# Patient Record
Sex: MALE | Race: WHITE | NOT HISPANIC OR LATINO | URBAN - METROPOLITAN AREA
[De-identification: names, ages, dates, MRNs, and addresses within clinical notes are randomized per-mention and may not be internally consistent; named-entity substitution may affect disease eponyms.]

---

## 2017-01-01 ENCOUNTER — EMERGENCY (EMERGENCY)
Facility: HOSPITAL | Age: 72
LOS: 1 days | Discharge: HOME | End: 2017-01-01
Attending: EMERGENCY MEDICINE
Payer: MEDICARE

## 2017-01-01 ENCOUNTER — OUTPATIENT (OUTPATIENT)
Dept: OUTPATIENT SERVICES | Facility: HOSPITAL | Age: 72
LOS: 1 days | End: 2017-01-01

## 2017-01-01 VITALS
WEIGHT: 199.96 LBS | HEART RATE: 58 BPM | DIASTOLIC BLOOD PRESSURE: 70 MMHG | RESPIRATION RATE: 16 BRPM | HEIGHT: 68 IN | TEMPERATURE: 99 F | SYSTOLIC BLOOD PRESSURE: 123 MMHG | OXYGEN SATURATION: 98 %

## 2017-01-01 DIAGNOSIS — I25.10 ATHEROSCLEROTIC HEART DISEASE OF NATIVE CORONARY ARTERY WITHOUT ANGINA PECTORIS: ICD-10-CM

## 2017-01-01 DIAGNOSIS — M25.542 PAIN IN JOINTS OF LEFT HAND: ICD-10-CM
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
## 2017-01-01 DIAGNOSIS — E78.5 HYPERLIPIDEMIA, UNSPECIFIED: ICD-10-CM
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
## 2017-01-01 DIAGNOSIS — Z98.89 OTHER SPECIFIED POSTPROCEDURAL STATES: Chronic | ICD-10-CM

## 2017-01-01 DIAGNOSIS — I10 ESSENTIAL (PRIMARY) HYPERTENSION: ICD-10-CM

## 2017-01-01 DIAGNOSIS — E11.9 TYPE 2 DIABETES MELLITUS WITHOUT COMPLICATIONS: ICD-10-CM
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
## 2017-01-01 PROCEDURE — 73110 X-RAY EXAM OF WRIST: CPT | Mod: 26,LT

## 2017-01-01 PROCEDURE — 73130 X-RAY EXAM OF HAND: CPT | Mod: 26,LT

## 2017-01-01 PROCEDURE — 99284 EMERGENCY DEPT VISIT MOD MDM: CPT

## 2017-01-01 PROCEDURE — 73130 X-RAY EXAM OF HAND: CPT

## 2017-01-01 PROCEDURE — 73110 X-RAY EXAM OF WRIST: CPT

## 2017-01-01 PROCEDURE — 73070 X-RAY EXAM OF ELBOW: CPT

## 2017-01-01 PROCEDURE — 73070 X-RAY EXAM OF ELBOW: CPT | Mod: 26,LT

## 2017-03-19 NOTE — ED ADULT TRIAGE NOTE - CHIEF COMPLAINT QUOTE
s/p slip and fell c/o l hand pain from [facility] denies hitting head or loc

## 2017-03-19 NOTE — ED PROVIDER NOTE - OBJECTIVE STATEMENT
72 y/o male RHD with PMHx of HTN, HLD, and DM presents to the ED for L hand pain s/p slip and fall today. Pt reports that he was walking when he slipped and fell forward and hit his L hand. Pt denies numbness, tingling, weakness, LOC, head injury, or any other complaints. Allergies: Multiple.

## 2017-03-19 NOTE — ED PROVIDER NOTE - NS ED MD SCRIBE ATTENDING SCRIBE SECTIONS
HISTORY OF PRESENT ILLNESS/PAST MEDICAL/SURGICAL/SOCIAL HISTORY/HIV/REVIEW OF SYSTEMS/VITAL SIGNS( Pullset)/PHYSICAL EXAM/DISPOSITION

## 2017-03-19 NOTE — ED ADULT NURSE NOTE - OBJECTIVE STATEMENT
Pt presented to er from [facility] with c/o left hand pain s/p trip and fall. Pt denies loc, denies head trauma.

## 2017-03-19 NOTE — ED PROVIDER NOTE - PMH
CAD (coronary artery disease)    DM (diabetes mellitus)    HTN (hypertension)    Hyperlipidemia    Sleep apnea  hx of spleep

## 2017-03-19 NOTE — ED PROVIDER NOTE - PHYSICAL EXAMINATION
MSK: L hand tenderness and swelling of the dorsum aspect. Able to passively flex and extend wrist. Radial pulse 2+, forearm nontender. FROM at elbow.

## 2018-01-01 ENCOUNTER — INPATIENT (INPATIENT)
Facility: HOSPITAL | Age: 73
LOS: 2 days | DRG: 871 | End: 2018-02-10
Attending: INTERNAL MEDICINE | Admitting: INTERNAL MEDICINE
Payer: OTHER MISCELLANEOUS

## 2018-01-01 ENCOUNTER — INPATIENT (INPATIENT)
Facility: HOSPITAL | Age: 73
LOS: 17 days | Discharge: HOME | DRG: 870 | End: 2018-02-07
Attending: INTERNAL MEDICINE | Admitting: INTERNAL MEDICINE
Payer: MEDICARE

## 2018-01-01 VITALS
SYSTOLIC BLOOD PRESSURE: 113 MMHG | HEART RATE: 92 BPM | TEMPERATURE: 98 F | RESPIRATION RATE: 16 BRPM | DIASTOLIC BLOOD PRESSURE: 47 MMHG

## 2018-01-01 VITALS
SYSTOLIC BLOOD PRESSURE: 121 MMHG | DIASTOLIC BLOOD PRESSURE: 43 MMHG | RESPIRATION RATE: 22 BRPM | TEMPERATURE: 99 F | HEART RATE: 109 BPM | OXYGEN SATURATION: 94 %

## 2018-01-01 VITALS
SYSTOLIC BLOOD PRESSURE: 82 MMHG | HEART RATE: 91 BPM | OXYGEN SATURATION: 98 % | WEIGHT: 199.96 LBS | TEMPERATURE: 101 F | HEIGHT: 73 IN | DIASTOLIC BLOOD PRESSURE: 53 MMHG

## 2018-01-01 VITALS — HEART RATE: 91 BPM | RESPIRATION RATE: 20 BRPM | OXYGEN SATURATION: 100 %

## 2018-01-01 DIAGNOSIS — J80 ACUTE RESPIRATORY DISTRESS SYNDROME: ICD-10-CM

## 2018-01-01 DIAGNOSIS — L98.9 DISORDER OF THE SKIN AND SUBCUTANEOUS TISSUE, UNSPECIFIED: ICD-10-CM

## 2018-01-01 DIAGNOSIS — R53.81 OTHER MALAISE: ICD-10-CM

## 2018-01-01 DIAGNOSIS — Z98.89 OTHER SPECIFIED POSTPROCEDURAL STATES: Chronic | ICD-10-CM

## 2018-01-01 DIAGNOSIS — A41.9 SEPSIS, UNSPECIFIED ORGANISM: ICD-10-CM

## 2018-01-01 DIAGNOSIS — N18.6 END STAGE RENAL DISEASE: ICD-10-CM

## 2018-01-01 DIAGNOSIS — I12.0 HYPERTENSIVE CHRONIC KIDNEY DISEASE WITH STAGE 5 CHRONIC KIDNEY DISEASE OR END STAGE RENAL DISEASE: ICD-10-CM

## 2018-01-01 DIAGNOSIS — Z29.9 ENCOUNTER FOR PROPHYLACTIC MEASURES, UNSPECIFIED: ICD-10-CM

## 2018-01-01 DIAGNOSIS — R06.00 DYSPNEA, UNSPECIFIED: ICD-10-CM

## 2018-01-01 DIAGNOSIS — J96.00 ACUTE RESPIRATORY FAILURE, UNSPECIFIED WHETHER WITH HYPOXIA OR HYPERCAPNIA: ICD-10-CM

## 2018-01-01 DIAGNOSIS — I10 ESSENTIAL (PRIMARY) HYPERTENSION: ICD-10-CM

## 2018-01-01 DIAGNOSIS — K11.7 DISTURBANCES OF SALIVARY SECRETION: ICD-10-CM

## 2018-01-01 DIAGNOSIS — R41.0 DISORIENTATION, UNSPECIFIED: ICD-10-CM

## 2018-01-01 DIAGNOSIS — K92.2 GASTROINTESTINAL HEMORRHAGE, UNSPECIFIED: ICD-10-CM

## 2018-01-01 DIAGNOSIS — J96.90 RESPIRATORY FAILURE, UNSPECIFIED, UNSPECIFIED WHETHER WITH HYPOXIA OR HYPERCAPNIA: ICD-10-CM

## 2018-01-01 DIAGNOSIS — N17.9 ACUTE KIDNEY FAILURE, UNSPECIFIED: ICD-10-CM

## 2018-01-01 DIAGNOSIS — R52 PAIN, UNSPECIFIED: ICD-10-CM

## 2018-01-01 DIAGNOSIS — K59.03 DRUG INDUCED CONSTIPATION: ICD-10-CM

## 2018-01-01 DIAGNOSIS — Z51.5 ENCOUNTER FOR PALLIATIVE CARE: ICD-10-CM

## 2018-01-01 DIAGNOSIS — E11.9 TYPE 2 DIABETES MELLITUS WITHOUT COMPLICATIONS: ICD-10-CM

## 2018-01-01 DIAGNOSIS — R69 ILLNESS, UNSPECIFIED: ICD-10-CM

## 2018-01-01 DIAGNOSIS — N19 UNSPECIFIED KIDNEY FAILURE: ICD-10-CM

## 2018-01-01 DIAGNOSIS — I25.10 ATHEROSCLEROTIC HEART DISEASE OF NATIVE CORONARY ARTERY WITHOUT ANGINA PECTORIS: ICD-10-CM

## 2018-01-01 DIAGNOSIS — J96.01 ACUTE RESPIRATORY FAILURE WITH HYPOXIA: ICD-10-CM

## 2018-01-01 LAB
24R-OH-CALCIDIOL SERPL-MCNC: 31 NG/ML — SIGNIFICANT CHANGE UP (ref 30–80)
ALBUMIN SERPL ELPH-MCNC: 1.5 G/DL — LOW (ref 3.5–5)
ALBUMIN SERPL ELPH-MCNC: 1.5 G/DL — LOW (ref 3.5–5)
ALBUMIN SERPL ELPH-MCNC: 1.6 G/DL — LOW (ref 3.5–5)
ALBUMIN SERPL ELPH-MCNC: 1.8 G/DL — LOW (ref 3.5–5)
ALBUMIN SERPL ELPH-MCNC: 1.9 G/DL — LOW (ref 3.5–5)
ALBUMIN SERPL ELPH-MCNC: 2 G/DL — LOW (ref 3.5–5)
ALBUMIN SERPL ELPH-MCNC: 2 G/DL — LOW (ref 3.5–5)
ALBUMIN SERPL ELPH-MCNC: 2.1 G/DL — LOW (ref 3.5–5)
ALBUMIN SERPL ELPH-MCNC: 2.2 G/DL — LOW (ref 3.5–5)
ALBUMIN SERPL ELPH-MCNC: 2.5 G/DL — LOW (ref 3.5–5)
ALBUMIN SERPL ELPH-MCNC: 3.3 G/DL — LOW (ref 3.5–5)
ALP SERPL-CCNC: 118 U/L — SIGNIFICANT CHANGE UP (ref 40–120)
ALP SERPL-CCNC: 119 U/L — SIGNIFICANT CHANGE UP (ref 40–120)
ALP SERPL-CCNC: 131 U/L — HIGH (ref 40–120)
ALP SERPL-CCNC: 142 U/L — HIGH (ref 40–120)
ALP SERPL-CCNC: 145 U/L — HIGH (ref 40–120)
ALP SERPL-CCNC: 160 U/L — HIGH (ref 40–120)
ALP SERPL-CCNC: 165 U/L — HIGH (ref 40–120)
ALP SERPL-CCNC: 207 U/L — HIGH (ref 40–120)
ALP SERPL-CCNC: 374 U/L — HIGH (ref 40–120)
ALP SERPL-CCNC: 43 U/L — SIGNIFICANT CHANGE UP (ref 40–120)
ALP SERPL-CCNC: 52 U/L — SIGNIFICANT CHANGE UP (ref 40–120)
ALP SERPL-CCNC: 65 U/L — SIGNIFICANT CHANGE UP (ref 40–120)
ALP SERPL-CCNC: 78 U/L — SIGNIFICANT CHANGE UP (ref 40–120)
ALP SERPL-CCNC: 85 U/L — SIGNIFICANT CHANGE UP (ref 40–120)
ALP SERPL-CCNC: 96 U/L — SIGNIFICANT CHANGE UP (ref 40–120)
ALT FLD-CCNC: 100 U/L DA — HIGH (ref 10–60)
ALT FLD-CCNC: 111 U/L DA — HIGH (ref 10–60)
ALT FLD-CCNC: 122 U/L DA — HIGH (ref 10–60)
ALT FLD-CCNC: 147 U/L DA — HIGH (ref 10–60)
ALT FLD-CCNC: 175 U/L DA — HIGH (ref 10–60)
ALT FLD-CCNC: 22 U/L DA — SIGNIFICANT CHANGE UP (ref 10–60)
ALT FLD-CCNC: 32 U/L DA — SIGNIFICANT CHANGE UP (ref 10–60)
ALT FLD-CCNC: 35 U/L DA — SIGNIFICANT CHANGE UP (ref 10–60)
ALT FLD-CCNC: 36 U/L DA — SIGNIFICANT CHANGE UP (ref 10–60)
ALT FLD-CCNC: 36 U/L DA — SIGNIFICANT CHANGE UP (ref 10–60)
ALT FLD-CCNC: 48 U/L DA — SIGNIFICANT CHANGE UP (ref 10–60)
ALT FLD-CCNC: 72 U/L DA — HIGH (ref 10–60)
ALT FLD-CCNC: 86 U/L DA — HIGH (ref 10–60)
ALT FLD-CCNC: 92 U/L DA — HIGH (ref 10–60)
ALT FLD-CCNC: 97 U/L DA — HIGH (ref 10–60)
ANION GAP SERPL CALC-SCNC: 10 MMOL/L — SIGNIFICANT CHANGE UP (ref 5–17)
ANION GAP SERPL CALC-SCNC: 11 MMOL/L — SIGNIFICANT CHANGE UP (ref 5–17)
ANION GAP SERPL CALC-SCNC: 12 MMOL/L — SIGNIFICANT CHANGE UP (ref 5–17)
ANION GAP SERPL CALC-SCNC: 14 MMOL/L — SIGNIFICANT CHANGE UP (ref 5–17)
ANION GAP SERPL CALC-SCNC: 14 MMOL/L — SIGNIFICANT CHANGE UP (ref 5–17)
ANION GAP SERPL CALC-SCNC: 15 MMOL/L — SIGNIFICANT CHANGE UP (ref 5–17)
ANION GAP SERPL CALC-SCNC: 16 MMOL/L — SIGNIFICANT CHANGE UP (ref 5–17)
ANION GAP SERPL CALC-SCNC: 17 MMOL/L — SIGNIFICANT CHANGE UP (ref 5–17)
ANION GAP SERPL CALC-SCNC: 18 MMOL/L — HIGH (ref 5–17)
ANION GAP SERPL CALC-SCNC: 19 MMOL/L — HIGH (ref 5–17)
ANION GAP SERPL CALC-SCNC: 19 MMOL/L — HIGH (ref 5–17)
ANION GAP SERPL CALC-SCNC: 20 MMOL/L — HIGH (ref 5–17)
ANION GAP SERPL CALC-SCNC: 23 MMOL/L — HIGH (ref 5–17)
ANION GAP SERPL CALC-SCNC: 9 MMOL/L — SIGNIFICANT CHANGE UP (ref 5–17)
APPEARANCE UR: ABNORMAL
APPEARANCE UR: CLEAR — SIGNIFICANT CHANGE UP
APPEARANCE UR: CLEAR — SIGNIFICANT CHANGE UP
APTT BLD: 102.4 SEC — HIGH (ref 27.5–37.4)
APTT BLD: 27.6 SEC — SIGNIFICANT CHANGE UP (ref 27.5–37.4)
APTT BLD: 32.5 SEC — SIGNIFICANT CHANGE UP (ref 27.5–37.4)
APTT BLD: 40.1 SEC — HIGH (ref 27.5–37.4)
APTT BLD: 56 SEC — HIGH (ref 27.5–37.4)
APTT BLD: 60.2 SEC — HIGH (ref 27.5–37.4)
APTT BLD: 64 SEC — HIGH (ref 27.5–37.4)
APTT BLD: 82 SEC — HIGH (ref 27.5–37.4)
AST SERPL-CCNC: 105 U/L — HIGH (ref 10–40)
AST SERPL-CCNC: 113 U/L — HIGH (ref 10–40)
AST SERPL-CCNC: 114 U/L — HIGH (ref 10–40)
AST SERPL-CCNC: 119 U/L — HIGH (ref 10–40)
AST SERPL-CCNC: 129 U/L — HIGH (ref 10–40)
AST SERPL-CCNC: 130 U/L — HIGH (ref 10–40)
AST SERPL-CCNC: 185 U/L — HIGH (ref 10–40)
AST SERPL-CCNC: 29 U/L — SIGNIFICANT CHANGE UP (ref 10–40)
AST SERPL-CCNC: 29 U/L — SIGNIFICANT CHANGE UP (ref 10–40)
AST SERPL-CCNC: 33 U/L — SIGNIFICANT CHANGE UP (ref 10–40)
AST SERPL-CCNC: 34 U/L — SIGNIFICANT CHANGE UP (ref 10–40)
AST SERPL-CCNC: 50 U/L — HIGH (ref 10–40)
AST SERPL-CCNC: 66 U/L — HIGH (ref 10–40)
AST SERPL-CCNC: 77 U/L — HIGH (ref 10–40)
AST SERPL-CCNC: 98 U/L — HIGH (ref 10–40)
BASE EXCESS BLDA CALC-SCNC: -10 MMOL/L — LOW (ref -2–2)
BASE EXCESS BLDA CALC-SCNC: -10.3 MMOL/L — LOW (ref -2–2)
BASE EXCESS BLDA CALC-SCNC: -10.4 MMOL/L — LOW (ref -2–2)
BASE EXCESS BLDA CALC-SCNC: -11.3 MMOL/L — LOW (ref -2–2)
BASE EXCESS BLDA CALC-SCNC: -4.6 MMOL/L — LOW (ref -2–2)
BASE EXCESS BLDA CALC-SCNC: -4.7 MMOL/L — LOW (ref -2–2)
BASE EXCESS BLDA CALC-SCNC: -5.8 MMOL/L — LOW (ref -2–2)
BASE EXCESS BLDA CALC-SCNC: -6.4 MMOL/L — LOW (ref -2–2)
BASE EXCESS BLDA CALC-SCNC: -6.8 MMOL/L — LOW (ref -2–2)
BASE EXCESS BLDA CALC-SCNC: -7 MMOL/L — LOW (ref -2–2)
BASE EXCESS BLDA CALC-SCNC: -7.4 MMOL/L — LOW (ref -2–2)
BASE EXCESS BLDA CALC-SCNC: -7.7 MMOL/L — LOW (ref -2–2)
BASE EXCESS BLDA CALC-SCNC: -8.1 MMOL/L — LOW (ref -2–2)
BASE EXCESS BLDA CALC-SCNC: -8.5 MMOL/L — LOW (ref -2–2)
BASE EXCESS BLDA CALC-SCNC: -8.6 MMOL/L — LOW (ref -2–2)
BASE EXCESS BLDA CALC-SCNC: -9.3 MMOL/L — LOW (ref -2–2)
BASE EXCESS BLDA CALC-SCNC: -9.5 MMOL/L — LOW (ref -2–2)
BASE EXCESS BLDV CALC-SCNC: -11.7 MMOL/L — LOW (ref -2–2)
BASE EXCESS BLDV CALC-SCNC: -5.3 MMOL/L — LOW (ref -2–2)
BASE EXCESS BLDV CALC-SCNC: -7.3 MMOL/L — LOW (ref -2–2)
BASE EXCESS BLDV CALC-SCNC: -8.3 MMOL/L — LOW (ref -2–2)
BASE EXCESS BLDV CALC-SCNC: -9.9 MMOL/L — LOW (ref -2–2)
BASOPHILS # BLD AUTO: 0 K/UL — SIGNIFICANT CHANGE UP (ref 0–0.2)
BASOPHILS # BLD AUTO: 0.1 K/UL — SIGNIFICANT CHANGE UP (ref 0–0.2)
BASOPHILS # BLD AUTO: 0.2 K/UL — SIGNIFICANT CHANGE UP (ref 0–0.2)
BASOPHILS NFR BLD AUTO: 0.5 % — SIGNIFICANT CHANGE UP (ref 0–2)
BASOPHILS NFR BLD AUTO: 0.8 % — SIGNIFICANT CHANGE UP (ref 0–2)
BASOPHILS NFR BLD AUTO: 0.9 % — SIGNIFICANT CHANGE UP (ref 0–2)
BASOPHILS NFR BLD AUTO: 1 % — SIGNIFICANT CHANGE UP (ref 0–2)
BASOPHILS NFR BLD AUTO: 1 % — SIGNIFICANT CHANGE UP (ref 0–2)
BASOPHILS NFR BLD AUTO: 1.1 % — SIGNIFICANT CHANGE UP (ref 0–2)
BASOPHILS NFR BLD AUTO: 1.2 % — SIGNIFICANT CHANGE UP (ref 0–2)
BASOPHILS NFR BLD AUTO: 1.5 % — SIGNIFICANT CHANGE UP (ref 0–2)
BILIRUB DIRECT SERPL-MCNC: 0.5 MG/DL — HIGH (ref 0–0.2)
BILIRUB DIRECT SERPL-MCNC: 1.4 MG/DL — HIGH (ref 0–0.2)
BILIRUB DIRECT SERPL-MCNC: 1.6 MG/DL — HIGH (ref 0–0.2)
BILIRUB INDIRECT FLD-MCNC: 0.4 MG/DL — SIGNIFICANT CHANGE UP (ref 0.2–1)
BILIRUB INDIRECT FLD-MCNC: 0.6 MG/DL — SIGNIFICANT CHANGE UP (ref 0.2–1)
BILIRUB INDIRECT FLD-MCNC: 0.7 MG/DL — SIGNIFICANT CHANGE UP (ref 0.2–1)
BILIRUB SERPL-MCNC: 0.5 MG/DL — SIGNIFICANT CHANGE UP (ref 0.2–1.2)
BILIRUB SERPL-MCNC: 0.5 MG/DL — SIGNIFICANT CHANGE UP (ref 0.2–1.2)
BILIRUB SERPL-MCNC: 0.7 MG/DL — SIGNIFICANT CHANGE UP (ref 0.2–1.2)
BILIRUB SERPL-MCNC: 0.7 MG/DL — SIGNIFICANT CHANGE UP (ref 0.2–1.2)
BILIRUB SERPL-MCNC: 0.8 MG/DL — SIGNIFICANT CHANGE UP (ref 0.2–1.2)
BILIRUB SERPL-MCNC: 0.9 MG/DL — SIGNIFICANT CHANGE UP (ref 0.2–1.2)
BILIRUB SERPL-MCNC: 1 MG/DL — SIGNIFICANT CHANGE UP (ref 0.2–1.2)
BILIRUB SERPL-MCNC: 1.1 MG/DL — SIGNIFICANT CHANGE UP (ref 0.2–1.2)
BILIRUB SERPL-MCNC: 1.1 MG/DL — SIGNIFICANT CHANGE UP (ref 0.2–1.2)
BILIRUB SERPL-MCNC: 1.2 MG/DL — SIGNIFICANT CHANGE UP (ref 0.2–1.2)
BILIRUB SERPL-MCNC: 1.4 MG/DL — HIGH (ref 0.2–1.2)
BILIRUB SERPL-MCNC: 1.7 MG/DL — HIGH (ref 0.2–1.2)
BILIRUB SERPL-MCNC: 2 MG/DL — HIGH (ref 0.2–1.2)
BILIRUB SERPL-MCNC: 2.1 MG/DL — HIGH (ref 0.2–1.2)
BILIRUB SERPL-MCNC: 2.2 MG/DL — HIGH (ref 0.2–1.2)
BILIRUB UR-MCNC: ABNORMAL
BILIRUB UR-MCNC: ABNORMAL
BILIRUB UR-MCNC: NEGATIVE — SIGNIFICANT CHANGE UP
BLOOD GAS COMMENTS ARTERIAL: SIGNIFICANT CHANGE UP
BLOOD GAS COMMENTS, VENOUS: SIGNIFICANT CHANGE UP
BUN SERPL-MCNC: 104 MG/DL — HIGH (ref 7–18)
BUN SERPL-MCNC: 108 MG/DL — HIGH (ref 7–18)
BUN SERPL-MCNC: 114 MG/DL — HIGH (ref 7–18)
BUN SERPL-MCNC: 122 MG/DL — HIGH (ref 7–18)
BUN SERPL-MCNC: 125 MG/DL — HIGH (ref 7–18)
BUN SERPL-MCNC: 137 MG/DL — HIGH (ref 7–18)
BUN SERPL-MCNC: 143 MG/DL — HIGH (ref 7–18)
BUN SERPL-MCNC: 143 MG/DL — HIGH (ref 7–18)
BUN SERPL-MCNC: 144 MG/DL — HIGH (ref 7–18)
BUN SERPL-MCNC: 156 MG/DL — HIGH (ref 7–18)
BUN SERPL-MCNC: 161 MG/DL — HIGH (ref 7–18)
BUN SERPL-MCNC: 161 MG/DL — SIGNIFICANT CHANGE UP (ref 7–18)
BUN SERPL-MCNC: 161 MG/DL — SIGNIFICANT CHANGE UP (ref 7–18)
BUN SERPL-MCNC: 165 MG/DL — SIGNIFICANT CHANGE UP (ref 7–18)
BUN SERPL-MCNC: 167 MG/DL — HIGH (ref 7–18)
BUN SERPL-MCNC: 175 MG/DL — HIGH (ref 7–18)
BUN SERPL-MCNC: 194 MG/DL — HIGH (ref 7–18)
BUN SERPL-MCNC: 20 MG/DL — HIGH (ref 7–18)
BUN SERPL-MCNC: 204 MG/DL — SIGNIFICANT CHANGE UP (ref 7–18)
BUN SERPL-MCNC: 204 MG/DL — SIGNIFICANT CHANGE UP (ref 7–18)
BUN SERPL-MCNC: 219 MG/DL — SIGNIFICANT CHANGE UP (ref 7–18)
BUN SERPL-MCNC: 224 MG/DL — SIGNIFICANT CHANGE UP (ref 7–18)
BUN SERPL-MCNC: 42 MG/DL — HIGH (ref 7–18)
BUN SERPL-MCNC: 53 MG/DL — HIGH (ref 7–18)
BUN SERPL-MCNC: 67 MG/DL — HIGH (ref 7–18)
BUN SERPL-MCNC: 71 MG/DL — HIGH (ref 7–18)
BUN SERPL-MCNC: 74 MG/DL — HIGH (ref 7–18)
BUN SERPL-MCNC: 76 MG/DL — HIGH (ref 7–18)
BUN SERPL-MCNC: 82 MG/DL — HIGH (ref 7–18)
BUN SERPL-MCNC: 82 MG/DL — HIGH (ref 7–18)
BUN SERPL-MCNC: 87 MG/DL — HIGH (ref 7–18)
BUN SERPL-MCNC: 88 MG/DL — HIGH (ref 7–18)
CALCIUM SERPL-MCNC: 6.1 MG/DL — SIGNIFICANT CHANGE UP (ref 8.4–10.5)
CALCIUM SERPL-MCNC: 6.2 MG/DL — CRITICAL LOW (ref 8.4–10.5)
CALCIUM SERPL-MCNC: 6.3 MG/DL — CRITICAL LOW (ref 8.4–10.5)
CALCIUM SERPL-MCNC: 6.3 MG/DL — CRITICAL LOW (ref 8.4–10.5)
CALCIUM SERPL-MCNC: 6.4 MG/DL — CRITICAL LOW (ref 8.4–10.5)
CALCIUM SERPL-MCNC: 6.5 MG/DL — CRITICAL LOW (ref 8.4–10.5)
CALCIUM SERPL-MCNC: 6.5 MG/DL — CRITICAL LOW (ref 8.4–10.5)
CALCIUM SERPL-MCNC: 6.5 MG/DL — SIGNIFICANT CHANGE UP (ref 8.4–10.5)
CALCIUM SERPL-MCNC: 6.6 MG/DL — LOW (ref 8.4–10.5)
CALCIUM SERPL-MCNC: 6.7 MG/DL — LOW (ref 8.4–10.5)
CALCIUM SERPL-MCNC: 6.7 MG/DL — LOW (ref 8.4–10.5)
CALCIUM SERPL-MCNC: 6.8 MG/DL — LOW (ref 8.4–10.5)
CALCIUM SERPL-MCNC: 7 MG/DL — LOW (ref 8.4–10.5)
CALCIUM SERPL-MCNC: 7 MG/DL — LOW (ref 8.4–10.5)
CALCIUM SERPL-MCNC: 7.1 MG/DL — LOW (ref 8.4–10.5)
CALCIUM SERPL-MCNC: 7.2 MG/DL — LOW (ref 8.4–10.5)
CALCIUM SERPL-MCNC: 7.4 MG/DL — LOW (ref 8.4–10.5)
CALCIUM SERPL-MCNC: 7.4 MG/DL — LOW (ref 8.4–10.5)
CALCIUM SERPL-MCNC: 7.6 MG/DL — LOW (ref 8.4–10.5)
CALCIUM SERPL-MCNC: 7.6 MG/DL — LOW (ref 8.4–10.5)
CALCIUM SERPL-MCNC: 7.9 MG/DL — LOW (ref 8.4–10.5)
CALCIUM SERPL-MCNC: 8 MG/DL — LOW (ref 8.4–10.5)
CALCIUM SERPL-MCNC: 8 MG/DL — LOW (ref 8.4–10.5)
CALCIUM SERPL-MCNC: 8.5 MG/DL — SIGNIFICANT CHANGE UP (ref 8.4–10.5)
CALCIUM UR-MCNC: 1 MG/DL — SIGNIFICANT CHANGE UP
CHLORIDE SERPL-SCNC: 100 MMOL/L — SIGNIFICANT CHANGE UP (ref 96–108)
CHLORIDE SERPL-SCNC: 100 MMOL/L — SIGNIFICANT CHANGE UP (ref 96–108)
CHLORIDE SERPL-SCNC: 101 MMOL/L — SIGNIFICANT CHANGE UP (ref 96–108)
CHLORIDE SERPL-SCNC: 102 MMOL/L — SIGNIFICANT CHANGE UP (ref 96–108)
CHLORIDE SERPL-SCNC: 102 MMOL/L — SIGNIFICANT CHANGE UP (ref 96–108)
CHLORIDE SERPL-SCNC: 105 MMOL/L — SIGNIFICANT CHANGE UP (ref 96–108)
CHLORIDE SERPL-SCNC: 106 MMOL/L — SIGNIFICANT CHANGE UP (ref 96–108)
CHLORIDE SERPL-SCNC: 107 MMOL/L — SIGNIFICANT CHANGE UP (ref 96–108)
CHLORIDE SERPL-SCNC: 107 MMOL/L — SIGNIFICANT CHANGE UP (ref 96–108)
CHLORIDE SERPL-SCNC: 109 MMOL/L — HIGH (ref 96–108)
CHLORIDE SERPL-SCNC: 109 MMOL/L — HIGH (ref 96–108)
CHLORIDE SERPL-SCNC: 113 MMOL/L — HIGH (ref 96–108)
CHLORIDE SERPL-SCNC: 94 MMOL/L — LOW (ref 96–108)
CHLORIDE SERPL-SCNC: 95 MMOL/L — LOW (ref 96–108)
CHLORIDE SERPL-SCNC: 95 MMOL/L — LOW (ref 96–108)
CHLORIDE SERPL-SCNC: 96 MMOL/L — SIGNIFICANT CHANGE UP (ref 96–108)
CHLORIDE SERPL-SCNC: 97 MMOL/L — SIGNIFICANT CHANGE UP (ref 96–108)
CHLORIDE SERPL-SCNC: 98 MMOL/L — SIGNIFICANT CHANGE UP (ref 96–108)
CHLORIDE SERPL-SCNC: 98 MMOL/L — SIGNIFICANT CHANGE UP (ref 96–108)
CHLORIDE SERPL-SCNC: 99 MMOL/L — SIGNIFICANT CHANGE UP (ref 96–108)
CHOLEST SERPL-MCNC: <50 MG/DL — LOW (ref 10–199)
CK MB BLD-MCNC: 0.1 % — SIGNIFICANT CHANGE UP (ref 0–3.5)
CK MB BLD-MCNC: 0.2 % — SIGNIFICANT CHANGE UP (ref 0–3.5)
CK MB BLD-MCNC: 0.2 % — SIGNIFICANT CHANGE UP (ref 0–3.5)
CK MB BLD-MCNC: 0.3 % — SIGNIFICANT CHANGE UP (ref 0–3.5)
CK MB CFR SERPL CALC: 1.4 NG/ML — SIGNIFICANT CHANGE UP (ref 0–3.6)
CK MB CFR SERPL CALC: 2.4 NG/ML — SIGNIFICANT CHANGE UP (ref 0–3.6)
CK MB CFR SERPL CALC: 2.9 NG/ML — SIGNIFICANT CHANGE UP (ref 0–3.6)
CK MB CFR SERPL CALC: 3.3 NG/ML — SIGNIFICANT CHANGE UP (ref 0–3.6)
CK SERPL-CCNC: 1005 U/L — HIGH (ref 35–232)
CK SERPL-CCNC: 1224 U/L — HIGH (ref 35–232)
CK SERPL-CCNC: 1298 U/L — HIGH (ref 35–232)
CK SERPL-CCNC: 1395 U/L — HIGH (ref 35–232)
CK SERPL-CCNC: 700 U/L — HIGH (ref 35–232)
CO2 SERPL-SCNC: 17 MMOL/L — LOW (ref 22–31)
CO2 SERPL-SCNC: 17 MMOL/L — LOW (ref 22–31)
CO2 SERPL-SCNC: 18 MMOL/L — LOW (ref 22–31)
CO2 SERPL-SCNC: 19 MMOL/L — LOW (ref 22–31)
CO2 SERPL-SCNC: 19 MMOL/L — LOW (ref 22–31)
CO2 SERPL-SCNC: 20 MMOL/L — LOW (ref 22–31)
CO2 SERPL-SCNC: 21 MMOL/L — LOW (ref 22–31)
CO2 SERPL-SCNC: 22 MMOL/L — SIGNIFICANT CHANGE UP (ref 22–31)
CO2 SERPL-SCNC: 23 MMOL/L — SIGNIFICANT CHANGE UP (ref 22–31)
CO2 SERPL-SCNC: 23 MMOL/L — SIGNIFICANT CHANGE UP (ref 22–31)
COLOR SPEC: YELLOW — SIGNIFICANT CHANGE UP
CREAT ?TM UR-MCNC: 85 MG/DL — SIGNIFICANT CHANGE UP
CREAT ?TM UR-MCNC: 90 MG/DL — SIGNIFICANT CHANGE UP
CREAT SERPL-MCNC: 0.8 MG/DL — SIGNIFICANT CHANGE UP (ref 0.5–1.3)
CREAT SERPL-MCNC: 2.66 MG/DL — HIGH (ref 0.5–1.3)
CREAT SERPL-MCNC: 2.92 MG/DL — HIGH (ref 0.5–1.3)
CREAT SERPL-MCNC: 3.77 MG/DL — HIGH (ref 0.5–1.3)
CREAT SERPL-MCNC: 4.73 MG/DL — HIGH (ref 0.5–1.3)
CREAT SERPL-MCNC: 4.78 MG/DL — HIGH (ref 0.5–1.3)
CREAT SERPL-MCNC: 4.94 MG/DL — HIGH (ref 0.5–1.3)
CREAT SERPL-MCNC: 5.18 MG/DL — HIGH (ref 0.5–1.3)
CREAT SERPL-MCNC: 5.25 MG/DL — HIGH (ref 0.5–1.3)
CREAT SERPL-MCNC: 5.26 MG/DL — HIGH (ref 0.5–1.3)
CREAT SERPL-MCNC: 5.65 MG/DL — HIGH (ref 0.5–1.3)
CREAT SERPL-MCNC: 6.02 MG/DL — HIGH (ref 0.5–1.3)
CREAT SERPL-MCNC: 6.04 MG/DL — HIGH (ref 0.5–1.3)
CREAT SERPL-MCNC: 6.04 MG/DL — HIGH (ref 0.5–1.3)
CREAT SERPL-MCNC: 6.45 MG/DL — HIGH (ref 0.5–1.3)
CREAT SERPL-MCNC: 6.61 MG/DL — HIGH (ref 0.5–1.3)
CREAT SERPL-MCNC: 6.63 MG/DL — HIGH (ref 0.5–1.3)
CREAT SERPL-MCNC: 6.75 MG/DL — HIGH (ref 0.5–1.3)
CREAT SERPL-MCNC: 6.87 MG/DL — HIGH (ref 0.5–1.3)
CREAT SERPL-MCNC: 6.87 MG/DL — HIGH (ref 0.5–1.3)
CREAT SERPL-MCNC: 6.91 MG/DL — HIGH (ref 0.5–1.3)
CREAT SERPL-MCNC: 6.91 MG/DL — HIGH (ref 0.5–1.3)
CREAT SERPL-MCNC: 6.96 MG/DL — HIGH (ref 0.5–1.3)
CREAT SERPL-MCNC: 7.14 MG/DL — HIGH (ref 0.5–1.3)
CREAT SERPL-MCNC: 7.25 MG/DL — HIGH (ref 0.5–1.3)
CREAT SERPL-MCNC: 7.34 MG/DL — HIGH (ref 0.5–1.3)
CREAT SERPL-MCNC: 7.46 MG/DL — HIGH (ref 0.5–1.3)
CREAT SERPL-MCNC: 7.62 MG/DL — HIGH (ref 0.5–1.3)
CREAT SERPL-MCNC: 7.7 MG/DL — HIGH (ref 0.5–1.3)
CREAT SERPL-MCNC: 7.72 MG/DL — HIGH (ref 0.5–1.3)
CREAT SERPL-MCNC: 7.92 MG/DL — HIGH (ref 0.5–1.3)
CREAT SERPL-MCNC: 7.97 MG/DL — HIGH (ref 0.5–1.3)
CULTURE RESULTS: NO GROWTH — SIGNIFICANT CHANGE UP
CULTURE RESULTS: SIGNIFICANT CHANGE UP
D DIMER BLD IA.RAPID-MCNC: 1131 NG/ML DDU — HIGH
DIFF PNL FLD: ABNORMAL
EOSINOPHIL # BLD AUTO: 0 K/UL — SIGNIFICANT CHANGE UP (ref 0–0.5)
EOSINOPHIL # BLD AUTO: 0.1 K/UL — SIGNIFICANT CHANGE UP (ref 0–0.5)
EOSINOPHIL # BLD AUTO: 0.2 K/UL — SIGNIFICANT CHANGE UP (ref 0–0.5)
EOSINOPHIL # BLD AUTO: 0.2 K/UL — SIGNIFICANT CHANGE UP (ref 0–0.5)
EOSINOPHIL # BLD AUTO: 0.4 K/UL — SIGNIFICANT CHANGE UP (ref 0–0.5)
EOSINOPHIL NFR BLD AUTO: 0 % — SIGNIFICANT CHANGE UP (ref 0–6)
EOSINOPHIL NFR BLD AUTO: 0 % — SIGNIFICANT CHANGE UP (ref 0–6)
EOSINOPHIL NFR BLD AUTO: 0.1 % — SIGNIFICANT CHANGE UP (ref 0–6)
EOSINOPHIL NFR BLD AUTO: 0.2 % — SIGNIFICANT CHANGE UP (ref 0–6)
EOSINOPHIL NFR BLD AUTO: 0.7 % — SIGNIFICANT CHANGE UP (ref 0–6)
EOSINOPHIL NFR BLD AUTO: 0.8 % — SIGNIFICANT CHANGE UP (ref 0–6)
EOSINOPHIL NFR BLD AUTO: 0.9 % — SIGNIFICANT CHANGE UP (ref 0–6)
EOSINOPHIL NFR BLD AUTO: 1 % — SIGNIFICANT CHANGE UP (ref 0–6)
EOSINOPHIL NFR BLD AUTO: 1 % — SIGNIFICANT CHANGE UP (ref 0–6)
EOSINOPHIL NFR BLD AUTO: 1.7 % — SIGNIFICANT CHANGE UP (ref 0–6)
EOSINOPHIL NFR BLD AUTO: 2.7 % — SIGNIFICANT CHANGE UP (ref 0–6)
FIBRINOGEN PPP-MCNC: 1106 MG/DL — HIGH (ref 310–510)
FLUBV RNA SPEC QL NAA+PROBE: DETECTED
GLUCOSE SERPL-MCNC: 137 MG/DL — HIGH (ref 70–99)
GLUCOSE SERPL-MCNC: 140 MG/DL — HIGH (ref 70–99)
GLUCOSE SERPL-MCNC: 152 MG/DL — HIGH (ref 70–99)
GLUCOSE SERPL-MCNC: 157 MG/DL — HIGH (ref 70–99)
GLUCOSE SERPL-MCNC: 165 MG/DL — HIGH (ref 70–99)
GLUCOSE SERPL-MCNC: 169 MG/DL — HIGH (ref 70–99)
GLUCOSE SERPL-MCNC: 191 MG/DL — HIGH (ref 70–99)
GLUCOSE SERPL-MCNC: 227 MG/DL — HIGH (ref 70–99)
GLUCOSE SERPL-MCNC: 236 MG/DL — HIGH (ref 70–99)
GLUCOSE SERPL-MCNC: 244 MG/DL — HIGH (ref 70–99)
GLUCOSE SERPL-MCNC: 254 MG/DL — HIGH (ref 70–99)
GLUCOSE SERPL-MCNC: 255 MG/DL — HIGH (ref 70–99)
GLUCOSE SERPL-MCNC: 261 MG/DL — HIGH (ref 70–99)
GLUCOSE SERPL-MCNC: 263 MG/DL — HIGH (ref 70–99)
GLUCOSE SERPL-MCNC: 265 MG/DL — HIGH (ref 70–99)
GLUCOSE SERPL-MCNC: 274 MG/DL — HIGH (ref 70–99)
GLUCOSE SERPL-MCNC: 292 MG/DL — HIGH (ref 70–99)
GLUCOSE SERPL-MCNC: 297 MG/DL — HIGH (ref 70–99)
GLUCOSE SERPL-MCNC: 301 MG/DL — HIGH (ref 70–99)
GLUCOSE SERPL-MCNC: 303 MG/DL — HIGH (ref 70–99)
GLUCOSE SERPL-MCNC: 304 MG/DL — HIGH (ref 70–99)
GLUCOSE SERPL-MCNC: 309 MG/DL — HIGH (ref 70–99)
GLUCOSE SERPL-MCNC: 314 MG/DL — HIGH (ref 70–99)
GLUCOSE SERPL-MCNC: 315 MG/DL — HIGH (ref 70–99)
GLUCOSE SERPL-MCNC: 322 MG/DL — HIGH (ref 70–99)
GLUCOSE SERPL-MCNC: 342 MG/DL — HIGH (ref 70–99)
GLUCOSE SERPL-MCNC: 351 MG/DL — HIGH (ref 70–99)
GLUCOSE SERPL-MCNC: 379 MG/DL — HIGH (ref 70–99)
GLUCOSE SERPL-MCNC: 412 MG/DL — HIGH (ref 70–99)
GLUCOSE SERPL-MCNC: 419 MG/DL — HIGH (ref 70–99)
GLUCOSE SERPL-MCNC: 429 MG/DL — HIGH (ref 70–99)
GLUCOSE SERPL-MCNC: 447 MG/DL — HIGH (ref 70–99)
GLUCOSE UR QL: 100 MG/DL
GLUCOSE UR QL: NEGATIVE — SIGNIFICANT CHANGE UP
GLUCOSE UR QL: NEGATIVE — SIGNIFICANT CHANGE UP
GRAM STN FLD: SIGNIFICANT CHANGE UP
GRAM STN FLD: SIGNIFICANT CHANGE UP
HAPTOGLOB SERPL-MCNC: 92 MG/DL — SIGNIFICANT CHANGE UP (ref 34–200)
HAV IGM SER-ACNC: SIGNIFICANT CHANGE UP
HBA1C BLD-MCNC: 6 % — HIGH (ref 4–5.6)
HBV CORE IGM SER-ACNC: SIGNIFICANT CHANGE UP
HBV SURFACE AG SER-ACNC: SIGNIFICANT CHANGE UP
HCO3 BLDA-SCNC: 15 MMOL/L — LOW (ref 23–27)
HCO3 BLDA-SCNC: 15 MMOL/L — LOW (ref 23–27)
HCO3 BLDA-SCNC: 16 MMOL/L — LOW (ref 23–27)
HCO3 BLDA-SCNC: 17 MMOL/L — LOW (ref 23–27)
HCO3 BLDA-SCNC: 18 MMOL/L — LOW (ref 23–27)
HCO3 BLDA-SCNC: 19 MMOL/L — LOW (ref 23–27)
HCO3 BLDA-SCNC: 20 MMOL/L — LOW (ref 23–27)
HCO3 BLDA-SCNC: 20 MMOL/L — LOW (ref 23–27)
HCO3 BLDV-SCNC: 13 MMOL/L — LOW (ref 21–29)
HCO3 BLDV-SCNC: 16 MMOL/L — LOW (ref 21–29)
HCO3 BLDV-SCNC: 18 MMOL/L — LOW (ref 21–29)
HCO3 BLDV-SCNC: 19 MMOL/L — LOW (ref 21–29)
HCO3 BLDV-SCNC: 20 MMOL/L — LOW (ref 21–29)
HCT VFR BLD CALC: 21.9 % — LOW (ref 39–50)
HCT VFR BLD CALC: 22.3 % — LOW (ref 39–50)
HCT VFR BLD CALC: 23.2 % — LOW (ref 39–50)
HCT VFR BLD CALC: 23.2 % — LOW (ref 39–50)
HCT VFR BLD CALC: 23.4 % — LOW (ref 39–50)
HCT VFR BLD CALC: 23.9 % — LOW (ref 39–50)
HCT VFR BLD CALC: 24 % — LOW (ref 39–50)
HCT VFR BLD CALC: 24.8 % — LOW (ref 39–50)
HCT VFR BLD CALC: 25.1 % — LOW (ref 39–50)
HCT VFR BLD CALC: 25.2 % — LOW (ref 39–50)
HCT VFR BLD CALC: 26 % — LOW (ref 39–50)
HCT VFR BLD CALC: 27.3 % — LOW (ref 39–50)
HCT VFR BLD CALC: 27.6 % — LOW (ref 39–50)
HCT VFR BLD CALC: 29.2 % — LOW (ref 39–50)
HCT VFR BLD CALC: 29.3 % — LOW (ref 39–50)
HCT VFR BLD CALC: 31.1 % — LOW (ref 39–50)
HCT VFR BLD CALC: 32.1 % — LOW (ref 39–50)
HCT VFR BLD CALC: 32.8 % — LOW (ref 39–50)
HCT VFR BLD CALC: 33.6 % — LOW (ref 39–50)
HCT VFR BLD CALC: 33.6 % — LOW (ref 39–50)
HCT VFR BLD CALC: 34.5 % — LOW (ref 39–50)
HCT VFR BLD CALC: 34.6 % — LOW (ref 39–50)
HCT VFR BLD CALC: 34.8 % — LOW (ref 39–50)
HCT VFR BLD CALC: 35.4 % — LOW (ref 39–50)
HCT VFR BLD CALC: 35.8 % — LOW (ref 39–50)
HCT VFR BLD CALC: 36.2 % — LOW (ref 39–50)
HCT VFR BLD CALC: 37.4 % — LOW (ref 39–50)
HCT VFR BLD CALC: 38 % — LOW (ref 39–50)
HCT VFR BLD CALC: 38.7 % — LOW (ref 39–50)
HCT VFR BLD CALC: 39 % — SIGNIFICANT CHANGE UP (ref 39–50)
HCT VFR BLD CALC: 39.5 % — SIGNIFICANT CHANGE UP (ref 39–50)
HCT VFR BLD CALC: 44.5 % — SIGNIFICANT CHANGE UP (ref 39–50)
HCV AB S/CO SERPL IA: 0.07 S/CO — SIGNIFICANT CHANGE UP
HCV AB SERPL-IMP: SIGNIFICANT CHANGE UP
HDLC SERPL-MCNC: 9 MG/DL — LOW (ref 40–125)
HGB BLD-MCNC: 10.1 G/DL — LOW (ref 13–17)
HGB BLD-MCNC: 10.6 G/DL — LOW (ref 13–17)
HGB BLD-MCNC: 11 G/DL — LOW (ref 13–17)
HGB BLD-MCNC: 11.4 G/DL — LOW (ref 13–17)
HGB BLD-MCNC: 11.5 G/DL — LOW (ref 13–17)
HGB BLD-MCNC: 11.6 G/DL — LOW (ref 13–17)
HGB BLD-MCNC: 11.6 G/DL — LOW (ref 13–17)
HGB BLD-MCNC: 11.7 G/DL — LOW (ref 13–17)
HGB BLD-MCNC: 11.9 G/DL — LOW (ref 13–17)
HGB BLD-MCNC: 12.5 G/DL — LOW (ref 13–17)
HGB BLD-MCNC: 12.5 G/DL — LOW (ref 13–17)
HGB BLD-MCNC: 12.6 G/DL — LOW (ref 13–17)
HGB BLD-MCNC: 12.8 G/DL — LOW (ref 13–17)
HGB BLD-MCNC: 12.9 G/DL — LOW (ref 13–17)
HGB BLD-MCNC: 13.4 G/DL — SIGNIFICANT CHANGE UP (ref 13–17)
HGB BLD-MCNC: 13.4 G/DL — SIGNIFICANT CHANGE UP (ref 13–17)
HGB BLD-MCNC: 13.5 G/DL — SIGNIFICANT CHANGE UP (ref 13–17)
HGB BLD-MCNC: 14.7 G/DL — SIGNIFICANT CHANGE UP (ref 13–17)
HGB BLD-MCNC: 7.3 G/DL — LOW (ref 13–17)
HGB BLD-MCNC: 7.5 G/DL — LOW (ref 13–17)
HGB BLD-MCNC: 7.6 G/DL — LOW (ref 13–17)
HGB BLD-MCNC: 7.7 G/DL — LOW (ref 13–17)
HGB BLD-MCNC: 7.9 G/DL — LOW (ref 13–17)
HGB BLD-MCNC: 8 G/DL — LOW (ref 13–17)
HGB BLD-MCNC: 8 G/DL — LOW (ref 13–17)
HGB BLD-MCNC: 8.2 G/DL — LOW (ref 13–17)
HGB BLD-MCNC: 8.5 G/DL — LOW (ref 13–17)
HGB BLD-MCNC: 9 G/DL — LOW (ref 13–17)
HGB BLD-MCNC: 9.3 G/DL — LOW (ref 13–17)
HGB BLD-MCNC: 9.8 G/DL — LOW (ref 13–17)
HOROWITZ INDEX BLDA+IHG-RTO: 100 — SIGNIFICANT CHANGE UP
HOROWITZ INDEX BLDA+IHG-RTO: 21 — SIGNIFICANT CHANGE UP
HOROWITZ INDEX BLDA+IHG-RTO: 40 — SIGNIFICANT CHANGE UP
HOROWITZ INDEX BLDA+IHG-RTO: 60 — SIGNIFICANT CHANGE UP
HOROWITZ INDEX BLDA+IHG-RTO: 60 — SIGNIFICANT CHANGE UP
HOROWITZ INDEX BLDA+IHG-RTO: 70 — SIGNIFICANT CHANGE UP
HOROWITZ INDEX BLDA+IHG-RTO: 80 — SIGNIFICANT CHANGE UP
HOROWITZ INDEX BLDA+IHG-RTO: 80 — SIGNIFICANT CHANGE UP
HOROWITZ INDEX BLDA+IHG-RTO: SIGNIFICANT CHANGE UP
HOROWITZ INDEX BLDV+IHG-RTO: 21 — SIGNIFICANT CHANGE UP
HOROWITZ INDEX BLDV+IHG-RTO: 40 — SIGNIFICANT CHANGE UP
INR BLD: 0.92 RATIO — SIGNIFICANT CHANGE UP (ref 0.88–1.16)
INR BLD: 1.02 RATIO — SIGNIFICANT CHANGE UP (ref 0.88–1.16)
INR BLD: 1.04 RATIO — SIGNIFICANT CHANGE UP (ref 0.88–1.16)
INR BLD: 1.09 RATIO — SIGNIFICANT CHANGE UP (ref 0.88–1.16)
INR BLD: 1.17 RATIO — HIGH (ref 0.88–1.16)
INR BLD: 1.21 RATIO — HIGH (ref 0.88–1.16)
INR BLD: 1.3 RATIO — HIGH (ref 0.88–1.16)
KETONES UR-MCNC: ABNORMAL
KETONES UR-MCNC: ABNORMAL
KETONES UR-MCNC: NEGATIVE — SIGNIFICANT CHANGE UP
LACTATE SERPL-SCNC: 0.8 MMOL/L — SIGNIFICANT CHANGE UP (ref 0.7–2)
LACTATE SERPL-SCNC: 1.9 MMOL/L — SIGNIFICANT CHANGE UP (ref 0.7–2)
LACTATE SERPL-SCNC: 2.4 MMOL/L — HIGH (ref 0.7–2)
LACTATE SERPL-SCNC: 2.6 MMOL/L — HIGH (ref 0.7–2)
LACTATE SERPL-SCNC: 7.6 MMOL/L — CRITICAL HIGH (ref 0.7–2)
LEGIONELLA AG UR QL: NEGATIVE — SIGNIFICANT CHANGE UP
LEUKOCYTE ESTERASE UR-ACNC: ABNORMAL
LIPID PNL WITH DIRECT LDL SERPL: <-40 MG/DL — SIGNIFICANT CHANGE UP
LYMPHOCYTES # BLD AUTO: 0.2 K/UL — LOW (ref 1–3.3)
LYMPHOCYTES # BLD AUTO: 0.3 K/UL — LOW (ref 1–3.3)
LYMPHOCYTES # BLD AUTO: 0.4 K/UL — LOW (ref 1–3.3)
LYMPHOCYTES # BLD AUTO: 0.5 K/UL — LOW (ref 1–3.3)
LYMPHOCYTES # BLD AUTO: 0.6 K/UL — LOW (ref 1–3.3)
LYMPHOCYTES # BLD AUTO: 0.7 K/UL — LOW (ref 1–3.3)
LYMPHOCYTES # BLD AUTO: 1 K/UL — SIGNIFICANT CHANGE UP (ref 1–3.3)
LYMPHOCYTES # BLD AUTO: 1.4 K/UL — SIGNIFICANT CHANGE UP (ref 1–3.3)
LYMPHOCYTES # BLD AUTO: 2 % — LOW (ref 13–44)
LYMPHOCYTES # BLD AUTO: 3.1 % — LOW (ref 13–44)
LYMPHOCYTES # BLD AUTO: 3.6 % — LOW (ref 13–44)
LYMPHOCYTES # BLD AUTO: 3.8 % — LOW (ref 13–44)
LYMPHOCYTES # BLD AUTO: 4 % — LOW (ref 13–44)
LYMPHOCYTES # BLD AUTO: 5.1 % — LOW (ref 13–44)
LYMPHOCYTES # BLD AUTO: 6.4 % — LOW (ref 13–44)
LYMPHOCYTES # BLD AUTO: 6.7 % — LOW (ref 13–44)
LYMPHOCYTES # BLD AUTO: 6.9 % — LOW (ref 13–44)
LYMPHOCYTES # BLD AUTO: 7 % — LOW (ref 13–44)
LYMPHOCYTES # BLD AUTO: 7 % — LOW (ref 13–44)
LYMPHOCYTES # BLD AUTO: 7.1 % — LOW (ref 13–44)
LYMPHOCYTES # BLD AUTO: 7.3 % — LOW (ref 13–44)
MAGNESIUM SERPL-MCNC: 1.7 MG/DL — SIGNIFICANT CHANGE UP (ref 1.6–2.6)
MAGNESIUM SERPL-MCNC: 1.8 MG/DL — SIGNIFICANT CHANGE UP (ref 1.6–2.6)
MAGNESIUM SERPL-MCNC: 1.8 MG/DL — SIGNIFICANT CHANGE UP (ref 1.6–2.6)
MAGNESIUM SERPL-MCNC: 1.9 MG/DL — SIGNIFICANT CHANGE UP (ref 1.6–2.6)
MAGNESIUM SERPL-MCNC: 2 MG/DL — SIGNIFICANT CHANGE UP (ref 1.6–2.6)
MAGNESIUM SERPL-MCNC: 2.2 MG/DL — SIGNIFICANT CHANGE UP (ref 1.6–2.6)
MAGNESIUM SERPL-MCNC: 2.2 MG/DL — SIGNIFICANT CHANGE UP (ref 1.6–2.6)
MAGNESIUM SERPL-MCNC: 2.3 MG/DL — SIGNIFICANT CHANGE UP (ref 1.6–2.6)
MAGNESIUM SERPL-MCNC: 2.5 MG/DL — SIGNIFICANT CHANGE UP (ref 1.6–2.6)
MAGNESIUM SERPL-MCNC: 2.7 MG/DL — HIGH (ref 1.6–2.6)
MAGNESIUM SERPL-MCNC: 2.9 MG/DL — HIGH (ref 1.6–2.6)
MAGNESIUM SERPL-MCNC: 2.9 MG/DL — HIGH (ref 1.6–2.6)
MAGNESIUM SERPL-MCNC: 3 MG/DL — HIGH (ref 1.6–2.6)
MAGNESIUM SERPL-MCNC: 3.1 MG/DL — HIGH (ref 1.6–2.6)
MAGNESIUM SERPL-MCNC: 3.2 MG/DL — HIGH (ref 1.6–2.6)
MAGNESIUM SERPL-MCNC: 3.3 MG/DL — HIGH (ref 1.6–2.6)
MAGNESIUM SERPL-MCNC: 3.3 MG/DL — HIGH (ref 1.6–2.6)
MCHC RBC-ENTMCNC: 30.1 PG — SIGNIFICANT CHANGE UP (ref 27–34)
MCHC RBC-ENTMCNC: 31.6 GM/DL — LOW (ref 32–36)
MCHC RBC-ENTMCNC: 31.9 GM/DL — LOW (ref 32–36)
MCHC RBC-ENTMCNC: 32 GM/DL — SIGNIFICANT CHANGE UP (ref 32–36)
MCHC RBC-ENTMCNC: 32.1 GM/DL — SIGNIFICANT CHANGE UP (ref 32–36)
MCHC RBC-ENTMCNC: 32.2 GM/DL — SIGNIFICANT CHANGE UP (ref 32–36)
MCHC RBC-ENTMCNC: 32.2 GM/DL — SIGNIFICANT CHANGE UP (ref 32–36)
MCHC RBC-ENTMCNC: 32.5 GM/DL — SIGNIFICANT CHANGE UP (ref 32–36)
MCHC RBC-ENTMCNC: 32.7 GM/DL — SIGNIFICANT CHANGE UP (ref 32–36)
MCHC RBC-ENTMCNC: 32.8 PG — SIGNIFICANT CHANGE UP (ref 27–34)
MCHC RBC-ENTMCNC: 32.9 PG — SIGNIFICANT CHANGE UP (ref 27–34)
MCHC RBC-ENTMCNC: 33 GM/DL — SIGNIFICANT CHANGE UP (ref 32–36)
MCHC RBC-ENTMCNC: 33 GM/DL — SIGNIFICANT CHANGE UP (ref 32–36)
MCHC RBC-ENTMCNC: 33 PG — SIGNIFICANT CHANGE UP (ref 27–34)
MCHC RBC-ENTMCNC: 33.1 GM/DL — SIGNIFICANT CHANGE UP (ref 32–36)
MCHC RBC-ENTMCNC: 33.1 PG — SIGNIFICANT CHANGE UP (ref 27–34)
MCHC RBC-ENTMCNC: 33.2 PG — SIGNIFICANT CHANGE UP (ref 27–34)
MCHC RBC-ENTMCNC: 33.3 PG — SIGNIFICANT CHANGE UP (ref 27–34)
MCHC RBC-ENTMCNC: 33.3 PG — SIGNIFICANT CHANGE UP (ref 27–34)
MCHC RBC-ENTMCNC: 33.6 GM/DL — SIGNIFICANT CHANGE UP (ref 32–36)
MCHC RBC-ENTMCNC: 33.6 GM/DL — SIGNIFICANT CHANGE UP (ref 32–36)
MCHC RBC-ENTMCNC: 33.7 PG — SIGNIFICANT CHANGE UP (ref 27–34)
MCHC RBC-ENTMCNC: 34 GM/DL — SIGNIFICANT CHANGE UP (ref 32–36)
MCHC RBC-ENTMCNC: 34 PG — SIGNIFICANT CHANGE UP (ref 27–34)
MCHC RBC-ENTMCNC: 34.1 GM/DL — SIGNIFICANT CHANGE UP (ref 32–36)
MCHC RBC-ENTMCNC: 34.1 PG — HIGH (ref 27–34)
MCHC RBC-ENTMCNC: 34.2 GM/DL — SIGNIFICANT CHANGE UP (ref 32–36)
MCHC RBC-ENTMCNC: 34.2 GM/DL — SIGNIFICANT CHANGE UP (ref 32–36)
MCHC RBC-ENTMCNC: 34.2 PG — HIGH (ref 27–34)
MCHC RBC-ENTMCNC: 34.3 GM/DL — SIGNIFICANT CHANGE UP (ref 32–36)
MCHC RBC-ENTMCNC: 34.3 GM/DL — SIGNIFICANT CHANGE UP (ref 32–36)
MCHC RBC-ENTMCNC: 34.3 PG — HIGH (ref 27–34)
MCHC RBC-ENTMCNC: 34.4 GM/DL — SIGNIFICANT CHANGE UP (ref 32–36)
MCHC RBC-ENTMCNC: 34.4 GM/DL — SIGNIFICANT CHANGE UP (ref 32–36)
MCHC RBC-ENTMCNC: 34.4 PG — HIGH (ref 27–34)
MCHC RBC-ENTMCNC: 34.5 GM/DL — SIGNIFICANT CHANGE UP (ref 32–36)
MCHC RBC-ENTMCNC: 34.6 GM/DL — SIGNIFICANT CHANGE UP (ref 32–36)
MCHC RBC-ENTMCNC: 34.7 GM/DL — SIGNIFICANT CHANGE UP (ref 32–36)
MCHC RBC-ENTMCNC: 35 PG — HIGH (ref 27–34)
MCHC RBC-ENTMCNC: 35.1 PG — HIGH (ref 27–34)
MCHC RBC-ENTMCNC: 35.2 PG — HIGH (ref 27–34)
MCHC RBC-ENTMCNC: 35.3 GM/DL — SIGNIFICANT CHANGE UP (ref 32–36)
MCHC RBC-ENTMCNC: 35.3 PG — HIGH (ref 27–34)
MCHC RBC-ENTMCNC: 35.4 PG — HIGH (ref 27–34)
MCHC RBC-ENTMCNC: 35.5 PG — HIGH (ref 27–34)
MCHC RBC-ENTMCNC: 35.6 PG — HIGH (ref 27–34)
MCHC RBC-ENTMCNC: 35.7 GM/DL — SIGNIFICANT CHANGE UP (ref 32–36)
MCHC RBC-ENTMCNC: 35.8 PG — HIGH (ref 27–34)
MCHC RBC-ENTMCNC: 35.9 GM/DL — SIGNIFICANT CHANGE UP (ref 32–36)
MCHC RBC-ENTMCNC: 35.9 PG — HIGH (ref 27–34)
MCHC RBC-ENTMCNC: 36.2 PG — HIGH (ref 27–34)
MCHC RBC-ENTMCNC: 36.3 GM/DL — HIGH (ref 32–36)
MCHC RBC-ENTMCNC: 36.7 PG — HIGH (ref 27–34)
MCHC RBC-ENTMCNC: 37 PG — HIGH (ref 27–34)
MCV RBC AUTO: 100 FL — SIGNIFICANT CHANGE UP (ref 80–100)
MCV RBC AUTO: 100.9 FL — HIGH (ref 80–100)
MCV RBC AUTO: 101.1 FL — HIGH (ref 80–100)
MCV RBC AUTO: 101.3 FL — HIGH (ref 80–100)
MCV RBC AUTO: 101.4 FL — HIGH (ref 80–100)
MCV RBC AUTO: 101.6 FL — HIGH (ref 80–100)
MCV RBC AUTO: 101.9 FL — HIGH (ref 80–100)
MCV RBC AUTO: 101.9 FL — HIGH (ref 80–100)
MCV RBC AUTO: 102 FL — HIGH (ref 80–100)
MCV RBC AUTO: 102 FL — HIGH (ref 80–100)
MCV RBC AUTO: 102.2 FL — HIGH (ref 80–100)
MCV RBC AUTO: 102.5 FL — HIGH (ref 80–100)
MCV RBC AUTO: 102.5 FL — HIGH (ref 80–100)
MCV RBC AUTO: 102.6 FL — HIGH (ref 80–100)
MCV RBC AUTO: 102.7 FL — HIGH (ref 80–100)
MCV RBC AUTO: 102.7 FL — HIGH (ref 80–100)
MCV RBC AUTO: 102.9 FL — HIGH (ref 80–100)
MCV RBC AUTO: 103.1 FL — HIGH (ref 80–100)
MCV RBC AUTO: 103.5 FL — HIGH (ref 80–100)
MCV RBC AUTO: 103.6 FL — HIGH (ref 80–100)
MCV RBC AUTO: 103.6 FL — HIGH (ref 80–100)
MCV RBC AUTO: 103.9 FL — HIGH (ref 80–100)
MCV RBC AUTO: 103.9 FL — HIGH (ref 80–100)
MCV RBC AUTO: 104 FL — HIGH (ref 80–100)
MCV RBC AUTO: 104 FL — HIGH (ref 80–100)
MCV RBC AUTO: 104.3 FL — HIGH (ref 80–100)
MCV RBC AUTO: 104.3 FL — HIGH (ref 80–100)
MCV RBC AUTO: 104.6 FL — HIGH (ref 80–100)
MCV RBC AUTO: 104.7 FL — HIGH (ref 80–100)
MCV RBC AUTO: 89.7 FL — SIGNIFICANT CHANGE UP (ref 80–100)
MONOCYTES # BLD AUTO: 0.4 K/UL — SIGNIFICANT CHANGE UP (ref 0–0.9)
MONOCYTES # BLD AUTO: 0.4 K/UL — SIGNIFICANT CHANGE UP (ref 0–0.9)
MONOCYTES # BLD AUTO: 0.5 K/UL — SIGNIFICANT CHANGE UP (ref 0–0.9)
MONOCYTES # BLD AUTO: 0.7 K/UL — SIGNIFICANT CHANGE UP (ref 0–0.9)
MONOCYTES # BLD AUTO: 0.8 K/UL — SIGNIFICANT CHANGE UP (ref 0–0.9)
MONOCYTES # BLD AUTO: 1.1 K/UL — HIGH (ref 0–0.9)
MONOCYTES # BLD AUTO: 1.1 K/UL — HIGH (ref 0–0.9)
MONOCYTES # BLD AUTO: 1.3 K/UL — HIGH (ref 0–0.9)
MONOCYTES # BLD AUTO: 1.5 K/UL — HIGH (ref 0–0.9)
MONOCYTES # BLD AUTO: 1.7 K/UL — HIGH (ref 0–0.9)
MONOCYTES NFR BLD AUTO: 11.5 % — SIGNIFICANT CHANGE UP (ref 2–14)
MONOCYTES NFR BLD AUTO: 3.9 % — SIGNIFICANT CHANGE UP (ref 2–14)
MONOCYTES NFR BLD AUTO: 5.6 % — SIGNIFICANT CHANGE UP (ref 2–14)
MONOCYTES NFR BLD AUTO: 5.8 % — SIGNIFICANT CHANGE UP (ref 2–14)
MONOCYTES NFR BLD AUTO: 6.1 % — SIGNIFICANT CHANGE UP (ref 2–14)
MONOCYTES NFR BLD AUTO: 7 % — SIGNIFICANT CHANGE UP (ref 2–14)
MONOCYTES NFR BLD AUTO: 7 % — SIGNIFICANT CHANGE UP (ref 2–14)
MONOCYTES NFR BLD AUTO: 7.7 % — SIGNIFICANT CHANGE UP (ref 2–14)
MONOCYTES NFR BLD AUTO: 8 % — SIGNIFICANT CHANGE UP (ref 2–14)
MONOCYTES NFR BLD AUTO: 8 % — SIGNIFICANT CHANGE UP (ref 2–14)
MONOCYTES NFR BLD AUTO: 8.1 % — SIGNIFICANT CHANGE UP (ref 2–14)
MONOCYTES NFR BLD AUTO: 8.4 % — SIGNIFICANT CHANGE UP (ref 2–14)
MONOCYTES NFR BLD AUTO: 9.2 % — SIGNIFICANT CHANGE UP (ref 2–14)
NEUTROPHILS # BLD AUTO: 10.9 K/UL — HIGH (ref 1.8–7.4)
NEUTROPHILS # BLD AUTO: 11.2 K/UL — HIGH (ref 1.8–7.4)
NEUTROPHILS # BLD AUTO: 11.5 K/UL — HIGH (ref 1.8–7.4)
NEUTROPHILS # BLD AUTO: 15.4 K/UL — HIGH (ref 1.8–7.4)
NEUTROPHILS # BLD AUTO: 15.7 K/UL — HIGH (ref 1.8–7.4)
NEUTROPHILS # BLD AUTO: 16.1 K/UL — HIGH (ref 1.8–7.4)
NEUTROPHILS # BLD AUTO: 5.2 K/UL — SIGNIFICANT CHANGE UP (ref 1.8–7.4)
NEUTROPHILS # BLD AUTO: 6.4 K/UL — SIGNIFICANT CHANGE UP (ref 1.8–7.4)
NEUTROPHILS # BLD AUTO: 7.2 K/UL — SIGNIFICANT CHANGE UP (ref 1.8–7.4)
NEUTROPHILS # BLD AUTO: 8.6 K/UL — HIGH (ref 1.8–7.4)
NEUTROPHILS NFR BLD AUTO: 36 % — LOW (ref 43–77)
NEUTROPHILS NFR BLD AUTO: 79.4 % — HIGH (ref 43–77)
NEUTROPHILS NFR BLD AUTO: 79.7 % — HIGH (ref 43–77)
NEUTROPHILS NFR BLD AUTO: 83.2 % — HIGH (ref 43–77)
NEUTROPHILS NFR BLD AUTO: 84.3 % — HIGH (ref 43–77)
NEUTROPHILS NFR BLD AUTO: 84.8 % — HIGH (ref 43–77)
NEUTROPHILS NFR BLD AUTO: 84.9 % — HIGH (ref 43–77)
NEUTROPHILS NFR BLD AUTO: 85 % — HIGH (ref 43–77)
NEUTROPHILS NFR BLD AUTO: 85 % — HIGH (ref 43–77)
NEUTROPHILS NFR BLD AUTO: 85.8 % — HIGH (ref 43–77)
NEUTROPHILS NFR BLD AUTO: 88.9 % — HIGH (ref 43–77)
NEUTROPHILS NFR BLD AUTO: 90.9 % — HIGH (ref 43–77)
NEUTROPHILS NFR BLD AUTO: 91.7 % — HIGH (ref 43–77)
NITRITE UR-MCNC: NEGATIVE — SIGNIFICANT CHANGE UP
NITRITE UR-MCNC: NEGATIVE — SIGNIFICANT CHANGE UP
NITRITE UR-MCNC: POSITIVE
OB PNL STL: POSITIVE
OSMOLALITY UR: 271 MOS/KG — SIGNIFICANT CHANGE UP (ref 50–1200)
PCO2 BLDA: 26 MMHG — LOW (ref 32–46)
PCO2 BLDA: 30 MMHG — LOW (ref 32–46)
PCO2 BLDA: 30 MMHG — LOW (ref 32–46)
PCO2 BLDA: 32 MMHG — SIGNIFICANT CHANGE UP (ref 32–46)
PCO2 BLDA: 34 MMHG — SIGNIFICANT CHANGE UP (ref 32–46)
PCO2 BLDA: 34 MMHG — SIGNIFICANT CHANGE UP (ref 32–46)
PCO2 BLDA: 38 MMHG — SIGNIFICANT CHANGE UP (ref 32–46)
PCO2 BLDA: 40 MMHG — SIGNIFICANT CHANGE UP (ref 32–46)
PCO2 BLDA: 41 MMHG — SIGNIFICANT CHANGE UP (ref 32–46)
PCO2 BLDA: 42 MMHG — SIGNIFICANT CHANGE UP (ref 32–46)
PCO2 BLDA: 44 MMHG — SIGNIFICANT CHANGE UP (ref 32–46)
PCO2 BLDA: 44 MMHG — SIGNIFICANT CHANGE UP (ref 32–46)
PCO2 BLDA: 45 MMHG — SIGNIFICANT CHANGE UP (ref 32–46)
PCO2 BLDA: 47 MMHG — HIGH (ref 32–46)
PCO2 BLDA: 47 MMHG — HIGH (ref 32–46)
PCO2 BLDA: 49 MMHG — HIGH (ref 32–46)
PCO2 BLDA: 51 MMHG — HIGH (ref 32–46)
PCO2 BLDV: 26 MMHG — LOW (ref 35–50)
PCO2 BLDV: 28 MMHG — LOW (ref 35–50)
PCO2 BLDV: 35 MMHG — SIGNIFICANT CHANGE UP (ref 35–50)
PCO2 BLDV: 47 MMHG — SIGNIFICANT CHANGE UP (ref 35–50)
PCO2 BLDV: 50 MMHG — SIGNIFICANT CHANGE UP (ref 35–50)
PH BLDA: 7.16 — CRITICAL LOW (ref 7.35–7.45)
PH BLDA: 7.21 — LOW (ref 7.35–7.45)
PH BLDA: 7.22 — LOW (ref 7.35–7.45)
PH BLDA: 7.22 — LOW (ref 7.35–7.45)
PH BLDA: 7.23 — LOW (ref 7.35–7.45)
PH BLDA: 7.25 — LOW (ref 7.35–7.45)
PH BLDA: 7.28 — LOW (ref 7.35–7.45)
PH BLDA: 7.3 — LOW (ref 7.35–7.45)
PH BLDA: 7.31 — LOW (ref 7.35–7.45)
PH BLDA: 7.32 — LOW (ref 7.35–7.45)
PH BLDA: 7.39 — SIGNIFICANT CHANGE UP (ref 7.35–7.45)
PH BLDA: 7.4 — SIGNIFICANT CHANGE UP (ref 7.35–7.45)
PH BLDA: 7.42 — SIGNIFICANT CHANGE UP (ref 7.35–7.45)
PH BLDV: 7.2 — CRITICAL LOW (ref 7.35–7.45)
PH BLDV: 7.23 — LOW (ref 7.35–7.45)
PH BLDV: 7.32 — LOW (ref 7.35–7.45)
PH BLDV: 7.35 — SIGNIFICANT CHANGE UP (ref 7.35–7.45)
PH BLDV: 7.37 — SIGNIFICANT CHANGE UP (ref 7.35–7.45)
PH UR: 5 — SIGNIFICANT CHANGE UP (ref 5–8)
PHOSPHATE SERPL-MCNC: 1.3 MG/DL — LOW (ref 2.5–4.5)
PHOSPHATE SERPL-MCNC: 1.6 MG/DL — LOW (ref 2.5–4.5)
PHOSPHATE SERPL-MCNC: 1.8 MG/DL — LOW (ref 2.5–4.5)
PHOSPHATE SERPL-MCNC: 10 MG/DL — HIGH (ref 2.5–4.5)
PHOSPHATE SERPL-MCNC: 10 MG/DL — SIGNIFICANT CHANGE UP (ref 2.5–4.5)
PHOSPHATE SERPL-MCNC: 10.2 MG/DL — SIGNIFICANT CHANGE UP (ref 2.5–4.5)
PHOSPHATE SERPL-MCNC: 11.1 MG/DL — HIGH (ref 2.5–4.5)
PHOSPHATE SERPL-MCNC: 11.8 MG/DL — SIGNIFICANT CHANGE UP (ref 2.5–4.5)
PHOSPHATE SERPL-MCNC: 12 MG/DL — SIGNIFICANT CHANGE UP (ref 2.5–4.5)
PHOSPHATE SERPL-MCNC: 13.2 MG/DL — HIGH (ref 2.5–4.5)
PHOSPHATE SERPL-MCNC: 13.4 MG/DL — SIGNIFICANT CHANGE UP (ref 2.5–4.5)
PHOSPHATE SERPL-MCNC: 3.7 MG/DL — SIGNIFICANT CHANGE UP (ref 2.5–4.5)
PHOSPHATE SERPL-MCNC: 5.1 MG/DL — HIGH (ref 2.5–4.5)
PHOSPHATE SERPL-MCNC: 7 MG/DL — HIGH (ref 2.5–4.5)
PHOSPHATE SERPL-MCNC: 7.5 MG/DL — HIGH (ref 2.5–4.5)
PHOSPHATE SERPL-MCNC: 7.6 MG/DL — HIGH (ref 2.5–4.5)
PHOSPHATE SERPL-MCNC: 8 MG/DL — HIGH (ref 2.5–4.5)
PHOSPHATE SERPL-MCNC: 9.2 MG/DL — HIGH (ref 2.5–4.5)
PHOSPHATE SERPL-MCNC: 9.5 MG/DL — SIGNIFICANT CHANGE UP (ref 2.5–4.5)
PHOSPHATE SERPL-MCNC: 9.9 MG/DL — SIGNIFICANT CHANGE UP (ref 2.5–4.5)
PHOSPHATE SERPL-MCNC: SIGNIFICANT CHANGE UP MG/DL (ref 2.5–4.5)
PHOSPHATE SERPL-MCNC: SIGNIFICANT CHANGE UP MG/DL (ref 2.5–4.5)
PLATELET # BLD AUTO: 133 K/UL — LOW (ref 150–400)
PLATELET # BLD AUTO: 133 K/UL — LOW (ref 150–400)
PLATELET # BLD AUTO: 148 K/UL — LOW (ref 150–400)
PLATELET # BLD AUTO: 157 K/UL — SIGNIFICANT CHANGE UP (ref 150–400)
PLATELET # BLD AUTO: 166 K/UL — SIGNIFICANT CHANGE UP (ref 150–400)
PLATELET # BLD AUTO: 168 K/UL — SIGNIFICANT CHANGE UP (ref 150–400)
PLATELET # BLD AUTO: 183 K/UL — SIGNIFICANT CHANGE UP (ref 150–400)
PLATELET # BLD AUTO: 186 K/UL — SIGNIFICANT CHANGE UP (ref 150–400)
PLATELET # BLD AUTO: 214 K/UL — SIGNIFICANT CHANGE UP (ref 150–400)
PLATELET # BLD AUTO: 245 K/UL — SIGNIFICANT CHANGE UP (ref 150–400)
PLATELET # BLD AUTO: 255 K/UL — SIGNIFICANT CHANGE UP (ref 150–400)
PLATELET # BLD AUTO: 270 K/UL — SIGNIFICANT CHANGE UP (ref 150–400)
PLATELET # BLD AUTO: 271 K/UL — SIGNIFICANT CHANGE UP (ref 150–400)
PLATELET # BLD AUTO: 278 K/UL — SIGNIFICANT CHANGE UP (ref 150–400)
PLATELET # BLD AUTO: 286 K/UL — SIGNIFICANT CHANGE UP (ref 150–400)
PLATELET # BLD AUTO: 303 K/UL — SIGNIFICANT CHANGE UP (ref 150–400)
PLATELET # BLD AUTO: 315 K/UL — SIGNIFICANT CHANGE UP (ref 150–400)
PLATELET # BLD AUTO: 328 K/UL — SIGNIFICANT CHANGE UP (ref 150–400)
PLATELET # BLD AUTO: 335 K/UL — SIGNIFICANT CHANGE UP (ref 150–400)
PLATELET # BLD AUTO: 354 K/UL — SIGNIFICANT CHANGE UP (ref 150–400)
PLATELET # BLD AUTO: 68 K/UL — LOW (ref 150–400)
PLATELET # BLD AUTO: 70 K/UL — LOW (ref 150–400)
PLATELET # BLD AUTO: 70 K/UL — LOW (ref 150–400)
PLATELET # BLD AUTO: 72 K/UL — LOW (ref 150–400)
PLATELET # BLD AUTO: 75 K/UL — LOW (ref 150–400)
PLATELET # BLD AUTO: 75 K/UL — LOW (ref 150–400)
PLATELET # BLD AUTO: 76 K/UL — LOW (ref 150–400)
PLATELET # BLD AUTO: 79 K/UL — LOW (ref 150–400)
PLATELET # BLD AUTO: 82 K/UL — LOW (ref 150–400)
PLATELET # BLD AUTO: 85 K/UL — LOW (ref 150–400)
PLATELET # BLD AUTO: 87 K/UL — LOW (ref 150–400)
PLATELET # BLD AUTO: 92 K/UL — LOW (ref 150–400)
PO2 BLDA: 102 MMHG — SIGNIFICANT CHANGE UP (ref 74–108)
PO2 BLDA: 113 MMHG — HIGH (ref 74–108)
PO2 BLDA: 121 MMHG — HIGH (ref 74–108)
PO2 BLDA: 122 MMHG — HIGH (ref 74–108)
PO2 BLDA: 171 MMHG — HIGH (ref 74–108)
PO2 BLDA: 220 MMHG — HIGH (ref 74–108)
PO2 BLDA: 52 MMHG — LOW (ref 74–108)
PO2 BLDA: 59 MMHG — LOW (ref 74–108)
PO2 BLDA: 64 MMHG — LOW (ref 74–108)
PO2 BLDA: 69 MMHG — LOW (ref 74–108)
PO2 BLDA: 77 MMHG — SIGNIFICANT CHANGE UP (ref 74–108)
PO2 BLDA: 85 MMHG — SIGNIFICANT CHANGE UP (ref 74–108)
PO2 BLDA: 85 MMHG — SIGNIFICANT CHANGE UP (ref 74–108)
PO2 BLDA: 87 MMHG — SIGNIFICANT CHANGE UP (ref 74–108)
PO2 BLDA: 95 MMHG — SIGNIFICANT CHANGE UP (ref 74–108)
PO2 BLDA: 96 MMHG — SIGNIFICANT CHANGE UP (ref 74–108)
PO2 BLDA: 98 MMHG — SIGNIFICANT CHANGE UP (ref 74–108)
PO2 BLDV: 49 MMHG — HIGH (ref 25–45)
PO2 BLDV: 83 MMHG — HIGH (ref 25–45)
PO2 BLDV: <43 MMHG — SIGNIFICANT CHANGE UP (ref 25–45)
PO2 BLDV: SIGNIFICANT CHANGE UP MMHG (ref 25–45)
PO2 BLDV: SIGNIFICANT CHANGE UP MMHG (ref 25–45)
POTASSIUM SERPL-MCNC: 3.2 MMOL/L — LOW (ref 3.5–5.3)
POTASSIUM SERPL-MCNC: 3.8 MMOL/L — SIGNIFICANT CHANGE UP (ref 3.5–5.3)
POTASSIUM SERPL-MCNC: 4 MMOL/L — SIGNIFICANT CHANGE UP (ref 3.5–5.3)
POTASSIUM SERPL-MCNC: 4.1 MMOL/L — SIGNIFICANT CHANGE UP (ref 3.5–5.3)
POTASSIUM SERPL-MCNC: 4.2 MMOL/L — SIGNIFICANT CHANGE UP (ref 3.5–5.3)
POTASSIUM SERPL-MCNC: 4.3 MMOL/L — SIGNIFICANT CHANGE UP (ref 3.5–5.3)
POTASSIUM SERPL-MCNC: 4.3 MMOL/L — SIGNIFICANT CHANGE UP (ref 3.5–5.3)
POTASSIUM SERPL-MCNC: 4.4 MMOL/L — SIGNIFICANT CHANGE UP (ref 3.5–5.3)
POTASSIUM SERPL-MCNC: 4.5 MMOL/L — SIGNIFICANT CHANGE UP (ref 3.5–5.3)
POTASSIUM SERPL-MCNC: 4.6 MMOL/L — SIGNIFICANT CHANGE UP (ref 3.5–5.3)
POTASSIUM SERPL-MCNC: 4.9 MMOL/L — SIGNIFICANT CHANGE UP (ref 3.5–5.3)
POTASSIUM SERPL-MCNC: 5 MMOL/L — SIGNIFICANT CHANGE UP (ref 3.5–5.3)
POTASSIUM SERPL-MCNC: 5.1 MMOL/L — SIGNIFICANT CHANGE UP (ref 3.5–5.3)
POTASSIUM SERPL-MCNC: 5.1 MMOL/L — SIGNIFICANT CHANGE UP (ref 3.5–5.3)
POTASSIUM SERPL-MCNC: 5.2 MMOL/L — SIGNIFICANT CHANGE UP (ref 3.5–5.3)
POTASSIUM SERPL-MCNC: 5.3 MMOL/L — SIGNIFICANT CHANGE UP (ref 3.5–5.3)
POTASSIUM SERPL-MCNC: 5.3 MMOL/L — SIGNIFICANT CHANGE UP (ref 3.5–5.3)
POTASSIUM SERPL-MCNC: 5.4 MMOL/L — HIGH (ref 3.5–5.3)
POTASSIUM SERPL-MCNC: 5.7 MMOL/L — HIGH (ref 3.5–5.3)
POTASSIUM SERPL-MCNC: 5.9 MMOL/L — HIGH (ref 3.5–5.3)
POTASSIUM SERPL-MCNC: 6 MMOL/L — HIGH (ref 3.5–5.3)
POTASSIUM SERPL-MCNC: 6 MMOL/L — HIGH (ref 3.5–5.3)
POTASSIUM SERPL-MCNC: 6.1 MMOL/L — HIGH (ref 3.5–5.3)
POTASSIUM SERPL-MCNC: 6.1 MMOL/L — HIGH (ref 3.5–5.3)
POTASSIUM SERPL-MCNC: 6.5 MMOL/L — CRITICAL HIGH (ref 3.5–5.3)
POTASSIUM SERPL-SCNC: 3.2 MMOL/L — LOW (ref 3.5–5.3)
POTASSIUM SERPL-SCNC: 3.8 MMOL/L — SIGNIFICANT CHANGE UP (ref 3.5–5.3)
POTASSIUM SERPL-SCNC: 4 MMOL/L — SIGNIFICANT CHANGE UP (ref 3.5–5.3)
POTASSIUM SERPL-SCNC: 4.1 MMOL/L — SIGNIFICANT CHANGE UP (ref 3.5–5.3)
POTASSIUM SERPL-SCNC: 4.2 MMOL/L — SIGNIFICANT CHANGE UP (ref 3.5–5.3)
POTASSIUM SERPL-SCNC: 4.3 MMOL/L — SIGNIFICANT CHANGE UP (ref 3.5–5.3)
POTASSIUM SERPL-SCNC: 4.3 MMOL/L — SIGNIFICANT CHANGE UP (ref 3.5–5.3)
POTASSIUM SERPL-SCNC: 4.4 MMOL/L — SIGNIFICANT CHANGE UP (ref 3.5–5.3)
POTASSIUM SERPL-SCNC: 4.5 MMOL/L — SIGNIFICANT CHANGE UP (ref 3.5–5.3)
POTASSIUM SERPL-SCNC: 4.6 MMOL/L — SIGNIFICANT CHANGE UP (ref 3.5–5.3)
POTASSIUM SERPL-SCNC: 4.9 MMOL/L — SIGNIFICANT CHANGE UP (ref 3.5–5.3)
POTASSIUM SERPL-SCNC: 5 MMOL/L — SIGNIFICANT CHANGE UP (ref 3.5–5.3)
POTASSIUM SERPL-SCNC: 5.1 MMOL/L — SIGNIFICANT CHANGE UP (ref 3.5–5.3)
POTASSIUM SERPL-SCNC: 5.1 MMOL/L — SIGNIFICANT CHANGE UP (ref 3.5–5.3)
POTASSIUM SERPL-SCNC: 5.2 MMOL/L — SIGNIFICANT CHANGE UP (ref 3.5–5.3)
POTASSIUM SERPL-SCNC: 5.3 MMOL/L — SIGNIFICANT CHANGE UP (ref 3.5–5.3)
POTASSIUM SERPL-SCNC: 5.3 MMOL/L — SIGNIFICANT CHANGE UP (ref 3.5–5.3)
POTASSIUM SERPL-SCNC: 5.4 MMOL/L — HIGH (ref 3.5–5.3)
POTASSIUM SERPL-SCNC: 5.7 MMOL/L — HIGH (ref 3.5–5.3)
POTASSIUM SERPL-SCNC: 5.9 MMOL/L — HIGH (ref 3.5–5.3)
POTASSIUM SERPL-SCNC: 6 MMOL/L — HIGH (ref 3.5–5.3)
POTASSIUM SERPL-SCNC: 6 MMOL/L — HIGH (ref 3.5–5.3)
POTASSIUM SERPL-SCNC: 6.1 MMOL/L — HIGH (ref 3.5–5.3)
POTASSIUM SERPL-SCNC: 6.1 MMOL/L — HIGH (ref 3.5–5.3)
POTASSIUM SERPL-SCNC: 6.5 MMOL/L — CRITICAL HIGH (ref 3.5–5.3)
PROCALCITONIN SERPL-MCNC: 80.45 NG/ML — HIGH (ref 0–0.04)
PROT ?TM UR-MCNC: 80 MG/DL — HIGH (ref 0–12)
PROT SERPL-MCNC: 3.8 G/DL — LOW (ref 6–8.3)
PROT SERPL-MCNC: 5.1 G/DL — LOW (ref 6–8.3)
PROT SERPL-MCNC: 5.5 G/DL — LOW (ref 6–8.3)
PROT SERPL-MCNC: 5.6 G/DL — LOW (ref 6–8.3)
PROT SERPL-MCNC: 5.7 G/DL — LOW (ref 6–8.3)
PROT SERPL-MCNC: 5.9 G/DL — LOW (ref 6–8.3)
PROT SERPL-MCNC: 5.9 G/DL — LOW (ref 6–8.3)
PROT SERPL-MCNC: 6 G/DL — SIGNIFICANT CHANGE UP (ref 6–8.3)
PROT SERPL-MCNC: 6.1 G/DL — SIGNIFICANT CHANGE UP (ref 6–8.3)
PROT SERPL-MCNC: 6.1 G/DL — SIGNIFICANT CHANGE UP (ref 6–8.3)
PROT SERPL-MCNC: 6.2 G/DL — SIGNIFICANT CHANGE UP (ref 6–8.3)
PROT SERPL-MCNC: 6.3 G/DL — SIGNIFICANT CHANGE UP (ref 6–8.3)
PROT SERPL-MCNC: 6.6 G/DL — SIGNIFICANT CHANGE UP (ref 6–8.3)
PROT UR-MCNC: 30 MG/DL
PROT/CREAT UR-RTO: 0.9 RATIO — HIGH (ref 0–0.2)
PROTHROM AB SERPL-ACNC: 10 SEC — SIGNIFICANT CHANGE UP (ref 9.8–12.7)
PROTHROM AB SERPL-ACNC: 11.1 SEC — SIGNIFICANT CHANGE UP (ref 9.8–12.7)
PROTHROM AB SERPL-ACNC: 11.3 SEC — SIGNIFICANT CHANGE UP (ref 9.8–12.7)
PROTHROM AB SERPL-ACNC: 11.9 SEC — SIGNIFICANT CHANGE UP (ref 9.8–12.7)
PROTHROM AB SERPL-ACNC: 12.8 SEC — HIGH (ref 9.8–12.7)
PROTHROM AB SERPL-ACNC: 13.2 SEC — HIGH (ref 9.8–12.7)
PROTHROM AB SERPL-ACNC: 14.3 SEC — HIGH (ref 9.8–12.7)
RAPID RVP RESULT: DETECTED
RBC # BLD: 2.14 M/UL — LOW (ref 4.2–5.8)
RBC # BLD: 2.17 M/UL — LOW (ref 4.2–5.8)
RBC # BLD: 2.24 M/UL — LOW (ref 4.2–5.8)
RBC # BLD: 2.28 M/UL — LOW (ref 4.2–5.8)
RBC # BLD: 2.28 M/UL — LOW (ref 4.2–5.8)
RBC # BLD: 2.31 M/UL — LOW (ref 4.2–5.8)
RBC # BLD: 2.34 M/UL — LOW (ref 4.2–5.8)
RBC # BLD: 2.39 M/UL — LOW (ref 4.2–5.8)
RBC # BLD: 2.44 M/UL — LOW (ref 4.2–5.8)
RBC # BLD: 2.46 M/UL — LOW (ref 4.2–5.8)
RBC # BLD: 2.55 M/UL — LOW (ref 4.2–5.8)
RBC # BLD: 2.66 M/UL — LOW (ref 4.2–5.8)
RBC # BLD: 2.73 M/UL — LOW (ref 4.2–5.8)
RBC # BLD: 2.81 M/UL — LOW (ref 4.2–5.8)
RBC # BLD: 2.98 M/UL — LOW (ref 4.2–5.8)
RBC # BLD: 3.09 M/UL — LOW (ref 4.2–5.8)
RBC # BLD: 3.13 M/UL — LOW (ref 4.2–5.8)
RBC # BLD: 3.21 M/UL — LOW (ref 4.2–5.8)
RBC # BLD: 3.25 M/UL — LOW (ref 4.2–5.8)
RBC # BLD: 3.26 M/UL — LOW (ref 4.2–5.8)
RBC # BLD: 3.37 M/UL — LOW (ref 4.2–5.8)
RBC # BLD: 3.41 M/UL — LOW (ref 4.2–5.8)
RBC # BLD: 3.42 M/UL — LOW (ref 4.2–5.8)
RBC # BLD: 3.43 M/UL — LOW (ref 4.2–5.8)
RBC # BLD: 3.56 M/UL — LOW (ref 4.2–5.8)
RBC # BLD: 3.57 M/UL — LOW (ref 4.2–5.8)
RBC # BLD: 3.58 M/UL — LOW (ref 4.2–5.8)
RBC # BLD: 3.67 M/UL — LOW (ref 4.2–5.8)
RBC # BLD: 3.69 M/UL — LOW (ref 4.2–5.8)
RBC # BLD: 3.72 M/UL — LOW (ref 4.2–5.8)
RBC # BLD: 3.77 M/UL — LOW (ref 4.2–5.8)
RBC # BLD: 3.91 M/UL — LOW (ref 4.2–5.8)
RBC # BLD: 4.33 M/UL — SIGNIFICANT CHANGE UP (ref 4.2–5.8)
RBC # FLD: 11.1 % — SIGNIFICANT CHANGE UP (ref 10.3–14.5)
RBC # FLD: 11.1 % — SIGNIFICANT CHANGE UP (ref 10.3–14.5)
RBC # FLD: 11.3 % — SIGNIFICANT CHANGE UP (ref 10.3–14.5)
RBC # FLD: 11.4 % — SIGNIFICANT CHANGE UP (ref 10.3–14.5)
RBC # FLD: 11.5 % — SIGNIFICANT CHANGE UP (ref 10.3–14.5)
RBC # FLD: 11.5 % — SIGNIFICANT CHANGE UP (ref 10.3–14.5)
RBC # FLD: 11.6 % — SIGNIFICANT CHANGE UP (ref 10.3–14.5)
RBC # FLD: 11.7 % — SIGNIFICANT CHANGE UP (ref 10.3–14.5)
RBC # FLD: 11.8 % — SIGNIFICANT CHANGE UP (ref 10.3–14.5)
RBC # FLD: 11.9 % — SIGNIFICANT CHANGE UP (ref 10.3–14.5)
RBC # FLD: 12 % — SIGNIFICANT CHANGE UP (ref 10.3–14.5)
RBC # FLD: 12.1 % — SIGNIFICANT CHANGE UP (ref 10.3–14.5)
RBC # FLD: 12.2 % — SIGNIFICANT CHANGE UP (ref 10.3–14.5)
RBC # FLD: 12.2 % — SIGNIFICANT CHANGE UP (ref 10.3–14.5)
RBC # FLD: 12.4 % — SIGNIFICANT CHANGE UP (ref 10.3–14.5)
RBC # FLD: 12.4 % — SIGNIFICANT CHANGE UP (ref 10.3–14.5)
RBC # FLD: 12.5 % — SIGNIFICANT CHANGE UP (ref 10.3–14.5)
RBC # FLD: 12.5 % — SIGNIFICANT CHANGE UP (ref 10.3–14.5)
RBC # FLD: 12.8 % — SIGNIFICANT CHANGE UP (ref 10.3–14.5)
RBC # FLD: 12.8 % — SIGNIFICANT CHANGE UP (ref 10.3–14.5)
RBC # FLD: 13.5 % — SIGNIFICANT CHANGE UP (ref 10.3–14.5)
RETICS #: 36.1 K/UL — SIGNIFICANT CHANGE UP (ref 25–125)
RETICS/RBC NFR: 1 % — SIGNIFICANT CHANGE UP (ref 0.5–2.5)
SAO2 % BLDA: 79 % — LOW (ref 92–96)
SAO2 % BLDA: 89 % — LOW (ref 92–96)
SAO2 % BLDA: 91 % — LOW (ref 92–96)
SAO2 % BLDA: 93 % — SIGNIFICANT CHANGE UP (ref 92–96)
SAO2 % BLDA: 93 % — SIGNIFICANT CHANGE UP (ref 92–96)
SAO2 % BLDA: 94 % — SIGNIFICANT CHANGE UP (ref 92–96)
SAO2 % BLDA: 96 % — SIGNIFICANT CHANGE UP (ref 92–96)
SAO2 % BLDA: 97 % — HIGH (ref 92–96)
SAO2 % BLDA: 98 % — HIGH (ref 92–96)
SAO2 % BLDA: 99 % — HIGH (ref 92–96)
SAO2 % BLDA: SIGNIFICANT CHANGE UP % (ref 92–96)
SAO2 % BLDV: 45 % — LOW (ref 67–88)
SAO2 % BLDV: 68 % — SIGNIFICANT CHANGE UP (ref 67–88)
SAO2 % BLDV: 74 % — SIGNIFICANT CHANGE UP (ref 67–88)
SAO2 % BLDV: 84 % — SIGNIFICANT CHANGE UP (ref 67–88)
SAO2 % BLDV: 95 % — HIGH (ref 67–88)
SODIUM SERPL-SCNC: 128 MMOL/L — LOW (ref 135–145)
SODIUM SERPL-SCNC: 129 MMOL/L — LOW (ref 135–145)
SODIUM SERPL-SCNC: 130 MMOL/L — LOW (ref 135–145)
SODIUM SERPL-SCNC: 132 MMOL/L — LOW (ref 135–145)
SODIUM SERPL-SCNC: 132 MMOL/L — LOW (ref 135–145)
SODIUM SERPL-SCNC: 133 MMOL/L — LOW (ref 135–145)
SODIUM SERPL-SCNC: 134 MMOL/L — LOW (ref 135–145)
SODIUM SERPL-SCNC: 135 MMOL/L — SIGNIFICANT CHANGE UP (ref 135–145)
SODIUM SERPL-SCNC: 136 MMOL/L — SIGNIFICANT CHANGE UP (ref 135–145)
SODIUM SERPL-SCNC: 136 MMOL/L — SIGNIFICANT CHANGE UP (ref 135–145)
SODIUM SERPL-SCNC: 137 MMOL/L — SIGNIFICANT CHANGE UP (ref 135–145)
SODIUM SERPL-SCNC: 138 MMOL/L — SIGNIFICANT CHANGE UP (ref 135–145)
SODIUM SERPL-SCNC: 141 MMOL/L — SIGNIFICANT CHANGE UP (ref 135–145)
SODIUM SERPL-SCNC: 143 MMOL/L — SIGNIFICANT CHANGE UP (ref 135–145)
SODIUM SERPL-SCNC: 143 MMOL/L — SIGNIFICANT CHANGE UP (ref 135–145)
SODIUM SERPL-SCNC: 144 MMOL/L — SIGNIFICANT CHANGE UP (ref 135–145)
SODIUM SERPL-SCNC: 145 MMOL/L — SIGNIFICANT CHANGE UP (ref 135–145)
SODIUM SERPL-SCNC: 146 MMOL/L — HIGH (ref 135–145)
SODIUM SERPL-SCNC: 147 MMOL/L — HIGH (ref 135–145)
SODIUM UR-SCNC: 24 MMOL/L — LOW (ref 40–220)
SODIUM UR-SCNC: 26 MMOL/L — LOW (ref 40–220)
SP GR SPEC: 1.01 — SIGNIFICANT CHANGE UP (ref 1.01–1.02)
SP GR SPEC: 1.02 — SIGNIFICANT CHANGE UP (ref 1.01–1.02)
SP GR SPEC: 1.02 — SIGNIFICANT CHANGE UP (ref 1.01–1.02)
SPECIMEN SOURCE: SIGNIFICANT CHANGE UP
TOTAL CHOLESTEROL/HDL RATIO MEASUREMENT: <5.6 RATIO — SIGNIFICANT CHANGE UP (ref 3.4–9.6)
TRIGL SERPL-MCNC: 404 MG/DL — HIGH (ref 10–149)
TROPONIN I SERPL-MCNC: 0.08 NG/ML — HIGH (ref 0–0.04)
TROPONIN I SERPL-MCNC: 0.38 NG/ML — HIGH (ref 0–0.04)
TROPONIN I SERPL-MCNC: 0.91 NG/ML — HIGH (ref 0–0.04)
TROPONIN I SERPL-MCNC: 1.98 NG/ML — HIGH (ref 0–0.04)
TSH SERPL-MCNC: 0.64 UU/ML — SIGNIFICANT CHANGE UP (ref 0.34–4.82)
URATE SERPL-MCNC: 9.2 MG/DL — HIGH (ref 3.4–8.8)
UROBILINOGEN FLD QL: 1
UROBILINOGEN FLD QL: 1
UROBILINOGEN FLD QL: 4
UUN UR-MCNC: 318 MG/DL — SIGNIFICANT CHANGE UP
VIT B12 SERPL-MCNC: 907 PG/ML — SIGNIFICANT CHANGE UP (ref 232–1245)
VIT B12 SERPL-MCNC: >2000 PG/ML — HIGH (ref 232–1245)
WBC # BLD: 10.2 K/UL — SIGNIFICANT CHANGE UP (ref 3.8–10.5)
WBC # BLD: 12.5 K/UL — HIGH (ref 3.8–10.5)
WBC # BLD: 12.7 K/UL — HIGH (ref 3.8–10.5)
WBC # BLD: 12.9 K/UL — HIGH (ref 3.8–10.5)
WBC # BLD: 14.1 K/UL — HIGH (ref 3.8–10.5)
WBC # BLD: 14.3 K/UL — HIGH (ref 3.8–10.5)
WBC # BLD: 14.3 K/UL — HIGH (ref 3.8–10.5)
WBC # BLD: 14.4 K/UL — HIGH (ref 3.8–10.5)
WBC # BLD: 14.7 K/UL — HIGH (ref 3.8–10.5)
WBC # BLD: 15 K/UL — HIGH (ref 3.8–10.5)
WBC # BLD: 15.4 K/UL — HIGH (ref 3.8–10.5)
WBC # BLD: 16.3 K/UL — HIGH (ref 3.8–10.5)
WBC # BLD: 16.4 K/UL — HIGH (ref 3.8–10.5)
WBC # BLD: 16.6 K/UL — HIGH (ref 3.8–10.5)
WBC # BLD: 16.9 K/UL — HIGH (ref 3.8–10.5)
WBC # BLD: 17.3 K/UL — HIGH (ref 3.8–10.5)
WBC # BLD: 18.1 K/UL — HIGH (ref 3.8–10.5)
WBC # BLD: 18.5 K/UL — HIGH (ref 3.8–10.5)
WBC # BLD: 18.6 K/UL — HIGH (ref 3.8–10.5)
WBC # BLD: 18.9 K/UL — HIGH (ref 3.8–10.5)
WBC # BLD: 19.9 K/UL — HIGH (ref 3.8–10.5)
WBC # BLD: 4.2 K/UL — SIGNIFICANT CHANGE UP (ref 3.8–10.5)
WBC # BLD: 4.8 K/UL — SIGNIFICANT CHANGE UP (ref 3.8–10.5)
WBC # BLD: 5.8 K/UL — SIGNIFICANT CHANGE UP (ref 3.8–10.5)
WBC # BLD: 6.3 K/UL — SIGNIFICANT CHANGE UP (ref 3.8–10.5)
WBC # BLD: 6.3 K/UL — SIGNIFICANT CHANGE UP (ref 3.8–10.5)
WBC # BLD: 6.5 K/UL — SIGNIFICANT CHANGE UP (ref 3.8–10.5)
WBC # BLD: 6.6 K/UL — SIGNIFICANT CHANGE UP (ref 3.8–10.5)
WBC # BLD: 7.1 K/UL — SIGNIFICANT CHANGE UP (ref 3.8–10.5)
WBC # BLD: 7.3 K/UL — SIGNIFICANT CHANGE UP (ref 3.8–10.5)
WBC # BLD: 7.4 K/UL — SIGNIFICANT CHANGE UP (ref 3.8–10.5)
WBC # BLD: 8 K/UL — SIGNIFICANT CHANGE UP (ref 3.8–10.5)
WBC # FLD AUTO: 10.2 K/UL — SIGNIFICANT CHANGE UP (ref 3.8–10.5)
WBC # FLD AUTO: 12.5 K/UL — HIGH (ref 3.8–10.5)
WBC # FLD AUTO: 12.7 K/UL — HIGH (ref 3.8–10.5)
WBC # FLD AUTO: 12.9 K/UL — HIGH (ref 3.8–10.5)
WBC # FLD AUTO: 14.1 K/UL — HIGH (ref 3.8–10.5)
WBC # FLD AUTO: 14.3 K/UL — HIGH (ref 3.8–10.5)
WBC # FLD AUTO: 14.3 K/UL — HIGH (ref 3.8–10.5)
WBC # FLD AUTO: 14.4 K/UL — HIGH (ref 3.8–10.5)
WBC # FLD AUTO: 14.7 K/UL — HIGH (ref 3.8–10.5)
WBC # FLD AUTO: 15 K/UL — HIGH (ref 3.8–10.5)
WBC # FLD AUTO: 15.4 K/UL — HIGH (ref 3.8–10.5)
WBC # FLD AUTO: 16.3 K/UL — HIGH (ref 3.8–10.5)
WBC # FLD AUTO: 16.4 K/UL — HIGH (ref 3.8–10.5)
WBC # FLD AUTO: 16.6 K/UL — HIGH (ref 3.8–10.5)
WBC # FLD AUTO: 16.9 K/UL — HIGH (ref 3.8–10.5)
WBC # FLD AUTO: 17.3 K/UL — HIGH (ref 3.8–10.5)
WBC # FLD AUTO: 18.1 K/UL — HIGH (ref 3.8–10.5)
WBC # FLD AUTO: 18.5 K/UL — HIGH (ref 3.8–10.5)
WBC # FLD AUTO: 18.6 K/UL — HIGH (ref 3.8–10.5)
WBC # FLD AUTO: 18.9 K/UL — HIGH (ref 3.8–10.5)
WBC # FLD AUTO: 19.9 K/UL — HIGH (ref 3.8–10.5)
WBC # FLD AUTO: 4.2 K/UL — SIGNIFICANT CHANGE UP (ref 3.8–10.5)
WBC # FLD AUTO: 4.8 K/UL — SIGNIFICANT CHANGE UP (ref 3.8–10.5)
WBC # FLD AUTO: 5.8 K/UL — SIGNIFICANT CHANGE UP (ref 3.8–10.5)
WBC # FLD AUTO: 6.3 K/UL — SIGNIFICANT CHANGE UP (ref 3.8–10.5)
WBC # FLD AUTO: 6.3 K/UL — SIGNIFICANT CHANGE UP (ref 3.8–10.5)
WBC # FLD AUTO: 6.5 K/UL — SIGNIFICANT CHANGE UP (ref 3.8–10.5)
WBC # FLD AUTO: 6.6 K/UL — SIGNIFICANT CHANGE UP (ref 3.8–10.5)
WBC # FLD AUTO: 7.1 K/UL — SIGNIFICANT CHANGE UP (ref 3.8–10.5)
WBC # FLD AUTO: 7.3 K/UL — SIGNIFICANT CHANGE UP (ref 3.8–10.5)
WBC # FLD AUTO: 7.4 K/UL — SIGNIFICANT CHANGE UP (ref 3.8–10.5)
WBC # FLD AUTO: 8 K/UL — SIGNIFICANT CHANGE UP (ref 3.8–10.5)

## 2018-01-01 PROCEDURE — 86900 BLOOD TYPING SEROLOGIC ABO: CPT

## 2018-01-01 PROCEDURE — 87086 URINE CULTURE/COLONY COUNT: CPT

## 2018-01-01 PROCEDURE — 99497 ADVNCD CARE PLAN 30 MIN: CPT

## 2018-01-01 PROCEDURE — 82607 VITAMIN B-12: CPT

## 2018-01-01 PROCEDURE — 71045 X-RAY EXAM CHEST 1 VIEW: CPT | Mod: 26

## 2018-01-01 PROCEDURE — 82340 ASSAY OF CALCIUM IN URINE: CPT

## 2018-01-01 PROCEDURE — 93970 EXTREMITY STUDY: CPT | Mod: 26

## 2018-01-01 PROCEDURE — 85379 FIBRIN DEGRADATION QUANT: CPT

## 2018-01-01 PROCEDURE — 85045 AUTOMATED RETICULOCYTE COUNT: CPT

## 2018-01-01 PROCEDURE — 97110 THERAPEUTIC EXERCISES: CPT

## 2018-01-01 PROCEDURE — 99285 EMERGENCY DEPT VISIT HI MDM: CPT

## 2018-01-01 PROCEDURE — 85730 THROMBOPLASTIN TIME PARTIAL: CPT

## 2018-01-01 PROCEDURE — 31500 INSERT EMERGENCY AIRWAY: CPT

## 2018-01-01 PROCEDURE — 99285 EMERGENCY DEPT VISIT HI MDM: CPT | Mod: 25

## 2018-01-01 PROCEDURE — 76775 US EXAM ABDO BACK WALL LIM: CPT | Mod: 26

## 2018-01-01 PROCEDURE — 87486 CHLMYD PNEUM DNA AMP PROBE: CPT

## 2018-01-01 PROCEDURE — 93005 ELECTROCARDIOGRAM TRACING: CPT

## 2018-01-01 PROCEDURE — 84156 ASSAY OF PROTEIN URINE: CPT

## 2018-01-01 PROCEDURE — 82553 CREATINE MB FRACTION: CPT

## 2018-01-01 PROCEDURE — 74019 RADEX ABDOMEN 2 VIEWS: CPT | Mod: 26

## 2018-01-01 PROCEDURE — 82962 GLUCOSE BLOOD TEST: CPT

## 2018-01-01 PROCEDURE — 82272 OCCULT BLD FECES 1-3 TESTS: CPT

## 2018-01-01 PROCEDURE — 87798 DETECT AGENT NOS DNA AMP: CPT

## 2018-01-01 PROCEDURE — 85610 PROTHROMBIN TIME: CPT

## 2018-01-01 PROCEDURE — 74019 RADEX ABDOMEN 2 VIEWS: CPT

## 2018-01-01 PROCEDURE — 86850 RBC ANTIBODY SCREEN: CPT

## 2018-01-01 PROCEDURE — 84145 PROCALCITONIN (PCT): CPT

## 2018-01-01 PROCEDURE — 80053 COMPREHEN METABOLIC PANEL: CPT

## 2018-01-01 PROCEDURE — 80076 HEPATIC FUNCTION PANEL: CPT

## 2018-01-01 PROCEDURE — 94660 CPAP INITIATION&MGMT: CPT

## 2018-01-01 PROCEDURE — 87070 CULTURE OTHR SPECIMN AEROBIC: CPT

## 2018-01-01 PROCEDURE — 94003 VENT MGMT INPAT SUBQ DAY: CPT

## 2018-01-01 PROCEDURE — 94640 AIRWAY INHALATION TREATMENT: CPT

## 2018-01-01 PROCEDURE — 84484 ASSAY OF TROPONIN QUANT: CPT

## 2018-01-01 PROCEDURE — 84550 ASSAY OF BLOOD/URIC ACID: CPT

## 2018-01-01 PROCEDURE — 93306 TTE W/DOPPLER COMPLETE: CPT

## 2018-01-01 PROCEDURE — 97162 PT EVAL MOD COMPLEX 30 MIN: CPT

## 2018-01-01 PROCEDURE — 85384 FIBRINOGEN ACTIVITY: CPT

## 2018-01-01 PROCEDURE — 80048 BASIC METABOLIC PNL TOTAL CA: CPT

## 2018-01-01 PROCEDURE — 82570 ASSAY OF URINE CREATININE: CPT

## 2018-01-01 PROCEDURE — 99261: CPT

## 2018-01-01 PROCEDURE — 82306 VITAMIN D 25 HYDROXY: CPT

## 2018-01-01 PROCEDURE — 87581 M.PNEUMON DNA AMP PROBE: CPT

## 2018-01-01 PROCEDURE — 84100 ASSAY OF PHOSPHORUS: CPT

## 2018-01-01 PROCEDURE — 76775 US EXAM ABDO BACK WALL LIM: CPT

## 2018-01-01 PROCEDURE — 84443 ASSAY THYROID STIM HORMONE: CPT

## 2018-01-01 PROCEDURE — 99223 1ST HOSP IP/OBS HIGH 75: CPT | Mod: 25

## 2018-01-01 PROCEDURE — 86901 BLOOD TYPING SEROLOGIC RH(D): CPT

## 2018-01-01 PROCEDURE — 80061 LIPID PANEL: CPT

## 2018-01-01 PROCEDURE — 80074 ACUTE HEPATITIS PANEL: CPT

## 2018-01-01 PROCEDURE — 81001 URINALYSIS AUTO W/SCOPE: CPT

## 2018-01-01 PROCEDURE — 84300 ASSAY OF URINE SODIUM: CPT

## 2018-01-01 PROCEDURE — 83036 HEMOGLOBIN GLYCOSYLATED A1C: CPT

## 2018-01-01 PROCEDURE — 36415 COLL VENOUS BLD VENIPUNCTURE: CPT

## 2018-01-01 PROCEDURE — 83605 ASSAY OF LACTIC ACID: CPT

## 2018-01-01 PROCEDURE — 84540 ASSAY OF URINE/UREA-N: CPT

## 2018-01-01 PROCEDURE — 71045 X-RAY EXAM CHEST 1 VIEW: CPT

## 2018-01-01 PROCEDURE — 87449 NOS EACH ORGANISM AG IA: CPT

## 2018-01-01 PROCEDURE — 99233 SBSQ HOSP IP/OBS HIGH 50: CPT | Mod: 25

## 2018-01-01 PROCEDURE — 87040 BLOOD CULTURE FOR BACTERIA: CPT

## 2018-01-01 PROCEDURE — 85027 COMPLETE CBC AUTOMATED: CPT

## 2018-01-01 PROCEDURE — 93970 EXTREMITY STUDY: CPT

## 2018-01-01 PROCEDURE — 83010 ASSAY OF HAPTOGLOBIN QUANT: CPT

## 2018-01-01 PROCEDURE — 87633 RESP VIRUS 12-25 TARGETS: CPT

## 2018-01-01 PROCEDURE — 94002 VENT MGMT INPAT INIT DAY: CPT

## 2018-01-01 PROCEDURE — 83735 ASSAY OF MAGNESIUM: CPT

## 2018-01-01 PROCEDURE — 71045 X-RAY EXAM CHEST 1 VIEW: CPT | Mod: 26,77

## 2018-01-01 PROCEDURE — 83935 ASSAY OF URINE OSMOLALITY: CPT

## 2018-01-01 PROCEDURE — 82803 BLOOD GASES ANY COMBINATION: CPT

## 2018-01-01 PROCEDURE — 83880 ASSAY OF NATRIURETIC PEPTIDE: CPT

## 2018-01-01 PROCEDURE — 82550 ASSAY OF CK (CPK): CPT

## 2018-01-01 RX ORDER — MEROPENEM 1 G/30ML
INJECTION INTRAVENOUS
Qty: 0 | Refills: 0 | Status: DISCONTINUED | OUTPATIENT
Start: 2018-01-01 | End: 2018-01-01

## 2018-01-01 RX ORDER — DUTASTERIDE 0.5 MG/1
1 CAPSULE, LIQUID FILLED ORAL
Qty: 0 | Refills: 0 | COMMUNITY

## 2018-01-01 RX ORDER — AMIODARONE HYDROCHLORIDE 400 MG/1
200 TABLET ORAL DAILY
Qty: 0 | Refills: 0 | Status: DISCONTINUED | OUTPATIENT
Start: 2018-01-01 | End: 2018-01-01

## 2018-01-01 RX ORDER — MEROPENEM 1 G/30ML
500 INJECTION INTRAVENOUS EVERY 12 HOURS
Qty: 0 | Refills: 0 | Status: DISCONTINUED | OUTPATIENT
Start: 2018-01-01 | End: 2018-01-01

## 2018-01-01 RX ORDER — ROBINUL 0.2 MG/ML
0.4 INJECTION INTRAMUSCULAR; INTRAVENOUS EVERY 4 HOURS
Qty: 0 | Refills: 0 | Status: DISCONTINUED | OUTPATIENT
Start: 2018-01-01 | End: 2018-01-01

## 2018-01-01 RX ORDER — AMIODARONE HYDROCHLORIDE 400 MG/1
0.5 TABLET ORAL
Qty: 450 | Refills: 0 | Status: DISCONTINUED | OUTPATIENT
Start: 2018-01-01 | End: 2018-01-01

## 2018-01-01 RX ORDER — SODIUM POLYSTYRENE SULFONATE 4.1 MEQ/G
15 POWDER, FOR SUSPENSION ORAL ONCE
Qty: 0 | Refills: 0 | Status: COMPLETED | OUTPATIENT
Start: 2018-01-01 | End: 2018-01-01

## 2018-01-01 RX ORDER — VANCOMYCIN HCL 1 G
1000 VIAL (EA) INTRAVENOUS ONCE
Qty: 0 | Refills: 0 | Status: COMPLETED | OUTPATIENT
Start: 2018-01-01 | End: 2018-01-01

## 2018-01-01 RX ORDER — HYDROMORPHONE HYDROCHLORIDE 2 MG/ML
0 INJECTION INTRAMUSCULAR; INTRAVENOUS; SUBCUTANEOUS
Qty: 0 | Refills: 0 | COMMUNITY
Start: 2018-01-01

## 2018-01-01 RX ORDER — FENTANYL CITRATE 50 UG/ML
0.5 INJECTION INTRAVENOUS
Qty: 2500 | Refills: 0 | Status: DISCONTINUED | OUTPATIENT
Start: 2018-01-01 | End: 2018-01-01

## 2018-01-01 RX ORDER — INSULIN HUMAN 100 [IU]/ML
4 INJECTION, SOLUTION SUBCUTANEOUS ONCE
Qty: 0 | Refills: 0 | Status: DISCONTINUED | OUTPATIENT
Start: 2018-01-01 | End: 2018-01-01

## 2018-01-01 RX ORDER — ALBUTEROL 90 UG/1
1 AEROSOL, METERED ORAL EVERY 4 HOURS
Qty: 0 | Refills: 0 | Status: DISCONTINUED | OUTPATIENT
Start: 2018-01-01 | End: 2018-01-01

## 2018-01-01 RX ORDER — INSULIN LISPRO 100/ML
VIAL (ML) SUBCUTANEOUS EVERY 6 HOURS
Qty: 0 | Refills: 0 | Status: DISCONTINUED | OUTPATIENT
Start: 2018-01-01 | End: 2018-01-01

## 2018-01-01 RX ORDER — FUROSEMIDE 40 MG
40 TABLET ORAL ONCE
Qty: 0 | Refills: 0 | Status: DISCONTINUED | OUTPATIENT
Start: 2018-01-01 | End: 2018-01-01

## 2018-01-01 RX ORDER — METOPROLOL TARTRATE 50 MG
25 TABLET ORAL EVERY 8 HOURS
Qty: 0 | Refills: 0 | Status: DISCONTINUED | OUTPATIENT
Start: 2018-01-01 | End: 2018-01-01

## 2018-01-01 RX ORDER — SODIUM POLYSTYRENE SULFONATE 4.1 MEQ/G
30 POWDER, FOR SUSPENSION ORAL ONCE
Qty: 0 | Refills: 0 | Status: COMPLETED | OUTPATIENT
Start: 2018-01-01 | End: 2018-01-01

## 2018-01-01 RX ORDER — CALCIUM GLUCONATE 100 MG/ML
1 VIAL (ML) INTRAVENOUS ONCE
Qty: 0 | Refills: 0 | Status: COMPLETED | OUTPATIENT
Start: 2018-01-01 | End: 2018-01-01

## 2018-01-01 RX ORDER — METOPROLOL TARTRATE 50 MG
12.5 TABLET ORAL
Qty: 0 | Refills: 0 | Status: DISCONTINUED | OUTPATIENT
Start: 2018-01-01 | End: 2018-01-01

## 2018-01-01 RX ORDER — ALBUTEROL 90 UG/1
5 AEROSOL, METERED ORAL ONCE
Qty: 0 | Refills: 0 | Status: COMPLETED | OUTPATIENT
Start: 2018-01-01 | End: 2018-01-01

## 2018-01-01 RX ORDER — PHENYLEPHRINE HYDROCHLORIDE 10 MG/ML
0.5 INJECTION INTRAVENOUS
Qty: 160 | Refills: 0 | Status: DISCONTINUED | OUTPATIENT
Start: 2018-01-01 | End: 2018-01-01

## 2018-01-01 RX ORDER — METOPROLOL TARTRATE 50 MG
12.5 TABLET ORAL THREE TIMES A DAY
Qty: 0 | Refills: 0 | Status: DISCONTINUED | OUTPATIENT
Start: 2018-01-01 | End: 2018-01-01

## 2018-01-01 RX ORDER — HYDRALAZINE HCL 50 MG
10 TABLET ORAL ONCE
Qty: 0 | Refills: 0 | Status: COMPLETED | OUTPATIENT
Start: 2018-01-01 | End: 2018-01-01

## 2018-01-01 RX ORDER — DEXTROSE 50 % IN WATER 50 %
50 SYRINGE (ML) INTRAVENOUS ONCE
Qty: 0 | Refills: 0 | Status: DISCONTINUED | OUTPATIENT
Start: 2018-01-01 | End: 2018-01-01

## 2018-01-01 RX ORDER — HEPARIN SODIUM 5000 [USP'U]/ML
5000 INJECTION INTRAVENOUS; SUBCUTANEOUS EVERY 12 HOURS
Qty: 0 | Refills: 0 | Status: DISCONTINUED | OUTPATIENT
Start: 2018-01-01 | End: 2018-01-01

## 2018-01-01 RX ORDER — EPINEPHRINE 11.25MG/ML
0.5 SOLUTION, NON-ORAL INHALATION ONCE
Qty: 0 | Refills: 0 | Status: COMPLETED | OUTPATIENT
Start: 2018-01-01 | End: 2018-01-01

## 2018-01-01 RX ORDER — CLOPIDOGREL BISULFATE 75 MG/1
1 TABLET, FILM COATED ORAL
Qty: 0 | Refills: 0 | COMMUNITY

## 2018-01-01 RX ORDER — FUROSEMIDE 40 MG
100 TABLET ORAL ONCE
Qty: 0 | Refills: 0 | Status: DISCONTINUED | OUTPATIENT
Start: 2018-01-01 | End: 2018-01-01

## 2018-01-01 RX ORDER — SODIUM POLYSTYRENE SULFONATE 4.1 MEQ/G
15 POWDER, FOR SUSPENSION ORAL ONCE
Qty: 0 | Refills: 0 | Status: DISCONTINUED | OUTPATIENT
Start: 2018-01-01 | End: 2018-01-01

## 2018-01-01 RX ORDER — PROPOFOL 10 MG/ML
5 INJECTION, EMULSION INTRAVENOUS
Qty: 1000 | Refills: 0 | Status: DISCONTINUED | OUTPATIENT
Start: 2018-01-01 | End: 2018-01-01

## 2018-01-01 RX ORDER — ASPIRIN/CALCIUM CARB/MAGNESIUM 324 MG
81 TABLET ORAL DAILY
Qty: 0 | Refills: 0 | Status: DISCONTINUED | OUTPATIENT
Start: 2018-01-01 | End: 2018-01-01

## 2018-01-01 RX ORDER — HYDROMORPHONE HYDROCHLORIDE 2 MG/ML
0.5 INJECTION INTRAMUSCULAR; INTRAVENOUS; SUBCUTANEOUS
Qty: 0 | Refills: 0 | Status: DISCONTINUED | OUTPATIENT
Start: 2018-01-01 | End: 2018-01-01

## 2018-01-01 RX ORDER — FUROSEMIDE 40 MG
60 TABLET ORAL ONCE
Qty: 0 | Refills: 0 | Status: COMPLETED | OUTPATIENT
Start: 2018-01-01 | End: 2018-01-01

## 2018-01-01 RX ORDER — PSYLLIUM SEED (WITH DEXTROSE)
0 POWDER (GRAM) ORAL
Qty: 0 | Refills: 0 | COMMUNITY

## 2018-01-01 RX ORDER — CHLORHEXIDINE GLUCONATE 213 G/1000ML
1 SOLUTION TOPICAL DAILY
Qty: 0 | Refills: 0 | Status: DISCONTINUED | OUTPATIENT
Start: 2018-01-01 | End: 2018-01-01

## 2018-01-01 RX ORDER — ADENOSINE 3 MG/ML
6 INJECTION INTRAVENOUS ONCE
Qty: 0 | Refills: 0 | Status: COMPLETED | OUTPATIENT
Start: 2018-01-01 | End: 2018-01-01

## 2018-01-01 RX ORDER — ASPIRIN/CALCIUM CARB/MAGNESIUM 324 MG
1 TABLET ORAL
Qty: 0 | Refills: 0 | COMMUNITY

## 2018-01-01 RX ORDER — NOREPINEPHRINE BITARTRATE/D5W 8 MG/250ML
0.5 PLASTIC BAG, INJECTION (ML) INTRAVENOUS
Qty: 16 | Refills: 0 | Status: DISCONTINUED | OUTPATIENT
Start: 2018-01-01 | End: 2018-01-01

## 2018-01-01 RX ORDER — AZITHROMYCIN 500 MG/1
250 TABLET, FILM COATED ORAL EVERY 24 HOURS
Qty: 0 | Refills: 0 | Status: DISCONTINUED | OUTPATIENT
Start: 2018-01-01 | End: 2018-01-01

## 2018-01-01 RX ORDER — ATORVASTATIN CALCIUM 80 MG/1
40 TABLET, FILM COATED ORAL AT BEDTIME
Qty: 0 | Refills: 0 | Status: DISCONTINUED | OUTPATIENT
Start: 2018-01-01 | End: 2018-01-01

## 2018-01-01 RX ORDER — NITROGLYCERIN 6.5 MG
1 CAPSULE, EXTENDED RELEASE ORAL
Qty: 0 | Refills: 0 | COMMUNITY

## 2018-01-01 RX ORDER — INSULIN LISPRO 100/ML
VIAL (ML) SUBCUTANEOUS EVERY 4 HOURS
Qty: 0 | Refills: 0 | Status: DISCONTINUED | OUTPATIENT
Start: 2018-01-01 | End: 2018-01-01

## 2018-01-01 RX ORDER — ADENOSINE 3 MG/ML
12 INJECTION INTRAVENOUS ONCE
Qty: 0 | Refills: 0 | Status: COMPLETED | OUTPATIENT
Start: 2018-01-01 | End: 2018-01-01

## 2018-01-01 RX ORDER — ACETAMINOPHEN 500 MG
650 TABLET ORAL EVERY 6 HOURS
Qty: 0 | Refills: 0 | Status: DISCONTINUED | OUTPATIENT
Start: 2018-01-01 | End: 2018-01-01

## 2018-01-01 RX ORDER — SODIUM BICARBONATE 1 MEQ/ML
650 SYRINGE (ML) INTRAVENOUS THREE TIMES A DAY
Qty: 0 | Refills: 0 | Status: DISCONTINUED | OUTPATIENT
Start: 2018-01-01 | End: 2018-01-01

## 2018-01-01 RX ORDER — METOPROLOL TARTRATE 50 MG
12.5 TABLET ORAL EVERY 8 HOURS
Qty: 0 | Refills: 0 | Status: DISCONTINUED | OUTPATIENT
Start: 2018-01-01 | End: 2018-01-01

## 2018-01-01 RX ORDER — INSULIN HUMAN 100 [IU]/ML
10 INJECTION, SOLUTION SUBCUTANEOUS ONCE
Qty: 0 | Refills: 0 | Status: DISCONTINUED | OUTPATIENT
Start: 2018-01-01 | End: 2018-01-01

## 2018-01-01 RX ORDER — DOXAZOSIN MESYLATE 4 MG
1 TABLET ORAL
Qty: 0 | Refills: 0 | COMMUNITY

## 2018-01-01 RX ORDER — MEROPENEM 1 G/30ML
500 INJECTION INTRAVENOUS EVERY 8 HOURS
Qty: 0 | Refills: 0 | Status: DISCONTINUED | OUTPATIENT
Start: 2018-01-01 | End: 2018-01-01

## 2018-01-01 RX ORDER — ACYCLOVIR SODIUM 500 MG
200 VIAL (EA) INTRAVENOUS
Qty: 0 | Refills: 0 | COMMUNITY

## 2018-01-01 RX ORDER — SEVELAMER CARBONATE 2400 MG/1
1600 POWDER, FOR SUSPENSION ORAL
Qty: 0 | Refills: 0 | Status: DISCONTINUED | OUTPATIENT
Start: 2018-01-01 | End: 2018-01-01

## 2018-01-01 RX ORDER — INSULIN GLARGINE 100 [IU]/ML
16 INJECTION, SOLUTION SUBCUTANEOUS AT BEDTIME
Qty: 0 | Refills: 0 | Status: DISCONTINUED | OUTPATIENT
Start: 2018-01-01 | End: 2018-01-01

## 2018-01-01 RX ORDER — AMIODARONE HYDROCHLORIDE 400 MG/1
150 TABLET ORAL ONCE
Qty: 0 | Refills: 0 | Status: COMPLETED | OUTPATIENT
Start: 2018-01-01 | End: 2018-01-01

## 2018-01-01 RX ORDER — PHYTONADIONE (VIT K1) 5 MG
5 TABLET ORAL DAILY
Qty: 0 | Refills: 0 | Status: COMPLETED | OUTPATIENT
Start: 2018-01-01 | End: 2018-01-01

## 2018-01-01 RX ORDER — INSULIN HUMAN 100 [IU]/ML
8 INJECTION, SOLUTION SUBCUTANEOUS ONCE
Qty: 0 | Refills: 0 | Status: COMPLETED | OUTPATIENT
Start: 2018-01-01 | End: 2018-01-01

## 2018-01-01 RX ORDER — INSULIN HUMAN 100 [IU]/ML
5 INJECTION, SOLUTION SUBCUTANEOUS ONCE
Qty: 0 | Refills: 0 | Status: COMPLETED | OUTPATIENT
Start: 2018-01-01 | End: 2018-01-01

## 2018-01-01 RX ORDER — INSULIN HUMAN 100 [IU]/ML
4 INJECTION, SOLUTION SUBCUTANEOUS ONCE
Qty: 0 | Refills: 0 | Status: COMPLETED | OUTPATIENT
Start: 2018-01-01 | End: 2018-01-01

## 2018-01-01 RX ORDER — CEFTRIAXONE 500 MG/1
1 INJECTION, POWDER, FOR SOLUTION INTRAMUSCULAR; INTRAVENOUS EVERY 24 HOURS
Qty: 0 | Refills: 0 | Status: DISCONTINUED | OUTPATIENT
Start: 2018-01-01 | End: 2018-01-01

## 2018-01-01 RX ORDER — POTASSIUM PHOSPHATE, MONOBASIC POTASSIUM PHOSPHATE, DIBASIC 236; 224 MG/ML; MG/ML
15 INJECTION, SOLUTION INTRAVENOUS ONCE
Qty: 0 | Refills: 0 | Status: DISCONTINUED | OUTPATIENT
Start: 2018-01-01 | End: 2018-01-01

## 2018-01-01 RX ORDER — ALBUTEROL 90 UG/1
2.5 AEROSOL, METERED ORAL EVERY 6 HOURS
Qty: 0 | Refills: 0 | Status: DISCONTINUED | OUTPATIENT
Start: 2018-01-01 | End: 2018-01-01

## 2018-01-01 RX ORDER — ERYTHROPOIETIN 10000 [IU]/ML
8000 INJECTION, SOLUTION INTRAVENOUS; SUBCUTANEOUS
Qty: 0 | Refills: 0 | Status: DISCONTINUED | OUTPATIENT
Start: 2018-01-01 | End: 2018-01-01

## 2018-01-01 RX ORDER — ALBUTEROL 90 UG/1
1 AEROSOL, METERED ORAL EVERY 4 HOURS
Qty: 0 | Refills: 0 | Status: COMPLETED | OUTPATIENT
Start: 2018-01-01 | End: 2018-12-22

## 2018-01-01 RX ORDER — DOBUTAMINE HCL 250MG/20ML
5 VIAL (ML) INTRAVENOUS
Qty: 1000 | Refills: 0 | Status: DISCONTINUED | OUTPATIENT
Start: 2018-01-01 | End: 2018-01-01

## 2018-01-01 RX ORDER — FINASTERIDE 5 MG/1
5 TABLET, FILM COATED ORAL DAILY
Qty: 0 | Refills: 0 | Status: DISCONTINUED | OUTPATIENT
Start: 2018-01-01 | End: 2018-01-01

## 2018-01-01 RX ORDER — AMIODARONE HYDROCHLORIDE 400 MG/1
1 TABLET ORAL
Qty: 450 | Refills: 0 | Status: DISCONTINUED | OUTPATIENT
Start: 2018-01-01 | End: 2018-01-01

## 2018-01-01 RX ORDER — ACETAMINOPHEN 500 MG
1000 TABLET ORAL ONCE
Qty: 0 | Refills: 0 | Status: COMPLETED | OUTPATIENT
Start: 2018-01-01 | End: 2018-01-01

## 2018-01-01 RX ORDER — ALUMINUM HYDROXIDE
13 POWDER (GRAM) MISCELLANEOUS EVERY 6 HOURS
Qty: 0 | Refills: 0 | Status: DISCONTINUED | OUTPATIENT
Start: 2018-01-01 | End: 2018-01-01

## 2018-01-01 RX ORDER — TIOTROPIUM BROMIDE 18 UG/1
1 CAPSULE ORAL; RESPIRATORY (INHALATION) DAILY
Qty: 0 | Refills: 0 | Status: DISCONTINUED | OUTPATIENT
Start: 2018-01-01 | End: 2018-01-01

## 2018-01-01 RX ORDER — ALUMINUM HYDROXIDE
30 POWDER (GRAM) MISCELLANEOUS EVERY 6 HOURS
Qty: 0 | Refills: 0 | Status: COMPLETED | OUTPATIENT
Start: 2018-01-01 | End: 2018-01-01

## 2018-01-01 RX ORDER — ROBINUL 0.2 MG/ML
0 INJECTION INTRAMUSCULAR; INTRAVENOUS
Qty: 0 | Refills: 0 | COMMUNITY
Start: 2018-01-01

## 2018-01-01 RX ORDER — POTASSIUM PHOSPHATE, MONOBASIC POTASSIUM PHOSPHATE, DIBASIC 236; 224 MG/ML; MG/ML
30 INJECTION, SOLUTION INTRAVENOUS ONCE
Qty: 0 | Refills: 0 | Status: COMPLETED | OUTPATIENT
Start: 2018-01-01 | End: 2018-01-01

## 2018-01-01 RX ORDER — IPRATROPIUM/ALBUTEROL SULFATE 18-103MCG
3 AEROSOL WITH ADAPTER (GRAM) INHALATION EVERY 6 HOURS
Qty: 0 | Refills: 0 | Status: DISCONTINUED | OUTPATIENT
Start: 2018-01-01 | End: 2018-01-01

## 2018-01-01 RX ORDER — PANTOPRAZOLE SODIUM 20 MG/1
40 TABLET, DELAYED RELEASE ORAL DAILY
Qty: 0 | Refills: 0 | Status: DISCONTINUED | OUTPATIENT
Start: 2018-01-01 | End: 2018-01-01

## 2018-01-01 RX ORDER — CALCIUM GLUCONATE 100 MG/ML
1 VIAL (ML) INTRAVENOUS ONCE
Qty: 0 | Refills: 0 | Status: DISCONTINUED | OUTPATIENT
Start: 2018-01-01 | End: 2018-01-01

## 2018-01-01 RX ORDER — CLOMIPRAMINE HYDROCHLORIDE 50 MG/1
1 CAPSULE ORAL
Qty: 0 | Refills: 0 | COMMUNITY

## 2018-01-01 RX ORDER — DOXAZOSIN MESYLATE 4 MG
4 TABLET ORAL AT BEDTIME
Qty: 0 | Refills: 0 | Status: DISCONTINUED | OUTPATIENT
Start: 2018-01-01 | End: 2018-01-01

## 2018-01-01 RX ORDER — METOPROLOL TARTRATE 50 MG
25 TABLET ORAL EVERY 6 HOURS
Qty: 0 | Refills: 0 | Status: DISCONTINUED | OUTPATIENT
Start: 2018-01-01 | End: 2018-01-01

## 2018-01-01 RX ORDER — ALTEPLASE 100 MG
2 KIT INTRAVENOUS ONCE
Qty: 0 | Refills: 0 | Status: DISCONTINUED | OUTPATIENT
Start: 2018-01-01 | End: 2018-01-01

## 2018-01-01 RX ORDER — TAMSULOSIN HYDROCHLORIDE 0.4 MG/1
0.4 CAPSULE ORAL AT BEDTIME
Qty: 0 | Refills: 0 | Status: DISCONTINUED | OUTPATIENT
Start: 2018-01-01 | End: 2018-01-01

## 2018-01-01 RX ORDER — ROSUVASTATIN CALCIUM 5 MG/1
1 TABLET ORAL
Qty: 0 | Refills: 0 | COMMUNITY

## 2018-01-01 RX ORDER — CLOPIDOGREL BISULFATE 75 MG/1
75 TABLET, FILM COATED ORAL DAILY
Qty: 0 | Refills: 0 | Status: DISCONTINUED | OUTPATIENT
Start: 2018-01-01 | End: 2018-01-01

## 2018-01-01 RX ORDER — INSULIN GLARGINE 100 [IU]/ML
20 INJECTION, SOLUTION SUBCUTANEOUS AT BEDTIME
Qty: 0 | Refills: 0 | Status: DISCONTINUED | OUTPATIENT
Start: 2018-01-01 | End: 2018-01-01

## 2018-01-01 RX ORDER — IPRATROPIUM/ALBUTEROL SULFATE 18-103MCG
3 AEROSOL WITH ADAPTER (GRAM) INHALATION
Qty: 0 | Refills: 0 | COMMUNITY

## 2018-01-01 RX ORDER — SODIUM CHLORIDE 9 MG/ML
2000 INJECTION INTRAMUSCULAR; INTRAVENOUS; SUBCUTANEOUS ONCE
Qty: 0 | Refills: 0 | Status: COMPLETED | OUTPATIENT
Start: 2018-01-01 | End: 2018-01-01

## 2018-01-01 RX ORDER — MORPHINE SULFATE 50 MG/1
1 CAPSULE, EXTENDED RELEASE ORAL ONCE
Qty: 0 | Refills: 0 | Status: DISCONTINUED | OUTPATIENT
Start: 2018-01-01 | End: 2018-01-01

## 2018-01-01 RX ORDER — ALBUTEROL 90 UG/1
1 AEROSOL, METERED ORAL EVERY 6 HOURS
Qty: 0 | Refills: 0 | Status: DISCONTINUED | OUTPATIENT
Start: 2018-01-01 | End: 2018-01-01

## 2018-01-01 RX ORDER — INSULIN LISPRO 100/ML
4 VIAL (ML) SUBCUTANEOUS EVERY 4 HOURS
Qty: 0 | Refills: 0 | Status: DISCONTINUED | OUTPATIENT
Start: 2018-01-01 | End: 2018-01-01

## 2018-01-01 RX ORDER — AZITHROMYCIN 500 MG/1
TABLET, FILM COATED ORAL
Qty: 0 | Refills: 0 | Status: DISCONTINUED | OUTPATIENT
Start: 2018-01-01 | End: 2018-01-01

## 2018-01-01 RX ORDER — ACETAMINOPHEN 500 MG
1 TABLET ORAL
Qty: 0 | Refills: 0 | COMMUNITY

## 2018-01-01 RX ORDER — AMIODARONE HYDROCHLORIDE 400 MG/1
200 TABLET ORAL THREE TIMES A DAY
Qty: 0 | Refills: 0 | Status: DISCONTINUED | OUTPATIENT
Start: 2018-01-01 | End: 2018-01-01

## 2018-01-01 RX ORDER — INSULIN GLARGINE 100 [IU]/ML
10 INJECTION, SOLUTION SUBCUTANEOUS AT BEDTIME
Qty: 0 | Refills: 0 | Status: DISCONTINUED | OUTPATIENT
Start: 2018-01-01 | End: 2018-01-01

## 2018-01-01 RX ORDER — SODIUM CHLORIDE 9 MG/ML
1000 INJECTION INTRAMUSCULAR; INTRAVENOUS; SUBCUTANEOUS ONCE
Qty: 0 | Refills: 0 | Status: COMPLETED | OUTPATIENT
Start: 2018-01-01 | End: 2018-01-01

## 2018-01-01 RX ORDER — HALOPERIDOL DECANOATE 100 MG/ML
2 INJECTION INTRAMUSCULAR ONCE
Qty: 0 | Refills: 0 | Status: COMPLETED | OUTPATIENT
Start: 2018-01-01 | End: 2018-01-01

## 2018-01-01 RX ORDER — RANOLAZINE 500 MG/1
1 TABLET, FILM COATED, EXTENDED RELEASE ORAL
Qty: 0 | Refills: 0 | COMMUNITY

## 2018-01-01 RX ORDER — IMIPENEM AND CILASTATIN 250; 250 MG/100ML; MG/100ML
500 INJECTION, POWDER, FOR SOLUTION INTRAVENOUS ONCE
Qty: 0 | Refills: 0 | Status: DISCONTINUED | OUTPATIENT
Start: 2018-01-01 | End: 2018-01-01

## 2018-01-01 RX ORDER — FUROSEMIDE 40 MG
80 TABLET ORAL DAILY
Qty: 0 | Refills: 0 | Status: DISCONTINUED | OUTPATIENT
Start: 2018-01-01 | End: 2018-01-01

## 2018-01-01 RX ORDER — ETOMIDATE 2 MG/ML
20 INJECTION INTRAVENOUS ONCE
Qty: 0 | Refills: 0 | Status: COMPLETED | OUTPATIENT
Start: 2018-01-01 | End: 2018-01-01

## 2018-01-01 RX ORDER — MORPHINE SULFATE 50 MG/1
0.5 CAPSULE, EXTENDED RELEASE ORAL ONCE
Qty: 0 | Refills: 0 | Status: DISCONTINUED | OUTPATIENT
Start: 2018-01-01 | End: 2018-01-01

## 2018-01-01 RX ORDER — PANTOPRAZOLE SODIUM 20 MG/1
8 TABLET, DELAYED RELEASE ORAL
Qty: 80 | Refills: 0 | Status: DISCONTINUED | OUTPATIENT
Start: 2018-01-01 | End: 2018-01-01

## 2018-01-01 RX ORDER — MORPHINE SULFATE 50 MG/1
2 CAPSULE, EXTENDED RELEASE ORAL ONCE
Qty: 0 | Refills: 0 | Status: DISCONTINUED | OUTPATIENT
Start: 2018-01-01 | End: 2018-01-01

## 2018-01-01 RX ORDER — PHENYLEPHRINE HYDROCHLORIDE 10 MG/ML
0 INJECTION INTRAVENOUS
Qty: 0 | Refills: 0 | COMMUNITY
Start: 2018-01-01

## 2018-01-01 RX ORDER — CALCIUM GLUCONATE 100 MG/ML
1 VIAL (ML) INTRAVENOUS EVERY 6 HOURS
Qty: 0 | Refills: 0 | Status: COMPLETED | OUTPATIENT
Start: 2018-01-01 | End: 2018-01-01

## 2018-01-01 RX ORDER — MIDAZOLAM HYDROCHLORIDE 1 MG/ML
2 INJECTION, SOLUTION INTRAMUSCULAR; INTRAVENOUS ONCE
Qty: 0 | Refills: 0 | Status: DISCONTINUED | OUTPATIENT
Start: 2018-01-01 | End: 2018-01-01

## 2018-01-01 RX ORDER — MEROPENEM 1 G/30ML
500 INJECTION INTRAVENOUS ONCE
Qty: 0 | Refills: 0 | Status: DISCONTINUED | OUTPATIENT
Start: 2018-01-01 | End: 2018-01-01

## 2018-01-01 RX ORDER — SODIUM BICARBONATE 1 MEQ/ML
100 SYRINGE (ML) INTRAVENOUS ONCE
Qty: 0 | Refills: 0 | Status: COMPLETED | OUTPATIENT
Start: 2018-01-01 | End: 2018-01-01

## 2018-01-01 RX ORDER — DEXTROSE 50 % IN WATER 50 %
50 SYRINGE (ML) INTRAVENOUS ONCE
Qty: 0 | Refills: 0 | Status: COMPLETED | OUTPATIENT
Start: 2018-01-01 | End: 2018-01-01

## 2018-01-01 RX ORDER — LANOLIN/MINERAL OIL
1 LOTION (ML) TOPICAL
Qty: 0 | Refills: 0 | COMMUNITY

## 2018-01-01 RX ORDER — HEPARIN SODIUM 5000 [USP'U]/ML
INJECTION INTRAVENOUS; SUBCUTANEOUS
Qty: 25000 | Refills: 0 | Status: DISCONTINUED | OUTPATIENT
Start: 2018-01-01 | End: 2018-01-01

## 2018-01-01 RX ORDER — LACTULOSE 10 G/15ML
30 SOLUTION ORAL DAILY
Qty: 0 | Refills: 0 | Status: DISCONTINUED | OUTPATIENT
Start: 2018-01-01 | End: 2018-01-01

## 2018-01-01 RX ORDER — PHENYLEPHRINE HYDROCHLORIDE 10 MG/ML
0.5 INJECTION INTRAVENOUS
Qty: 80 | Refills: 0 | Status: DISCONTINUED | OUTPATIENT
Start: 2018-01-01 | End: 2018-01-01

## 2018-01-01 RX ORDER — ACETAMINOPHEN 500 MG
650 TABLET ORAL ONCE
Qty: 0 | Refills: 0 | Status: COMPLETED | OUTPATIENT
Start: 2018-01-01 | End: 2018-01-01

## 2018-01-01 RX ORDER — DOBUTAMINE HCL 250MG/20ML
5 VIAL (ML) INTRAVENOUS
Qty: 500 | Refills: 0 | Status: DISCONTINUED | OUTPATIENT
Start: 2018-01-01 | End: 2018-01-01

## 2018-01-01 RX ORDER — POLYETHYLENE GLYCOL 3350 17 G/17G
17 POWDER, FOR SOLUTION ORAL DAILY
Qty: 0 | Refills: 0 | Status: COMPLETED | OUTPATIENT
Start: 2018-01-01 | End: 2018-01-01

## 2018-01-01 RX ORDER — MEROPENEM 1 G/30ML
500 INJECTION INTRAVENOUS ONCE
Qty: 0 | Refills: 0 | Status: COMPLETED | OUTPATIENT
Start: 2018-01-01 | End: 2018-01-01

## 2018-01-01 RX ORDER — PANTOPRAZOLE SODIUM 20 MG/1
40 TABLET, DELAYED RELEASE ORAL EVERY 12 HOURS
Qty: 0 | Refills: 0 | Status: DISCONTINUED | OUTPATIENT
Start: 2018-01-01 | End: 2018-01-01

## 2018-01-01 RX ORDER — TAMSULOSIN HYDROCHLORIDE 0.4 MG/1
1 CAPSULE ORAL
Qty: 0 | Refills: 0 | COMMUNITY

## 2018-01-01 RX ORDER — METRONIDAZOLE 7.5 MG/G
1 GEL VAGINAL
Qty: 0 | Refills: 0 | COMMUNITY

## 2018-01-01 RX ORDER — METOPROLOL TARTRATE 50 MG
1 TABLET ORAL
Qty: 0 | Refills: 0 | COMMUNITY

## 2018-01-01 RX ORDER — SODIUM,POTASSIUM PHOSPHATES 278-250MG
1 POWDER IN PACKET (EA) ORAL ONCE
Qty: 0 | Refills: 0 | Status: DISCONTINUED | OUTPATIENT
Start: 2018-01-01 | End: 2018-01-01

## 2018-01-01 RX ORDER — INSULIN GLARGINE 100 [IU]/ML
20 INJECTION, SOLUTION SUBCUTANEOUS EVERY MORNING
Qty: 0 | Refills: 0 | Status: DISCONTINUED | OUTPATIENT
Start: 2018-01-01 | End: 2018-01-01

## 2018-01-01 RX ORDER — ACETAMINOPHEN 500 MG
650 TABLET ORAL
Qty: 0 | Refills: 0 | Status: DISCONTINUED | OUTPATIENT
Start: 2018-01-01 | End: 2018-01-01

## 2018-01-01 RX ORDER — DEXMEDETOMIDINE HYDROCHLORIDE IN 0.9% SODIUM CHLORIDE 4 UG/ML
0.5 INJECTION INTRAVENOUS
Qty: 400 | Refills: 0 | Status: DISCONTINUED | OUTPATIENT
Start: 2018-01-01 | End: 2018-01-01

## 2018-01-01 RX ORDER — SODIUM BICARBONATE 1 MEQ/ML
50 SYRINGE (ML) INTRAVENOUS ONCE
Qty: 0 | Refills: 0 | Status: COMPLETED | OUTPATIENT
Start: 2018-01-01 | End: 2018-01-01

## 2018-01-01 RX ORDER — HEPARIN SODIUM 5000 [USP'U]/ML
750 INJECTION INTRAVENOUS; SUBCUTANEOUS
Qty: 25000 | Refills: 0 | Status: DISCONTINUED | OUTPATIENT
Start: 2018-01-01 | End: 2018-01-01

## 2018-01-01 RX ORDER — FUROSEMIDE 40 MG
100 TABLET ORAL ONCE
Qty: 0 | Refills: 0 | Status: COMPLETED | OUTPATIENT
Start: 2018-01-01 | End: 2018-01-01

## 2018-01-01 RX ORDER — ALUMINUM HYDROXIDE
30 POWDER (GRAM) MISCELLANEOUS EVERY 6 HOURS
Qty: 0 | Refills: 0 | Status: DISCONTINUED | OUTPATIENT
Start: 2018-01-01 | End: 2018-01-01

## 2018-01-01 RX ORDER — HYDROCORTISONE 1 %
1 OINTMENT (GRAM) TOPICAL
Qty: 0 | Refills: 0 | COMMUNITY

## 2018-01-01 RX ORDER — AZITHROMYCIN 500 MG/1
500 TABLET, FILM COATED ORAL ONCE
Qty: 0 | Refills: 0 | Status: COMPLETED | OUTPATIENT
Start: 2018-01-01 | End: 2018-01-01

## 2018-01-01 RX ORDER — OMEPRAZOLE 10 MG/1
1 CAPSULE, DELAYED RELEASE ORAL
Qty: 0 | Refills: 0 | COMMUNITY

## 2018-01-01 RX ADMIN — Medication 200 GRAM(S): at 23:48

## 2018-01-01 RX ADMIN — Medication 6: at 17:44

## 2018-01-01 RX ADMIN — FINASTERIDE 5 MILLIGRAM(S): 5 TABLET, FILM COATED ORAL at 13:14

## 2018-01-01 RX ADMIN — Medication 8: at 06:11

## 2018-01-01 RX ADMIN — POTASSIUM PHOSPHATE, MONOBASIC POTASSIUM PHOSPHATE, DIBASIC 63.75 MILLIMOLE(S): 236; 224 INJECTION, SOLUTION INTRAVENOUS at 10:26

## 2018-01-01 RX ADMIN — INSULIN GLARGINE 10 UNIT(S): 100 INJECTION, SOLUTION SUBCUTANEOUS at 22:50

## 2018-01-01 RX ADMIN — SEVELAMER CARBONATE 1600 MILLIGRAM(S): 2400 POWDER, FOR SUSPENSION ORAL at 08:33

## 2018-01-01 RX ADMIN — PANTOPRAZOLE SODIUM 40 MILLIGRAM(S): 20 TABLET, DELAYED RELEASE ORAL at 11:33

## 2018-01-01 RX ADMIN — FINASTERIDE 5 MILLIGRAM(S): 5 TABLET, FILM COATED ORAL at 11:06

## 2018-01-01 RX ADMIN — Medication 4: at 06:11

## 2018-01-01 RX ADMIN — Medication 40 MILLIGRAM(S): at 05:16

## 2018-01-01 RX ADMIN — CLOPIDOGREL BISULFATE 75 MILLIGRAM(S): 75 TABLET, FILM COATED ORAL at 12:20

## 2018-01-01 RX ADMIN — PANTOPRAZOLE SODIUM 40 MILLIGRAM(S): 20 TABLET, DELAYED RELEASE ORAL at 06:32

## 2018-01-01 RX ADMIN — TAMSULOSIN HYDROCHLORIDE 0.4 MILLIGRAM(S): 0.4 CAPSULE ORAL at 22:46

## 2018-01-01 RX ADMIN — SODIUM CHLORIDE 1000 MILLILITER(S): 9 INJECTION INTRAMUSCULAR; INTRAVENOUS; SUBCUTANEOUS at 11:08

## 2018-01-01 RX ADMIN — Medication 8: at 06:05

## 2018-01-01 RX ADMIN — SEVELAMER CARBONATE 1600 MILLIGRAM(S): 2400 POWDER, FOR SUSPENSION ORAL at 18:54

## 2018-01-01 RX ADMIN — SEVELAMER CARBONATE 1600 MILLIGRAM(S): 2400 POWDER, FOR SUSPENSION ORAL at 08:38

## 2018-01-01 RX ADMIN — Medication 125 MILLIGRAM(S): at 15:21

## 2018-01-01 RX ADMIN — TAMSULOSIN HYDROCHLORIDE 0.4 MILLIGRAM(S): 0.4 CAPSULE ORAL at 22:40

## 2018-01-01 RX ADMIN — Medication 200 GRAM(S): at 05:09

## 2018-01-01 RX ADMIN — INSULIN GLARGINE 20 UNIT(S): 100 INJECTION, SOLUTION SUBCUTANEOUS at 22:47

## 2018-01-01 RX ADMIN — HEPARIN SODIUM 5000 UNIT(S): 5000 INJECTION INTRAVENOUS; SUBCUTANEOUS at 05:13

## 2018-01-01 RX ADMIN — HYDROMORPHONE HYDROCHLORIDE 0.5 MILLIGRAM(S): 2 INJECTION INTRAMUSCULAR; INTRAVENOUS; SUBCUTANEOUS at 19:30

## 2018-01-01 RX ADMIN — ETOMIDATE 20 MILLIGRAM(S): 2 INJECTION INTRAVENOUS at 17:55

## 2018-01-01 RX ADMIN — FENTANYL CITRATE 4.46 MICROGRAM(S)/KG/HR: 50 INJECTION INTRAVENOUS at 10:38

## 2018-01-01 RX ADMIN — ALBUTEROL 1 PUFF(S): 90 AEROSOL, METERED ORAL at 14:20

## 2018-01-01 RX ADMIN — DEXMEDETOMIDINE HYDROCHLORIDE IN 0.9% SODIUM CHLORIDE 11.16 MICROGRAM(S)/KG/HR: 4 INJECTION INTRAVENOUS at 01:01

## 2018-01-01 RX ADMIN — CHLORHEXIDINE GLUCONATE 1 APPLICATION(S): 213 SOLUTION TOPICAL at 12:28

## 2018-01-01 RX ADMIN — CEFTRIAXONE 100 GRAM(S): 500 INJECTION, POWDER, FOR SOLUTION INTRAMUSCULAR; INTRAVENOUS at 13:14

## 2018-01-01 RX ADMIN — CLOPIDOGREL BISULFATE 75 MILLIGRAM(S): 75 TABLET, FILM COATED ORAL at 11:12

## 2018-01-01 RX ADMIN — Medication 41.86 MICROGRAM(S)/KG/MIN: at 19:13

## 2018-01-01 RX ADMIN — PANTOPRAZOLE SODIUM 40 MILLIGRAM(S): 20 TABLET, DELAYED RELEASE ORAL at 06:10

## 2018-01-01 RX ADMIN — HEPARIN SODIUM 5000 UNIT(S): 5000 INJECTION INTRAVENOUS; SUBCUTANEOUS at 17:52

## 2018-01-01 RX ADMIN — INSULIN GLARGINE 20 UNIT(S): 100 INJECTION, SOLUTION SUBCUTANEOUS at 22:24

## 2018-01-01 RX ADMIN — Medication 10 MILLIGRAM(S): at 06:16

## 2018-01-01 RX ADMIN — Medication 12.5 MILLIGRAM(S): at 05:15

## 2018-01-01 RX ADMIN — Medication 30 MILLIGRAM(S): at 05:41

## 2018-01-01 RX ADMIN — Medication 8: at 23:53

## 2018-01-01 RX ADMIN — Medication 30 MILLILITER(S): at 23:02

## 2018-01-01 RX ADMIN — INSULIN GLARGINE 16 UNIT(S): 100 INJECTION, SOLUTION SUBCUTANEOUS at 21:38

## 2018-01-01 RX ADMIN — Medication 6: at 13:03

## 2018-01-01 RX ADMIN — AMIODARONE HYDROCHLORIDE 600 MILLIGRAM(S): 400 TABLET ORAL at 11:19

## 2018-01-01 RX ADMIN — SEVELAMER CARBONATE 1600 MILLIGRAM(S): 2400 POWDER, FOR SUSPENSION ORAL at 11:35

## 2018-01-01 RX ADMIN — ALBUTEROL 5 PUFF(S): 90 AEROSOL, METERED ORAL at 08:38

## 2018-01-01 RX ADMIN — SEVELAMER CARBONATE 1600 MILLIGRAM(S): 2400 POWDER, FOR SUSPENSION ORAL at 09:00

## 2018-01-01 RX ADMIN — Medication 5 MILLIGRAM(S): at 23:06

## 2018-01-01 RX ADMIN — Medication 30 MILLIGRAM(S): at 05:07

## 2018-01-01 RX ADMIN — PANTOPRAZOLE SODIUM 40 MILLIGRAM(S): 20 TABLET, DELAYED RELEASE ORAL at 05:45

## 2018-01-01 RX ADMIN — MEROPENEM 100 MILLIGRAM(S): 1 INJECTION INTRAVENOUS at 06:10

## 2018-01-01 RX ADMIN — Medication 30 MILLIGRAM(S): at 18:04

## 2018-01-01 RX ADMIN — CHLORHEXIDINE GLUCONATE 1 APPLICATION(S): 213 SOLUTION TOPICAL at 13:11

## 2018-01-01 RX ADMIN — Medication 12.5 MILLIGRAM(S): at 17:42

## 2018-01-01 RX ADMIN — Medication 10 MILLIGRAM(S): at 06:40

## 2018-01-01 RX ADMIN — Medication 650 MILLIGRAM(S): at 09:00

## 2018-01-01 RX ADMIN — Medication 40 MILLIGRAM(S): at 05:01

## 2018-01-01 RX ADMIN — CHLORHEXIDINE GLUCONATE 1 APPLICATION(S): 213 SOLUTION TOPICAL at 13:04

## 2018-01-01 RX ADMIN — ALBUTEROL 1 PUFF(S): 90 AEROSOL, METERED ORAL at 16:24

## 2018-01-01 RX ADMIN — SEVELAMER CARBONATE 1600 MILLIGRAM(S): 2400 POWDER, FOR SUSPENSION ORAL at 15:52

## 2018-01-01 RX ADMIN — HYDROMORPHONE HYDROCHLORIDE 0.5 MILLIGRAM(S): 2 INJECTION INTRAMUSCULAR; INTRAVENOUS; SUBCUTANEOUS at 01:57

## 2018-01-01 RX ADMIN — INSULIN GLARGINE 20 UNIT(S): 100 INJECTION, SOLUTION SUBCUTANEOUS at 22:23

## 2018-01-01 RX ADMIN — PANTOPRAZOLE SODIUM 40 MILLIGRAM(S): 20 TABLET, DELAYED RELEASE ORAL at 05:53

## 2018-01-01 RX ADMIN — Medication 650 MILLIGRAM(S): at 17:18

## 2018-01-01 RX ADMIN — LACTULOSE 30 GRAM(S): 10 SOLUTION ORAL at 11:08

## 2018-01-01 RX ADMIN — AMIODARONE HYDROCHLORIDE 200 MILLIGRAM(S): 400 TABLET ORAL at 15:43

## 2018-01-01 RX ADMIN — Medication 25 MILLIGRAM(S): at 06:29

## 2018-01-01 RX ADMIN — INSULIN GLARGINE 20 UNIT(S): 100 INJECTION, SOLUTION SUBCUTANEOUS at 08:08

## 2018-01-01 RX ADMIN — Medication 30 MILLILITER(S): at 00:46

## 2018-01-01 RX ADMIN — CHLORHEXIDINE GLUCONATE 1 APPLICATION(S): 213 SOLUTION TOPICAL at 17:54

## 2018-01-01 RX ADMIN — Medication 60 MILLIGRAM(S): at 23:53

## 2018-01-01 RX ADMIN — HEPARIN SODIUM 5000 UNIT(S): 5000 INJECTION INTRAVENOUS; SUBCUTANEOUS at 05:19

## 2018-01-01 RX ADMIN — Medication 41.86 MICROGRAM(S)/KG/MIN: at 21:52

## 2018-01-01 RX ADMIN — CHLORHEXIDINE GLUCONATE 1 APPLICATION(S): 213 SOLUTION TOPICAL at 15:34

## 2018-01-01 RX ADMIN — MEROPENEM 200 MILLIGRAM(S): 1 INJECTION INTRAVENOUS at 19:08

## 2018-01-01 RX ADMIN — INSULIN GLARGINE 20 UNIT(S): 100 INJECTION, SOLUTION SUBCUTANEOUS at 08:04

## 2018-01-01 RX ADMIN — SEVELAMER CARBONATE 1600 MILLIGRAM(S): 2400 POWDER, FOR SUSPENSION ORAL at 13:22

## 2018-01-01 RX ADMIN — Medication 100 GRAM(S): at 23:02

## 2018-01-01 RX ADMIN — ADENOSINE 6 MILLIGRAM(S): 3 INJECTION INTRAVENOUS at 05:51

## 2018-01-01 RX ADMIN — CHLORHEXIDINE GLUCONATE 1 APPLICATION(S): 213 SOLUTION TOPICAL at 17:53

## 2018-01-01 RX ADMIN — ADENOSINE 12 MILLIGRAM(S): 3 INJECTION INTRAVENOUS at 14:15

## 2018-01-01 RX ADMIN — PROPOFOL 2.72 MICROGRAM(S)/KG/MIN: 10 INJECTION, EMULSION INTRAVENOUS at 04:24

## 2018-01-01 RX ADMIN — Medication 650 MILLIGRAM(S): at 07:34

## 2018-01-01 RX ADMIN — INSULIN GLARGINE 20 UNIT(S): 100 INJECTION, SOLUTION SUBCUTANEOUS at 22:50

## 2018-01-01 RX ADMIN — Medication 12.5 MILLIGRAM(S): at 05:16

## 2018-01-01 RX ADMIN — Medication 60 MILLIGRAM(S): at 23:02

## 2018-01-01 RX ADMIN — PROPOFOL 2.72 MICROGRAM(S)/KG/MIN: 10 INJECTION, EMULSION INTRAVENOUS at 05:09

## 2018-01-01 RX ADMIN — Medication 41.86 MICROGRAM(S)/KG/MIN: at 05:34

## 2018-01-01 RX ADMIN — PANTOPRAZOLE SODIUM 10 MG/HR: 20 TABLET, DELAYED RELEASE ORAL at 23:44

## 2018-01-01 RX ADMIN — Medication 4: at 12:15

## 2018-01-01 RX ADMIN — SEVELAMER CARBONATE 1600 MILLIGRAM(S): 2400 POWDER, FOR SUSPENSION ORAL at 17:43

## 2018-01-01 RX ADMIN — Medication 12.5 MILLIGRAM(S): at 17:05

## 2018-01-01 RX ADMIN — FENTANYL CITRATE 4.46 MICROGRAM(S)/KG/HR: 50 INJECTION INTRAVENOUS at 00:44

## 2018-01-01 RX ADMIN — PANTOPRAZOLE SODIUM 10 MG/HR: 20 TABLET, DELAYED RELEASE ORAL at 09:44

## 2018-01-01 RX ADMIN — Medication 25 MILLIGRAM(S): at 13:12

## 2018-01-01 RX ADMIN — Medication 6: at 17:51

## 2018-01-01 RX ADMIN — Medication 650 MILLIGRAM(S): at 21:57

## 2018-01-01 RX ADMIN — CLOPIDOGREL BISULFATE 75 MILLIGRAM(S): 75 TABLET, FILM COATED ORAL at 11:06

## 2018-01-01 RX ADMIN — ERYTHROPOIETIN 8000 UNIT(S): 10000 INJECTION, SOLUTION INTRAVENOUS; SUBCUTANEOUS at 15:43

## 2018-01-01 RX ADMIN — Medication 25 MILLIGRAM(S): at 17:54

## 2018-01-01 RX ADMIN — Medication 3 MILLILITER(S): at 15:35

## 2018-01-01 RX ADMIN — LACTULOSE 30 GRAM(S): 10 SOLUTION ORAL at 11:17

## 2018-01-01 RX ADMIN — Medication 40 MILLIGRAM(S): at 05:18

## 2018-01-01 RX ADMIN — HEPARIN SODIUM 5000 UNIT(S): 5000 INJECTION INTRAVENOUS; SUBCUTANEOUS at 17:43

## 2018-01-01 RX ADMIN — SEVELAMER CARBONATE 1600 MILLIGRAM(S): 2400 POWDER, FOR SUSPENSION ORAL at 17:04

## 2018-01-01 RX ADMIN — SEVELAMER CARBONATE 1600 MILLIGRAM(S): 2400 POWDER, FOR SUSPENSION ORAL at 08:20

## 2018-01-01 RX ADMIN — HEPARIN SODIUM 5000 UNIT(S): 5000 INJECTION INTRAVENOUS; SUBCUTANEOUS at 17:05

## 2018-01-01 RX ADMIN — Medication 81 MILLIGRAM(S): at 17:28

## 2018-01-01 RX ADMIN — DEXMEDETOMIDINE HYDROCHLORIDE IN 0.9% SODIUM CHLORIDE 11.16 MICROGRAM(S)/KG/HR: 4 INJECTION INTRAVENOUS at 00:45

## 2018-01-01 RX ADMIN — Medication 650 MILLIGRAM(S): at 19:56

## 2018-01-01 RX ADMIN — ADENOSINE 6 MILLIGRAM(S): 3 INJECTION INTRAVENOUS at 22:18

## 2018-01-01 RX ADMIN — PANTOPRAZOLE SODIUM 40 MILLIGRAM(S): 20 TABLET, DELAYED RELEASE ORAL at 17:55

## 2018-01-01 RX ADMIN — SEVELAMER CARBONATE 1600 MILLIGRAM(S): 2400 POWDER, FOR SUSPENSION ORAL at 17:42

## 2018-01-01 RX ADMIN — ADENOSINE 6 MILLIGRAM(S): 3 INJECTION INTRAVENOUS at 13:50

## 2018-01-01 RX ADMIN — CLOPIDOGREL BISULFATE 75 MILLIGRAM(S): 75 TABLET, FILM COATED ORAL at 11:28

## 2018-01-01 RX ADMIN — CHLORHEXIDINE GLUCONATE 1 APPLICATION(S): 213 SOLUTION TOPICAL at 13:01

## 2018-01-01 RX ADMIN — Medication 10 MILLIGRAM(S): at 06:09

## 2018-01-01 RX ADMIN — MEROPENEM 100 MILLIGRAM(S): 1 INJECTION INTRAVENOUS at 17:05

## 2018-01-01 RX ADMIN — FINASTERIDE 5 MILLIGRAM(S): 5 TABLET, FILM COATED ORAL at 13:49

## 2018-01-01 RX ADMIN — MEROPENEM 100 MILLIGRAM(S): 1 INJECTION INTRAVENOUS at 17:51

## 2018-01-01 RX ADMIN — Medication 8: at 05:44

## 2018-01-01 RX ADMIN — AMIODARONE HYDROCHLORIDE 33.33 MG/MIN: 400 TABLET ORAL at 11:39

## 2018-01-01 RX ADMIN — FINASTERIDE 5 MILLIGRAM(S): 5 TABLET, FILM COATED ORAL at 11:34

## 2018-01-01 RX ADMIN — FINASTERIDE 5 MILLIGRAM(S): 5 TABLET, FILM COATED ORAL at 12:08

## 2018-01-01 RX ADMIN — ALBUTEROL 1 PUFF(S): 90 AEROSOL, METERED ORAL at 08:14

## 2018-01-01 RX ADMIN — LACTULOSE 30 GRAM(S): 10 SOLUTION ORAL at 13:06

## 2018-01-01 RX ADMIN — HEPARIN SODIUM 5000 UNIT(S): 5000 INJECTION INTRAVENOUS; SUBCUTANEOUS at 05:42

## 2018-01-01 RX ADMIN — Medication 30 MILLILITER(S): at 18:08

## 2018-01-01 RX ADMIN — ALBUTEROL 1 PUFF(S): 90 AEROSOL, METERED ORAL at 08:11

## 2018-01-01 RX ADMIN — Medication 30 MILLILITER(S): at 23:18

## 2018-01-01 RX ADMIN — TAMSULOSIN HYDROCHLORIDE 0.4 MILLIGRAM(S): 0.4 CAPSULE ORAL at 21:23

## 2018-01-01 RX ADMIN — CLOPIDOGREL BISULFATE 75 MILLIGRAM(S): 75 TABLET, FILM COATED ORAL at 12:59

## 2018-01-01 RX ADMIN — DEXMEDETOMIDINE HYDROCHLORIDE IN 0.9% SODIUM CHLORIDE 11.16 MICROGRAM(S)/KG/HR: 4 INJECTION INTRAVENOUS at 06:59

## 2018-01-01 RX ADMIN — ALBUTEROL 1 PUFF(S): 90 AEROSOL, METERED ORAL at 17:33

## 2018-01-01 RX ADMIN — SEVELAMER CARBONATE 1600 MILLIGRAM(S): 2400 POWDER, FOR SUSPENSION ORAL at 14:41

## 2018-01-01 RX ADMIN — Medication 12.5 MILLIGRAM(S): at 22:18

## 2018-01-01 RX ADMIN — Medication 81 MILLIGRAM(S): at 11:12

## 2018-01-01 RX ADMIN — MEROPENEM 100 MILLIGRAM(S): 1 INJECTION INTRAVENOUS at 17:42

## 2018-01-01 RX ADMIN — Medication 6: at 06:07

## 2018-01-01 RX ADMIN — Medication 2: at 11:21

## 2018-01-01 RX ADMIN — Medication 50 MILLILITER(S): at 10:29

## 2018-01-01 RX ADMIN — Medication 400 MILLIGRAM(S): at 10:05

## 2018-01-01 RX ADMIN — ALBUTEROL 1 PUFF(S): 90 AEROSOL, METERED ORAL at 15:08

## 2018-01-01 RX ADMIN — Medication 6: at 00:19

## 2018-01-01 RX ADMIN — PROPOFOL 2.72 MICROGRAM(S)/KG/MIN: 10 INJECTION, EMULSION INTRAVENOUS at 22:28

## 2018-01-01 RX ADMIN — POLYETHYLENE GLYCOL 3350 17 GRAM(S): 17 POWDER, FOR SOLUTION ORAL at 11:10

## 2018-01-01 RX ADMIN — ALBUTEROL 1 PUFF(S): 90 AEROSOL, METERED ORAL at 21:17

## 2018-01-01 RX ADMIN — Medication 2: at 01:24

## 2018-01-01 RX ADMIN — Medication 30 MILLILITER(S): at 16:26

## 2018-01-01 RX ADMIN — PANTOPRAZOLE SODIUM 40 MILLIGRAM(S): 20 TABLET, DELAYED RELEASE ORAL at 11:41

## 2018-01-01 RX ADMIN — Medication 8: at 05:52

## 2018-01-01 RX ADMIN — Medication 50 MILLIGRAM(S): at 18:57

## 2018-01-01 RX ADMIN — CHLORHEXIDINE GLUCONATE 1 APPLICATION(S): 213 SOLUTION TOPICAL at 12:14

## 2018-01-01 RX ADMIN — SEVELAMER CARBONATE 1600 MILLIGRAM(S): 2400 POWDER, FOR SUSPENSION ORAL at 12:15

## 2018-01-01 RX ADMIN — Medication 100 MILLIEQUIVALENT(S): at 20:00

## 2018-01-01 RX ADMIN — Medication 8: at 06:12

## 2018-01-01 RX ADMIN — Medication 30 MILLILITER(S): at 23:22

## 2018-01-01 RX ADMIN — ALBUTEROL 1 PUFF(S): 90 AEROSOL, METERED ORAL at 20:17

## 2018-01-01 RX ADMIN — Medication 30 MILLILITER(S): at 18:22

## 2018-01-01 RX ADMIN — Medication 10 MILLIGRAM(S): at 17:36

## 2018-01-01 RX ADMIN — INSULIN GLARGINE 20 UNIT(S): 100 INJECTION, SOLUTION SUBCUTANEOUS at 12:06

## 2018-01-01 RX ADMIN — SODIUM CHLORIDE 1000 MILLILITER(S): 9 INJECTION INTRAMUSCULAR; INTRAVENOUS; SUBCUTANEOUS at 19:40

## 2018-01-01 RX ADMIN — TAMSULOSIN HYDROCHLORIDE 0.4 MILLIGRAM(S): 0.4 CAPSULE ORAL at 21:51

## 2018-01-01 RX ADMIN — MORPHINE SULFATE 0.5 MILLIGRAM(S): 50 CAPSULE, EXTENDED RELEASE ORAL at 06:11

## 2018-01-01 RX ADMIN — PHENYLEPHRINE HYDROCHLORIDE 9 MICROGRAM(S)/KG/MIN: 10 INJECTION INTRAVENOUS at 17:50

## 2018-01-01 RX ADMIN — SODIUM POLYSTYRENE SULFONATE 15 GRAM(S): 4.1 POWDER, FOR SUSPENSION ORAL at 15:36

## 2018-01-01 RX ADMIN — SEVELAMER CARBONATE 1600 MILLIGRAM(S): 2400 POWDER, FOR SUSPENSION ORAL at 08:04

## 2018-01-01 RX ADMIN — Medication 2: at 00:08

## 2018-01-01 RX ADMIN — Medication 200 GRAM(S): at 14:30

## 2018-01-01 RX ADMIN — FINASTERIDE 5 MILLIGRAM(S): 5 TABLET, FILM COATED ORAL at 12:20

## 2018-01-01 RX ADMIN — Medication 2: at 01:58

## 2018-01-01 RX ADMIN — Medication 8: at 14:47

## 2018-01-01 RX ADMIN — SODIUM CHLORIDE 2000 MILLILITER(S): 9 INJECTION INTRAMUSCULAR; INTRAVENOUS; SUBCUTANEOUS at 17:55

## 2018-01-01 RX ADMIN — Medication 81 MILLIGRAM(S): at 11:41

## 2018-01-01 RX ADMIN — MEROPENEM 200 MILLIGRAM(S): 1 INJECTION INTRAVENOUS at 05:15

## 2018-01-01 RX ADMIN — CHLORHEXIDINE GLUCONATE 1 APPLICATION(S): 213 SOLUTION TOPICAL at 11:19

## 2018-01-01 RX ADMIN — Medication 650 MILLIGRAM(S): at 06:09

## 2018-01-01 RX ADMIN — Medication 30 MILLILITER(S): at 12:16

## 2018-01-01 RX ADMIN — Medication 20 MILLIGRAM(S): at 06:10

## 2018-01-01 RX ADMIN — DEXMEDETOMIDINE HYDROCHLORIDE IN 0.9% SODIUM CHLORIDE 11.16 MICROGRAM(S)/KG/HR: 4 INJECTION INTRAVENOUS at 13:22

## 2018-01-01 RX ADMIN — PROPOFOL 2.72 MICROGRAM(S)/KG/MIN: 10 INJECTION, EMULSION INTRAVENOUS at 21:51

## 2018-01-01 RX ADMIN — Medication 30 MILLILITER(S): at 06:41

## 2018-01-01 RX ADMIN — INSULIN GLARGINE 10 UNIT(S): 100 INJECTION, SOLUTION SUBCUTANEOUS at 22:17

## 2018-01-01 RX ADMIN — Medication 10: at 09:46

## 2018-01-01 RX ADMIN — AZITHROMYCIN 250 MILLIGRAM(S): 500 TABLET, FILM COATED ORAL at 14:33

## 2018-01-01 RX ADMIN — FINASTERIDE 5 MILLIGRAM(S): 5 TABLET, FILM COATED ORAL at 12:04

## 2018-01-01 RX ADMIN — MORPHINE SULFATE 1 MILLIGRAM(S): 50 CAPSULE, EXTENDED RELEASE ORAL at 04:00

## 2018-01-01 RX ADMIN — HEPARIN SODIUM 5000 UNIT(S): 5000 INJECTION INTRAVENOUS; SUBCUTANEOUS at 05:00

## 2018-01-01 RX ADMIN — CLOPIDOGREL BISULFATE 75 MILLIGRAM(S): 75 TABLET, FILM COATED ORAL at 11:37

## 2018-01-01 RX ADMIN — SEVELAMER CARBONATE 1600 MILLIGRAM(S): 2400 POWDER, FOR SUSPENSION ORAL at 13:03

## 2018-01-01 RX ADMIN — INSULIN GLARGINE 16 UNIT(S): 100 INJECTION, SOLUTION SUBCUTANEOUS at 22:47

## 2018-01-01 RX ADMIN — ALBUTEROL 1 PUFF(S): 90 AEROSOL, METERED ORAL at 20:23

## 2018-01-01 RX ADMIN — MEROPENEM 200 MILLIGRAM(S): 1 INJECTION INTRAVENOUS at 18:56

## 2018-01-01 RX ADMIN — ALBUTEROL 1 PUFF(S): 90 AEROSOL, METERED ORAL at 02:46

## 2018-01-01 RX ADMIN — Medication 12.5 MILLIGRAM(S): at 05:01

## 2018-01-01 RX ADMIN — ALBUTEROL 1 PUFF(S): 90 AEROSOL, METERED ORAL at 05:46

## 2018-01-01 RX ADMIN — Medication 45 MICROGRAM(S)/KG/MIN: at 17:50

## 2018-01-01 RX ADMIN — HYDROMORPHONE HYDROCHLORIDE 0.5 MILLIGRAM(S): 2 INJECTION INTRAMUSCULAR; INTRAVENOUS; SUBCUTANEOUS at 21:19

## 2018-01-01 RX ADMIN — Medication 50 MILLIGRAM(S): at 05:17

## 2018-01-01 RX ADMIN — PANTOPRAZOLE SODIUM 40 MILLIGRAM(S): 20 TABLET, DELAYED RELEASE ORAL at 13:14

## 2018-01-01 RX ADMIN — INSULIN GLARGINE 10 UNIT(S): 100 INJECTION, SOLUTION SUBCUTANEOUS at 21:54

## 2018-01-01 RX ADMIN — Medication 60 MILLIGRAM(S): at 05:58

## 2018-01-01 RX ADMIN — HYDROMORPHONE HYDROCHLORIDE 0.5 MILLIGRAM(S): 2 INJECTION INTRAMUSCULAR; INTRAVENOUS; SUBCUTANEOUS at 18:20

## 2018-01-01 RX ADMIN — Medication 10: at 02:12

## 2018-01-01 RX ADMIN — SEVELAMER CARBONATE 1600 MILLIGRAM(S): 2400 POWDER, FOR SUSPENSION ORAL at 17:54

## 2018-01-01 RX ADMIN — Medication 0.5 MILLILITER(S): at 04:58

## 2018-01-01 RX ADMIN — MORPHINE SULFATE 0.5 MILLIGRAM(S): 50 CAPSULE, EXTENDED RELEASE ORAL at 06:12

## 2018-01-01 RX ADMIN — Medication 30 MILLILITER(S): at 17:43

## 2018-01-01 RX ADMIN — SEVELAMER CARBONATE 1600 MILLIGRAM(S): 2400 POWDER, FOR SUSPENSION ORAL at 16:38

## 2018-01-01 RX ADMIN — Medication 250 MILLIGRAM(S): at 22:24

## 2018-01-01 RX ADMIN — MEROPENEM 100 MILLIGRAM(S): 1 INJECTION INTRAVENOUS at 05:00

## 2018-01-01 RX ADMIN — Medication 25 MILLIGRAM(S): at 22:50

## 2018-01-01 RX ADMIN — MEROPENEM 100 MILLIGRAM(S): 1 INJECTION INTRAVENOUS at 06:29

## 2018-01-01 RX ADMIN — Medication 41.86 MICROGRAM(S)/KG/MIN: at 22:30

## 2018-01-01 RX ADMIN — TAMSULOSIN HYDROCHLORIDE 0.4 MILLIGRAM(S): 0.4 CAPSULE ORAL at 21:54

## 2018-01-01 RX ADMIN — HEPARIN SODIUM 5000 UNIT(S): 5000 INJECTION INTRAVENOUS; SUBCUTANEOUS at 15:22

## 2018-01-01 RX ADMIN — Medication 4: at 17:00

## 2018-01-01 RX ADMIN — HEPARIN SODIUM 5000 UNIT(S): 5000 INJECTION INTRAVENOUS; SUBCUTANEOUS at 17:01

## 2018-01-01 RX ADMIN — ALBUTEROL 1 PUFF(S): 90 AEROSOL, METERED ORAL at 08:38

## 2018-01-01 RX ADMIN — HEPARIN SODIUM 1000 UNIT(S)/HR: 5000 INJECTION INTRAVENOUS; SUBCUTANEOUS at 20:28

## 2018-01-01 RX ADMIN — ALBUTEROL 1 PUFF(S): 90 AEROSOL, METERED ORAL at 03:56

## 2018-01-01 RX ADMIN — INSULIN GLARGINE 20 UNIT(S): 100 INJECTION, SOLUTION SUBCUTANEOUS at 08:33

## 2018-01-01 RX ADMIN — HEPARIN SODIUM 750 UNIT(S)/HR: 5000 INJECTION INTRAVENOUS; SUBCUTANEOUS at 21:36

## 2018-01-01 RX ADMIN — Medication 3 MILLILITER(S): at 16:03

## 2018-01-01 RX ADMIN — ADENOSINE 6 MILLIGRAM(S): 3 INJECTION INTRAVENOUS at 18:48

## 2018-01-01 RX ADMIN — TAMSULOSIN HYDROCHLORIDE 0.4 MILLIGRAM(S): 0.4 CAPSULE ORAL at 22:04

## 2018-01-01 RX ADMIN — HYDROMORPHONE HYDROCHLORIDE 0.5 MILLIGRAM(S): 2 INJECTION INTRAMUSCULAR; INTRAVENOUS; SUBCUTANEOUS at 20:42

## 2018-01-01 RX ADMIN — ALBUTEROL 1 PUFF(S): 90 AEROSOL, METERED ORAL at 03:15

## 2018-01-01 RX ADMIN — Medication 6: at 18:14

## 2018-01-01 RX ADMIN — FINASTERIDE 5 MILLIGRAM(S): 5 TABLET, FILM COATED ORAL at 12:14

## 2018-01-01 RX ADMIN — Medication 12.5 MILLIGRAM(S): at 22:47

## 2018-01-01 RX ADMIN — PROPOFOL 2.72 MICROGRAM(S)/KG/MIN: 10 INJECTION, EMULSION INTRAVENOUS at 16:43

## 2018-01-01 RX ADMIN — PHENYLEPHRINE HYDROCHLORIDE 9 MICROGRAM(S)/KG/MIN: 10 INJECTION INTRAVENOUS at 10:34

## 2018-01-01 RX ADMIN — MEROPENEM 200 MILLIGRAM(S): 1 INJECTION INTRAVENOUS at 06:06

## 2018-01-01 RX ADMIN — Medication 4: at 21:39

## 2018-01-01 RX ADMIN — ALBUTEROL 1 PUFF(S): 90 AEROSOL, METERED ORAL at 15:25

## 2018-01-01 RX ADMIN — AMIODARONE HYDROCHLORIDE 16.67 MG/MIN: 400 TABLET ORAL at 07:34

## 2018-01-01 RX ADMIN — Medication 3 MILLILITER(S): at 08:32

## 2018-01-01 RX ADMIN — Medication 2: at 12:08

## 2018-01-01 RX ADMIN — INSULIN GLARGINE 16 UNIT(S): 100 INJECTION, SOLUTION SUBCUTANEOUS at 00:03

## 2018-01-01 RX ADMIN — FINASTERIDE 5 MILLIGRAM(S): 5 TABLET, FILM COATED ORAL at 11:12

## 2018-01-01 RX ADMIN — SEVELAMER CARBONATE 1600 MILLIGRAM(S): 2400 POWDER, FOR SUSPENSION ORAL at 12:12

## 2018-01-01 RX ADMIN — Medication 200 GRAM(S): at 14:00

## 2018-01-01 RX ADMIN — HYDROMORPHONE HYDROCHLORIDE 0.5 MILLIGRAM(S): 2 INJECTION INTRAMUSCULAR; INTRAVENOUS; SUBCUTANEOUS at 12:41

## 2018-01-01 RX ADMIN — TAMSULOSIN HYDROCHLORIDE 0.4 MILLIGRAM(S): 0.4 CAPSULE ORAL at 22:18

## 2018-01-01 RX ADMIN — Medication 25 MILLIGRAM(S): at 14:03

## 2018-01-01 RX ADMIN — SEVELAMER CARBONATE 1600 MILLIGRAM(S): 2400 POWDER, FOR SUSPENSION ORAL at 11:44

## 2018-01-01 RX ADMIN — PROPOFOL 2.72 MICROGRAM(S)/KG/MIN: 10 INJECTION, EMULSION INTRAVENOUS at 08:34

## 2018-01-01 RX ADMIN — Medication 80 MILLIGRAM(S): at 21:30

## 2018-01-01 RX ADMIN — CLOPIDOGREL BISULFATE 75 MILLIGRAM(S): 75 TABLET, FILM COATED ORAL at 11:41

## 2018-01-01 RX ADMIN — HEPARIN SODIUM 750 UNIT(S)/HR: 5000 INJECTION INTRAVENOUS; SUBCUTANEOUS at 15:18

## 2018-01-01 RX ADMIN — HEPARIN SODIUM 5000 UNIT(S): 5000 INJECTION INTRAVENOUS; SUBCUTANEOUS at 05:17

## 2018-01-01 RX ADMIN — Medication 30 MILLILITER(S): at 06:12

## 2018-01-01 RX ADMIN — Medication 81 MILLIGRAM(S): at 11:06

## 2018-01-01 RX ADMIN — Medication 80 MILLIGRAM(S): at 17:28

## 2018-01-01 RX ADMIN — PROPOFOL 2.88 MICROGRAM(S)/KG/MIN: 10 INJECTION, EMULSION INTRAVENOUS at 11:06

## 2018-01-01 RX ADMIN — Medication 650 MILLIGRAM(S): at 19:03

## 2018-01-01 RX ADMIN — Medication 8: at 11:16

## 2018-01-01 RX ADMIN — HEPARIN SODIUM 5000 UNIT(S): 5000 INJECTION INTRAVENOUS; SUBCUTANEOUS at 18:13

## 2018-01-01 RX ADMIN — Medication 50 MILLIGRAM(S): at 18:13

## 2018-01-01 RX ADMIN — MEROPENEM 100 MILLIGRAM(S): 1 INJECTION INTRAVENOUS at 06:15

## 2018-01-01 RX ADMIN — Medication 0.5 MILLIGRAM(S): at 19:19

## 2018-01-01 RX ADMIN — Medication 50 MILLIEQUIVALENT(S): at 16:38

## 2018-01-01 RX ADMIN — Medication 30 MILLIGRAM(S): at 05:09

## 2018-01-01 RX ADMIN — CHLORHEXIDINE GLUCONATE 1 APPLICATION(S): 213 SOLUTION TOPICAL at 13:57

## 2018-01-01 RX ADMIN — Medication 12.5 MILLIGRAM(S): at 05:52

## 2018-01-01 RX ADMIN — Medication 3 MILLILITER(S): at 21:11

## 2018-01-01 RX ADMIN — PANTOPRAZOLE SODIUM 40 MILLIGRAM(S): 20 TABLET, DELAYED RELEASE ORAL at 06:09

## 2018-01-01 RX ADMIN — ALBUTEROL 1 PUFF(S): 90 AEROSOL, METERED ORAL at 02:20

## 2018-01-01 RX ADMIN — Medication 30 MILLIGRAM(S): at 17:28

## 2018-01-01 RX ADMIN — FINASTERIDE 5 MILLIGRAM(S): 5 TABLET, FILM COATED ORAL at 13:03

## 2018-01-01 RX ADMIN — Medication 40 MILLIGRAM(S): at 22:40

## 2018-01-01 RX ADMIN — HEPARIN SODIUM 5000 UNIT(S): 5000 INJECTION INTRAVENOUS; SUBCUTANEOUS at 19:04

## 2018-01-01 RX ADMIN — Medication 200 GRAM(S): at 19:48

## 2018-01-01 RX ADMIN — Medication 100 MILLIEQUIVALENT(S): at 03:00

## 2018-01-01 RX ADMIN — Medication 650 MILLIGRAM(S): at 07:06

## 2018-01-01 RX ADMIN — Medication 6: at 17:01

## 2018-01-01 RX ADMIN — INSULIN GLARGINE 20 UNIT(S): 100 INJECTION, SOLUTION SUBCUTANEOUS at 22:04

## 2018-01-01 RX ADMIN — Medication 10: at 11:29

## 2018-01-01 RX ADMIN — Medication 81 MILLIGRAM(S): at 12:20

## 2018-01-01 RX ADMIN — HALOPERIDOL DECANOATE 2 MILLIGRAM(S): 100 INJECTION INTRAMUSCULAR at 13:59

## 2018-01-01 RX ADMIN — Medication 25 MILLIGRAM(S): at 17:00

## 2018-01-01 RX ADMIN — Medication 30 MILLILITER(S): at 18:33

## 2018-01-01 RX ADMIN — PROPOFOL 2.88 MICROGRAM(S)/KG/MIN: 10 INJECTION, EMULSION INTRAVENOUS at 08:12

## 2018-01-01 RX ADMIN — AMIODARONE HYDROCHLORIDE 200 MILLIGRAM(S): 400 TABLET ORAL at 17:54

## 2018-01-01 RX ADMIN — Medication 50 MILLIGRAM(S): at 18:04

## 2018-01-01 RX ADMIN — LACTULOSE 30 GRAM(S): 10 SOLUTION ORAL at 12:59

## 2018-01-01 RX ADMIN — HEPARIN SODIUM 0 UNIT(S)/HR: 5000 INJECTION INTRAVENOUS; SUBCUTANEOUS at 03:08

## 2018-01-01 RX ADMIN — SEVELAMER CARBONATE 1600 MILLIGRAM(S): 2400 POWDER, FOR SUSPENSION ORAL at 19:04

## 2018-01-01 RX ADMIN — CHLORHEXIDINE GLUCONATE 1 APPLICATION(S): 213 SOLUTION TOPICAL at 12:30

## 2018-01-01 RX ADMIN — Medication 650 MILLIGRAM(S): at 16:30

## 2018-01-01 RX ADMIN — Medication 12.5 MILLIGRAM(S): at 05:45

## 2018-01-01 RX ADMIN — Medication 13.39 MICROGRAM(S)/KG/MIN: at 10:26

## 2018-01-01 RX ADMIN — Medication 60 MILLIGRAM(S): at 17:00

## 2018-01-01 RX ADMIN — Medication 30 MILLILITER(S): at 11:21

## 2018-01-01 RX ADMIN — ALBUTEROL 1 PUFF(S): 90 AEROSOL, METERED ORAL at 01:33

## 2018-01-01 RX ADMIN — Medication 2: at 06:42

## 2018-01-01 RX ADMIN — ADENOSINE 6 MILLIGRAM(S): 3 INJECTION INTRAVENOUS at 19:51

## 2018-01-01 RX ADMIN — MEROPENEM 100 MILLIGRAM(S): 1 INJECTION INTRAVENOUS at 06:00

## 2018-01-01 RX ADMIN — Medication 50 MILLIGRAM(S): at 05:41

## 2018-01-01 RX ADMIN — INSULIN GLARGINE 10 UNIT(S): 100 INJECTION, SOLUTION SUBCUTANEOUS at 22:41

## 2018-01-01 RX ADMIN — Medication 4: at 23:17

## 2018-01-01 RX ADMIN — Medication 30 MILLIGRAM(S): at 12:03

## 2018-01-01 RX ADMIN — Medication 3 MILLILITER(S): at 03:06

## 2018-01-01 RX ADMIN — Medication 30 MILLILITER(S): at 12:11

## 2018-01-01 RX ADMIN — Medication 100 GRAM(S): at 15:47

## 2018-01-01 RX ADMIN — Medication 4: at 11:11

## 2018-01-01 RX ADMIN — Medication 81 MILLIGRAM(S): at 11:34

## 2018-01-01 RX ADMIN — SEVELAMER CARBONATE 1600 MILLIGRAM(S): 2400 POWDER, FOR SUSPENSION ORAL at 09:51

## 2018-01-01 RX ADMIN — SODIUM POLYSTYRENE SULFONATE 30 GRAM(S): 4.1 POWDER, FOR SUSPENSION ORAL at 01:00

## 2018-01-01 RX ADMIN — ALBUTEROL 1 PUFF(S): 90 AEROSOL, METERED ORAL at 14:21

## 2018-01-01 RX ADMIN — PROPOFOL 2.72 MICROGRAM(S)/KG/MIN: 10 INJECTION, EMULSION INTRAVENOUS at 20:00

## 2018-01-01 RX ADMIN — SEVELAMER CARBONATE 1600 MILLIGRAM(S): 2400 POWDER, FOR SUSPENSION ORAL at 17:39

## 2018-01-01 RX ADMIN — SEVELAMER CARBONATE 1600 MILLIGRAM(S): 2400 POWDER, FOR SUSPENSION ORAL at 11:17

## 2018-01-01 RX ADMIN — Medication 5 MILLIGRAM(S): at 12:00

## 2018-01-01 RX ADMIN — FINASTERIDE 5 MILLIGRAM(S): 5 TABLET, FILM COATED ORAL at 11:41

## 2018-01-01 RX ADMIN — Medication 8: at 23:20

## 2018-01-01 RX ADMIN — POLYETHYLENE GLYCOL 3350 17 GRAM(S): 17 POWDER, FOR SOLUTION ORAL at 13:21

## 2018-01-01 RX ADMIN — ALBUTEROL 1 PUFF(S): 90 AEROSOL, METERED ORAL at 09:04

## 2018-01-01 RX ADMIN — Medication 6: at 00:47

## 2018-01-01 RX ADMIN — HEPARIN SODIUM 5000 UNIT(S): 5000 INJECTION INTRAVENOUS; SUBCUTANEOUS at 05:57

## 2018-01-01 RX ADMIN — TAMSULOSIN HYDROCHLORIDE 0.4 MILLIGRAM(S): 0.4 CAPSULE ORAL at 22:25

## 2018-01-01 RX ADMIN — Medication 12.5 MILLIGRAM(S): at 19:05

## 2018-01-01 RX ADMIN — Medication 50 MILLIGRAM(S): at 17:54

## 2018-01-01 RX ADMIN — SEVELAMER CARBONATE 1600 MILLIGRAM(S): 2400 POWDER, FOR SUSPENSION ORAL at 08:23

## 2018-01-01 RX ADMIN — Medication 4: at 00:45

## 2018-01-01 RX ADMIN — ALBUTEROL 5 PUFF(S): 90 AEROSOL, METERED ORAL at 04:25

## 2018-01-01 RX ADMIN — Medication 81 MILLIGRAM(S): at 13:04

## 2018-01-01 RX ADMIN — Medication 4: at 02:01

## 2018-01-01 RX ADMIN — AZITHROMYCIN 250 MILLIGRAM(S): 500 TABLET, FILM COATED ORAL at 13:15

## 2018-01-01 RX ADMIN — Medication 50 MILLIGRAM(S): at 05:07

## 2018-01-01 RX ADMIN — Medication 12.5 MILLIGRAM(S): at 18:56

## 2018-01-01 RX ADMIN — Medication 8: at 05:00

## 2018-01-01 RX ADMIN — ALBUTEROL 1 PUFF(S): 90 AEROSOL, METERED ORAL at 15:17

## 2018-01-01 RX ADMIN — PROPOFOL 2.88 MICROGRAM(S)/KG/MIN: 10 INJECTION, EMULSION INTRAVENOUS at 09:39

## 2018-01-01 RX ADMIN — Medication 60 MILLIGRAM(S): at 11:12

## 2018-01-01 RX ADMIN — SODIUM POLYSTYRENE SULFONATE 15 GRAM(S): 4.1 POWDER, FOR SUSPENSION ORAL at 08:56

## 2018-01-01 RX ADMIN — SODIUM POLYSTYRENE SULFONATE 15 GRAM(S): 4.1 POWDER, FOR SUSPENSION ORAL at 13:53

## 2018-01-01 RX ADMIN — HEPARIN SODIUM 5000 UNIT(S): 5000 INJECTION INTRAVENOUS; SUBCUTANEOUS at 17:20

## 2018-01-01 RX ADMIN — HEPARIN SODIUM 750 UNIT(S)/HR: 5000 INJECTION INTRAVENOUS; SUBCUTANEOUS at 04:47

## 2018-01-01 RX ADMIN — Medication 40 MILLIGRAM(S): at 05:08

## 2018-01-01 RX ADMIN — TAMSULOSIN HYDROCHLORIDE 0.4 MILLIGRAM(S): 0.4 CAPSULE ORAL at 21:29

## 2018-01-01 RX ADMIN — PANTOPRAZOLE SODIUM 40 MILLIGRAM(S): 20 TABLET, DELAYED RELEASE ORAL at 17:00

## 2018-01-01 RX ADMIN — Medication 12.5 MILLIGRAM(S): at 15:18

## 2018-01-01 RX ADMIN — MEROPENEM 100 MILLIGRAM(S): 1 INJECTION INTRAVENOUS at 17:39

## 2018-01-01 RX ADMIN — Medication 12.5 MILLIGRAM(S): at 06:10

## 2018-01-01 RX ADMIN — ADENOSINE 6 MILLIGRAM(S): 3 INJECTION INTRAVENOUS at 03:15

## 2018-01-01 RX ADMIN — ALBUTEROL 1 PUFF(S): 90 AEROSOL, METERED ORAL at 14:26

## 2018-01-01 RX ADMIN — MEROPENEM 200 MILLIGRAM(S): 1 INJECTION INTRAVENOUS at 17:54

## 2018-01-01 RX ADMIN — Medication 30 MILLIGRAM(S): at 11:40

## 2018-01-01 RX ADMIN — Medication 50 MILLIGRAM(S): at 17:02

## 2018-01-01 RX ADMIN — INSULIN HUMAN 5 UNIT(S): 100 INJECTION, SOLUTION SUBCUTANEOUS at 09:00

## 2018-01-01 RX ADMIN — Medication 25 MILLIGRAM(S): at 11:19

## 2018-01-01 RX ADMIN — Medication 50 MILLIGRAM(S): at 21:51

## 2018-01-01 RX ADMIN — MEROPENEM 100 MILLIGRAM(S): 1 INJECTION INTRAVENOUS at 18:00

## 2018-01-01 RX ADMIN — ALBUTEROL 1 PUFF(S): 90 AEROSOL, METERED ORAL at 20:10

## 2018-01-01 RX ADMIN — Medication 4: at 23:02

## 2018-01-01 RX ADMIN — HYDROMORPHONE HYDROCHLORIDE 0.5 MILLIGRAM(S): 2 INJECTION INTRAMUSCULAR; INTRAVENOUS; SUBCUTANEOUS at 15:01

## 2018-01-01 RX ADMIN — Medication 250 MILLIGRAM(S): at 11:10

## 2018-01-01 RX ADMIN — Medication 100 GRAM(S): at 09:55

## 2018-01-01 RX ADMIN — ALBUTEROL 1 PUFF(S): 90 AEROSOL, METERED ORAL at 09:19

## 2018-01-01 RX ADMIN — Medication 20 MILLIGRAM(S): at 05:45

## 2018-01-01 RX ADMIN — TAMSULOSIN HYDROCHLORIDE 0.4 MILLIGRAM(S): 0.4 CAPSULE ORAL at 22:47

## 2018-01-01 RX ADMIN — Medication 3 MILLILITER(S): at 08:51

## 2018-01-01 RX ADMIN — MEROPENEM 100 MILLIGRAM(S): 1 INJECTION INTRAVENOUS at 18:54

## 2018-01-01 RX ADMIN — Medication 2: at 18:03

## 2018-01-01 RX ADMIN — Medication 12.5 MILLIGRAM(S): at 05:18

## 2018-01-01 RX ADMIN — AMIODARONE HYDROCHLORIDE 16.67 MG/MIN: 400 TABLET ORAL at 17:42

## 2018-01-01 RX ADMIN — Medication 12.5 MILLIGRAM(S): at 13:54

## 2018-01-01 RX ADMIN — FENTANYL CITRATE 4.8 MICROGRAM(S)/KG/HR: 50 INJECTION INTRAVENOUS at 17:50

## 2018-01-01 RX ADMIN — MEROPENEM 200 MILLIGRAM(S): 1 INJECTION INTRAVENOUS at 17:43

## 2018-01-01 RX ADMIN — Medication 6: at 07:20

## 2018-01-01 RX ADMIN — Medication 40 MILLIGRAM(S): at 21:40

## 2018-01-01 RX ADMIN — PROPOFOL 2.72 MICROGRAM(S)/KG/MIN: 10 INJECTION, EMULSION INTRAVENOUS at 17:55

## 2018-01-01 RX ADMIN — Medication 650 MILLIGRAM(S): at 13:21

## 2018-01-01 RX ADMIN — MEROPENEM 100 MILLIGRAM(S): 1 INJECTION INTRAVENOUS at 06:09

## 2018-01-01 RX ADMIN — SEVELAMER CARBONATE 1600 MILLIGRAM(S): 2400 POWDER, FOR SUSPENSION ORAL at 17:01

## 2018-01-01 RX ADMIN — Medication 60 MILLIGRAM(S): at 11:09

## 2018-01-01 RX ADMIN — PROPOFOL 2.88 MICROGRAM(S)/KG/MIN: 10 INJECTION, EMULSION INTRAVENOUS at 15:30

## 2018-01-01 RX ADMIN — CHLORHEXIDINE GLUCONATE 1 APPLICATION(S): 213 SOLUTION TOPICAL at 12:13

## 2018-01-01 RX ADMIN — Medication 0.5 MILLIGRAM(S): at 21:09

## 2018-01-01 RX ADMIN — SEVELAMER CARBONATE 1600 MILLIGRAM(S): 2400 POWDER, FOR SUSPENSION ORAL at 17:51

## 2018-01-01 RX ADMIN — Medication 120 MILLIGRAM(S): at 11:10

## 2018-01-01 RX ADMIN — PANTOPRAZOLE SODIUM 40 MILLIGRAM(S): 20 TABLET, DELAYED RELEASE ORAL at 18:00

## 2018-01-01 RX ADMIN — Medication 3 MILLILITER(S): at 02:30

## 2018-01-01 RX ADMIN — TAMSULOSIN HYDROCHLORIDE 0.4 MILLIGRAM(S): 0.4 CAPSULE ORAL at 21:16

## 2018-01-01 RX ADMIN — Medication 6: at 21:32

## 2018-01-01 RX ADMIN — TAMSULOSIN HYDROCHLORIDE 0.4 MILLIGRAM(S): 0.4 CAPSULE ORAL at 23:03

## 2018-01-01 RX ADMIN — ATORVASTATIN CALCIUM 40 MILLIGRAM(S): 80 TABLET, FILM COATED ORAL at 21:51

## 2018-01-01 RX ADMIN — ALBUTEROL 1 PUFF(S): 90 AEROSOL, METERED ORAL at 21:01

## 2018-01-01 RX ADMIN — AMIODARONE HYDROCHLORIDE 200 MILLIGRAM(S): 400 TABLET ORAL at 06:32

## 2018-01-01 RX ADMIN — HEPARIN SODIUM 5000 UNIT(S): 5000 INJECTION INTRAVENOUS; SUBCUTANEOUS at 18:04

## 2018-01-01 RX ADMIN — MEROPENEM 100 MILLIGRAM(S): 1 INJECTION INTRAVENOUS at 05:45

## 2018-01-01 RX ADMIN — Medication 2: at 18:00

## 2018-01-01 RX ADMIN — MEROPENEM 200 MILLIGRAM(S): 1 INJECTION INTRAVENOUS at 05:18

## 2018-01-01 RX ADMIN — MIDAZOLAM HYDROCHLORIDE 2 MILLIGRAM(S): 1 INJECTION, SOLUTION INTRAMUSCULAR; INTRAVENOUS at 17:50

## 2018-01-01 RX ADMIN — Medication 6: at 17:19

## 2018-01-01 RX ADMIN — Medication 50 MILLIGRAM(S): at 05:19

## 2018-01-01 RX ADMIN — Medication 5 MILLIGRAM(S): at 12:09

## 2018-01-01 RX ADMIN — Medication 30 MILLIGRAM(S): at 06:10

## 2018-01-01 RX ADMIN — ALBUTEROL 1 PUFF(S): 90 AEROSOL, METERED ORAL at 22:19

## 2018-01-01 RX ADMIN — CLOPIDOGREL BISULFATE 75 MILLIGRAM(S): 75 TABLET, FILM COATED ORAL at 13:14

## 2018-01-01 RX ADMIN — Medication 80 MILLIGRAM(S): at 17:55

## 2018-01-01 RX ADMIN — PROPOFOL 2.88 MICROGRAM(S)/KG/MIN: 10 INJECTION, EMULSION INTRAVENOUS at 21:29

## 2018-01-01 RX ADMIN — TAMSULOSIN HYDROCHLORIDE 0.4 MILLIGRAM(S): 0.4 CAPSULE ORAL at 22:50

## 2018-01-01 RX ADMIN — Medication 60 MILLIGRAM(S): at 14:31

## 2018-01-01 RX ADMIN — Medication 25 MILLIGRAM(S): at 12:09

## 2018-01-01 RX ADMIN — SEVELAMER CARBONATE 1600 MILLIGRAM(S): 2400 POWDER, FOR SUSPENSION ORAL at 17:28

## 2018-01-01 RX ADMIN — SEVELAMER CARBONATE 1600 MILLIGRAM(S): 2400 POWDER, FOR SUSPENSION ORAL at 13:12

## 2018-01-01 RX ADMIN — CHLORHEXIDINE GLUCONATE 1 APPLICATION(S): 213 SOLUTION TOPICAL at 11:11

## 2018-01-01 RX ADMIN — Medication 25 MILLIGRAM(S): at 05:45

## 2018-01-01 RX ADMIN — Medication 80 MILLIGRAM(S): at 17:00

## 2018-01-01 RX ADMIN — SEVELAMER CARBONATE 1600 MILLIGRAM(S): 2400 POWDER, FOR SUSPENSION ORAL at 11:41

## 2018-01-01 RX ADMIN — ALBUTEROL 1 PUFF(S): 90 AEROSOL, METERED ORAL at 03:30

## 2018-01-01 RX ADMIN — PHENYLEPHRINE HYDROCHLORIDE 8.37 MICROGRAM(S)/KG/MIN: 10 INJECTION INTRAVENOUS at 18:30

## 2018-01-01 RX ADMIN — Medication 25 MILLIGRAM(S): at 06:32

## 2018-01-01 RX ADMIN — Medication 4: at 17:43

## 2018-01-01 RX ADMIN — Medication 6: at 01:19

## 2018-01-01 RX ADMIN — Medication 12: at 09:01

## 2018-01-01 RX ADMIN — CHLORHEXIDINE GLUCONATE 1 APPLICATION(S): 213 SOLUTION TOPICAL at 11:35

## 2018-01-01 RX ADMIN — HEPARIN SODIUM 5000 UNIT(S): 5000 INJECTION INTRAVENOUS; SUBCUTANEOUS at 06:10

## 2018-01-01 RX ADMIN — HEPARIN SODIUM 5000 UNIT(S): 5000 INJECTION INTRAVENOUS; SUBCUTANEOUS at 05:15

## 2018-01-01 RX ADMIN — SEVELAMER CARBONATE 1600 MILLIGRAM(S): 2400 POWDER, FOR SUSPENSION ORAL at 09:12

## 2018-01-01 RX ADMIN — MEROPENEM 200 MILLIGRAM(S): 1 INJECTION INTRAVENOUS at 17:01

## 2018-01-01 RX ADMIN — ALBUTEROL 1 PUFF(S): 90 AEROSOL, METERED ORAL at 08:47

## 2018-01-01 RX ADMIN — Medication 50 MILLIGRAM(S): at 05:08

## 2018-01-01 RX ADMIN — PHENYLEPHRINE HYDROCHLORIDE 9 MICROGRAM(S)/KG/MIN: 10 INJECTION INTRAVENOUS at 04:50

## 2018-01-01 RX ADMIN — Medication 40 MILLIGRAM(S): at 13:57

## 2018-01-01 RX ADMIN — Medication 12: at 00:10

## 2018-01-01 RX ADMIN — PROPOFOL 2.88 MICROGRAM(S)/KG/MIN: 10 INJECTION, EMULSION INTRAVENOUS at 20:02

## 2018-01-01 RX ADMIN — Medication 250 MILLIGRAM(S): at 18:04

## 2018-01-01 RX ADMIN — Medication 13.39 MICROGRAM(S)/KG/MIN: at 19:59

## 2018-01-01 RX ADMIN — PANTOPRAZOLE SODIUM 40 MILLIGRAM(S): 20 TABLET, DELAYED RELEASE ORAL at 11:12

## 2018-01-01 RX ADMIN — Medication 60 MILLIGRAM(S): at 05:19

## 2018-01-01 RX ADMIN — Medication 60 MILLIGRAM(S): at 17:52

## 2018-01-01 RX ADMIN — Medication 50 MILLIGRAM(S): at 05:57

## 2018-01-01 RX ADMIN — Medication 8: at 22:51

## 2018-01-01 RX ADMIN — Medication 650 MILLIGRAM(S): at 01:03

## 2018-01-01 RX ADMIN — Medication 8: at 11:39

## 2018-01-01 RX ADMIN — Medication 10: at 13:52

## 2018-01-01 RX ADMIN — Medication 3 MILLILITER(S): at 20:38

## 2018-01-01 RX ADMIN — Medication 50 MILLIGRAM(S): at 05:15

## 2018-01-01 RX ADMIN — MEROPENEM 200 MILLIGRAM(S): 1 INJECTION INTRAVENOUS at 05:20

## 2018-01-01 RX ADMIN — PHENYLEPHRINE HYDROCHLORIDE 18 MICROGRAM(S)/KG/MIN: 10 INJECTION INTRAVENOUS at 09:45

## 2018-01-01 RX ADMIN — Medication 8: at 18:16

## 2018-01-01 RX ADMIN — CEFTRIAXONE 100 GRAM(S): 500 INJECTION, POWDER, FOR SOLUTION INTRAMUSCULAR; INTRAVENOUS at 15:22

## 2018-01-01 RX ADMIN — Medication 650 MILLIGRAM(S): at 22:24

## 2018-01-01 RX ADMIN — CLOPIDOGREL BISULFATE 75 MILLIGRAM(S): 75 TABLET, FILM COATED ORAL at 13:03

## 2018-01-01 RX ADMIN — ADENOSINE 6 MILLIGRAM(S): 3 INJECTION INTRAVENOUS at 12:30

## 2018-01-01 RX ADMIN — ADENOSINE 6 MILLIGRAM(S): 3 INJECTION INTRAVENOUS at 01:31

## 2018-01-01 RX ADMIN — PANTOPRAZOLE SODIUM 40 MILLIGRAM(S): 20 TABLET, DELAYED RELEASE ORAL at 18:54

## 2018-01-01 RX ADMIN — FENTANYL CITRATE 4.8 MICROGRAM(S)/KG/HR: 50 INJECTION INTRAVENOUS at 04:30

## 2018-01-01 RX ADMIN — Medication 100 GRAM(S): at 19:11

## 2018-01-01 RX ADMIN — Medication 30 MILLILITER(S): at 05:41

## 2018-01-01 RX ADMIN — SEVELAMER CARBONATE 1600 MILLIGRAM(S): 2400 POWDER, FOR SUSPENSION ORAL at 12:08

## 2018-01-01 RX ADMIN — PANTOPRAZOLE SODIUM 40 MILLIGRAM(S): 20 TABLET, DELAYED RELEASE ORAL at 11:10

## 2018-01-01 RX ADMIN — ADENOSINE 6 MILLIGRAM(S): 3 INJECTION INTRAVENOUS at 14:00

## 2018-01-01 RX ADMIN — Medication 81 MILLIGRAM(S): at 12:59

## 2018-01-01 RX ADMIN — ALBUTEROL 1 PUFF(S): 90 AEROSOL, METERED ORAL at 08:25

## 2018-01-01 RX ADMIN — SEVELAMER CARBONATE 1600 MILLIGRAM(S): 2400 POWDER, FOR SUSPENSION ORAL at 17:16

## 2018-01-01 RX ADMIN — HYDROMORPHONE HYDROCHLORIDE 0.5 MILLIGRAM(S): 2 INJECTION INTRAMUSCULAR; INTRAVENOUS; SUBCUTANEOUS at 18:06

## 2018-01-01 RX ADMIN — ALBUTEROL 1 PUFF(S): 90 AEROSOL, METERED ORAL at 20:34

## 2018-01-01 RX ADMIN — Medication 60 MILLIGRAM(S): at 05:14

## 2018-01-01 RX ADMIN — PANTOPRAZOLE SODIUM 40 MILLIGRAM(S): 20 TABLET, DELAYED RELEASE ORAL at 12:03

## 2018-01-01 RX ADMIN — INSULIN HUMAN 8 UNIT(S): 100 INJECTION, SOLUTION SUBCUTANEOUS at 10:42

## 2018-01-01 RX ADMIN — CHLORHEXIDINE GLUCONATE 1 APPLICATION(S): 213 SOLUTION TOPICAL at 15:20

## 2018-01-01 RX ADMIN — Medication 4: at 18:07

## 2018-01-01 RX ADMIN — Medication 650 MILLIGRAM(S): at 12:04

## 2018-01-01 RX ADMIN — INSULIN GLARGINE 16 UNIT(S): 100 INJECTION, SOLUTION SUBCUTANEOUS at 21:29

## 2018-01-01 RX ADMIN — Medication 25 MILLIGRAM(S): at 00:45

## 2018-01-01 RX ADMIN — PANTOPRAZOLE SODIUM 40 MILLIGRAM(S): 20 TABLET, DELAYED RELEASE ORAL at 12:59

## 2018-01-01 RX ADMIN — Medication 40 MILLIGRAM(S): at 11:34

## 2018-01-01 RX ADMIN — Medication 50 MILLIGRAM(S): at 17:43

## 2018-01-01 RX ADMIN — TAMSULOSIN HYDROCHLORIDE 0.4 MILLIGRAM(S): 0.4 CAPSULE ORAL at 21:24

## 2018-01-01 RX ADMIN — AMIODARONE HYDROCHLORIDE 200 MILLIGRAM(S): 400 TABLET ORAL at 05:52

## 2018-01-01 RX ADMIN — Medication 25 MILLIGRAM(S): at 06:09

## 2018-01-01 RX ADMIN — FINASTERIDE 5 MILLIGRAM(S): 5 TABLET, FILM COATED ORAL at 11:28

## 2018-01-01 RX ADMIN — Medication 250 MILLIGRAM(S): at 21:53

## 2018-01-01 RX ADMIN — SEVELAMER CARBONATE 1600 MILLIGRAM(S): 2400 POWDER, FOR SUSPENSION ORAL at 08:00

## 2018-01-01 RX ADMIN — PANTOPRAZOLE SODIUM 40 MILLIGRAM(S): 20 TABLET, DELAYED RELEASE ORAL at 11:28

## 2018-01-01 RX ADMIN — AMIODARONE HYDROCHLORIDE 200 MILLIGRAM(S): 400 TABLET ORAL at 21:24

## 2018-01-01 RX ADMIN — Medication 4: at 17:57

## 2018-01-01 RX ADMIN — ALBUTEROL 1 PUFF(S): 90 AEROSOL, METERED ORAL at 17:22

## 2018-01-01 RX ADMIN — PANTOPRAZOLE SODIUM 40 MILLIGRAM(S): 20 TABLET, DELAYED RELEASE ORAL at 17:39

## 2018-01-01 RX ADMIN — PROPOFOL 2.72 MICROGRAM(S)/KG/MIN: 10 INJECTION, EMULSION INTRAVENOUS at 23:56

## 2018-01-01 RX ADMIN — ALBUTEROL 5 PUFF(S): 90 AEROSOL, METERED ORAL at 08:10

## 2018-01-01 RX ADMIN — ADENOSINE 6 MILLIGRAM(S): 3 INJECTION INTRAVENOUS at 10:35

## 2018-01-01 RX ADMIN — INSULIN HUMAN 4 UNIT(S): 100 INJECTION, SOLUTION SUBCUTANEOUS at 08:13

## 2018-01-01 RX ADMIN — Medication 25 MILLIGRAM(S): at 23:03

## 2018-01-01 RX ADMIN — INSULIN GLARGINE 20 UNIT(S): 100 INJECTION, SOLUTION SUBCUTANEOUS at 21:25

## 2018-01-01 RX ADMIN — SEVELAMER CARBONATE 1600 MILLIGRAM(S): 2400 POWDER, FOR SUSPENSION ORAL at 17:21

## 2018-01-01 RX ADMIN — HYDROMORPHONE HYDROCHLORIDE 0.5 MILLIGRAM(S): 2 INJECTION INTRAMUSCULAR; INTRAVENOUS; SUBCUTANEOUS at 14:49

## 2018-01-01 RX ADMIN — MEROPENEM 100 MILLIGRAM(S): 1 INJECTION INTRAVENOUS at 13:35

## 2018-01-01 RX ADMIN — ALBUTEROL 1 PUFF(S): 90 AEROSOL, METERED ORAL at 02:04

## 2018-01-01 RX ADMIN — Medication 50 MILLIGRAM(S): at 17:27

## 2018-01-01 RX ADMIN — Medication 13.39 MICROGRAM(S)/KG/MIN: at 03:43

## 2018-01-01 RX ADMIN — CHLORHEXIDINE GLUCONATE 1 APPLICATION(S): 213 SOLUTION TOPICAL at 11:17

## 2018-01-01 RX ADMIN — PANTOPRAZOLE SODIUM 40 MILLIGRAM(S): 20 TABLET, DELAYED RELEASE ORAL at 17:43

## 2018-01-01 RX ADMIN — Medication 81 MILLIGRAM(S): at 11:28

## 2018-01-01 RX ADMIN — Medication 6: at 13:00

## 2018-01-01 RX ADMIN — ALBUTEROL 1 PUFF(S): 90 AEROSOL, METERED ORAL at 20:44

## 2018-01-01 RX ADMIN — HYDROMORPHONE HYDROCHLORIDE 0.5 MILLIGRAM(S): 2 INJECTION INTRAMUSCULAR; INTRAVENOUS; SUBCUTANEOUS at 19:27

## 2018-01-01 RX ADMIN — Medication 30 MILLILITER(S): at 05:45

## 2018-01-01 RX ADMIN — Medication 60 MILLIGRAM(S): at 00:21

## 2018-01-01 RX ADMIN — Medication 4 UNIT(S): at 18:14

## 2018-01-01 RX ADMIN — PANTOPRAZOLE SODIUM 40 MILLIGRAM(S): 20 TABLET, DELAYED RELEASE ORAL at 17:30

## 2018-01-01 RX ADMIN — Medication 25 MILLIGRAM(S): at 23:06

## 2018-01-01 RX ADMIN — HYDROMORPHONE HYDROCHLORIDE 0.5 MILLIGRAM(S): 2 INJECTION INTRAMUSCULAR; INTRAVENOUS; SUBCUTANEOUS at 01:27

## 2018-01-01 RX ADMIN — SODIUM POLYSTYRENE SULFONATE 15 GRAM(S): 4.1 POWDER, FOR SUSPENSION ORAL at 08:14

## 2018-01-01 RX ADMIN — ADENOSINE 6 MILLIGRAM(S): 3 INJECTION INTRAVENOUS at 20:49

## 2018-01-01 RX ADMIN — PANTOPRAZOLE SODIUM 40 MILLIGRAM(S): 20 TABLET, DELAYED RELEASE ORAL at 05:37

## 2018-01-01 RX ADMIN — Medication 650 MILLIGRAM(S): at 14:59

## 2018-01-01 RX ADMIN — CHLORHEXIDINE GLUCONATE 1 APPLICATION(S): 213 SOLUTION TOPICAL at 15:50

## 2018-01-01 RX ADMIN — MEROPENEM 100 MILLIGRAM(S): 1 INJECTION INTRAVENOUS at 17:58

## 2018-01-01 RX ADMIN — MEROPENEM 200 MILLIGRAM(S): 1 INJECTION INTRAVENOUS at 19:07

## 2018-01-01 RX ADMIN — PANTOPRAZOLE SODIUM 40 MILLIGRAM(S): 20 TABLET, DELAYED RELEASE ORAL at 13:04

## 2018-01-01 RX ADMIN — FINASTERIDE 5 MILLIGRAM(S): 5 TABLET, FILM COATED ORAL at 13:11

## 2018-01-01 RX ADMIN — FINASTERIDE 5 MILLIGRAM(S): 5 TABLET, FILM COATED ORAL at 12:59

## 2018-01-01 RX ADMIN — Medication 40 MILLIGRAM(S): at 14:00

## 2018-01-01 RX ADMIN — MEROPENEM 200 MILLIGRAM(S): 1 INJECTION INTRAVENOUS at 05:14

## 2018-01-01 RX ADMIN — Medication 4: at 14:26

## 2018-01-01 RX ADMIN — Medication 30 MILLIGRAM(S): at 13:03

## 2018-01-01 RX ADMIN — FINASTERIDE 5 MILLIGRAM(S): 5 TABLET, FILM COATED ORAL at 13:04

## 2018-01-01 RX ADMIN — Medication 60 MILLIGRAM(S): at 11:29

## 2018-01-01 RX ADMIN — INSULIN GLARGINE 20 UNIT(S): 100 INJECTION, SOLUTION SUBCUTANEOUS at 08:42

## 2018-01-01 RX ADMIN — PANTOPRAZOLE SODIUM 40 MILLIGRAM(S): 20 TABLET, DELAYED RELEASE ORAL at 06:29

## 2018-01-01 RX ADMIN — Medication 650 MILLIGRAM(S): at 08:34

## 2018-01-01 RX ADMIN — TAMSULOSIN HYDROCHLORIDE 0.4 MILLIGRAM(S): 0.4 CAPSULE ORAL at 21:40

## 2018-01-01 RX ADMIN — FENTANYL CITRATE 4.8 MICROGRAM(S)/KG/HR: 50 INJECTION INTRAVENOUS at 20:00

## 2018-01-01 RX ADMIN — Medication 40 MILLIGRAM(S): at 20:12

## 2018-01-01 RX ADMIN — HYDROMORPHONE HYDROCHLORIDE 0.5 MILLIGRAM(S): 2 INJECTION INTRAMUSCULAR; INTRAVENOUS; SUBCUTANEOUS at 12:29

## 2018-01-01 RX ADMIN — Medication 650 MILLIGRAM(S): at 13:05

## 2018-01-01 RX ADMIN — INSULIN GLARGINE 20 UNIT(S): 100 INJECTION, SOLUTION SUBCUTANEOUS at 23:03

## 2018-01-01 RX ADMIN — Medication 60 MILLIGRAM(S): at 18:13

## 2018-01-01 RX ADMIN — MEROPENEM 100 MILLIGRAM(S): 1 INJECTION INTRAVENOUS at 19:04

## 2018-01-01 RX ADMIN — MORPHINE SULFATE 1 MILLIGRAM(S): 50 CAPSULE, EXTENDED RELEASE ORAL at 03:34

## 2018-01-01 RX ADMIN — FINASTERIDE 5 MILLIGRAM(S): 5 TABLET, FILM COATED ORAL at 12:07

## 2018-01-01 RX ADMIN — FINASTERIDE 5 MILLIGRAM(S): 5 TABLET, FILM COATED ORAL at 11:19

## 2018-01-01 RX ADMIN — INSULIN HUMAN 8 UNIT(S): 100 INJECTION, SOLUTION SUBCUTANEOUS at 13:48

## 2018-01-20 NOTE — H&P ADULT - PROBLEM SELECTOR PLAN 1
Likely 2/2 post viral pneumonia  Concerns for CHF exacerbation  RVP positive for Influenza B  On droplet isolation  Placed on BiPAP  Crackles appreciated on lung exam  starting on tamiflu, azithromycin and ceftriaxone   Lactate 7.6 s/p 2 L bolus  Giving lasix 60 mg IVP x1  Troponin negative x1, trend CE  f/U Echo  f/u BCx, repeat lactate

## 2018-01-20 NOTE — ED PROVIDER NOTE - OBJECTIVE STATEMENT
72M from NH p/w sob and fatigue x 1 week. worsening over 2 days. associated with generalized weakness and multiple falls from weakness from the bed to floor over the past 4 days. (+) coughing and URI symptoms. no fever or chills. no CP. (+) SOB. no headache. (+) malaise. no head trauma. no neck pain. no N/V/D. no abd complaints. no  complaints.

## 2018-01-20 NOTE — H&P ADULT - HISTORY OF PRESENT ILLNESS
72 M from [facility] h/o CKD, Anxiety disorder, CAD, BPH, Carpel tunnel syndrome, compulsive disorder, DM, OA, HTN, IBS sent for dyspnea for last 3 days. Dyspnea is constant, moderate to severe in intensity, associated with fever, generalized weakness, lethargy, frequent falls, cough, myalgias and URI symptoms. Cough is productive with non bloody yellowish sputum.     Patient denies chest pain, nausea, vomiting, LOC, focal neurological deficit, abdominal pain, hematochezia, current smoking, alcohol abuse and illicit drug use.    ICU Vital Signs Last 24 Hrs  T(C): 38.4 (20 Jan 2018 10:36), Max: 38.4 (20 Jan 2018 10:22)  T(F): 101.2 (20 Jan 2018 10:36), Max: 101.2 (20 Jan 2018 10:22)  HR: 88 (20 Jan 2018 13:01) (78 - 91)  BP: 130/61 (20 Jan 2018 12:44) (82/53 - 130/61)  RR: 25 (20 Jan 2018 12:44) (25 - 25)  SpO2: 99% (20 Jan 2018 13:01) (91% - 99%)

## 2018-01-20 NOTE — ED ADULT TRIAGE NOTE - CHIEF COMPLAINT QUOTE
pt bought from nursing home  for s/p fall pt has hypoxic at site pt has Spo2 88% room air . pt is lethrgic

## 2018-01-20 NOTE — H&P ADULT - NEUROLOGICAL DETAILS
no spontaneous movement/deep reflexes intact/cranial nerves intact/responds to verbal commands/sensation intact/responds to pain

## 2018-01-20 NOTE — ED PROVIDER NOTE - MEDICAL DECISION MAKING DETAILS
72M p/w sepsis with unclear source at this time. Will workup for sepsis with CXR/UA/lactate/ABG/CBC/CMP/pan-culture/IVF hydration/empirix abx. Needs admission. disposition unclear at this time. 72M p/w sepsis with unclear source at this time. Will workup for sepsis with CXR/UA/lactate/ABG/CBC/CMP/pan-culture/IVF hydration/empirix abx. Needs admission. Will place on NIPPV given work of breathing. disposition unclear at this time.

## 2018-01-20 NOTE — ED PROVIDER NOTE - CARE PLAN
Principal Discharge DX:	Sepsis Principal Discharge DX:	Sepsis  Secondary Diagnosis:	Respiratory failure

## 2018-01-20 NOTE — AIRWAY PLACEMENT NOTE ADULT - POST AIRWAY PLACEMENT ASSESSMENT:
skin color improved/breath sounds bilateral/positive end tidal CO2 noted/breath sounds equal/CXR pending/chest excursion noted

## 2018-01-20 NOTE — H&P ADULT - ATTENDING COMMENTS
72 male nursing home patient with CAD, CKD, DM, HTN, OA, BPH, anxiety, presented with influenza, acute respiratory failure, severe sepsis.  Has bilateral hazy interstitial opacities on CXR representing either CHF or pneumonia.    Plan:  - admit to ICU  - trial of bipap, if fails may need intubation  - tamiflu, abx  - lasix  - 2D echo, troponins  total cc time 35 min

## 2018-01-21 NOTE — PROGRESS NOTE ADULT - ATTENDING COMMENTS
72 male nursing home patient with CAD, CKD, DM, HTN, OA, BPH, anxiety, presented with influenza, acute respiratory failure, septic shock.  May have superimposed pneumonia as well.   Plan:  - tamiflu, abx, obtain sputum culture and procalcitonin  - mechanical ventilation, pressors.  - tube feeds.  - 2D echo, troponins  total cc time 35 min .

## 2018-01-21 NOTE — PROGRESS NOTE ADULT - SUBJECTIVE AND OBJECTIVE BOX
[ ]DNR    [ ]DNI    [ ]MEWS SUSPENDED     [ ]GOALS OF CARE DISCUSSION WITH PATIENT/FAMILY/PROXY:    INTERVAL HPI/OVERNIGHT EVENTS: ***    PRESSORS: [x ] YES [ ] NO  WHICH: levo      ANTIBIOTICS:        Rocephin           DATE STARTED:   ANTIBIOTICS:              Zithro    DATE STARTED:   ANTIBIOTICS:         Tamiflu          DATE STARTED:     aspirin enteric coated 81 milliGRAM(s) Oral daily  atorvastatin 40 milliGRAM(s) Oral at bedtime  azithromycin  IVPB      azithromycin  IVPB 250 milliGRAM(s) IV Intermittent every 24 hours  cefTRIAXone   IVPB 1 Gram(s) IV Intermittent every 24 hours  clopidogrel Tablet 75 milliGRAM(s) Oral daily  finasteride 5 milliGRAM(s) Oral daily  heparin  Injectable 5000 Unit(s) SubCutaneous every 12 hours  norepinephrine Infusion 0.5 MICROgram(s)/kG/Min IV Continuous <Continuous>  oseltamivir 30 milliGRAM(s) Oral every 12 hours  pregabalin 50 milliGRAM(s) Oral two times a day  propofol Infusion 5 MICROgram(s)/kG/Min IV Continuous <Continuous>  tamsulosin 0.4 milliGRAM(s) Oral at bedtime      CENTRAL LINE: [ x] YES [ ] NO  LOCATION:  [facility]  DATE INSERTED:   REMOVE: [ ] YES [ ] NO  EXPLAIN:    BRUNSON: x[ ] YES [ ] NO    DATE INSERTED:   REMOVE:  [ ] YES [ ] NO  EXPLAIN:    PMH -reviewed admission note, no change since admission  PAST MEDICAL & SURGICAL HISTORY:  Sleep apnea: hx of spleep  Hyperlipidemia  CAD (coronary artery disease)  DM (diabetes mellitus)  HTN (hypertension)  S/P foot surgery      T(C): 37.4 (18 @ 16:40), Max: 38.4 (18 @ 10:22)  HR: 91 (18 @ 21:10)  BP: 115/91 (18 @ 20:00)  RR: 22 (18 @ 20:00)  SpO2: 98% (18 @ 21:10)  Wt(kg): --          PHYSICAL EXAMINATION:    GENERAL APPEARANCE: NO DISTRESS  HEENT: NO  PALLOR, NO  JVD,  NO   NODES, NECK SUPPLE  CVS: S1 +, S2 +,   RS: AEEB,  B/L  RALES +,   MILD B/L RONCHI +  ABD: SOFT, NT, NO, BS  EXT: NO  PE  SKIN: WARM,   SKELETAL:  ROM  ACCEPTABLE   CNS: sedated    LABS:  CBC Full  -  ( 2018 11:15 )  WBC Count : 4.2 K/uL  Hemoglobin : 14.7 g/dL  Hematocrit : 44.5 %  Platelet Count - Automated : 92 K/uL  Mean Cell Volume : 102.7 fl  Mean Cell Hemoglobin : 34.0 pg  Mean Cell Hemoglobin Concentration : 33.1 gm/dL  Auto Neutrophil # : x  Auto Lymphocyte # : x  Auto Monocyte # : x  Auto Eosinophil # : x  Auto Basophil # : x  Auto Neutrophil % : 36.0 %  Auto Lymphocyte % : 2.0 %  Auto Monocyte % : 7.0 %  Auto Eosinophil % : 1.0 %  Auto Basophil % : x        134<L>  |  101  |  42<H>  ----------------------------<  261<H>  5.0   |  22  |  2.66<H>    Ca    8.5      2018 11:15    TPro  6.6  /  Alb  3.3<L>  /  TBili  1.0  /  DBili  x   /  AST  77<H>  /  ALT  100<H>  /  AlkPhos  52        Urinalysis Basic - ( 2018 12:47 )    Color: Yellow / Appearance: very cloudy / S.020 / pH: x  Gluc: x / Ketone: Trace  / Bili: Moderate / Urobili: 4   Blood: x / Protein: 30 mg/dL / Nitrite: Negative   Leuk Esterase: Trace / RBC: 2-5 /HPF / WBC 3-5 /HPF   Sq Epi: x / Non Sq Epi: Few /HPF / Bacteria: Few /HPF          RADIOLOGY & ADDITIONAL STUDIES REVIEWED:  ***    2 M h/o HTN, CAD sent for respiratory distress, fever, and URI symptoms         Problem/Plan - 1:  ·  Problem: Acute hypoxic  Respiratory failure 2/2 flu     Concerns for CHF exacerbation  On droplet isolation  starting on tamiflu, azithromycin and ceftriaxone   Lactate 7.6 s/p 2 L bolus which improved to 2.6   Giving lasix 60 mg IVP x1  Troponin negative x2, trend CE  f/U Echo  f/u BCx, repeat lactate.          Problem/Plan - 2:  ·  Problem: CAD (coronary artery disease).  Plan: c/w ASA, Plavix and Lipitor          Problem/Plan - 3:  ·  Problem: DM (diabetes mellitus).  Plan: Diet controlled  f/u HbA1c.      Problem/Plan - 4:  ·  Problem: HTN (hypertension).  Plan: Holding metoprolol as BP is low.      Problem/Plan - 5:  ·  Problem: Need for prophylactic measure.  Plan: Improve score 3 on heparin sc for now.         CRITICAL CARE TIME SPENT: 35 minutes [ ]DNR    [ ]DNI    [ ]MEWS SUSPENDED     [ ]GOALS OF CARE DISCUSSION WITH PATIENT/FAMILY/PROXY:    INTERVAL HPI/OVERNIGHT EVENTS: ***    PRESSORS: [x ] YES [ ] NO  WHICH: levo      ANTIBIOTICS:        Rocephin           DATE STARTED:   ANTIBIOTICS:              Zithro    DATE STARTED:   ANTIBIOTICS:         Tamiflu          DATE STARTED:     aspirin enteric coated 81 milliGRAM(s) Oral daily  atorvastatin 40 milliGRAM(s) Oral at bedtime  azithromycin  IVPB      azithromycin  IVPB 250 milliGRAM(s) IV Intermittent every 24 hours  cefTRIAXone   IVPB 1 Gram(s) IV Intermittent every 24 hours  clopidogrel Tablet 75 milliGRAM(s) Oral daily  finasteride 5 milliGRAM(s) Oral daily  heparin  Injectable 5000 Unit(s) SubCutaneous every 12 hours  norepinephrine Infusion 0.5 MICROgram(s)/kG/Min IV Continuous <Continuous>  oseltamivir 30 milliGRAM(s) Oral every 12 hours  pregabalin 50 milliGRAM(s) Oral two times a day  propofol Infusion 5 MICROgram(s)/kG/Min IV Continuous <Continuous>  tamsulosin 0.4 milliGRAM(s) Oral at bedtime      CENTRAL LINE: [ x] YES [ ] NO  LOCATION:  [facility]  DATE INSERTED:   REMOVE: [ ] YES [ ] NO  EXPLAIN:    BRUNSON: x[ ] YES [ ] NO    DATE INSERTED:   REMOVE:  [ ] YES [ ] NO  EXPLAIN:    PMH -reviewed admission note, no change since admission  PAST MEDICAL & SURGICAL HISTORY:  Sleep apnea: hx of spleep  Hyperlipidemia  CAD (coronary artery disease)  DM (diabetes mellitus)  HTN (hypertension)  S/P foot surgery      T(C): 37.4 (18 @ 16:40), Max: 38.4 (18 @ 10:22)  HR: 91 (18 @ 21:10)  BP: 115/91 (18 @ 20:00)  RR: 22 (18 @ 20:00)  SpO2: 98% (18 @ 21:10)  Wt(kg): --          PHYSICAL EXAMINATION:    GENERAL APPEARANCE: NO DISTRESS  HEENT: NO  PALLOR, NO  JVD,  NO   NODES, NECK SUPPLE  CVS: S1 +, S2 +,   RS: AEEB,  B/L  RALES +,   MILD B/L RONCHI +  ABD: SOFT, NT, NO, BS  EXT: NO  PE  SKIN: WARM,   SKELETAL:  ROM  ACCEPTABLE   CNS: sedated    LABS:  CBC Full  -  ( 2018 11:15 )  WBC Count : 4.2 K/uL  Hemoglobin : 14.7 g/dL  Hematocrit : 44.5 %  Platelet Count - Automated : 92 K/uL  Mean Cell Volume : 102.7 fl  Mean Cell Hemoglobin : 34.0 pg  Mean Cell Hemoglobin Concentration : 33.1 gm/dL  Auto Neutrophil # : x  Auto Lymphocyte # : x  Auto Monocyte # : x  Auto Eosinophil # : x  Auto Basophil # : x  Auto Neutrophil % : 36.0 %  Auto Lymphocyte % : 2.0 %  Auto Monocyte % : 7.0 %  Auto Eosinophil % : 1.0 %  Auto Basophil % : x        134<L>  |  101  |  42<H>  ----------------------------<  261<H>  5.0   |  22  |  2.66<H>    Ca    8.5      2018 11:15    TPro  6.6  /  Alb  3.3<L>  /  TBili  1.0  /  DBili  x   /  AST  77<H>  /  ALT  100<H>  /  AlkPhos  52        Urinalysis Basic - ( 2018 12:47 )    Color: Yellow / Appearance: very cloudy / S.020 / pH: x  Gluc: x / Ketone: Trace  / Bili: Moderate / Urobili: 4   Blood: x / Protein: 30 mg/dL / Nitrite: Negative   Leuk Esterase: Trace / RBC: 2-5 /HPF / WBC 3-5 /HPF   Sq Epi: x / Non Sq Epi: Few /HPF / Bacteria: Few /HPF          RADIOLOGY & ADDITIONAL STUDIES REVIEWED:  ***    72 M h/o HTN, CAD sent for respiratory distress, fever, and URI symptoms         Problem/Plan - 1:  ·  Problem: Acute hypoxic  Respiratory failure 2/2 flu     Concerns for CHF exacerbation  On droplet isolation  starting on tamiflu, azithromycin and ceftriaxone   Lactate 7.6 s/p 2 L bolus which improved to 2.6   Giving lasix 60 mg IVP x1  Troponin negative x2, trend CE  f/U Echo  f/u BCx, repeat lactate.          Problem/Plan - 2:  ·  Problem: CAD (coronary artery disease).  Plan: c/w ASA, Plavix and Lipitor          Problem/Plan - 3:  ·  Problem: DM (diabetes mellitus).  Plan: Diet controlled  f/u HbA1c.      Problem/Plan - 4:  ·  Problem: HTN (hypertension).  Plan: Holding metoprolol as BP is low.      Problem/Plan - 5:  ·  Problem: Need for prophylactic measure.  Plan: Improve score 3 on heparin sc for now.         CRITICAL CARE TIME SPENT: 35 minutes

## 2018-01-21 NOTE — CONSULT NOTE ADULT - SUBJECTIVE AND OBJECTIVE BOX
HPI:  72 M from [facility] h/o CKD, Anxiety disorder, CAD, BPH, Carpel tunnel syndrome, compulsive disorder, DM, OA, HTN, IBS sent for dyspnea for last 3 days. Dyspnea is constant, moderate to severe in intensity, associated with fever, generalized weakness, lethargy, frequent falls, cough, myalgias and URI symptoms. Cough is productive with non bloody yellowish sputum.     Patient denies chest pain, nausea, vomiting, LOC, focal neurological deficit, abdominal pain, hematochezia, current smoking, alcohol abuse and illicit drug use.    ICU Vital Signs Last 24 Hrs  T(C): 38.4 (2018 10:36), Max: 38.4 (2018 10:22)  T(F): 101.2 (2018 10:36), Max: 101.2 (2018 10:22)  HR: 88 (2018 13:01) (78 - 91)  BP: 130/61 (2018 12:44) (82/53 - 130/61)  RR: 25 (2018 12:44) (25 - 25)  SpO2: 99% (2018 13:01) (91% - 99%) (2018 14:30)      PAST MEDICAL & SURGICAL HISTORY:  Sleep apnea: hx of spleep  Hyperlipidemia  CAD (coronary artery disease)  DM (diabetes mellitus)  HTN (hypertension)  S/P foot surgery      doxycycline (Unknown)  penicillin (Unknown)  tetracycline (Unknown)  Valtrex (Unknown)      Meds:  acetaminophen    Suspension 650 milliGRAM(s) Oral every 6 hours PRN  aspirin enteric coated 81 milliGRAM(s) Oral daily  atorvastatin 40 milliGRAM(s) Oral at bedtime  azithromycin  IVPB      azithromycin  IVPB 250 milliGRAM(s) IV Intermittent every 24 hours  cefTRIAXone   IVPB 1 Gram(s) IV Intermittent every 24 hours  clopidogrel Tablet 75 milliGRAM(s) Oral daily  finasteride 5 milliGRAM(s) Oral daily  heparin  Injectable 5000 Unit(s) SubCutaneous every 12 hours  norepinephrine Infusion 0.5 MICROgram(s)/kG/Min IV Continuous <Continuous>  oseltamivir 30 milliGRAM(s) Oral every 12 hours  pregabalin 50 milliGRAM(s) Oral two times a day  propofol Infusion 5 MICROgram(s)/kG/Min IV Continuous <Continuous>  tamsulosin 0.4 milliGRAM(s) Oral at bedtime      SOCIAL HISTORY:  Smoker:    ETOH use:      FAMILY HISTORY:      VITALS:  Vital Signs Last 24 Hrs  T(C): 37.2 (2018 14:00), Max: 40 (2018 08:00)  T(F): 98.9 (2018 14:00), Max: 104 (2018 08:00)  HR: 96 (2018 15:00) (66 - 114)  BP: 116/56 (2018 15:00) (50/34 - 160/131)  BP(mean): 71 (2018 15:00) (37 - 138)  RR: 20 (2018 15:00) (19 - 29)  SpO2: 95% (2018 15:00) (92% - 100%)    LABS/DIAGNOSTIC TESTS:                          13.5   4.8   )-----------( 79       ( 2018 07:06 )             39.5     WBC Count: 4.8 K/uL ( @ 07:06)  WBC Count: 4.2 K/uL ( @ 11:15)          136  |  106  |  53<H>  ----------------------------<  244<H>  5.3   |  18<L>  |  2.92<H>    Ca    7.4<L>      2018 07:06  Phos  1.3       Mg     1.8         TPro  5.5<L>  /  Alb  2.5<L>  /  TBili  2.1<H>  /  DBili  1.4<H>  /  AST  185<H>  /  ALT  175<H>  /  AlkPhos  43        Urinalysis Basic - ( 2018 12:47 )    Color: Yellow / Appearance: very cloudy / S.020 / pH: x  Gluc: x / Ketone: Trace  / Bili: Moderate / Urobili: 4   Blood: x / Protein: 30 mg/dL / Nitrite: Negative   Leuk Esterase: Trace / RBC: 2-5 /HPF / WBC 3-5 /HPF   Sq Epi: x / Non Sq Epi: Few /HPF / Bacteria: Few /HPF        LIVER FUNCTIONS - ( 2018 07:06 )  Alb: 2.5 g/dL / Pro: 5.5 g/dL / ALK PHOS: 43 U/L / ALT: 175 U/L DA / AST: 185 U/L / GGT: x                 LACTATE:Lactate, Blood: 2.4 mmol/L ( @ 07:06)  Lactate, Blood: 2.6 mmol/L ( @ 22:10)      ABG - ABG - ( 2018 04:29 )  pH: 7.42  /  pCO2: 26    /  pO2: 96    / HCO3: 17    / Base Excess: -5.8  /  SaO2: 98                  CULTURES:       RADIOLOGY:  < from: Xray Chest 1 View AP-PORTABLE IMMEDIATE (18 @ 18:10) >  EXAM:  XR CHEST PORTABLE IMMED 1V                            PROCEDURE DATE:  2018          INTERPRETATION:  Chest one view    HISTORY: Post intubation with sepsis    COMPARISON STUDY: Earlier the same day    Frontal expiratory view of the chest shows the heart to be similar in   size. Endotracheal tube reaches the lower trachea. Feeding tube reaches   the upper stomach. The lungs show no focal infiltrate and there is no   evidence of pneumothorax nor definite pleural effusion.    IMPRESSION:  Tubes as noted.    Thank you for the courtesy of this referral.      ------------------------------------------------------------------------------------------------------------------------        EXAM:  XR CHEST PORTABLE URGENT 1V                            PROCEDURE DATE:  2018          INTERPRETATION:  Chest one view    HISTORY: Fever and fatigue    COMPARISON STUDY: None available    Frontal expiratory view of the chest shows the heart to be enlarged in   size. Small pleural effusions are present. The lungs show questionable   retrocardiac infiltrate and there is no evidence of pneumothorax.    IMPRESSION:  Questionable retrocardiac infiltrate. Follow-up study is recommended as  clinically warranted.    Thank you for the courtesy of this referral.      < end of copied text >        ROS  [  ] UNABLE TO ELICIT HPI:  72 M from [facility] h/o CKD, Anxiety disorder, CAD, BPH, Carpel tunnel syndrome, compulsive disorder, DM, OA, HTN, IBS sent for dyspnea for last 3 days. Dyspnea is constant, moderate to severe in intensity, associated with fever, generalized weakness, lethargy, frequent falls, cough, myalgias and URI symptoms. Cough is productive with non bloody yellowish sputum.     Patient denied chest pain, nausea, vomiting, LOC, focal neurological deficit, abdominal pain, hematochezia, current smoking, alcohol abuse and illicit drug use to admitting team but is currently in the ICU sedated and intubated and unable to answer any questions. He is spiking high fevers and found to be Influenza B +, his cxr shows a questionable infiltrate and he doesn't have much as far as secretions go from his ETT. His LFTS are rising for some unapparent reason. pt is on pressors.    ICU Vital Signs Last 24 Hrs  T(C): 38.4 (2018 10:36), Max: 38.4 (2018 10:22)  T(F): 101.2 (2018 10:36), Max: 101.2 (2018 10:22)  HR: 88 (2018 13:01) (78 - 91)  BP: 130/61 (2018 12:44) (82/53 - 130/61)  RR: 25 (2018 12:44) (25 - 25)  SpO2: 99% (2018 13:01) (91% - 99%) (2018 14:30)      PAST MEDICAL & SURGICAL HISTORY:  Sleep apnea: hx of sleep   Hyperlipidemia  CAD (coronary artery disease)  DM (diabetes mellitus)  HTN (hypertension)  S/P foot surgery      doxycycline (Unknown)  penicillin (Unknown)  tetracycline (Unknown)  Valtrex (Unknown)      Meds:  acetaminophen    Suspension 650 milliGRAM(s) Oral every 6 hours PRN  aspirin enteric coated 81 milliGRAM(s) Oral daily  atorvastatin 40 milliGRAM(s) Oral at bedtime  azithromycin  IVPB      azithromycin  IVPB 250 milliGRAM(s) IV Intermittent every 24 hours  cefTRIAXone   IVPB 1 Gram(s) IV Intermittent every 24 hours  clopidogrel Tablet 75 milliGRAM(s) Oral daily  finasteride 5 milliGRAM(s) Oral daily  heparin  Injectable 5000 Unit(s) SubCutaneous every 12 hours  norepinephrine Infusion 0.5 MICROgram(s)/kG/Min IV Continuous <Continuous>  oseltamivir 30 milliGRAM(s) Oral every 12 hours  pregabalin 50 milliGRAM(s) Oral two times a day  propofol Infusion 5 MICROgram(s)/kG/Min IV Continuous <Continuous>  tamsulosin 0.4 milliGRAM(s) Oral at bedtime      SOCIAL HISTORY: as above  Smoker:    ETOH use:      FAMILY HISTORY: unknown      VITALS:  Vital Signs Last 24 Hrs  T(C): 37.2 (2018 14:00), Max: 40 (2018 08:00)  T(F): 98.9 (2018 14:00), Max: 104 (2018 08:00)  HR: 96 (2018 15:00) (66 - 114)  BP: 116/56 (2018 15:00) (50/34 - 160/131)  BP(mean): 71 (2018 15:00) (37 - 138)  RR: 20 (2018 15:00) (19 - 29)  SpO2: 95% (2018 15:00) (92% - 100%)    LABS/DIAGNOSTIC TESTS:                          13.5   4.8   )-----------( 79       ( 2018 07:06 )             39.5     WBC Count: 4.8 K/uL ( @ 07:06)  WBC Count: 4.2 K/uL ( @ 11:15)          136  |  106  |  53<H>  ----------------------------<  244<H>  5.3   |  18<L>  |  2.92<H>    Ca    7.4<L>      2018 07:06  Phos  1.3       Mg     1.8         TPro  5.5<L>  /  Alb  2.5<L>  /  TBili  2.1<H>  /  DBili  1.4<H>  /  AST  185<H>  /  ALT  175<H>  /  AlkPhos  43        Urinalysis Basic - ( 2018 12:47 )    Color: Yellow / Appearance: very cloudy / S.020 / pH: x  Gluc: x / Ketone: Trace  / Bili: Moderate / Urobili: 4   Blood: x / Protein: 30 mg/dL / Nitrite: Negative   Leuk Esterase: Trace / RBC: 2-5 /HPF / WBC 3-5 /HPF   Sq Epi: x / Non Sq Epi: Few /HPF / Bacteria: Few /HPF        LIVER FUNCTIONS - ( 2018 07:06 )  Alb: 2.5 g/dL / Pro: 5.5 g/dL / ALK PHOS: 43 U/L / ALT: 175 U/L DA / AST: 185 U/L / GGT: x                 LACTATE:Lactate, Blood: 2.4 mmol/L ( @ 07:06)  Lactate, Blood: 2.6 mmol/L ( @ 22:10)      ABG - ABG - ( 2018 04:29 )  pH: 7.42  /  pCO2: 26    /  pO2: 96    / HCO3: 17    / Base Excess: -5.8  /  SaO2: 98                  CULTURES:       RADIOLOGY:  < from: Xray Chest 1 View AP-PORTABLE IMMEDIATE (18 @ 18:10) >  EXAM:  XR CHEST PORTABLE IMMED 1V                            PROCEDURE DATE:  2018          INTERPRETATION:  Chest one view    HISTORY: Post intubation with sepsis    COMPARISON STUDY: Earlier the same day    Frontal expiratory view of the chest shows the heart to be similar in   size. Endotracheal tube reaches the lower trachea. Feeding tube reaches   the upper stomach. The lungs show no focal infiltrate and there is no   evidence of pneumothorax nor definite pleural effusion.    IMPRESSION:  Tubes as noted.    Thank you for the courtesy of this referral.      ------------------------------------------------------------------------------------------------------------------------        EXAM:  XR CHEST PORTABLE URGENT 1V                            PROCEDURE DATE:  2018          INTERPRETATION:  Chest one view    HISTORY: Fever and fatigue    COMPARISON STUDY: None available    Frontal expiratory view of the chest shows the heart to be enlarged in   size. Small pleural effusions are present. The lungs show questionable   retrocardiac infiltrate and there is no evidence of pneumothorax.    IMPRESSION:  Questionable retrocardiac infiltrate. Follow-up study is recommended as  clinically warranted.    Thank you for the courtesy of this referral.      < end of copied text >        ROS  [ x ] UNABLE TO ELICIT

## 2018-01-21 NOTE — CONSULT NOTE ADULT - ASSESSMENT
sepsis/ septic shock  Influenza B infection  fevers  pneumonia ?  respiratory failure    plan - cont tamiflu 30 mgs via NGT q12hra  cont rocephin 1 gm iv q24 hrs  dc azithromycin and lipitor as LFTS are increasing

## 2018-01-22 NOTE — CONSULT NOTE ADULT - SUBJECTIVE AND OBJECTIVE BOX
Patient is a 72y old  Male who presents with a chief complaint of Respiratory distress (2018 14:30)      Initial HPI on admission:  HPI:  72 M from [facility] h/o CKD, Anxiety disorder, CAD, BPH, Carpel tunnel syndrome, compulsive disorder, DM, OA, HTN, IBS sent for dyspnea for last 3 days. Dyspnea is constant, moderate to severe in intensity, associated with fever, generalized weakness, lethargy, frequent falls, cough, myalgias and URI symptoms. Cough is productive with non bloody yellowish sputum. Patient denies chest pain, nausea, vomiting, LOC, focal neurological deficit, abdominal pain, hematochezia, current smoking, alcohol abuse and illicit drug use.    ICU Vital Signs Last 24 Hrs  T(C): 38.4 (2018 10:36), Max: 38.4 (2018 10:22)  T(F): 101.2 (2018 10:36), Max: 101.2 (2018 10:22)  HR: 88 (2018 13:01) (78 - 91)  BP: 130/61 (2018 12:44) (82/53 - 130/61)  RR: 25 (2018 12:44) (25 - 25)  SpO2: 99% (2018 13:01) (91% - 99%) (2018 14:30)      BRIEF HOSPITAL COURSE: Patient was in septic shock started on Phenylephrine later levophed when central line was placed. He was intubated for respiratory failure. (18)    PAST MEDICAL & SURGICAL HISTORY:  Sleep apnea: hx of spleep  Hyperlipidemia  CAD (coronary artery disease)  DM (diabetes mellitus)  HTN (hypertension)  S/P foot surgery    Allergies    doxycycline (Unknown)  penicillin (Unknown)  tetracycline (Unknown)  Valtrex (Unknown)    Intolerances      FAMILY HISTORY:    Review of Systems:  unable to obtain as patient is intubated and sedated       Medications:  MEDICATIONS  (STANDING):  aspirin enteric coated 81 milliGRAM(s) Oral daily  cefTRIAXone   IVPB 1 Gram(s) IV Intermittent every 24 hours  clopidogrel Tablet 75 milliGRAM(s) Oral daily  DOBUTamine Infusion 5 MICROgram(s)/kG/Min (13.395 mL/Hr) IV Continuous <Continuous>  finasteride 5 milliGRAM(s) Oral daily  heparin  Infusion.  Unit(s)/Hr (10 mL/Hr) IV Continuous <Continuous>  norepinephrine Infusion 0.5 MICROgram(s)/kG/Min (41.859 mL/Hr) IV Continuous <Continuous>  oseltamivir 30 milliGRAM(s) Oral every 12 hours  pregabalin 50 milliGRAM(s) Oral two times a day  propofol Infusion 5 MICROgram(s)/kG/Min (2.721 mL/Hr) IV Continuous <Continuous>  tamsulosin 0.4 milliGRAM(s) Oral at bedtime    MEDICATIONS  (PRN):  acetaminophen    Suspension 650 milliGRAM(s) Oral every 6 hours PRN For Temp greater than 38 C (100.4 F)      vent settings  Mode: AC/ CMV (Assist Control/ Continuous Mandatory Ventilation)  RR (machine): 14  TV (machine): 500  FiO2: 40  PEEP: 5  ITime: 1  MAP: 21  PIP: 14        ABG - ( 2018 04:58 )  pH: 7.40  /  pCO2: 30    /  pO2: 59    / HCO3: 18    / Base Excess: -4.7  /  SaO2: 91               @ 07:01  -   @ 07:00  --------------------------------------------------------  IN: 1736.3 mL / OUT: 855 mL / NET: 881.3 mL          LABS:                        12.6   6.3   )-----------( 70       ( 2018 06:47 )             34.6         132<L>  |  102  |  67<H>  ----------------------------<  255<H>  4.5   |  20<L>  |  3.77<H>    Ca    6.6<L>      2018 06:47  Phos  1.6       Mg     1.7         TPro  5.1<L>  /  Alb  2.0<L>  /  TBili  2.2<H>  /  DBili  1.6<H>  /  AST  119<H>  /  ALT  147<H>  /  AlkPhos  65        CARDIAC MARKERS ( 2018 15:24 )  1.980 ng/mL / x     / 1395 U/L / x     / 2.9 ng/mL  CARDIAC MARKERS ( 2018 07:06 )  0.375 ng/mL / x     / 1224 U/L / x     / 1.4 ng/mL  CARDIAC MARKERS ( 2018 22:49 )  0.076 ng/mL / x     / 1298 U/L / x     / 2.4 ng/mL  CARDIAC MARKERS ( 2018 14:26 )  0.020 ng/mL / x     / 568 U/L / x     / 1.1 ng/mL      CAPILLARY BLOOD GLUCOSE      POCT Blood Glucose.: 270 mg/dL (2018 18:32)    PT/INR - ( 2018 10:16 )   PT: 11.1 sec;   INR: 1.02 ratio         PTT - ( 2018 10:16 )  PTT:82.0 sec  Urinalysis Basic - ( 2018 12:47 )    Color: Yellow / Appearance: very cloudy / S.020 / pH: x  Gluc: x / Ketone: Trace  / Bili: Moderate / Urobili: 4   Blood: x / Protein: 30 mg/dL / Nitrite: Negative   Leuk Esterase: Trace / RBC: 2-5 /HPF / WBC 3-5 /HPF   Sq Epi: x / Non Sq Epi: Few /HPF / Bacteria: Few /HPF      CULTURES:  Culture Results:   No growth ( @ 22:48)  Culture Results:   No growth to date. ( @ 17:03)    .Urine Clean Catch (Midstream)  .Blood Blood-Peripheral      Physical Examination:    General: intubated and sedated     HEENT: Pupils equal, reactive to light.  Symmetric.    PULM: scattered rhonchi     CVS: Regular rate and rhythm, no murmurs, rubs, or gallops    ABD: Soft, distended/obese , nontender, normoactive bowel sounds, no masses    EXT: No edema, nontender    SKIN: Warm and well perfused, no rashes noted.    NEURO: sedated     RADIOLOGY REVIEWED: EXAM:  XR CHEST PORTABLE IMMED 1V                            PROCEDURE DATE:  2018          INTERPRETATION:  Chest one view    HISTORY: Central line placement    COMPARISON STUDY: Earlier the same day    Frontal expiratory view of the chest shows the heart to be similar in   size. Right jugular line as and placed extending to the level of the   superior vena cava. Feeding tube tip remains in the stomach. Endotracheal   tube is unchanged. The lungs show no definite infiltrate and there is no   evidence of pneumothorax nor pleural effusion.    IMPRESSION:  Right jugular line. No pneumothorax.  CRITICAL CARE TIME SPENT: 35 minutes Patient is a 72y old  Male who presents with a chief complaint of Respiratory distress (2018 14:30)      Initial HPI on admission:  HPI:  72 M from [facility] h/o CKD, Anxiety disorder, CAD, BPH, Carpel tunnel syndrome, compulsive disorder, DM, OA, HTN, IBS sent for dyspnea for last 3 days. Dyspnea is constant, moderate to severe in intensity, associated with fever, generalized weakness, lethargy, frequent falls, cough, myalgias and URI symptoms. Cough is productive with non bloody yellowish sputum. Patient denies chest pain, nausea, vomiting, LOC, focal neurological deficit, abdominal pain, hematochezia, current smoking, alcohol abuse and illicit drug use.     ICU Vital Signs Last 24 Hrs  T(C): 38.4 (2018 10:36), Max: 38.4 (2018 10:22)  T(F): 101.2 (2018 10:36), Max: 101.2 (2018 10:22)  HR: 88 (2018 13:01) (78 - 91)  BP: 130/61 (2018 12:44) (82/53 - 130/61)  RR: 25 (2018 12:44) (25 - 25)  SpO2: 99% (2018 13:01) (91% - 99%) (2018 14:30)      BRIEF HOSPITAL COURSE: Patient was in septic shock started on Phenylephrine later levophed when central line was placed. He was intubated for respiratory failure. (18)    PAST MEDICAL & SURGICAL HISTORY:  Sleep apnea: hx of spleep  Hyperlipidemia  CAD (coronary artery disease)  DM (diabetes mellitus)  HTN (hypertension)  S/P foot surgery    Allergies    doxycycline (Unknown)  penicillin (Unknown)  tetracycline (Unknown)  Valtrex (Unknown)    Intolerances      FAMILY HISTORY:    Review of Systems:  unable to obtain as patient is intubated and sedated       Medications:  MEDICATIONS  (STANDING):  aspirin enteric coated 81 milliGRAM(s) Oral daily  cefTRIAXone   IVPB 1 Gram(s) IV Intermittent every 24 hours  clopidogrel Tablet 75 milliGRAM(s) Oral daily  DOBUTamine Infusion 5 MICROgram(s)/kG/Min (13.395 mL/Hr) IV Continuous <Continuous>  finasteride 5 milliGRAM(s) Oral daily  heparin  Infusion.  Unit(s)/Hr (10 mL/Hr) IV Continuous <Continuous>  norepinephrine Infusion 0.5 MICROgram(s)/kG/Min (41.859 mL/Hr) IV Continuous <Continuous>  oseltamivir 30 milliGRAM(s) Oral every 12 hours  pregabalin 50 milliGRAM(s) Oral two times a day  propofol Infusion 5 MICROgram(s)/kG/Min (2.721 mL/Hr) IV Continuous <Continuous>  tamsulosin 0.4 milliGRAM(s) Oral at bedtime    MEDICATIONS  (PRN):  acetaminophen    Suspension 650 milliGRAM(s) Oral every 6 hours PRN For Temp greater than 38 C (100.4 F)      vent settings  Mode: AC/ CMV (Assist Control/ Continuous Mandatory Ventilation)  RR (machine): 14  TV (machine): 500  FiO2: 40  PEEP: 5  ITime: 1  MAP: 21  PIP: 14        ABG - ( 2018 04:58 )  pH: 7.40  /  pCO2: 30    /  pO2: 59    / HCO3: 18    / Base Excess: -4.7  /  SaO2: 91               @ 07:01  -   @ 07:00  --------------------------------------------------------  IN: 1736.3 mL / OUT: 855 mL / NET: 881.3 mL          LABS:                        12.6   6.3   )-----------( 70       ( 2018 06:47 )             34.6         132<L>  |  102  |  67<H>  ----------------------------<  255<H>  4.5   |  20<L>  |  3.77<H>    Ca    6.6<L>      2018 06:47  Phos  1.6       Mg     1.7         TPro  5.1<L>  /  Alb  2.0<L>  /  TBili  2.2<H>  /  DBili  1.6<H>  /  AST  119<H>  /  ALT  147<H>  /  AlkPhos  65        CARDIAC MARKERS ( 2018 15:24 )  1.980 ng/mL / x     / 1395 U/L / x     / 2.9 ng/mL  CARDIAC MARKERS ( 2018 07:06 )  0.375 ng/mL / x     / 1224 U/L / x     / 1.4 ng/mL  CARDIAC MARKERS ( 2018 22:49 )  0.076 ng/mL / x     / 1298 U/L / x     / 2.4 ng/mL  CARDIAC MARKERS ( 2018 14:26 )  0.020 ng/mL / x     / 568 U/L / x     / 1.1 ng/mL      CAPILLARY BLOOD GLUCOSE      POCT Blood Glucose.: 270 mg/dL (2018 18:32)    PT/INR - ( 2018 10:16 )   PT: 11.1 sec;   INR: 1.02 ratio         PTT - ( 2018 10:16 )  PTT:82.0 sec  Urinalysis Basic - ( 2018 12:47 )    Color: Yellow / Appearance: very cloudy / S.020 / pH: x  Gluc: x / Ketone: Trace  / Bili: Moderate / Urobili: 4   Blood: x / Protein: 30 mg/dL / Nitrite: Negative   Leuk Esterase: Trace / RBC: 2-5 /HPF / WBC 3-5 /HPF   Sq Epi: x / Non Sq Epi: Few /HPF / Bacteria: Few /HPF      CULTURES:  Culture Results:   No growth ( @ 22:48)  Culture Results:   No growth to date. ( @ 17:03)    .Urine Clean Catch (Midstream)  .Blood Blood-Peripheral      Physical Examination:    General: intubated and sedated     HEENT: Pupils equal, reactive to light.  Symmetric.    PULM: scattered rhonchi     CVS: Regular rate and rhythm, no murmurs, rubs, or gallops    ABD: Soft, distended/obese , nontender, normoactive bowel sounds, no masses    EXT: No edema, nontender    SKIN: Warm and well perfused, no rashes noted.    NEURO: sedated     RADIOLOGY REVIEWED: EXAM:  XR CHEST PORTABLE IMMED 1V                            PROCEDURE DATE:  2018          INTERPRETATION:  Chest one view    HISTORY: Central line placement    COMPARISON STUDY: Earlier the same day    Frontal expiratory view of the chest shows the heart to be similar in   size. Right jugular line as and placed extending to the level of the   superior vena cava. Feeding tube tip remains in the stomach. Endotracheal   tube is unchanged. The lungs show no definite infiltrate and there is no   evidence of pneumothorax nor pleural effusion.    IMPRESSION:  Right jugular line. No pneumothorax.  CRITICAL CARE TIME SPENT: 35 minutes Patient is a 72y old  Male who presents with a chief complaint of Respiratory distress (2018 14:30)      Initial HPI on admission:  Medical HPI:  72 M from [facility] h/o CKD, Anxiety disorder, CAD, BPH, Carpel tunnel syndrome, compulsive disorder, DM, OA, HTN, IBS sent for dyspnea for last 3 days. Dyspnea is constant, moderate to severe in intensity, associated with fever, generalized weakness, lethargy, frequent falls, cough, myalgias and URI symptoms. Cough is productive with non bloody yellowish sputum. Patient denies chest pain, nausea, vomiting, LOC, focal neurological deficit, abdominal pain, hematochezia, current smoking, alcohol abuse and illicit drug use.     ICU Vital Signs Last 24 Hrs  T(C): 38.4 (2018 10:36), Max: 38.4 (2018 10:22)  T(F): 101.2 (2018 10:36), Max: 101.2 (2018 10:22)  HR: 88 (2018 13:01) (78 - 91)  BP: 130/61 (2018 12:44) (82/53 - 130/61)  RR: 25 (2018 12:44) (25 - 25)  SpO2: 99% (2018 13:01) (91% - 99%) (2018 14:30)      BRIEF HOSPITAL COURSE: Patient was in septic shock started on Phenylephrine later levophed when central line was placed. He was intubated for respiratory failure. (18)  Renal hpi:  pts current chart reviewed and case discussed with [facility] resident  pt has been hypotensive since admission with progressive rise in cr with reduced U/O   pt had serum cr around 1.29 (stage 3 ckd) as per old labs  pt is on ventilator/sedated and unable to get any hx  I  have left a message for pts son to call me back so i could get more info    PAST MEDICAL & SURGICAL HISTORY:  Sleep apnea: hx of spleep  Hyperlipidemia  CAD (coronary artery disease)  DM (diabetes mellitus)  HTN (hypertension)  S/P foot surgery    Allergies    doxycycline (Unknown)  penicillin (Unknown)  tetracycline (Unknown)  Valtrex (Unknown)    Intolerances    Past social hx:uk    FAMILY HISTORY:uk    Review of Systems:  unable to obtain as patient is intubated and sedated       Medications:  MEDICATIONS  (STANDING):  aspirin enteric coated 81 milliGRAM(s) Oral daily  cefTRIAXone   IVPB 1 Gram(s) IV Intermittent every 24 hours  clopidogrel Tablet 75 milliGRAM(s) Oral daily  DOBUTamine Infusion 5 MICROgram(s)/kG/Min (13.395 mL/Hr) IV Continuous <Continuous>  finasteride 5 milliGRAM(s) Oral daily  heparin  Infusion.  Unit(s)/Hr (10 mL/Hr) IV Continuous <Continuous>  norepinephrine Infusion 0.5 MICROgram(s)/kG/Min (41.859 mL/Hr) IV Continuous <Continuous>  oseltamivir 30 milliGRAM(s) Oral every 12 hours  pregabalin 50 milliGRAM(s) Oral two times a day  propofol Infusion 5 MICROgram(s)/kG/Min (2.721 mL/Hr) IV Continuous <Continuous>  tamsulosin 0.4 milliGRAM(s) Oral at bedtime    MEDICATIONS  (PRN):  acetaminophen    Suspension 650 milliGRAM(s) Oral every 6 hours PRN For Temp greater than 38 C (100.4 F)      vent settings  Mode: AC/ CMV (Assist Control/ Continuous Mandatory Ventilation)  RR (machine): 14  TV (machine): 500  FiO2: 40  PEEP: 5  ITime: 1  MAP: 21  PIP: 14        ABG - ( 2018 04:58 )  pH: 7.40  /  pCO2: 30    /  pO2: 59    / HCO3: 18    / Base Excess: -4.7  /  SaO2: 91               @ 07:01  -  22 @ 07:00  --------------------------------------------------------  IN: 1736.3 mL / OUT: 855 mL / NET: 881.3 mL          LABS:                        12.6   6.3   )-----------( 70       ( 2018 06:47 )             34.6         132<L>  |  102  |  67<H>  ----------------------------<  255<H>  4.5   |  20<L>  |  3.77<H>    Ca    6.6<L>      2018 06:47  Phos  1.6       Mg     1.7         TPro  5.1<L>  /  Alb  2.0<L>  /  TBili  2.2<H>  /  DBili  1.6<H>  /  AST  119<H>  /  ALT  147<H>  /  AlkPhos  65        CARDIAC MARKERS ( 2018 15:24 )  1.980 ng/mL / x     / 1395 U/L / x     / 2.9 ng/mL  CARDIAC MARKERS ( 2018 07:06 )  0.375 ng/mL / x     / 1224 U/L / x     / 1.4 ng/mL  CARDIAC MARKERS ( 2018 22:49 )  0.076 ng/mL / x     / 1298 U/L / x     / 2.4 ng/mL  CARDIAC MARKERS ( 2018 14:26 )  0.020 ng/mL / x     / 568 U/L / x     / 1.1 ng/mL      CAPILLARY BLOOD GLUCOSE      POCT Blood Glucose.: 270 mg/dL (2018 18:32)    PT/INR - ( 2018 10:16 )   PT: 11.1 sec;   INR: 1.02 ratio         PTT - ( 2018 10:16 )  PTT:82.0 sec  Urinalysis Basic - ( 2018 12:47 )    Color: Yellow / Appearance: very cloudy / S.020 / pH: x  Gluc: x / Ketone: Trace  / Bili: Moderate / Urobili: 4   Blood: x / Protein: 30 mg/dL / Nitrite: Negative   Leuk Esterase: Trace / RBC: 2-5 /HPF / WBC 3-5 /HPF   Sq Epi: x / Non Sq Epi: Few /HPF / Bacteria: Few /HPF      CULTURES:  Culture Results:   No growth ( @ 22:48)  Culture Results:   No growth to date. ( @ 17:03)    .Urine Clean Catch (Midstream)  .Blood Blood-Peripheral      Physical Examination:    General: well built, obese ,  intubated and sedated , in nad    HEENT: Pupils equal, reactive to light.  Symmetric.    PULM: ait entry heard lower lungs with scattered rhonchi     CVS: Regular rate and rhythm, no murmurs, rubs, or gallops    ABD: Soft, distended/obese , nontender, normoactive bowel sounds, no masses    EXT: 1 plus edema noted in both LE s    SKIN: Warm and well perfused, no rashes noted.    NEURO: sedated     RADIOLOGY REVIEWED: EXAM:  XR CHEST PORTABLE IMMED 1V                        < from: Xray Chest 1 View AP -PORTABLE-Routine (18 @ 11:41) >  EXAM:  XR CHEST PORTABLE  ROUTINE 1V                            PROCEDURE DATE:  2018          INTERPRETATION:  AP semierect chest on  at 8:45 AM. Patient is   respirator dependent.    Heart magnified by technique but could be enlarged.    Endotracheal tube, nasogastric tube, and right jugular line remain.    Small infiltrate off the lower right hilum again noted.    Atelectatic retrocardiac process again seen.    The chest is similar to .    IMPRESSION: Unchanged chest as above.      < end of copied text >      PROCEDURE DATE:  2018          INTERPRETATION:  Chest one view    HISTORY: Central line placement    COMPARISON STUDY: Earlier the same day    Frontal expiratory view of the chest shows the heart to be similar in   size. Right jugular line as and placed extending to the level of the   superior vena cava. Feeding tube tip remains in the stomach. Endotracheal   tube is unchanged. The lungs show no definite infiltrate and there is no   evidence of pneumothorax nor pleural effusion.    IMPRESSION:  Right jugular line. No pneumothorax.  CRITICAL CARE TIME SPENT: 35 minutes

## 2018-01-22 NOTE — PROGRESS NOTE ADULT - SUBJECTIVE AND OBJECTIVE BOX
Patient is a 72y old  Male who presents with a chief complaint of Respiratory distress (2018 14:30)    PATIENT IS SEEN AND EXAMINED IN ICU.  ORAL ET +  NGT +  BRUNSON +    ALLERGIES:  doxycycline (Unknown)  penicillin (Unknown)  tetracycline (Unknown)  Valtrex (Unknown)    Daily Weight in k.4 (2018 11:32)    VITALS:    Vital Signs Last 24 Hrs  T(C): 37.8 (2018 15:36), Max: 37.8 (2018 04:00)  T(F): 100 (2018 15:36), Max: 100 (2018 04:00)  HR: 99 (2018 19:00) (97 - 124)  BP: 119/57 (2018 19:00) (48/26 - 134/106)  BP(mean): 73 (2018 19:00) (30 - 112)  RR: 22 (2018 19:00) (20 - 29)  SpO2: 96% (2018 19:00) (91% - 96%)    LABS:  CBC Full  -  ( 2018 18:20 )  WBC Count : 7.4 K/uL  Hemoglobin : 13.4 g/dL  Hematocrit : 38.7 %  Platelet Count - Automated : 75 K/uL  Mean Cell Volume : 104.0 fl  Mean Cell Hemoglobin : 35.9 pg  Mean Cell Hemoglobin Concentration : 34.5 gm/dL  Auto Neutrophil # : x  Auto Lymphocyte # : x  Auto Monocyte # : x  Auto Eosinophil # : x  Auto Basophil # : x  Auto Neutrophil % : x  Auto Lymphocyte % : x  Auto Monocyte % : x  Auto Eosinophil % : x  Auto Basophil % : x    PT/INR - ( 2018 10:16 )   PT: 11.1 sec;   INR: 1.02 ratio         PTT - ( 2018 14:13 )  PTT:56.0 sec      132<L>  |  102  |  67<H>  ----------------------------<  255<H>  4.5   |  20<L>  |  3.77<H>    Ca    6.6<L>      2018 06:47  Phos  1.6       Mg     1.7         TPro  5.1<L>  /  Alb  2.0<L>  /  TBili  2.2<H>  /  DBili  1.6<H>  /  AST  119<H>  /  ALT  147<H>  /  AlkPhos  65  01-    CAPILLARY BLOOD GLUCOSE        CARDIAC MARKERS ( 2018 10:16 )  0.911 ng/mL / x     / 1005 U/L / x     / 3.3 ng/mL  CARDIAC MARKERS ( 2018 15:24 )  1.980 ng/mL / x     / 1395 U/L / x     / 2.9 ng/mL  CARDIAC MARKERS ( 2018 07:06 )  0.375 ng/mL / x     / 1224 U/L / x     / 1.4 ng/mL  CARDIAC MARKERS ( 2018 22:49 )  0.076 ng/mL / x     / 1298 U/L / x     / 2.4 ng/mL      LIVER FUNCTIONS - ( 2018 06:47 )  Alb: 2.0 g/dL / Pro: 5.1 g/dL / ALK PHOS: 65 U/L / ALT: 147 U/L DA / AST: 119 U/L / GGT: x             ABG - ( 2018 04:58 )  pH: 7.40  /  pCO2: 30    /  pO2: 59    / HCO3: 18    / Base Excess: -4.7  /  SaO2: 91                  .Sputum Sputumtrap rec'd   @ 10:48 --  --    Numerous polymorphonuclear leukocytes  Rare Squamous epithelial cells per low power field  Numerous Gram Positive Cocci in Pairs and Chains per oil power field      .Urine Clean Catch (Midstream)   @ 22:48   No growth  --  --      .Blood Blood-Peripheral   @ 17:03   No growth to date.  --  --          MEDICATIONS:    MEDICATIONS  (STANDING):  aspirin  chewable 81 milliGRAM(s) Oral daily  cefTRIAXone   IVPB 1 Gram(s) IV Intermittent every 24 hours  clopidogrel Tablet 75 milliGRAM(s) Oral daily  finasteride 5 milliGRAM(s) Oral daily  heparin  Infusion. 750 Unit(s)/Hr (7.5 mL/Hr) IV Continuous <Continuous>  norepinephrine Infusion 0.5 MICROgram(s)/kG/Min (41.859 mL/Hr) IV Continuous <Continuous>  oseltamivir 30 milliGRAM(s) Oral every 12 hours  pantoprazole  Injectable 40 milliGRAM(s) IV Push daily  pregabalin 50 milliGRAM(s) Oral two times a day  propofol Infusion 5 MICROgram(s)/kG/Min (2.721 mL/Hr) IV Continuous <Continuous>  tamsulosin 0.4 milliGRAM(s) Oral at bedtime      MEDICATIONS  (PRN):  acetaminophen    Suspension 650 milliGRAM(s) Oral every 6 hours PRN For Temp greater than 38 C (100.4 F)      REVIEW OF SYSTEMS:                           ALL ROS DONE [ X   ]    CONSTITUTIONAL:  LETHARGIC [   ], FEVER [   ], UNRESPONSIVE [   ]  CVS:  CP  [   ], SOB, [   ], PALPITATIONS [   ], DIZZYNESS [   ]  RS: COUGH [   ], SPUTUM [   ]  GI: ABDOMINAL PAIN [   ], NAUSEA [   ], VOMITINGS [   ], DIARRHEA [   ], CONSTIPATION [   ]  :  DYSURIA [   ], NOCTURIA [   ], INCREASED FREQUENCY [   ], DRIBLING [   ],  SKELETAL: PAINFUL JOINTS [   ], SWOLLEN JOINTS [   ], NECK ACHE [   ], LOW BACK ACHE [   ],  SKIN : ULCERS [   ], RASH [   ], ITCHING [   ]  CNS: HEAD ACHE [   ], DOUBLE VISION [   ], BLURRED VISION [   ], AMS / CONFUSION [   ], SEIZURES [   ], WEAKNESS [   ],TINGLING / NUMBNESS [   ]    PHYSICAL EXAMINATION:  GENERAL APPEARANCE: NO DISTRESS  HEENT:  NO PALLOR, NO  JVD,  NO   NODES, NECK SUPPLE ,                         ORAL ET + , NGT +  CVS: S1 +, S2 +,   RS: AEEB,  B/L RALES +,  B/L RONCHI +  ABD: SOFT, NT, NO, BS +  EXT: PE +  SKIN: WARM,   SKELETAL:  ROM ACCEPTABLE  CNS:  AAO X 0   , NO  DEFICITS    RADIOLOGY :    < from: Xray Chest 1 View AP-PORTABLE IMMEDIATE (18 @ 19:50) >  EXAM:  XR CHEST PORTABLE IMMED 1V                            PROCEDURE DATE:  2018          INTERPRETATION:  Chest one view    HISTORY: Central line placement    COMPARISON STUDY: Earlier the same day    Frontal expiratory view of the chest shows the heart to be similar in   size. Right jugular line as and placed extending to the level of the   superior vena cava. Feeding tube tip remains in the stomach. Endotracheal   tube is unchanged. The lungs show no definite infiltrate and there is no   evidence of pneumothorax nor pleural effusion.    IMPRESSION:  Right jugular line. No pneumothorax.    < end of copied text >      ASSESSMENT :     Sepsis  Sleep apnea  Hyperlipidemia  CAD (coronary artery disease)  DM (diabetes mellitus)  HTN (hypertension)  S/P foot surgery      PLAN:  HPI:  72 M from [facility] h/o CKD, Anxiety disorder, CAD, BPH, Carpel tunnel syndrome, compulsive disorder, DM, OA, HTN, IBS sent for dyspnea for last 3 days. Dyspnea is constant, moderate to severe in intensity, associated with fever, generalized weakness, lethargy, frequent falls, cough, myalgias and URI symptoms. Cough is productive with non bloody yellowish sputum.     Patient denies chest pain, nausea, vomiting, LOC, focal neurological deficit, abdominal pain, hematochezia, current smoking, alcohol abuse and illicit drug use.    ICU Vital Signs Last 24 Hrs  T(C): 38.4 (2018 10:36), Max: 38.4 (2018 10:22)  T(F): 101.2 (2018 10:36), Max: 101.2 (2018 10:22)  HR: 88 (2018 13:01) (78 - 91)  BP: 130/61 (2018 12:44) (82/53 - 130/61)  RR: 25 (2018 12:44) (25 - 25)  SpO2: 99% (2018 13:01) (91% - 99%) (2018 14:30)    -  INFLUENZA B, ACUTE BRONCHITIS,  B/L PNEUMONIA, SEPSIS, HYPOXIC RESPIRATORY FAILURE  ON  TAMIFLU, IV ROCEPHIN, AZITHROMYCIN,  NEBS. ICU F/UP IS IN PROGRESS. S/P INTUBATION ON VENT , PRESSORS    - AMS DUE TO METABOLIC ENCEPHALOPATHY  - ARF / CKD   - GI AND DVT PROPHYLAXIS  - DR. GARCIA

## 2018-01-22 NOTE — PROGRESS NOTE ADULT - SUBJECTIVE AND OBJECTIVE BOX
72y Male who remains in the ICU , he is sedated ,intubated and vent dependent, he is still on pressors but being tapered down. Fevers are decreasing , urine output is poor. His wbc is normal.     Meds:  cefTRIAXone   IVPB 1 Gram(s) IV Intermittent every 24 hours  oseltamivir 30 milliGRAM(s) Oral every 12 hours    Allergies    doxycycline (Unknown)  penicillin (Unknown)  tetracycline (Unknown)  Valtrex (Unknown)    Intolerances        VITALS:  Vital Signs Last 24 Hrs  T(C): 37.8 (22 Jan 2018 15:36), Max: 37.8 (22 Jan 2018 04:00)  T(F): 100 (22 Jan 2018 15:36), Max: 100 (22 Jan 2018 04:00)  HR: 100 (22 Jan 2018 17:07) (97 - 124)  BP: 111/51 (22 Jan 2018 17:00) (48/26 - 134/106)  BP(mean): 66 (22 Jan 2018 17:00) (30 - 112)  RR: 23 (22 Jan 2018 17:00) (20 - 29)  SpO2: 96% (22 Jan 2018 17:07) (91% - 96%)    LABS/DIAGNOSTIC TESTS:                          12.6   6.3   )-----------( 70       ( 22 Jan 2018 06:47 )             34.6         01-22    132<L>  |  102  |  67<H>  ----------------------------<  255<H>  4.5   |  20<L>  |  3.77<H>    Ca    6.6<L>      22 Jan 2018 06:47  Phos  1.6     01-22  Mg     1.7     01-22    TPro  5.1<L>  /  Alb  2.0<L>  /  TBili  2.2<H>  /  DBili  1.6<H>  /  AST  119<H>  /  ALT  147<H>  /  AlkPhos  65  01-22      LIVER FUNCTIONS - ( 22 Jan 2018 06:47 )  Alb: 2.0 g/dL / Pro: 5.1 g/dL / ALK PHOS: 65 U/L / ALT: 147 U/L DA / AST: 119 U/L / GGT: x             CULTURES: .Sputum Sputumtrap rec'd  01-22 @ 10:48 --  --    Numerous polymorphonuclear leukocytes  Rare Squamous epithelial cells per low power field  Numerous Gram Positive Cocci in Pairs and Chains per oil power field      .Urine Clean Catch (Midstream)  01-20 @ 22:48   No growth  --  --      .Blood Blood-Peripheral  01-20 @ 17:03   No growth to date.  --  --            RADIOLOGY:< from: Xray Chest 1 View AP -PORTABLE-Routine (01.22.18 @ 11:41) >  EXAM:  XR CHEST PORTABLE  ROUTINE 1V                            PROCEDURE DATE:  01/22/2018          INTERPRETATION:  AP semierect chest on January 22 at 8:45 AM. Patient is   respirator dependent.    Heart magnified by technique but could be enlarged.    Endotracheal tube, nasogastric tube, and right jugular line remain.    Small infiltrate off the lower right hilum again noted.    Atelectatic retrocardiac process again seen.    The chest is similar to January 20.    IMPRESSION: Unchanged chest as above.    < end of copied text >        ROS:  [ x ] UNABLE TO ELICIT

## 2018-01-22 NOTE — DIETITIAN INITIAL EVALUATION ADULT. - MD RECOMMEND
If TF with Propofol, Goal: Glucerna 1.5 @ 35ml/h x 24 (840ml, 1260kcal, 68g protein, 638ml water daily)   MD to adjust free water as needed, Add Prosource 30ml x bid daily as medically feasible (extra 120 kcal, 30 g protein daily);   IF TF without Propofol, Goal: Glucerna 1.5 @ 50ml/h x 24h (1200ml, 1800kcal, 99g protein, 911ml water at goal)   MD to adjust free water as needed If TF with Propofol, Goal: Glucerna 1.5 @ 35ml/h x 24 (840ml, 1260kcal, 68g protein, 638ml water daily)   MD to adjust free water as needed, Add Prosource 30ml x bid daily as medically feasible (extra 120 kcal, 30 g protein daily);   IF TF without Propofol, Goal: Glucerna 1.5 @ 50ml/h x 24h (1200ml, 1800kcal, 99g protein, 911ml water at goal)   MD to adjust free water as needed; MVI/minerals, Vit C supplements as medically feasible, Monitor needs for Zn supplement

## 2018-01-22 NOTE — DIETITIAN INITIAL EVALUATION ADULT. - OTHER INFO
nutrition assessment for ICU length of stay and NPO status; from skilled nursing facility; pressure ulcer DTI noted; NPO x 3d since admission; on vent support nutrition assessment for ICU length of stay and NPO status; from skilled nursing facility; pressure ulcer DTI noted; NPO x 3d since admission; on vent support; NING per Nephrologist; NGT in place now nutrition assessment for ICU length of stay and NPO status; from skilled nursing facility; pressure ulcer DTI noted; NPO x 3d since admission; on vent support; NING per Nephrologist; NGT in place now.

## 2018-01-22 NOTE — ADVANCED PRACTICE NURSE CONSULT - ASSESSMENT
This is a 72yr old male patient admitted for Sepsis, presenting with an Abrasion to the L. Elbow (0.2cm x 0.2cm) with red tissue and scant drainage. There are also scabbed wound to the L. Knee without drainage

## 2018-01-22 NOTE — PROGRESS NOTE ADULT - ASSESSMENT
Problem: Acute hypoxic  Respiratory failure 2/2 flu vs PNA   Concerns for CHF exacerbation  On droplet isolation  starting on tamiflu,  ceftriaxone   Lactate 7.6 s/p 2 L bolus which improved to 2.6   Giving lasix 60 mg IVP x1  Cardiac enzymes trending up   - Procalcitonin 80.4  -urine and blood cultures are negative   f/U Echo    Problem/Plan - 2:  ·  Problem: CAD (coronary artery disease).  Plan: c/w ASA, Plavix and Lipitor   cardiac enzymes trending up   - continue with heparin drip   - continue with dobutamine for cardiopgenic shock   -c/w levophed for pressor support   - hold Lipitor as LFT's trending up          Problem/Plan - 3:  ·  Problem: DM (diabetes mellitus).  Plan: Diet controlled  f/u HbA1c.      Problem/Plan - 4:  ·  Problem: HTN (hypertension).  Plan: Holding metoprolol as BP is low.      Problem/Plan - 5:  ·  Problem: Need for prophylactic measure.  Plan: Improve score 3 on heparin sc for now. Problem: Acute hypoxic  Respiratory failure 2/2 flu vs PNA   Concerns for CHF exacerbation  On droplet isolation, Influenza B positive   starting on tamiflu 30mg Q 12 hrs day 2,  ceftriaxone for aspiration PNA day 2   Lactate 7.6 s/p 2 L bolus which improved to 2.6   Giving lasix 60 mg IVP x1  Cardiac enzymes trending up   - Procalcitonin 80.4  -urine and blood cultures are negative   f/U Echo  - will continue with ABx     Problem/Plan - 2:  ·  Problem: CAD (coronary artery disease).  Plan: c/w ASA, Plavix and Lipitor   cardiac enzymes trending up   - continue with heparin drip   - continue with dobutamine for cardiogenic shock   -c/w levophed for pressor support   - continue with aspirin, plavix and Dobutamine   - hold Lipitor as LFT's trending up   - Dr Benson cardio consulted       Problem/Plan- 3 Thrombocytopenia   Likely due to sepsis, patient on heparin drip currently   f/u DIC workup         Problem/Plan - 4  NING   patient Cr trending up Cr 3.2  likely pre renal 2/2 septic shock vs ATN  f/u urine lytes and osmolarity   f/u US renal   Dr So nephrology consulted     Problem/Plan- 5   ·  Problem: DM (diabetes mellitus).  Plan: Diet controlled  f/u HbA1c. 6.0     Problem/Plan - 4:  ·  Problem: HTN (hypertension).  Plan: Holding metoprolol as BP is low.   continue with levophed and dobutamine      Problem/Plan - 5:  ·  Problem: Need for prophylactic measure.  Plan: Improve score 3 on heparin  drip and Protonix for GI prophylaxis Problem: Acute hypoxic  Respiratory failure 2/2 flu vs PNA   Concerns for CHF exacerbation  On droplet isolation, Influenza B positive   starting on tamiflu 30mg Q 12 hrs day 2,  ceftriaxone for aspiration PNA day 2   Lactate 7.6 s/p 2 L bolus which improved to 2.6   Giving lasix 60 mg IVP x1  Cardiac enzymes trending up   - Procalcitonin 80.4  -urine and blood cultures are negative   f/U Echo  - f/u venous doppler   - will continue with ABx     Problem/Plan - 2:  ·  Problem: CAD (coronary artery disease).  Plan: c/w ASA, Plavix and Lipitor   cardiac enzymes trending up   - continue with heparin drip   - continue with dobutamine for cardiogenic shock   -c/w levophed for pressor support   - continue with aspirin, plavix  - hold Lipitor as LFT's trending up   - stop dobutamine, c/w levophed for pressor support   - Dr Benson cardio consulted   - c/w heparin drip       Problem/Plan- 3 Thrombocytopenia   Likely due to sepsis, patient on heparin drip currently   f/u DIC workup         Problem/Plan - 4  NING   patient Cr trending up Cr 3.2  likely pre renal 2/2 septic shock vs ATN  f/u urine lytes and osmolarity   f/u US renal   Dr So nephrology consulted     Problem/Plan- 5   ·  Problem: DM (diabetes mellitus).  Plan: Diet controlled  f/u HbA1c. 6.0     Problem/Plan - 4:  ·  Problem: HTN (hypertension).  Plan: Holding metoprolol as BP is low.   continue with levophed and dobutamine      Problem/Plan - 5:  ·  Problem: Need for prophylactic measure.  Plan: Improve score 3 on heparin  drip and Protonix for GI prophylaxis

## 2018-01-22 NOTE — PROGRESS NOTE ADULT - SUBJECTIVE AND OBJECTIVE BOX
INTERVAL HPI/OVERNIGHT EVENTS: patient seen and examined at bed side, continues on Vent and pressor support.     PRESSORS: [ x] YES [ ] NO  WHICH: levophed     ANTIBIOTICS:     Rocephin              DATE STARTED:   ANTIBIOTICS:                  DATE STARTED:  ANTIBIOTICS:                  DATE STARTED:    Antimicrobial:  cefTRIAXone   IVPB 1 Gram(s) IV Intermittent every 24 hours  oseltamivir 30 milliGRAM(s) Oral every 12 hours    Cardiovascular:  DOBUTamine Infusion 5 MICROgram(s)/kG/Min IV Continuous <Continuous>  norepinephrine Infusion 0.5 MICROgram(s)/kG/Min IV Continuous <Continuous>  tamsulosin 0.4 milliGRAM(s) Oral at bedtime    Pulmonary:    Hematalogic:  aspirin enteric coated 81 milliGRAM(s) Oral daily  clopidogrel Tablet 75 milliGRAM(s) Oral daily  heparin  Infusion.  Unit(s)/Hr IV Continuous <Continuous>    Other:  acetaminophen    Suspension 650 milliGRAM(s) Oral every 6 hours PRN  finasteride 5 milliGRAM(s) Oral daily  potassium phosphate IVPB 30 milliMole(s) IV Intermittent once  pregabalin 50 milliGRAM(s) Oral two times a day  propofol Infusion 5 MICROgram(s)/kG/Min IV Continuous <Continuous>    acetaminophen    Suspension 650 milliGRAM(s) Oral every 6 hours PRN  aspirin enteric coated 81 milliGRAM(s) Oral daily  cefTRIAXone   IVPB 1 Gram(s) IV Intermittent every 24 hours  clopidogrel Tablet 75 milliGRAM(s) Oral daily  DOBUTamine Infusion 5 MICROgram(s)/kG/Min IV Continuous <Continuous>  finasteride 5 milliGRAM(s) Oral daily  heparin  Infusion.  Unit(s)/Hr IV Continuous <Continuous>  norepinephrine Infusion 0.5 MICROgram(s)/kG/Min IV Continuous <Continuous>  oseltamivir 30 milliGRAM(s) Oral every 12 hours  potassium phosphate IVPB 30 milliMole(s) IV Intermittent once  pregabalin 50 milliGRAM(s) Oral two times a day  propofol Infusion 5 MICROgram(s)/kG/Min IV Continuous <Continuous>  tamsulosin 0.4 milliGRAM(s) Oral at bedtime    Drug Dosing Weight  Height (cm): 185.42 (2018 10:22)  Weight (kg): 89.3 (2018 17:00)  BMI (kg/m2): 26 (2018 17:00)  BSA (m2): 2.14 (2018 17:00)    CENTRAL LINE: [ x] YES [ ] NO  LOCATION:   DATE INSERTED:  REMOVE: [ ] YES [ ] NO  EXPLAIN:    BRUNSON: [x ] YES [ ] NO    DATE INSERTED:  REMOVE:  [ ] YES [ ] NO  EXPLAIN:    A-LINE:  [ ] YES [x ] NO  LOCATION:   DATE INSERTED:  REMOVE:  [ ] YES [ ] NO  EXPLAIN:    PMH -reviewed admission note, no change since admission      ICU Vital Signs Last 24 Hrs  T(C): 37.8 (2018 04:00), Max: 39.6 (2018 09:00)  T(F): 100 (2018 04:00), Max: 103.2 (2018 09:00)  HR: 104 (2018 07:00) (94 - 124)  BP: 110/55 (2018 07:00) (48/26 - 134/106)  BP(mean): 69 (2018 07:00) (30 - 112)  ABP: --  ABP(mean): --  RR: 23 (2018 07:00) (17 - 29)  SpO2: 94% (2018 07:00) (91% - 98%)      ABG - ( 2018 04:58 )  pH: 7.40  /  pCO2: 30    /  pO2: 59    / HCO3: 18    / Base Excess: -4.7  /  SaO2: 91                     @ 07:01  -  -22 @ 07:00  --------------------------------------------------------  IN: 1736.3 mL / OUT: 855 mL / NET: 881.3 mL        Mode: AC/ CMV (Assist Control/ Continuous Mandatory Ventilation)  RR (machine): 14  TV (machine): 500  FiO2: 40  PEEP: 5  ITime: 1  MAP: 13  PIP: 24      PHYSICAL EXAM:    GENERAL APPEARANCE: sedation on MV   HEENT: NO  PALLOR, NO  JVD,  NO   NODES, NECK SUPPLE  CVS: S1 +, S2 +, normal   RS: AEEB,  B/L  RALES +,   MILD B/L RONCHI +  ABD: SOFT, NT, NO, BS  EXT: NO  PE  SKIN: WARM,   SKELETAL:  ROM  ACCEPTABLE   CNS: sedated        LABS:  CBC Full  -  ( 2018 06:47 )  WBC Count : 6.3 K/uL  Hemoglobin : 12.6 g/dL  Hematocrit : 34.6 %  Platelet Count - Automated : 70 K/uL  Mean Cell Volume : 101.1 fl  Mean Cell Hemoglobin : 36.7 pg  Mean Cell Hemoglobin Concentration : 36.3 gm/dL  Auto Neutrophil # : 5.2 K/uL  Auto Lymphocyte # : 0.4 K/uL  Auto Monocyte # : 0.5 K/uL  Auto Eosinophil # : 0.1 K/uL  Auto Basophil # : 0.1 K/uL  Auto Neutrophil % : 83.2 %  Auto Lymphocyte % : 6.4 %  Auto Monocyte % : 7.7 %  Auto Eosinophil % : 1.7 %  Auto Basophil % : 1.0 %        132<L>  |  102  |  67<H>  ----------------------------<  255<H>  4.5   |  20<L>  |  3.77<H>    Ca    6.6<L>      2018 06:47  Phos  1.6       Mg     1.7         TPro  5.1<L>  /  Alb  2.0<L>  /  TBili  2.2<H>  /  DBili  1.6<H>  /  AST  119<H>  /  ALT  147<H>  /  AlkPhos  65      PTT - ( 2018 02:11 )  PTT:102.4 sec  Urinalysis Basic - ( 2018 12:47 )    Color: Yellow / Appearance: very cloudy / S.020 / pH: x  Gluc: x / Ketone: Trace  / Bili: Moderate / Urobili: 4   Blood: x / Protein: 30 mg/dL / Nitrite: Negative   Leuk Esterase: Trace / RBC: 2-5 /HPF / WBC 3-5 /HPF   Sq Epi: x / Non Sq Epi: Few /HPF / Bacteria: Few /HPF      Culture Results:   No growth ( @ 22:48)  Culture Results:   No growth to date. ( @ 17:03)      RADIOLOGY & ADDITIONAL STUDIES REVIEWED:  ***    [ ]GOALS OF CARE DISCUSSION WITH PATIENT/FAMILY/PROXY:    CRITICAL CARE TIME SPENT: 35 minutes INTERVAL HPI/OVERNIGHT EVENTS: patient seen and examined at bed side, continues on Vent and pressor support.     PRESSORS: [ x] YES [ ] NO  WHICH: levophed,     ANTIBIOTICS:     Rocephin              DATE STARTED:   ANTIBIOTICS:                  DATE STARTED:  ANTIBIOTICS:                  DATE STARTED:    Antimicrobial:  cefTRIAXone   IVPB 1 Gram(s) IV Intermittent every 24 hours  oseltamivir 30 milliGRAM(s) Oral every 12 hours    Cardiovascular:  DOBUTamine Infusion 5 MICROgram(s)/kG/Min IV Continuous <Continuous>  norepinephrine Infusion 0.5 MICROgram(s)/kG/Min IV Continuous <Continuous>  tamsulosin 0.4 milliGRAM(s) Oral at bedtime    Pulmonary:    Hematalogic:  aspirin enteric coated 81 milliGRAM(s) Oral daily  clopidogrel Tablet 75 milliGRAM(s) Oral daily  heparin  Infusion.  Unit(s)/Hr IV Continuous <Continuous>    Other:  acetaminophen    Suspension 650 milliGRAM(s) Oral every 6 hours PRN  finasteride 5 milliGRAM(s) Oral daily  potassium phosphate IVPB 30 milliMole(s) IV Intermittent once  pregabalin 50 milliGRAM(s) Oral two times a day  propofol Infusion 5 MICROgram(s)/kG/Min IV Continuous <Continuous>    acetaminophen    Suspension 650 milliGRAM(s) Oral every 6 hours PRN  aspirin enteric coated 81 milliGRAM(s) Oral daily  cefTRIAXone   IVPB 1 Gram(s) IV Intermittent every 24 hours  clopidogrel Tablet 75 milliGRAM(s) Oral daily  DOBUTamine Infusion 5 MICROgram(s)/kG/Min IV Continuous <Continuous>  finasteride 5 milliGRAM(s) Oral daily  heparin  Infusion.  Unit(s)/Hr IV Continuous <Continuous>  norepinephrine Infusion 0.5 MICROgram(s)/kG/Min IV Continuous <Continuous>  oseltamivir 30 milliGRAM(s) Oral every 12 hours  potassium phosphate IVPB 30 milliMole(s) IV Intermittent once  pregabalin 50 milliGRAM(s) Oral two times a day  propofol Infusion 5 MICROgram(s)/kG/Min IV Continuous <Continuous>  tamsulosin 0.4 milliGRAM(s) Oral at bedtime    Drug Dosing Weight  Height (cm): 185.42 (2018 10:22)  Weight (kg): 89.3 (2018 17:00)  BMI (kg/m2): 26 (2018 17:00)  BSA (m2): 2.14 (2018 17:00)    CENTRAL LINE: [ x] YES [ ] NO  LOCATION:   DATE INSERTED:  REMOVE: [ ] YES [ ] NO  EXPLAIN:    BRUNSON: [x ] YES [ ] NO    DATE INSERTED:  REMOVE:  [ ] YES [ ] NO  EXPLAIN:    A-LINE:  [ ] YES [x ] NO  LOCATION:   DATE INSERTED:  REMOVE:  [ ] YES [ ] NO  EXPLAIN:    PMH -reviewed admission note, no change since admission      ICU Vital Signs Last 24 Hrs  T(C): 37.8 (2018 04:00), Max: 39.6 (2018 09:00)  T(F): 100 (2018 04:00), Max: 103.2 (2018 09:00)  HR: 104 (2018 07:00) (94 - 124)  BP: 110/55 (2018 07:00) (48/26 - 134/106)  BP(mean): 69 (2018 07:00) (30 - 112)  ABP: --  ABP(mean): --  RR: 23 (2018 07:00) (17 - 29)  SpO2: 94% (2018 07:00) (91% - 98%)      ABG - ( 2018 04:58 )  pH: 7.40  /  pCO2: 30    /  pO2: 59    / HCO3: 18    / Base Excess: -4.7  /  SaO2: 91                     @ 07:01  -  - @ 07:00  --------------------------------------------------------  IN: 1736.3 mL / OUT: 855 mL / NET: 881.3 mL        Mode: AC/ CMV (Assist Control/ Continuous Mandatory Ventilation)  RR (machine): 14  TV (machine): 500  FiO2: 40  PEEP: 5  ITime: 1  MAP: 13  PIP: 24      PHYSICAL EXAM:    GENERAL APPEARANCE: sedation on MV   HEENT: NO  PALLOR, NO  JVD,  NO   NODES, NECK SUPPLE  CVS: S1 +, S2 +, normal   RS: AEEB,  B/L  RALES +,   MILD B/L RONCHI +  ABD: SOFT, NT, NO, BS  EXT: NO  PE  SKIN: WARM,   SKELETAL:  ROM  ACCEPTABLE   CNS: sedated        LABS:  CBC Full  -  ( 2018 06:47 )  WBC Count : 6.3 K/uL  Hemoglobin : 12.6 g/dL  Hematocrit : 34.6 %  Platelet Count - Automated : 70 K/uL  Mean Cell Volume : 101.1 fl  Mean Cell Hemoglobin : 36.7 pg  Mean Cell Hemoglobin Concentration : 36.3 gm/dL  Auto Neutrophil # : 5.2 K/uL  Auto Lymphocyte # : 0.4 K/uL  Auto Monocyte # : 0.5 K/uL  Auto Eosinophil # : 0.1 K/uL  Auto Basophil # : 0.1 K/uL  Auto Neutrophil % : 83.2 %  Auto Lymphocyte % : 6.4 %  Auto Monocyte % : 7.7 %  Auto Eosinophil % : 1.7 %  Auto Basophil % : 1.0 %        132<L>  |  102  |  67<H>  ----------------------------<  255<H>  4.5   |  20<L>  |  3.77<H>    Ca    6.6<L>      2018 06:47  Phos  1.6       Mg     1.7         TPro  5.1<L>  /  Alb  2.0<L>  /  TBili  2.2<H>  /  DBili  1.6<H>  /  AST  119<H>  /  ALT  147<H>  /  AlkPhos  65      PTT - ( 2018 02:11 )  PTT:102.4 sec  Urinalysis Basic - ( 2018 12:47 )    Color: Yellow / Appearance: very cloudy / S.020 / pH: x  Gluc: x / Ketone: Trace  / Bili: Moderate / Urobili: 4   Blood: x / Protein: 30 mg/dL / Nitrite: Negative   Leuk Esterase: Trace / RBC: 2-5 /HPF / WBC 3-5 /HPF   Sq Epi: x / Non Sq Epi: Few /HPF / Bacteria: Few /HPF      Culture Results:   No growth ( @ 22:48)  Culture Results:   No growth to date. ( @ 17:03)      RADIOLOGY & ADDITIONAL STUDIES REVIEWED:  ***    [ ]GOALS OF CARE DISCUSSION WITH PATIENT/FAMILY/PROXY:    CRITICAL CARE TIME SPENT: 35 minutes

## 2018-01-22 NOTE — DIETITIAN INITIAL EVALUATION ADULT. - ETIOLOGY
acute on chronic comorbidities, s/p intubation on vent support increased nutrient demands for wound healing

## 2018-01-22 NOTE — CONSULT NOTE ADULT - ASSESSMENT
72 M from [facility] h/o CKD, Anxiety disorder, CAD, BPH, Carpel tunnel syndrome, compulsive disorder, DM, OA, HTN, IBS sent for dyspnea for last 3 days admitted to ICU for respiratory failure and septic shock from influenza and pneumonia has worsening renal function.     #NING  BUN/Cr is <20:1; renal etiology likely ATN from reduced renal perfusion in view of septic shock requiring 2 pressors (levophed+ dobutamine; patient required levophed+ phenylephrine 1/20/18)   urine looks dark; likely muddy brown casts  proteinuria; recommend spot protein/cr ratio  urine Na, Cr, K, osmolality and renal US to r/o other etiologies   hold nephrotoxic medications and contrast studies 72 M from [facility] h/o CKD, Anxiety disorder, CAD, BPH, Carpel tunnel syndrome, compulsive disorder, DM, OA, HTN, IBS sent for dyspnea for last 3 days admitted to ICU for respiratory failure and septic shock from influenza and pneumonia has worsening renal function.     1.NING  BUN/Cr is <20:1; renal etiology likely ATN from reduced renal perfusion in view of septic shock requiring 2 pressors (levophed+ dobutamine; patient required levophed+ phenylephrine 1/20/18)   urine looks dark; likely muddy brown casts  urine Na, Cr, K, osmolality and renal US to r/o other etiologies   hold nephrotoxic medications and contrast studies   may need dialysis if renal function worsens with acidosis, hyperkalemia, fluid overload or uremia     2. Metabolic acidosis   multifactorial with acceptable pH   monitor CO 2 daily   keep PH >7.3     3. Hypophosphatemia   sec to depletion   replace with iv sodium phosphate   keep Phos>3, <5   phos every other day     4. Fluid overload   sec to CHF/NING   suggest diuretics iv lasix 60mg daily if BP ok 72 M from [facility] h/o CKD, Anxiety disorder, CAD, BPH, Carpel tunnel syndrome, compulsive disorder, DM, OA, HTN, IBS sent for dyspnea for last 3 days admitted to ICU for respiratory failure and septic shock from influenza and pneumonia has worsening renal function.     1.NING  BUN/Cr is <20:1; renal etiology likely ATN from reduced renal perfusion in view of septic shock requiring 2 pressors (levophed+ dobutamine; patient required levophed+ phenylephrine 1/20/18) , non oliguric  -suggest to send urine for  Na, Cr, K, osmolality and renal US to r/o other etiologies   -hold nephrotoxic medications, adjust meds as per egfr  and avoid contrast studies/phosphate  enema,etc  -may need dialysis if renal function worsens with acidosis, hyperkalemia, fluid overload or uremia     2. Metabolic acidosis   multifactorial with acceptable pH   monitor CO 2 daily   keep PH >7.3     3. Hypophosphatemia   sec to depletion   replace with iv sodium phosphate   keep Phos>3, <5   phos every other day     4. Fluid overload   sec to CHF/NING   suggest diuretics iv lasix 60mg daily if BP ok   5.Hypocalcemia;corrected ca is low  -suggest to replace q 6hrs 1 gm   -f/u ca level daily 72 M from [facility] h/o CKD, Anxiety disorder, CAD, BPH, Carpel tunnel syndrome, compulsive disorder, DM, OA, HTN, IBS sent for dyspnea for last 3 days admitted to ICU for respiratory failure and septic shock from influenza and pneumonia has worsening renal function.     1.NING  BUN/Cr is <20:1; renal etiology likely ATN from reduced renal perfusion in view of septic shock requiring 2 pressors (levophed+ dobutamine; patient required levophed+ phenylephrine 1/20/18) , non oliguric  -suggest to send urine for  Na, Cr, K, osmolality and renal US to r/o other etiologies   -hold nephrotoxic medications, adjust meds as per egfr  and avoid contrast studies/phosphate  enema,etc  -may need dialysis if renal function worsens with acidosis, hyperkalemia, fluid overload or uremia     2. Metabolic acidosis   multifactorial with acceptable pH   monitor CO 2 daily   keep PH >7.3     3. Hypophosphatemia   sec to depletion   replace with iv sodium phosphate   keep Phos>3, <5   4.Hyponatremia;mild ,secondary to nnig  -avoid iv fluids  -f/u Na level daily  -Lasix as ordered  phos every other day     4. Fluid overload   sec to CHF/NING   suggest diuretics iv lasix 60mg daily if BP ok   5.Hypocalcemia;corrected ca is low  -suggest to replace q 6hrs 1 gm   -f/u ca level daily 72 M from [facility] h/o CKD, Anxiety disorder, CAD, BPH, Carpel tunnel syndrome, compulsive disorder, DM, OA, HTN, IBS sent for dyspnea for last 3 days admitted to ICU for respiratory failure and septic shock from influenza and pneumonia has worsening renal function.     1.NING on ckd(stage 3 ,r/o secondary to htn/dm)  BUN/Cr is <20:1; etiology for ning ,likely ATN from reduced renal perfusion in view of septic shock requiring 2 pressors (levophed+ dobutamine; patient required levophed+ phenylephrine 1/20/18) , non oliguric  -suggest to send urine for  Na, Cr, K, osmolality and renal US to r/o other etiologies   -hold nephrotoxic medications, adjust meds as per egfr  and avoid contrast studies/phosphate  enema,etc  -may need dialysis if renal function worsens with acidosis, hyperkalemia, fluid overload or uremia     2. Metabolic acidosis   multifactorial with acceptable pH   monitor CO 2 daily   keep PH >7.3     3. Hypophosphatemia   sec to depletion   replace with iv sodium phosphate   keep Phos>3, <5   4.Hyponatremia;mild ,secondary to ning  -avoid iv fluids  -f/u Na level daily  -Lasix as ordered  phos every other day     4. Fluid overload   sec to CHF/NING   suggest diuretics iv lasix 60mg daily if BP ok   5.Hypocalcemia;corrected ca is low  -suggest to replace q 6hrs 1 gm   -f/u ca level daily

## 2018-01-22 NOTE — PROGRESS NOTE ADULT - ASSESSMENT
sepsis/ septic shock  Influenza B infection  fevers  pneumonia   respiratory failure    plan - cont tamiflu 30 mgs via NGT q12hrs  cont rocephin 1 gm iv q24 hrs  time spent - 30 minutes

## 2018-01-22 NOTE — PROGRESS NOTE ADULT - ATTENDING COMMENTS
-pat on pressors for shock  -started on hep gtt for post troponin  -cards eval pending and will re-evaluate need for heparin gtt  -taper off pressores

## 2018-01-22 NOTE — DIETITIAN INITIAL EVALUATION ADULT. - PERTINENT LABORATORY DATA
reviewed   BUN/Cr=67/3.77   eGFR=15  Quv=287   Finger Stick hrgvy=267 to 270   GhvF8P=9  KE=002 reviewed   BUN/Cr=67/3.77   eGFR=15  Mzw=453   Finger Stick uujgh=376 to 270   DlaH8Z=7  OT=036   K=4.5  PO4=1.6

## 2018-01-22 NOTE — ADVANCED PRACTICE NURSE CONSULT - RECOMMEDATIONS
-Clean the L. Elbow wound with normal saline and apply skin prep to the surrounding skin  -Apply a Foam dressing Q 72hrs PRN  -Leave the L. Knee wounds open to air  -Elevate/float the patients heels using heel protectors and reposition the patient Q 2hrs using wedges or pillows

## 2018-01-23 NOTE — PROGRESS NOTE ADULT - SUBJECTIVE AND OBJECTIVE BOX
INTERVAL HPI/OVERNIGHT EVENTS: patient has low grade fever overnight, HD stable off pressor support, SBT today, patient renal functions worsening,     PRESSORS: [ ] YES [x ] NO  WHICH:    ANTIBIOTICS:  Rocephin           DATE STARTED:  ANTIBIOTICS:                  DATE STARTED:  ANTIBIOTICS:                  DATE STARTED:    Antimicrobial:  cefTRIAXone   IVPB 1 Gram(s) IV Intermittent every 24 hours  oseltamivir 30 milliGRAM(s) Oral every 12 hours    Cardiovascular:  norepinephrine Infusion 0.5 MICROgram(s)/kG/Min IV Continuous <Continuous>  tamsulosin 0.4 milliGRAM(s) Oral at bedtime    Pulmonary:  ALBUTerol    0.083% 2.5 milliGRAM(s) Nebulizer every 6 hours  ALBUTerol    90 MICROgram(s) HFA Inhaler 1 Puff(s) Inhalation every 4 hours    Hematalogic:  aspirin  chewable 81 milliGRAM(s) Oral daily  clopidogrel Tablet 75 milliGRAM(s) Oral daily  heparin  Infusion. 750 Unit(s)/Hr IV Continuous <Continuous>    Other:  acetaminophen    Suspension 650 milliGRAM(s) Oral every 6 hours PRN  calcium gluconate IVPB 1 Gram(s) IV Intermittent every 6 hours  dexmedetomidine Infusion 0.5 MICROgram(s)/kG/Hr IV Continuous <Continuous>  fentaNYL   Infusion 0.5 MICROgram(s)/kG/Hr IV Continuous <Continuous>  finasteride 5 milliGRAM(s) Oral daily  insulin lispro (HumaLOG) corrective regimen sliding scale   SubCutaneous every 4 hours  insulin regular  human recombinant. 5 Unit(s) IV Push once  methylPREDNISolone sodium succinate Injectable 40 milliGRAM(s) IV Push daily  pantoprazole  Injectable 40 milliGRAM(s) IV Push daily  pregabalin 50 milliGRAM(s) Oral two times a day  sodium polystyrene sulfonate Suspension 15 Gram(s) Oral once    acetaminophen    Suspension 650 milliGRAM(s) Oral every 6 hours PRN  ALBUTerol    0.083% 2.5 milliGRAM(s) Nebulizer every 6 hours  ALBUTerol    90 MICROgram(s) HFA Inhaler 1 Puff(s) Inhalation every 4 hours  aspirin  chewable 81 milliGRAM(s) Oral daily  calcium gluconate IVPB 1 Gram(s) IV Intermittent every 6 hours  cefTRIAXone   IVPB 1 Gram(s) IV Intermittent every 24 hours  clopidogrel Tablet 75 milliGRAM(s) Oral daily  dexmedetomidine Infusion 0.5 MICROgram(s)/kG/Hr IV Continuous <Continuous>  fentaNYL   Infusion 0.5 MICROgram(s)/kG/Hr IV Continuous <Continuous>  finasteride 5 milliGRAM(s) Oral daily  heparin  Infusion. 750 Unit(s)/Hr IV Continuous <Continuous>  insulin lispro (HumaLOG) corrective regimen sliding scale   SubCutaneous every 4 hours  insulin regular  human recombinant. 5 Unit(s) IV Push once  methylPREDNISolone sodium succinate Injectable 40 milliGRAM(s) IV Push daily  norepinephrine Infusion 0.5 MICROgram(s)/kG/Min IV Continuous <Continuous>  oseltamivir 30 milliGRAM(s) Oral every 12 hours  pantoprazole  Injectable 40 milliGRAM(s) IV Push daily  pregabalin 50 milliGRAM(s) Oral two times a day  sodium polystyrene sulfonate Suspension 15 Gram(s) Oral once  tamsulosin 0.4 milliGRAM(s) Oral at bedtime    Drug Dosing Weight  Height (cm): 185.42 (20 Jan 2018 10:22)  Weight (kg): 89.3 (20 Jan 2018 17:00)  BMI (kg/m2): 26 (20 Jan 2018 17:00)  BSA (m2): 2.14 (20 Jan 2018 17:00)    CENTRAL LINE: [x ] YES [ ] NO  LOCATION:   DATE INSERTED:  REMOVE: [ ] YES [ ] NO  EXPLAIN:    BRUNSON: [x ] YES [ ] NO    DATE INSERTED:  REMOVE:  [ ] YES [ ] NO  EXPLAIN:    A-LINE:  [ ] YES [x ] NO  LOCATION:   DATE INSERTED:  REMOVE:  [ ] YES [ ] NO  EXPLAIN:    PMH -reviewed admission note, no change since admission    ICU Vital Signs Last 24 Hrs  T(C): 37.6 (23 Jan 2018 05:00), Max: 37.8 (22 Jan 2018 15:36)  T(F): 99.6 (23 Jan 2018 05:00), Max: 100.1 (22 Jan 2018 20:00)  HR: 92 (23 Jan 2018 08:02) (92 - 103)  BP: 100/70 (23 Jan 2018 08:00) (82/49 - 155/64)  BP(mean): 75 (23 Jan 2018 08:00) (41 - 103)  ABP: --  ABP(mean): --  RR: 25 (23 Jan 2018 08:00) (20 - 33)  SpO2: 98% (23 Jan 2018 08:02) (89% - 98%)      ABG - ( 23 Jan 2018 04:20 )  pH: 7.31  /  pCO2: 30    /  pO2: 69    / HCO3: 15    / Base Excess: -10.0 /  SaO2: 93                    01-22 @ 07:01  -  01-23 @ 07:00  --------------------------------------------------------  IN: 2213.8 mL / OUT: 810 mL / NET: 1403.8 mL        Mode: CPAP with PS  FiO2: 40  PEEP: 5  PS: 5  ITime: 1  MAP: 7.6  PIP: 12      PHYSICAL EXAM:    GENERAL APPEARANCE: sedation on MV   HEENT: no pallor, no JVD   CVS: S1 +, S2 +, normal   RS: AEEB,  B/L  rhonchi ETT in Place   ABD:  soft , no tender, normal bowel sounds   EXT: on edema   SKIN: WARM, no rashes   CNS: sedated        LABS:  CBC Full  -  ( 23 Jan 2018 06:12 )  WBC Count : 7.1 K/uL  Hemoglobin : 11.9 g/dL  Hematocrit : 34.5 %  Platelet Count - Automated : 82 K/uL  Mean Cell Volume : 102.2 fl  Mean Cell Hemoglobin : 35.2 pg  Mean Cell Hemoglobin Concentration : 34.4 gm/dL  Auto Neutrophil # : 6.4 K/uL  Auto Lymphocyte # : 0.3 K/uL  Auto Monocyte # : 0.4 K/uL  Auto Eosinophil # : 0.0 K/uL  Auto Basophil # : 0.1 K/uL  Auto Neutrophil % : 88.9 %  Auto Lymphocyte % : 3.8 %  Auto Monocyte % : 6.1 %  Auto Eosinophil % : 0.2 %  Auto Basophil % : 1.0 %    01-23    128<L>  |  99  |  76<H>  ----------------------------<  429<H>  6.0<H>   |  17<L>  |  4.73<H>    Ca    7.2<L>      23 Jan 2018 06:12  Phos  3.7     01-23  Mg     1.8     01-23    TPro  5.6<L>  /  Alb  2.1<L>  /  TBili  2.0<H>  /  DBili  x   /  AST  98<H>  /  ALT  122<H>  /  AlkPhos  96  01-23    PT/INR - ( 22 Jan 2018 10:16 )   PT: 11.1 sec;   INR: 1.02 ratio         PTT - ( 23 Jan 2018 03:42 )  PTT:60.2 sec    Culture Results:   No growth (01-20 @ 22:48)  Culture Results:   No growth to date. (01-20 @ 17:03)      RADIOLOGY & ADDITIONAL STUDIES REVIEWED:  ***    [ ]GOALS OF CARE DISCUSSION WITH PATIENT/FAMILY/PROXY:    CRITICAL CARE TIME SPENT: 35 minutes INTERVAL HPI/OVERNIGHT EVENTS: patient has low grade fever overnight, HD stable off pressor support, SBT today, patient renal functions worsening,     PRESSORS: [ ] YES [x ] NO  WHICH:    ANTIBIOTICS:  Rocephin           DATE STARTED: 1/20  ANTIBIOTICS:                  DATE STARTED:  ANTIBIOTICS:                  DATE STARTED:    Antimicrobial:  cefTRIAXone   IVPB 1 Gram(s) IV Intermittent every 24 hours  oseltamivir 30 milliGRAM(s) Oral every 12 hours for 2 more days     Cardiovascular:  norepinephrine Infusion 0.5 MICROgram(s)/kG/Min IV Continuous <Continuous>  tamsulosin 0.4 milliGRAM(s) Oral at bedtime    Pulmonary:  ALBUTerol    0.083% 2.5 milliGRAM(s) Nebulizer every 6 hours  ALBUTerol    90 MICROgram(s) HFA Inhaler 1 Puff(s) Inhalation every 4 hours    Hematalogic:  aspirin  chewable 81 milliGRAM(s) Oral daily  clopidogrel Tablet 75 milliGRAM(s) Oral daily  heparin  Infusion. 750 Unit(s)/Hr IV Continuous <Continuous>    Other:  acetaminophen    Suspension 650 milliGRAM(s) Oral every 6 hours PRN  calcium gluconate IVPB 1 Gram(s) IV Intermittent every 6 hours  dexmedetomidine Infusion 0.5 MICROgram(s)/kG/Hr IV Continuous <Continuous>  fentaNYL   Infusion 0.5 MICROgram(s)/kG/Hr IV Continuous <Continuous>  finasteride 5 milliGRAM(s) Oral daily  insulin lispro (HumaLOG) corrective regimen sliding scale   SubCutaneous every 4 hours  insulin regular  human recombinant. 5 Unit(s) IV Push once  methylPREDNISolone sodium succinate Injectable 40 milliGRAM(s) IV Push daily  pantoprazole  Injectable 40 milliGRAM(s) IV Push daily  pregabalin 50 milliGRAM(s) Oral two times a day  sodium polystyrene sulfonate Suspension 15 Gram(s) Oral once    acetaminophen    Suspension 650 milliGRAM(s) Oral every 6 hours PRN  ALBUTerol    0.083% 2.5 milliGRAM(s) Nebulizer every 6 hours  ALBUTerol    90 MICROgram(s) HFA Inhaler 1 Puff(s) Inhalation every 4 hours  aspirin  chewable 81 milliGRAM(s) Oral daily  calcium gluconate IVPB 1 Gram(s) IV Intermittent every 6 hours  cefTRIAXone   IVPB 1 Gram(s) IV Intermittent every 24 hours  clopidogrel Tablet 75 milliGRAM(s) Oral daily  dexmedetomidine Infusion 0.5 MICROgram(s)/kG/Hr IV Continuous <Continuous>  fentaNYL   Infusion 0.5 MICROgram(s)/kG/Hr IV Continuous <Continuous>  finasteride 5 milliGRAM(s) Oral daily  heparin  Infusion. 750 Unit(s)/Hr IV Continuous <Continuous>  insulin lispro (HumaLOG) corrective regimen sliding scale   SubCutaneous every 4 hours  insulin regular  human recombinant. 5 Unit(s) IV Push once  methylPREDNISolone sodium succinate Injectable 40 milliGRAM(s) IV Push daily  norepinephrine Infusion 0.5 MICROgram(s)/kG/Min IV Continuous <Continuous>  oseltamivir 30 milliGRAM(s) Oral every 12 hours  pantoprazole  Injectable 40 milliGRAM(s) IV Push daily  pregabalin 50 milliGRAM(s) Oral two times a day  sodium polystyrene sulfonate Suspension 15 Gram(s) Oral once  tamsulosin 0.4 milliGRAM(s) Oral at bedtime    Drug Dosing Weight  Height (cm): 185.42 (20 Jan 2018 10:22)  Weight (kg): 89.3 (20 Jan 2018 17:00)  BMI (kg/m2): 26 (20 Jan 2018 17:00)  BSA (m2): 2.14 (20 Jan 2018 17:00)    CENTRAL LINE: [x ] YES [ ] NO  LOCATION:   DATE INSERTED:  REMOVE: [ ] YES [ ] NO  EXPLAIN:    BRUNSON: [x ] YES [ ] NO    DATE INSERTED:  REMOVE:  [ ] YES [ ] NO  EXPLAIN:    A-LINE:  [ ] YES [x ] NO  LOCATION:   DATE INSERTED:  REMOVE:  [ ] YES [ ] NO  EXPLAIN:    PMH -reviewed admission note, no change since admission    ICU Vital Signs Last 24 Hrs  T(C): 37.6 (23 Jan 2018 05:00), Max: 37.8 (22 Jan 2018 15:36)  T(F): 99.6 (23 Jan 2018 05:00), Max: 100.1 (22 Jan 2018 20:00)  HR: 92 (23 Jan 2018 08:02) (92 - 103)  BP: 100/70 (23 Jan 2018 08:00) (82/49 - 155/64)  BP(mean): 75 (23 Jan 2018 08:00) (41 - 103)  ABP: --  ABP(mean): --  RR: 25 (23 Jan 2018 08:00) (20 - 33)  SpO2: 98% (23 Jan 2018 08:02) (89% - 98%)      ABG - ( 23 Jan 2018 04:20 )  pH: 7.31  /  pCO2: 30    /  pO2: 69    / HCO3: 15    / Base Excess: -10.0 /  SaO2: 93                    01-22 @ 07:01  -  01-23 @ 07:00  --------------------------------------------------------  IN: 2213.8 mL / OUT: 810 mL / NET: 1403.8 mL        Mode: CPAP with PS  FiO2: 40  PEEP: 5  PS: 5  ITime: 1  MAP: 7.6  PIP: 12      PHYSICAL EXAM:    GENERAL APPEARANCE: sedation on MV   HEENT: no pallor, no JVD   CVS: S1 +, S2 +, normal   RS: AEEB,  B/L  rhonchi ETT in Place   ABD:  soft , no tender, normal bowel sounds   EXT: on edema   SKIN: WARM, no rashes   CNS: sedated        LABS:  CBC Full  -  ( 23 Jan 2018 06:12 )  WBC Count : 7.1 K/uL  Hemoglobin : 11.9 g/dL  Hematocrit : 34.5 %  Platelet Count - Automated : 82 K/uL  Mean Cell Volume : 102.2 fl  Mean Cell Hemoglobin : 35.2 pg  Mean Cell Hemoglobin Concentration : 34.4 gm/dL  Auto Neutrophil # : 6.4 K/uL  Auto Lymphocyte # : 0.3 K/uL  Auto Monocyte # : 0.4 K/uL  Auto Eosinophil # : 0.0 K/uL  Auto Basophil # : 0.1 K/uL  Auto Neutrophil % : 88.9 %  Auto Lymphocyte % : 3.8 %  Auto Monocyte % : 6.1 %  Auto Eosinophil % : 0.2 %  Auto Basophil % : 1.0 %    01-23    128<L>  |  99  |  76<H>  ----------------------------<  429<H>  6.0<H>   |  17<L>  |  4.73<H>    Ca    7.2<L>      23 Jan 2018 06:12  Phos  3.7     01-23  Mg     1.8     01-23    TPro  5.6<L>  /  Alb  2.1<L>  /  TBili  2.0<H>  /  DBili  x   /  AST  98<H>  /  ALT  122<H>  /  AlkPhos  96  01-23    PT/INR - ( 22 Jan 2018 10:16 )   PT: 11.1 sec;   INR: 1.02 ratio         PTT - ( 23 Jan 2018 03:42 )  PTT:60.2 sec    Culture Results:   No growth (01-20 @ 22:48)  Culture Results:   No growth to date. (01-20 @ 17:03)      RADIOLOGY & ADDITIONAL STUDIES REVIEWED:    CXR ET tube, feeding tube, right central line again noted.    Increased interstitial lung markings without significant change. New   consolidation right lung. Progressed consolidation left lung.    Better aeration left lung base without significant pleural effusion.    Heart size cannot be accurately assessed in this projection.      ECHO 1. Normalmitral valve.  2. Normal trileaflet aortic valve. Mild aortic  insufficiency.  3. Aortic Root: 3.4 cm.  4. Normal left atrium.  5. Normal left ventricular internal dimensions and wall  thicknesses.  6. Normal Left Ventricular Systolic Function,  (EF = 55%)  7. Grade I diastolic dysfunction (Impaired relaxation).  8. Normal right atrium.  9. Normal right ventricular size and function.  10. Unable to estimate RVSP.  11. Normal tricuspid valve.  12. There is mild pulmonic regurgitation.  13. Normal pericardium with no pericardial effusion.          [ ]GOALS OF CARE DISCUSSION WITH PATIENT/FAMILY/PROXY:    CRITICAL CARE TIME SPENT: 35 minutes

## 2018-01-23 NOTE — PROCEDURE NOTE - PROCEDURE
<<-----Click on this checkbox to enter Procedure Insertion of catheter for hemodialysis  01/23/2018    Active  RLIND

## 2018-01-23 NOTE — PROGRESS NOTE ADULT - ATTENDING COMMENTS
-pat cxr is getting worst and suggest of possible early ARDS  -will repeat cxr in 5 hrs and if needed will start on ARDS protocol  -keep O2 sat >90%  -dialysis as per renal  -continue antibx

## 2018-01-23 NOTE — PROGRESS NOTE ADULT - ASSESSMENT
72 M from [facility] h/o CKD, Anxiety disorder, CAD, BPH, Carpel tunnel syndrome, compulsive disorder, DM, OA, HTN, IBS sent for dyspnea for last 3 days admitted to ICU for respiratory failure and septic shock from influenza and pneumonia has worsening renal function.     1. NING on ckd (stage 3 ,r/o secondary to htn/dm)  BUN/Cr is <20:1 etiology for ning ,likely ATN from reduced renal perfusion in view of septic shock requiring 2 pressors (levophed+ dobutamine; patient required levophed+ phenylephrine 1/20/18) , - -non oliguric  -Fena 1.1(with corrected na of 131), prot/cr ratio is 1.1  -hold nephrotoxic medications, adjust meds as per egfr  and avoid contrast studies/phosphate  enema, etc  -given hyperkalemia and metabolic acidosis, patient may need dialysis    2. Hyperkalemia   sec to renal failure     2. Metabolic acidosis   multifactorial with acceptable pH   monitor CO 2 daily   keep PH >7.3     3. Hypophosphatemia   resolved   keep Phos>3, <5     4.Hyponatremia;mild ,secondary to ning  -avoid iv fluids  -f/u Na level daily  -Lasix as ordered  phos every other day     4. Fluid overload   sec to CHF/NING   suggest diuretics iv lasix 60mg daily if BP ok     5.Hypocalcemia;corrected ca is low  -suggest to replace q 6hrs 1 gm   -f/u ca level daily 72 M from [facility] h/o CKD, Anxiety disorder, CAD, BPH, Carpel tunnel syndrome, compulsive disorder, DM, OA, HTN, IBS sent for dyspnea for last 3 days admitted to ICU for respiratory failure and septic shock from influenza and pneumonia has worsening renal function.     1. NING on ckd (stage 3 ,r/o secondary to htn/dm)  BUN/Cr is <20:1 etiology for ning ,likely ATN from reduced renal perfusion in view of septic shock requiring 2 pressors (levophed+ dobutamine; patient required levophed+ phenylephrine 1/20/18) , - -non oliguric  -Fena 1.1(with corrected na of 131), prot/cr ratio is 1.1  -hold nephrotoxic medications, adjust meds as per egfr  and avoid contrast studies/phosphate  enema, etc  -given hyperkalemia and metabolic acidosis, patient may need dialysis  - Son: [phone]; agreeable for dialysis   - patient will benefit from HD today; call vascular for [facility]    2. Hyperkalemia   sec to renal failure   s/p dextrose, insulin, calcium and albuterol Kayexalate repeat is 5.9   plan for dialysis   low K diet     2. Metabolic acidosis   worsening   multifactorial with acceptable pH   monitor CO 2 daily   keep PH >7.3   plan for dialysis     3. Hypophosphatemia   resolved   keep Phos>3, <5     4.Hyponatremia;mild ,secondary to ning  -avoid iv fluids  -f/u Na level daily  -Lasix as ordered  phos every other day     4. Fluid overload   - worsening CXR   sec to CHF/NING   suggest diuretics iv lasix 60mg daily if BP ok   plan for dialysis today     5.Hypocalcemia;corrected ca is low  -suggest to replace q 6hrs 1 gm   -f/u ca level daily 72 M from [facility] h/o CKD, Anxiety disorder, CAD, BPH, Carpel tunnel syndrome, compulsive disorder, DM, OA, HTN, IBS sent for dyspnea for last 3 days admitted to ICU for respiratory failure and septic shock from influenza and pneumonia has worsening renal function.     1. NING on ckd (stage 3 ,r/o secondary to htn/dm)  BUN/Cr is <20:1 etiology for ning ,likely ATN from reduced renal perfusion in view of septic shock requiring 2 pressors (levophed+ dobutamine; patient required levophed+ phenylephrine 1/20/18) , - -non oliguric  -Fena 1.1(with corrected na of 131), prot/cr ratio is 1.1  -hold nephrotoxic medications, adjust meds as per egfr  and avoid contrast studies/phosphate  enema, etc  -given hyperkalemia and metabolic acidosis, patient needs hd today  -spoke to pts son and got consent for hd after expalining all risks ,like hypotension,worsening ning,mi or cardiac arrest,,etc  - Son: [phone]   -call vascular for [facility]  -hd orders written x 2hrs and will pplan for 3 hrs tomorrow    2. Hyperkalemia   sec to renal failure   -add dextrose, insulin, calcium and albuterol Kayexalate for high k  plan for dialysis later today  low K diet   -f/u k level q 12hrs  2. Metabolic acidosis   worsening   multifactorial with acceptable pH   monitor CO 2 daily   keep PH >7.3   plan for dialysis     3. Hypophosphatemia   resolved   keep Phos>3, <5     4.Hyponatremia;mild ,secondary to ning  -avoid iv fluids  -f/u Na level daily  -Lasix as ordered  phos every other day     4. Fluid overload   - worsening CXR   sec to CHF/NING   suggest diuretics iv lasix 60mg daily if BP ok   plan for dialysis today     5.Hypocalcemia;corrected ca is low but improving today  -suggest to replace q 6hrs 1 gm   -f/u ca level daily

## 2018-01-23 NOTE — PROGRESS NOTE ADULT - SUBJECTIVE AND OBJECTIVE BOX
Patient is a 72y old  Male who presents with a chief complaint of Respiratory distress (2018 14:30)    PATIENT IS SEEN AND EXAMINED IN ICU  NGT [ X   ]    BRUNSON [ X  ]      ORAL ET +  RIGHT GROIN HD CATHETER +    ALLERGIES:  doxycycline (Unknown)  penicillin (Unknown)  tetracycline (Unknown)  Valtrex (Unknown)         Daily Weight in k (2018 17:45)    VITALS:    Vital Signs Last 24 Hrs  T(C): 36.3 (2018 20:37), Max: 38.7 (2018 15:45)  T(F): 97.4 (2018 20:37), Max: 101.7 (2018 15:45)  HR: 70 (2018 20:42) (70 - 111)  BP: 94/62 (2018 20:30) (94/62 - 173/68)  BP(mean): 70 (2018 20:30) (59 - 109)  RR: 8 (2018 20:30) (8 - 33)  SpO2: 99% (2018 20:42) (87% - 99%)    LABS:  CBC Full  -  ( 2018 18:30 )  WBC Count : 10.2 K/uL  Hemoglobin : 12.5 g/dL  Hematocrit : 36.2 %  Platelet Count - Automated : 87 K/uL  Mean Cell Volume : 101.6 fl  Mean Cell Hemoglobin : 35.1 pg  Mean Cell Hemoglobin Concentration : 34.6 gm/dL  Auto Neutrophil # : 8.6 K/uL  Auto Lymphocyte # : 0.7 K/uL  Auto Monocyte # : 0.8 K/uL  Auto Eosinophil # : 0.0 K/uL  Auto Basophil # : 0.1 K/uL  Auto Neutrophil % : 84.3 %  Auto Lymphocyte % : 6.7 %  Auto Monocyte % : 8.0 %  Auto Eosinophil % : 0.1 %  Auto Basophil % : 0.9 %    PT/INR - ( 2018 12:27 )   PT: 10.0 sec;   INR: 0.92 ratio         PTT - ( 2018 12:27 )  PTT:32.5 sec      130<L>  |  99  |  71<H>  ----------------------------<  297<H>  5.9<H>   |  22  |  4.78<H>    Ca    8.0<L>      2018 18:29  Phos  5.1       Mg     2.0         TPro  6.3  /  Alb  2.2<L>  /  TBili  1.4<H>  /  DBili  x   /  AST  113<H>  /  ALT  111<H>  /  AlkPhos  131<H>      CAPILLARY BLOOD GLUCOSE        CARDIAC MARKERS ( 2018 10:16 )  0.911 ng/mL / x     / 1005 U/L / x     / 3.3 ng/mL      LIVER FUNCTIONS - ( 2018 18:29 )  Alb: 2.2 g/dL / Pro: 6.3 g/dL / ALK PHOS: 131 U/L / ALT: 111 U/L DA / AST: 113 U/L / GGT: x             ABG - ( 2018 14:54 )  pH: 7.16  /  pCO2: 51    /  pO2: 64    / HCO3: 17    / Base Excess: -11.3 /  SaO2: 89                  .Sputum Sputumtrap rec'd   @ 10:48   Insufficient growth-culture reincubated  --    Numerous polymorphonuclear leukocytes  Rare Squamous epithelial cells per low power field  Numerous Gram Positive Cocci in Pairs and Chains per oil power field      .Urine Clean Catch (Midstream)   @ 22:48   No growth  --  --      .Blood Blood-Peripheral   @ 17:03   No growth to date.  --  --          MEDICATIONS:    MEDICATIONS  (STANDING):  ALBUTerol    90 MICROgram(s) HFA Inhaler 1 Puff(s) Inhalation every 4 hours  ALBUTerol    90 MICROgram(s) HFA Inhaler 1 Puff(s) Inhalation every 4 hours  aspirin  chewable 81 milliGRAM(s) Oral daily  clopidogrel Tablet 75 milliGRAM(s) Oral daily  dexmedetomidine Infusion 0.5 MICROgram(s)/kG/Hr (11.162 mL/Hr) IV Continuous <Continuous>  fentaNYL   Infusion 0.5 MICROgram(s)/kG/Hr (4.465 mL/Hr) IV Continuous <Continuous>  finasteride 5 milliGRAM(s) Oral daily  heparin  Injectable 5000 Unit(s) SubCutaneous every 12 hours  insulin glargine Injectable (LANTUS) 10 Unit(s) SubCutaneous at bedtime  insulin lispro (HumaLOG) corrective regimen sliding scale   SubCutaneous every 4 hours  meropenem IVPB 500 milliGRAM(s) IV Intermittent every 12 hours  methylPREDNISolone sodium succinate Injectable 60 milliGRAM(s) IV Push every 6 hours  norepinephrine Infusion 0.5 MICROgram(s)/kG/Min (41.859 mL/Hr) IV Continuous <Continuous>  oseltamivir 30 milliGRAM(s) Oral daily  pantoprazole  Injectable 40 milliGRAM(s) IV Push daily  pregabalin 50 milliGRAM(s) Oral two times a day  tamsulosin 0.4 milliGRAM(s) Oral at bedtime      MEDICATIONS  (PRN):  acetaminophen    Suspension 650 milliGRAM(s) Oral every 6 hours PRN For Temp greater than 38 C (100.4 F)      REVIEW OF SYSTEMS:                           ALL ROS DONE [ X   ]    CONSTITUTIONAL:  LETHARGIC [   ], FEVER [   ], UNRESPONSIVE [   ]  CVS:  CP  [   ], SOB, [   ], PALPITATIONS [   ], DIZZYNESS [   ]  RS: COUGH [   ], SPUTUM [   ]  GI: ABDOMINAL PAIN [   ], NAUSEA [   ], VOMITINGS [   ], DIARRHEA [   ], CONSTIPATION [   ]  :  DYSURIA [   ], NOCTURIA [   ], INCREASED FREQUENCY [   ], DRIBLING [   ],  SKELETAL: PAINFUL JOINTS [   ], SWOLLEN JOINTS [   ], NECK ACHE [   ], LOW BACK ACHE [   ],  SKIN : ULCERS [   ], RASH [   ], ITCHING [   ]  CNS: HEAD ACHE [   ], DOUBLE VISION [   ], BLURRED VISION [   ], AMS / CONFUSION [   ], SEIZURES [   ], WEAKNESS [   ],TINGLING / NUMBNESS [   ]    PHYSICAL EXAMINATION:  GENERAL APPEARANCE: NO DISTRESS  HEENT:  NO PALLOR, NO  JVD,  NO   NODES, NECK SUPPLE ,                         ORAL ET + , NGT +  CVS: S1 +, S2 +,   RS: AEEB,  B/L RALES +,  B/L RONCHI +  ABD: SOFT, NT, NO, BS +  EXT: PE +  SKIN: WARM,   SKELETAL:  ROM ACCEPTABLE  CNS:  AAO X 0   , NO  DEFICITS    RADIOLOGY :    < from: Xray Chest 1 View AP-PORTABLE IMMEDIATE (18 @ 19:50) >  EXAM:  XR CHEST PORTABLE IMMED 1V                            PROCEDURE DATE:  2018          INTERPRETATION:  Chest one view    HISTORY: Central line placement    COMPARISON STUDY: Earlier the same day    Frontal expiratory view of the chest shows the heart to be similar in   size. Right jugular line as and placed extending to the level of the   superior vena cava. Feeding tube tip remains in the stomach. Endotracheal   tube is unchanged. The lungs show no definite infiltrate and there is no   evidence of pneumothorax nor pleural effusion.    IMPRESSION:  Right jugular line. No pneumothorax.    < end of copied text >      ASSESSMENT :     Sepsis  Sleep apnea  Hyperlipidemia  CAD (coronary artery disease)  DM (diabetes mellitus)  HTN (hypertension)  S/P foot surgery      PLAN:  HPI:  72 M from [facility] h/o CKD, Anxiety disorder, CAD, BPH, Carpel tunnel syndrome, compulsive disorder, DM, OA, HTN, IBS sent for dyspnea for last 3 days. Dyspnea is constant, moderate to severe in intensity, associated with fever, generalized weakness, lethargy, frequent falls, cough, myalgias and URI symptoms. Cough is productive with non bloody yellowish sputum.     Patient denies chest pain, nausea, vomiting, LOC, focal neurological deficit, abdominal pain, hematochezia, current smoking, alcohol abuse and illicit drug use.    ICU Vital Signs Last 24 Hrs  T(C): 38.4 (2018 10:36), Max: 38.4 (2018 10:22)  T(F): 101.2 (2018 10:36), Max: 101.2 (2018 10:22)  HR: 88 (2018 13:01) (78 - 91)  BP: 130/61 (2018 12:44) (82/53 - 130/61)  RR: 25 (2018 12:44) (25 - 25)  SpO2: 99% (2018 13:01) (91% - 99%) (2018 14:30)    -  INFLUENZA B, ACUTE BRONCHITIS,  B/L PNEUMONIA, SEPSIS, HYPOXIC RESPIRATORY FAILURE  ON  TAMIFLU, IV ROCEPHIN, AZITHROMYCIN,  NEBS. ICU F/UP IS IN PROGRESS. S/P INTUBATION ON VENT , S/P PRESSORS    - AMS DUE TO METABOLIC ENCEPHALOPATHY  - ARF / CKD , WORSENING HYPERKALEMIA AND METABOLIC ACIDOSIS - S/P HD CATHETER INSERTION TODAY AND HD STARTED, S/P MEDICAL MANAGEMENT OF HYPERKALEMIA  - GI AND DVT PROPHYLAXIS  - DR. GARCIA

## 2018-01-23 NOTE — PROGRESS NOTE ADULT - SUBJECTIVE AND OBJECTIVE BOX
INTERVAL HPI/OVERNIGHT EVENTS: Patient failed SBP     VITAL SIGNS:  T(F): 99.6 (01-23-18 @ 05:00)  HR: 92 (01-23-18 @ 08:02)  BP: 100/70 (01-23-18 @ 08:00)  RR: 25 (01-23-18 @ 08:00)  SpO2: 98% (01-23-18 @ 08:02)  Wt(kg): --    PHYSICAL EXAM:    Constitutional: Patient was agitated after SBT and sedation was increased   Eyes: pupils dialted, reactive. sclera normal   ENMT: no external lesions   Neck: supple, no JVD   Respiratory: rhonchi +  Cardiovascular:s1,s2 present   Gastrointestinal: distended, BS +   Extremities: no cyanosis, edema or clubbing   Vascular: pulses intact   Neurological: sedated     MEDICATIONS  (STANDING):  ALBUTerol    0.083% 2.5 milliGRAM(s) Nebulizer every 6 hours  ALBUTerol    90 MICROgram(s) HFA Inhaler 1 Puff(s) Inhalation every 4 hours  aspirin  chewable 81 milliGRAM(s) Oral daily  calcium gluconate IVPB 1 Gram(s) IV Intermittent every 6 hours  cefTRIAXone   IVPB 1 Gram(s) IV Intermittent every 24 hours  clopidogrel Tablet 75 milliGRAM(s) Oral daily  dexmedetomidine Infusion 0.5 MICROgram(s)/kG/Hr (11.162 mL/Hr) IV Continuous <Continuous>  fentaNYL   Infusion 0.5 MICROgram(s)/kG/Hr (4.465 mL/Hr) IV Continuous <Continuous>  finasteride 5 milliGRAM(s) Oral daily  heparin  Infusion. 750 Unit(s)/Hr (7.5 mL/Hr) IV Continuous <Continuous>  insulin lispro (HumaLOG) corrective regimen sliding scale   SubCutaneous every 4 hours  insulin regular  human recombinant. 5 Unit(s) IV Push once  methylPREDNISolone sodium succinate Injectable 40 milliGRAM(s) IV Push daily  norepinephrine Infusion 0.5 MICROgram(s)/kG/Min (41.859 mL/Hr) IV Continuous <Continuous>  oseltamivir 30 milliGRAM(s) Oral every 12 hours  pantoprazole  Injectable 40 milliGRAM(s) IV Push daily  pregabalin 50 milliGRAM(s) Oral two times a day  sodium polystyrene sulfonate Suspension 15 Gram(s) Oral once  tamsulosin 0.4 milliGRAM(s) Oral at bedtime    MEDICATIONS  (PRN):  acetaminophen    Suspension 650 milliGRAM(s) Oral every 6 hours PRN For Temp greater than 38 C (100.4 F)      Allergies    doxycycline (Unknown)  penicillin (Unknown)  tetracycline (Unknown)  Valtrex (Unknown)    Intolerances        LABS:                        11.7   7.3   )-----------( 75       ( 23 Jan 2018 08:35 )             34.8     01-23    128<L>  |  99  |  76<H>  ----------------------------<  429<H>  6.0<H>   |  17<L>  |  4.73<H>    Ca    7.2<L>      23 Jan 2018 06:12  Phos  3.7     01-23  Mg     1.8     01-23    TPro  5.6<L>  /  Alb  2.1<L>  /  TBili  2.0<H>  /  DBili  x   /  AST  98<H>  /  ALT  122<H>  /  AlkPhos  96  01-23    PT/INR - ( 22 Jan 2018 10:16 )   PT: 11.1 sec;   INR: 1.02 ratio         PTT - ( 23 Jan 2018 03:42 )  PTT:60.2 sec      RADIOLOGY & ADDITIONAL TESTS: more congested today; pending read INTERVAL HPI/OVERNIGHT EVENTS: Patient failed SBP   pt is on vent/sedated  VITAL SIGNS:  T(F): 99.6 (01-23-18 @ 05:00)  HR: 92 (01-23-18 @ 08:02)  BP: 100/70 (01-23-18 @ 08:00)  RR: 25 (01-23-18 @ 08:00)  SpO2: 98% (01-23-18 @ 08:02)  Wt(kg): --    PHYSICAL EXAM:    Constitutional: Patient was agitated after SBT and sedation was increased   Eyes: pupils dialted, reactive. sclera normal   ENMT: no external lesions   Neck: supple, no JVD   Respiratory: rhonchi +  Cardiovascular:s1,s2 present   Gastrointestinal: distended, BS +   Extremities: no cyanosis, edema or clubbing   Vascular: pulses intact   Neurological: sedated     MEDICATIONS  (STANDING):  ALBUTerol    0.083% 2.5 milliGRAM(s) Nebulizer every 6 hours  ALBUTerol    90 MICROgram(s) HFA Inhaler 1 Puff(s) Inhalation every 4 hours  aspirin  chewable 81 milliGRAM(s) Oral daily  calcium gluconate IVPB 1 Gram(s) IV Intermittent every 6 hours  cefTRIAXone   IVPB 1 Gram(s) IV Intermittent every 24 hours  clopidogrel Tablet 75 milliGRAM(s) Oral daily  dexmedetomidine Infusion 0.5 MICROgram(s)/kG/Hr (11.162 mL/Hr) IV Continuous <Continuous>  fentaNYL   Infusion 0.5 MICROgram(s)/kG/Hr (4.465 mL/Hr) IV Continuous <Continuous>  finasteride 5 milliGRAM(s) Oral daily  heparin  Infusion. 750 Unit(s)/Hr (7.5 mL/Hr) IV Continuous <Continuous>  insulin lispro (HumaLOG) corrective regimen sliding scale   SubCutaneous every 4 hours  insulin regular  human recombinant. 5 Unit(s) IV Push once  methylPREDNISolone sodium succinate Injectable 40 milliGRAM(s) IV Push daily  norepinephrine Infusion 0.5 MICROgram(s)/kG/Min (41.859 mL/Hr) IV Continuous <Continuous>  oseltamivir 30 milliGRAM(s) Oral every 12 hours  pantoprazole  Injectable 40 milliGRAM(s) IV Push daily  pregabalin 50 milliGRAM(s) Oral two times a day  sodium polystyrene sulfonate Suspension 15 Gram(s) Oral once  tamsulosin 0.4 milliGRAM(s) Oral at bedtime    MEDICATIONS  (PRN):  acetaminophen    Suspension 650 milliGRAM(s) Oral every 6 hours PRN For Temp greater than 38 C (100.4 F)      Allergies    doxycycline (Unknown)  penicillin (Unknown)  tetracycline (Unknown)  Valtrex (Unknown)    Intolerances        LABS:                        11.7   7.3   )-----------( 75       ( 23 Jan 2018 08:35 )             34.8     01-23    128<L>  |  99  |  76<H>  ----------------------------<  429<H>  6.0<H>   |  17<L>  |  4.73<H>    Ca    7.2<L>      23 Jan 2018 06:12  Phos  3.7     01-23  Mg     1.8     01-23    TPro  5.6<L>  /  Alb  2.1<L>  /  TBili  2.0<H>  /  DBili  x   /  AST  98<H>  /  ALT  122<H>  /  AlkPhos  96  01-23    PT/INR - ( 22 Jan 2018 10:16 )   PT: 11.1 sec;   INR: 1.02 ratio         PTT - ( 23 Jan 2018 03:42 )  PTT:60.2 sec      RADIOLOGY & ADDITIONAL TESTS: more congested today; pending read

## 2018-01-23 NOTE — PROGRESS NOTE ADULT - ASSESSMENT
sepsis/ septic shock  Influenza B infection  fevers  pneumonia   respiratory failure    plan - cont tamiflu 30 mgs via NGT q12hrs  dc rocephin   change meropenem to 500mgs iv q12hrs  time spent - 30 minutes

## 2018-01-23 NOTE — PROGRESS NOTE ADULT - SUBJECTIVE AND OBJECTIVE BOX
72y Male    Meds:  meropenem IVPB      meropenem IVPB 500 milliGRAM(s) IV Intermittent once  meropenem IVPB 500 milliGRAM(s) IV Intermittent every 8 hours  oseltamivir 30 milliGRAM(s) Oral daily  vancomycin  IVPB 1000 milliGRAM(s) IV Intermittent once    Allergies    doxycycline (Unknown)  penicillin (Unknown)  tetracycline (Unknown)  Valtrex (Unknown)    Intolerances        VITALS:  Vital Signs Last 24 Hrs  T(C): 38.7 (23 Jan 2018 15:45), Max: 38.7 (23 Jan 2018 15:45)  T(F): 101.7 (23 Jan 2018 15:45), Max: 101.7 (23 Jan 2018 15:45)  HR: 97 (23 Jan 2018 17:18) (92 - 111)  BP: 106/58 (23 Jan 2018 17:00) (96/56 - 173/68)  BP(mean): 71 (23 Jan 2018 17:00) (59 - 109)  RR: 13 (23 Jan 2018 17:00) (13 - 33)  SpO2: 94% (23 Jan 2018 17:18) (87% - 98%)    LABS/DIAGNOSTIC TESTS:                          11.7   7.3   )-----------( 75       ( 23 Jan 2018 08:35 )             34.8         01-23    128<L>  |  99  |  87<H>  ----------------------------<  412<H>  6.5<HH>   |  19<L>  |  5.26<H>    Ca    7.9<L>      23 Jan 2018 12:27  Phos  3.7     01-23  Mg     1.8     01-23    TPro  5.6<L>  /  Alb  2.1<L>  /  TBili  2.0<H>  /  DBili  x   /  AST  98<H>  /  ALT  122<H>  /  AlkPhos  96  01-23      LIVER FUNCTIONS - ( 23 Jan 2018 06:12 )  Alb: 2.1 g/dL / Pro: 5.6 g/dL / ALK PHOS: 96 U/L / ALT: 122 U/L DA / AST: 98 U/L / GGT: x             CULTURES: .Sputum Sputumtrap rec'd  01-22 @ 10:48   Insufficient growth-culture reincubated  --    Numerous polymorphonuclear leukocytes  Rare Squamous epithelial cells per low power field  Numerous Gram Positive Cocci in Pairs and Chains per oil power field      .Urine Clean Catch (Midstream)  01-20 @ 22:48   No growth  --  --      .Blood Blood-Peripheral  01-20 @ 17:03   No growth to date.  --  --            RADIOLOGY:< from: Xray Chest 1 View AP- PORTABLE-Urgent (01.23.18 @ 14:56) >  EXAM:  XR CHEST PORTABLE URGENT 1V                            PROCEDURE DATE:  01/23/2018          INTERPRETATION:  HISTORY:  Septic shock. ARDS  TECHNIQUE: Portable frontal view of the chest, 1 view.  COMPARISON: Earlier today.  FINDINGS:   The nasogastric tube courses below the left hemidiaphragm, tip off edge   of film. The endotracheal tube is above the park. Right central line is   in the distal SVC region  HEART:difficult to access in this projection  LUNGS: Bilateral patchy opacities are seen, most pronounced on the right,   worse on this study than on the prior.    OSSEOUS STRUCTURES:: degenerative changes    IMPRESSION:   Worsening dense consolidation          < end of copied text >        ROS:  [  ] UNABLE TO ELICIT 72y Male who remains in the ICU , sedated , intubated and vent dependent, his urine output is poor and has been started on dialysis. He started having high grade fevers again and so rocephin was switched to meropenem today but hasn't gotten a dose yet. He is not agitated currently and is well sedated. His wbc count remains wnl. still on pressors.    Meds:  meropenem IVPB      meropenem IVPB 500 milliGRAM(s) IV Intermittent once  meropenem IVPB 500 milliGRAM(s) IV Intermittent every 8 hours  oseltamivir 30 milliGRAM(s) Oral daily  vancomycin  IVPB 1000 milliGRAM(s) IV Intermittent once    Allergies    doxycycline (Unknown)  penicillin (Unknown)  tetracycline (Unknown)  Valtrex (Unknown)    Intolerances        VITALS:  Vital Signs Last 24 Hrs  T(C): 38.7 (23 Jan 2018 15:45), Max: 38.7 (23 Jan 2018 15:45)  T(F): 101.7 (23 Jan 2018 15:45), Max: 101.7 (23 Jan 2018 15:45)  HR: 97 (23 Jan 2018 17:18) (92 - 111)  BP: 106/58 (23 Jan 2018 17:00) (96/56 - 173/68)  BP(mean): 71 (23 Jan 2018 17:00) (59 - 109)  RR: 13 (23 Jan 2018 17:00) (13 - 33)  SpO2: 94% (23 Jan 2018 17:18) (87% - 98%)    LABS/DIAGNOSTIC TESTS:                          11.7   7.3   )-----------( 75       ( 23 Jan 2018 08:35 )             34.8         01-23    128<L>  |  99  |  87<H>  ----------------------------<  412<H>  6.5<HH>   |  19<L>  |  5.26<H>    Ca    7.9<L>      23 Jan 2018 12:27  Phos  3.7     01-23  Mg     1.8     01-23    TPro  5.6<L>  /  Alb  2.1<L>  /  TBili  2.0<H>  /  DBili  x   /  AST  98<H>  /  ALT  122<H>  /  AlkPhos  96  01-23      LIVER FUNCTIONS - ( 23 Jan 2018 06:12 )  Alb: 2.1 g/dL / Pro: 5.6 g/dL / ALK PHOS: 96 U/L / ALT: 122 U/L DA / AST: 98 U/L / GGT: x             CULTURES: .Sputum Sputumtrap rec'd  01-22 @ 10:48   Insufficient growth-culture reincubated  --    Numerous polymorphonuclear leukocytes  Rare Squamous epithelial cells per low power field  Numerous Gram Positive Cocci in Pairs and Chains per oil power field      .Urine Clean Catch (Midstream)  01-20 @ 22:48   No growth  --  --      .Blood Blood-Peripheral  01-20 @ 17:03   No growth to date.  --  --            RADIOLOGY:< from: Xray Chest 1 View AP- PORTABLE-Urgent (01.23.18 @ 14:56) >  EXAM:  XR CHEST PORTABLE URGENT 1V                            PROCEDURE DATE:  01/23/2018          INTERPRETATION:  HISTORY:  Septic shock. ARDS  TECHNIQUE: Portable frontal view of the chest, 1 view.  COMPARISON: Earlier today.  FINDINGS:   The nasogastric tube courses below the left hemidiaphragm, tip off edge   of film. The endotracheal tube is above the park. Right central line is   in the distal SVC region  HEART:difficult to access in this projection  LUNGS: Bilateral patchy opacities are seen, most pronounced on the right,   worse on this study than on the prior.    OSSEOUS STRUCTURES:: degenerative changes    IMPRESSION:   Worsening dense consolidation          < end of copied text >        ROS:  [ x ] UNABLE TO ELICIT

## 2018-01-23 NOTE — PROGRESS NOTE ADULT - ASSESSMENT
72 male nursing home patient with CAD, CKD, DM, HTN, OA, BPH, anxiety, presented with influenza, acute respiratory failure, septic shock.  May have superimposed pneumonia as well    Problem: Acute hypoxic  Respiratory failure 2/2 flu vs PNA   Concerns for CHF exacerbation  On droplet isolation, Influenza B positive   starting on tamiflu 30mg Q 12 hrs day 2,  ceftriaxone for aspiration PNA day 2   Lactate 7.6 s/p 2 L bolus which improved to 2.6   Cardiac enzymes ttrending down, on heparin drip for elevated troponin   - Procalcitonin 80.4  -urine and blood cultures are negative   - elevated dimer with negative b/l Lower EXT doppler, likely due to sepsis and YANG   - will continue with Rocephin     Problem/Plan - 2:  ·  Problem: CAD (coronary artery disease).  Plan: c/w ASA, Plavix and Lipitor   cardiac enzymes trending up   - continue with heparin drip   -c/w levophed for pressor support   - continue with aspirin, plavix  - hold Lipitor as LFT's trending up   - stop dobutamine, c/w levophed for pressor support   - Dr Benson cardio consulted   - c/w heparin drip   - patient currently HD stable off the pressor support.       Problem/Plan- 3 Thrombocytopenia   Likely due to sepsis, patient on heparin drip currently   f/u DIC workup         Problem/Plan - 4  YANG   patient Cr trending up Cr 3.2>> 4.7, FeNA 0.8, Yang likely pre renal 2/2 septic shock causing ATN  f/u US renal   urine output is 50- 60 cc/hr   - Monitor Renal functions, avoid nephrotoxic drugs   Dr So nephrology consulted     Problem/Plan- 5   ·  Problem: DM (diabetes mellitus).  Plan: Diet controlled   HbA1c. 6.0  - patient sugars high in 's   - will give insulin regular IV push   - continue with sliding scale Q 4 hrs   - finger sticksQ 2 hrs      Problem/Plan - 4:  ·  Problem: HTN (hypertension).  Plan: Holding metoprolol as BP is low.   continue with levophed and dobutamine      Problem/Plan - 5:  ·  Problem: Need for prophylactic measure.  Plan: Improve score 3 on heparin  drip and Protonix for GI prophylaxis 72 male nursing home patient with CAD, CKD, DM, HTN, OA, BPH, anxiety, presented with influenza, acute respiratory failure, septic shock.  May have superimposed pneumonia as well    Problem: Acute hypoxic  Respiratory failure 2/2 flu vs PNA   On droplet isolation, Influenza B positive   starting on tamiflu 30mg Q 12 hrs day 4,  ceftriaxone for aspiration PNA day 2   Lactate 7.6 s/p 2 L bolus which improved to 2.6   - Procalcitonin 80.4  -urine and blood cultures are negative   - CXR Increased interstitial lung markings without significant change. New consolidation right lung. Progressed consolidation left lung.  - f/u urine legionella, failed SBT today, need high O2 to maintain sat concerns for ARDS, continue with Mechanical ventilation PEEP 8  - CT physiotherapy  - solumedrol 40 mg iv daily   - will continue with Rocephin   - ID Dr Minh bain noted      Problem/Plan - 2:  ·  Problem: CAD (coronary artery disease).  Plan: c/w ASA, Plavix and Lipitor   patient has H/o of CAD, had elevated trop, start patient on heparin drip for concerns of NSTEMI   - trop trending down ECHO EF 55 %with normal LV functions   - tropnin likely due to demand ischaemia 2/2 septic shock   - Holding heparin drip    - continue with aspirin, plavix  - hold Lipitor as LFT's trending up   - HD stable, currently off the pressor support   - Elevated d-dimer with normal venous doppler with no RV strain on ECHO less likely PE,   - elevated d-dimer 2/2 renal failure and sepsis   - Dr Benson cardio taya noted       Problem/Plan- 3 Thrombocytopenia   Likely due to sepsis, patient on heparin drip currently   - normal fibrogen assay and rt count       Problem/Plan - 4  YANG   patient Cr trending up Cr 3.2>> 4.7, FeNA 0.8, Yang likely pre renal 2/2 septic shock causing ATN  f/u US renal   urine output is 50- 60 cc/hr   - Monitor Renal functions, avoid nephrotoxic drugs   Dr So nephrology consulted     Problem/Plan- 5   ·  Problem: DM (diabetes mellitus).  Plan: Diet controlled   HbA1c. 6.0  - patient sugars high in 's   - will give insulin regular IV push   - continue with sliding scale Q 4 hrs   - finger sticksQ 2 hrs      Problem/Plan - 6:  ·  Problem: HTN (hypertension).  Plan: Holding metoprolol as BP is low.   continue with levophed and dobutamine      Problem/Plan - 7  Problem Hyperkalemia: K 6.0   Due to renal failure and metabolic acidosis    - hyperkalemia cocktail( calcium gluconate. insulin albuterol and Kayexalate given)  f/u potassium      Problem/Plan - 8  Metabolic acidosis2/2 renal failure  continue to monitor real functions   - patient might need dialysis if renal functions continue to get worse      Problem/Plan - 9  ·  Problem: Need for prophylactic measure.  Plan: Improve score 3 on heparin  drip and Protonix for GI prophylaxis

## 2018-01-23 NOTE — CONSULT NOTE ADULT - SUBJECTIVE AND OBJECTIVE BOX
72 M from [facility] h/o CKD, Anxiety disorder, CAD, BPH, Carpel tunnel syndrome, compulsive disorder, DM, OA, HTN, IBS sent for dyspnea for last 3 days. Dyspnea is constant, moderate to severe in intensity, associated with fever, generalized weakness, lethargy, frequent falls, cough, myalgias and URI symptoms. Cough is productive with non bloody yellowish sputum.     Patient denies chest pain, nausea, vomiting, LOC, focal neurological deficit, abdominal pain, hematochezia, current smoking, alcohol abuse and illicit drug use.  worsening renal failure prompts request for HD catheter.    pt intubated/sedated  1+ dependant edema  extremities warm, good distal pulses bilaterally  abd: soft distended nt.                            11.7   7.3   )-----------( 74       ( 23 Jan 2018 08:35 )             34.8     01-23    128<L>  |  99  |  87<H>  ----------------------------<  412<H>  6.5<HH>   |  19<L>  |  5.26<H>    Ca    7.9<L>      23 Jan 2018 12:27  Phos  3.7     01-23  Mg     1.8     01-23    TPro  5.6<L>  /  Alb  2.1<L>  /  TBili  2.0<H>  /  DBili  x   /  AST  98<H>  /  ALT  122<H>  /  AlkPhos  96  01-23

## 2018-01-23 NOTE — PROGRESS NOTE ADULT - SUBJECTIVE AND OBJECTIVE BOX
Patient remains intubated, off pressors at present   	  MEDICATIONS:  MEDICATIONS  (STANDING):  ALBUTerol    90 MICROgram(s) HFA Inhaler 1 Puff(s) Inhalation every 4 hours  aspirin  chewable 81 milliGRAM(s) Oral daily  calcium gluconate IVPB 1 Gram(s) IV Intermittent every 6 hours  cefTRIAXone   IVPB 1 Gram(s) IV Intermittent every 24 hours  clopidogrel Tablet 75 milliGRAM(s) Oral daily  dexmedetomidine Infusion 0.5 MICROgram(s)/kG/Hr (11.162 mL/Hr) IV Continuous <Continuous>  fentaNYL   Infusion 0.5 MICROgram(s)/kG/Hr (4.465 mL/Hr) IV Continuous <Continuous>  finasteride 5 milliGRAM(s) Oral daily  insulin lispro (HumaLOG) corrective regimen sliding scale   SubCutaneous every 4 hours  methylPREDNISolone sodium succinate Injectable 40 milliGRAM(s) IV Push daily  norepinephrine Infusion 0.5 MICROgram(s)/kG/Min (41.859 mL/Hr) IV Continuous <Continuous>  oseltamivir 30 milliGRAM(s) Oral daily  pantoprazole  Injectable 40 milliGRAM(s) IV Push daily  pregabalin 50 milliGRAM(s) Oral two times a day  tamsulosin 0.4 milliGRAM(s) Oral at bedtime      LABS:	 	    CARDIAC MARKERS:  CARDIAC MARKERS ( 22 Jan 2018 10:16 )  0.911 ng/mL / x     / 1005 U/L / x     / 3.3 ng/mL  CARDIAC MARKERS ( 21 Jan 2018 15:24 )  1.980 ng/mL / x     / 1395 U/L / x     / 2.9 ng/mL  CARDIAC MARKERS ( 21 Jan 2018 07:06 )  0.375 ng/mL / x     / 1224 U/L / x     / 1.4 ng/mL  CARDIAC MARKERS ( 20 Jan 2018 22:49 )  0.076 ng/mL / x     / 1298 U/L / x     / 2.4 ng/mL  CARDIAC MARKERS ( 20 Jan 2018 14:26 )  0.020 ng/mL / x     / 568 U/L / x     / 1.1 ng/mL                                11.7   7.3   )-----------( 74       ( 23 Jan 2018 08:35 )             34.8     Hemoglobin: 11.7 g/dL (01-23 @ 08:35)  Hemoglobin: 11.9 g/dL (01-23 @ 06:12)  Hemoglobin: 13.4 g/dL (01-22 @ 21:16)  Hemoglobin: 13.4 g/dL (01-22 @ 18:20)  Hemoglobin: 12.6 g/dL (01-22 @ 06:47)      01-23    128<L>  |  99  |  82<H>  ----------------------------<  447<H>  5.9<H>   |  17<L>  |  4.94<H>    Ca    7.6<L>      23 Jan 2018 08:35  Phos  3.7     01-23  Mg     1.8     01-23    TPro  5.6<L>  /  Alb  2.1<L>  /  TBili  2.0<H>  /  DBili  x   /  AST  98<H>  /  ALT  122<H>  /  AlkPhos  96  01-23    Creatinine Trend: 4.94<--, 4.73<--, 3.77<--, 2.92<--, 2.66<--    COAGS:   PTT - ( 23 Jan 2018 03:42 )  PTT:60.2 sec      PHYSICAL EXAM:  T(C): 37.6 (01-23-18 @ 05:00), Max: 37.8 (01-22-18 @ 15:36)  HR: 101 (01-23-18 @ 12:30) (92 - 111)  BP: 127/54 (01-23-18 @ 12:00) (96/56 - 173/68)  RR: 17 (01-23-18 @ 12:30) (17 - 33)  SpO2: 92% (01-23-18 @ 12:30) (87% - 98%)  Wt(kg): --  I&O's Summary    22 Jan 2018 07:01  -  23 Jan 2018 07:00  --------------------------------------------------------  IN: 2213.8 mL / OUT: 810 mL / NET: 1403.8 mL    23 Jan 2018 07:01  -  23 Jan 2018 12:34  --------------------------------------------------------  IN: 154.7 mL / OUT: 100 mL / NET: 54.7 mL          HEENT:   Normal oral mucosa  Lymphatic: No obvious lymphadenopathy , no edema  Cardiovascular: Normal S1 S2, No JVD, 1/6 LAMIN murmur, Peripheral pulses palpable 2+ bilaterally  Respiratory: coarse mech BS, normal effort 	  Gastrointestinal:  Soft, Non-tender, + BS	  Skin: No rashes,  No cyanosis, warm to touch  Musculoskeletal: unable to assess  Psychiatry:  unable to assess Mood & affect     TELEMETRY: 	  sinus     	      ASSESSMENT/PLAN: 	72y Male [facility] h/o CKD, Anxiety disorder, CAD, BPH, Carpel tunnel syndrome, compulsive disorder, DM, OA, HTN, IBS sent for dyspnea for last 3 days found with (+) flu, PNA likely septic shock    - Echo with normal LV function  - Plan for HD per renal  - Abx per MICU    Jake Benson MD, FACC  [facility], [facility]  [address] [place] [address]  PHONE:  [phone]  BEEPER : [phone]

## 2018-01-23 NOTE — CONSULT NOTE ADULT - ASSESSMENT
acute on chronic renal failure  requested for Hd catheter placement      plan HD catheter placement with consent from pts son.

## 2018-01-24 NOTE — PROGRESS NOTE ADULT - SUBJECTIVE AND OBJECTIVE BOX
INTERVAL HPI/OVERNIGHT EVENTS: patient has one episode of fever yesterday. Overnight he is been afebrile, Hd stable, not on pressor support,  Had shiley placed yesterday for Hemodialysis .     PRESSORS: [ ] YES [ x] NO  WHICH:    ANTIBIOTICS:   Meropenam               DATE STARTED: 1/20  ANTIBIOTICS:                  DATE STARTED:  ANTIBIOTICS:                  DATE STARTED:    Antimicrobial:  meropenem IVPB 500 milliGRAM(s) IV Intermittent every 12 hours  oseltamivir 30 milliGRAM(s) Oral daily    Cardiovascular:  norepinephrine Infusion 0.5 MICROgram(s)/kG/Min IV Continuous <Continuous>  tamsulosin 0.4 milliGRAM(s) Oral at bedtime    Pulmonary:  ALBUTerol    90 MICROgram(s) HFA Inhaler 1 Puff(s) Inhalation every 4 hours  ALBUTerol    90 MICROgram(s) HFA Inhaler 1 Puff(s) Inhalation every 4 hours  ALBUTerol    90 MICROgram(s) HFA Inhaler 5 Puff(s) Inhalation once    Hematalogic:  aspirin  chewable 81 milliGRAM(s) Oral daily  clopidogrel Tablet 75 milliGRAM(s) Oral daily  heparin  Injectable 5000 Unit(s) SubCutaneous every 12 hours    Other:  acetaminophen    Suspension 650 milliGRAM(s) Oral every 6 hours PRN  dexmedetomidine Infusion 0.5 MICROgram(s)/kG/Hr IV Continuous <Continuous>  fentaNYL   Infusion 0.5 MICROgram(s)/kG/Hr IV Continuous <Continuous>  finasteride 5 milliGRAM(s) Oral daily  insulin glargine Injectable (LANTUS) 10 Unit(s) SubCutaneous at bedtime  insulin lispro (HumaLOG) corrective regimen sliding scale   SubCutaneous every 6 hours  methylPREDNISolone sodium succinate Injectable 60 milliGRAM(s) IV Push every 6 hours  pantoprazole  Injectable 40 milliGRAM(s) IV Push daily  pregabalin 50 milliGRAM(s) Oral two times a day    acetaminophen    Suspension 650 milliGRAM(s) Oral every 6 hours PRN  ALBUTerol    90 MICROgram(s) HFA Inhaler 1 Puff(s) Inhalation every 4 hours  ALBUTerol    90 MICROgram(s) HFA Inhaler 1 Puff(s) Inhalation every 4 hours  ALBUTerol    90 MICROgram(s) HFA Inhaler 5 Puff(s) Inhalation once  aspirin  chewable 81 milliGRAM(s) Oral daily  clopidogrel Tablet 75 milliGRAM(s) Oral daily  dexmedetomidine Infusion 0.5 MICROgram(s)/kG/Hr IV Continuous <Continuous>  fentaNYL   Infusion 0.5 MICROgram(s)/kG/Hr IV Continuous <Continuous>  finasteride 5 milliGRAM(s) Oral daily  heparin  Injectable 5000 Unit(s) SubCutaneous every 12 hours  insulin glargine Injectable (LANTUS) 10 Unit(s) SubCutaneous at bedtime  insulin lispro (HumaLOG) corrective regimen sliding scale   SubCutaneous every 6 hours  meropenem IVPB 500 milliGRAM(s) IV Intermittent every 12 hours  methylPREDNISolone sodium succinate Injectable 60 milliGRAM(s) IV Push every 6 hours  norepinephrine Infusion 0.5 MICROgram(s)/kG/Min IV Continuous <Continuous>  oseltamivir 30 milliGRAM(s) Oral daily  pantoprazole  Injectable 40 milliGRAM(s) IV Push daily  pregabalin 50 milliGRAM(s) Oral two times a day  tamsulosin 0.4 milliGRAM(s) Oral at bedtime    Drug Dosing Weight  Height (cm): 185.42 (20 Jan 2018 10:22)  Weight (kg): 89.3 (20 Jan 2018 17:00)  BMI (kg/m2): 26 (20 Jan 2018 17:00)  BSA (m2): 2.14 (20 Jan 2018 17:00)    CENTRAL LINE: [x YES [ ] NO  LOCATION:   DATE INSERTED:  REMOVE: [ ] YES [ ] NO  EXPLAIN:    BRUNSON: [x ] YES [ ] NO    DATE INSERTED:  REMOVE:  [ ] YES [ ] NO  EXPLAIN:    A-LINE:  [ ] YES [x ] NO  LOCATION:   DATE INSERTED:  REMOVE:  [ ] YES [ ] NO  EXPLAIN:    PMH -reviewed admission note, no change since admission    ICU Vital Signs Last 24 Hrs  T(C): 36.4 (24 Jan 2018 04:30), Max: 38.7 (23 Jan 2018 15:45)  T(F): 97.5 (24 Jan 2018 04:30), Max: 101.7 (23 Jan 2018 15:45)  HR: 83 (24 Jan 2018 07:00) (69 - 111)  BP: 121/59 (24 Jan 2018 07:00) (94/62 - 173/68)  BP(mean): 74 (24 Jan 2018 07:00) (65 - 109)  ABP: --  ABP(mean): --  RR: 10 (24 Jan 2018 07:00) (8 - 33)  SpO2: 99% (24 Jan 2018 07:00) (87% - 100%)      ABG - ( 23 Jan 2018 22:33 )  pH: 7.23  /  pCO2: 45    /  pO2: 121   / HCO3: 18    / Base Excess: -8.6  /  SaO2: 98                    01-23 @ 07:01  -  01-24 @ 07:00  --------------------------------------------------------  IN: 1376.8 mL / OUT: 220 mL / NET: 1156.8 mL        Mode: AC/ CMV (Assist Control/ Continuous Mandatory Ventilation)  RR (machine): 14  TV (machine): 400  FiO2: 100  PEEP: 10  ITime: 0.9  MAP: 12  PIP: 20      PHYSICAL EXAM:    GENERAL: sedated   HEAD:  [x ]Atraumatic, [ x]Normocephalic  EYES:  clear conjunctiva   ENMT: ETT in place   NECK: [c]Supple, normal appearance, [ x]No JVD; RIJ in place   NERVOUS SYSTEM:  patient is sedated and intubated, On mechanical ventilation   CHEST/LUNG: B/l rales and rhonchi   HEART: [ x]Regular rate and rhythm; [x ]No murmurs, rubs, or gallops  ABDOMEN: abdomen is soft,   EXTREMITIES:  [x ]2+ Peripheral Pulses, [ ]No clubbing, cyanosis, or edema  LYMPH: [ x]No lymphadenopathy noted  SKIN: [x ]No rashes or lesions; [      LABS:  CBC Full  -  ( 24 Jan 2018 06:26 )  WBC Count : 5.8 K/uL  Hemoglobin : 11.6 g/dL  Hematocrit : 35.4 %  Platelet Count - Automated : x  Mean Cell Volume : 103.1 fl  Mean Cell Hemoglobin : 33.7 pg  Mean Cell Hemoglobin Concentration : 32.7 gm/dL  Auto Neutrophil # : x  Auto Lymphocyte # : x  Auto Monocyte # : x  Auto Eosinophil # : x  Auto Basophil # : x  Auto Neutrophil % : x  Auto Lymphocyte % : x  Auto Monocyte % : x  Auto Eosinophil % : x  Auto Basophil % : x    01-24    129<L>  |  97  |  82<H>  ----------------------------<  379<H>  5.7<H>   |  20<L>  |  5.65<H>    Ca    8.0<L>      24 Jan 2018 06:26  Phos  7.6     01-24  Mg     2.3     01-24    TPro  6.0  /  Alb  2.1<L>  /  TBili  1.2  /  DBili  x   /  AST  105<H>  /  ALT  97<H>  /  AlkPhos  119  01-24    PT/INR - ( 23 Jan 2018 12:27 )   PT: 10.0 sec;   INR: 0.92 ratio         PTT - ( 24 Jan 2018 06:26 )  PTT:27.6 sec    Culture Results:   Insufficient growth-culture reincubated (01-22 @ 10:48)      RADIOLOGY & ADDITIONAL STUDIES REVIEWED:  CXR Bilateral patchy opacities are seen, most pronounced on the right,   worse on this study than on the prior.      [ ]GOALS OF CARE DISCUSSION WITH PATIENT/FAMILY/PROXY:    CRITICAL CARE TIME SPENT: 35 minutes

## 2018-01-24 NOTE — PHYSICAL THERAPY INITIAL EVALUATION ADULT - IMPAIRMENTS CONTRIBUTING IMPAIRED BED MOBILITY, REHAB EVAL
decreased strength/impaired balance/cognition/impaired coordination/impaired motor control/abnormal muscle tone/impaired postural control

## 2018-01-24 NOTE — PHYSICAL THERAPY INITIAL EVALUATION ADULT - IMPAIRMENTS FOUND, PT EVAL
poor safety awareness/posture/ventilation and respiration/gas exchange/gross motor/aerobic capacity/endurance/arousal, attention, and cognition/decreased midline orientation/gait, locomotion, and balance/muscle strength/tone

## 2018-01-24 NOTE — PROGRESS NOTE ADULT - ATTENDING COMMENTS
-ARDS due to sepsis and pna  -continue antibx  -started steroids  -hemodynamic support  -will need A-line  -started on dialysis yesterday  -will treat hyperkalemia -ARDS due to sepsis and pna  -continue antibx  -started steroids  -hemodynamic support  -will need A-line  -started on dialysis yesterday  -will treat hyperkalemia  mm

## 2018-01-24 NOTE — PHYSICAL THERAPY INITIAL EVALUATION ADULT - DIAGNOSIS, PT EVAL
impaired level of arousal; impaired cardiopulmonary function; impaired mobility and function; inability to perform ADLs and ambulation

## 2018-01-24 NOTE — PROGRESS NOTE ADULT - SUBJECTIVE AND OBJECTIVE BOX
INTERVAL HPI/ OVERNIGHT EVENTS: patient is vented and sedated.       VITAL SIGNS:  Vital Signs Last 24 Hrs  T(C): 36.9 (24 Jan 2018 08:00), Max: 38.7 (23 Jan 2018 15:45)  T(F): 98.4 (24 Jan 2018 08:00), Max: 101.7 (23 Jan 2018 15:45)  HR: 85 (24 Jan 2018 09:00) (69 - 108)  BP: 116/60 (24 Jan 2018 09:00) (94/62 - 142/68)  BP(mean): 74 (24 Jan 2018 09:00) (65 - 84)  RR: 8 (24 Jan 2018 09:00) (8 - 22)  SpO2: 97% (24 Jan 2018 09:00) (90% - 100%)      01-23 @ 07:01  -  01-24 @ 07:00  --------------------------------------------------------  IN: 1376.8 mL / OUT: 220 mL / NET: 1156.8 mL        PHYSICAL EXAM:    Constitutional: Patient is sedated  Eyes: pupils dilated  reactive. sclera normal   ENMT: no external lesions   Neck: RIJ+, supple, no JVD   Respiratory: rhonchi +  Cardiovascular:s1,s2 present   Gastrointestinal: distended, BS +   Extremities: right femoral Shiley.  no cyanosis, edema or clubbing   Vascular: pulses intact   Neurological: sedated       MEDICATIONS  (STANDING):  ALBUTerol    90 MICROgram(s) HFA Inhaler 1 Puff(s) Inhalation every 4 hours  ALBUTerol    90 MICROgram(s) HFA Inhaler 1 Puff(s) Inhalation every 4 hours  aspirin  chewable 81 milliGRAM(s) Oral daily  clopidogrel Tablet 75 milliGRAM(s) Oral daily  dexmedetomidine Infusion 0.5 MICROgram(s)/kG/Hr (11.162 mL/Hr) IV Continuous <Continuous>  fentaNYL   Infusion 0.5 MICROgram(s)/kG/Hr (4.465 mL/Hr) IV Continuous <Continuous>  finasteride 5 milliGRAM(s) Oral daily  heparin  Injectable 5000 Unit(s) SubCutaneous every 12 hours  insulin glargine Injectable (LANTUS) 10 Unit(s) SubCutaneous at bedtime  insulin lispro (HumaLOG) corrective regimen sliding scale   SubCutaneous every 6 hours  meropenem IVPB 500 milliGRAM(s) IV Intermittent every 12 hours  methylPREDNISolone sodium succinate Injectable 60 milliGRAM(s) IV Push every 6 hours  norepinephrine Infusion 0.5 MICROgram(s)/kG/Min (41.859 mL/Hr) IV Continuous <Continuous>  oseltamivir 30 milliGRAM(s) Oral daily  pantoprazole  Injectable 40 milliGRAM(s) IV Push daily  pregabalin 50 milliGRAM(s) Oral two times a day  tamsulosin 0.4 milliGRAM(s) Oral at bedtime    MEDICATIONS  (PRN):  acetaminophen    Suspension 650 milliGRAM(s) Oral every 6 hours PRN For Temp greater than 38 C (100.4 F)        Allergies    doxycycline (Unknown)  penicillin (Unknown)  tetracycline (Unknown)  Valtrex (Unknown)    Intolerances        LABS:                                          11.6   5.8   )-----------( 68       ( 24 Jan 2018 06:26 )             35.4   01-24    129<L>  |  97  |  82<H>  ----------------------------<  379<H>  5.7<H>   |  20<L>  |  5.65<H>    Ca    8.0<L>      24 Jan 2018 06:26  Phos  7.6     01-24  Mg     2.3     01-24    TPro  6.0  /  Alb  2.1<L>  /  TBili  1.2  /  DBili  x   /  AST  105<H>  /  ALT  97<H>  /  AlkPhos  119  01-24      RADIOLOGY & ADDITIONAL TESTS: chest x ray pending today INTERVAL HPI/ OVERNIGHT EVENTS: patient is vented and sedated.   U/O poor    VITAL SIGNS:  Vital Signs Last 24 Hrs  T(C): 36.9 (24 Jan 2018 08:00), Max: 38.7 (23 Jan 2018 15:45)  T(F): 98.4 (24 Jan 2018 08:00), Max: 101.7 (23 Jan 2018 15:45)  HR: 85 (24 Jan 2018 09:00) (69 - 108)  BP: 116/60 (24 Jan 2018 09:00) (94/62 - 142/68)  BP(mean): 74 (24 Jan 2018 09:00) (65 - 84)  RR: 8 (24 Jan 2018 09:00) (8 - 22)  SpO2: 97% (24 Jan 2018 09:00) (90% - 100%)      01-23 @ 07:01  -  01-24 @ 07:00  --------------------------------------------------------  IN: 1376.8 mL / OUT: 220 mL / NET: 1156.8 mL        PHYSICAL EXAM:    Constitutional: Patient is sedated  Eyes: pupils dilated  reactive. sclera normal   ENMT: no external lesions   Neck: RIJ+, supple, no JVD   Respiratory: rhonchi +  Cardiovascular:s1,s2 present   Gastrointestinal: distended, BS +   Extremities: right femoral Shiley.  no cyanosis, edema or clubbing   Vascular: pulses intact   Neurological: sedated       MEDICATIONS  (STANDING):  ALBUTerol    90 MICROgram(s) HFA Inhaler 1 Puff(s) Inhalation every 4 hours  ALBUTerol    90 MICROgram(s) HFA Inhaler 1 Puff(s) Inhalation every 4 hours  aspirin  chewable 81 milliGRAM(s) Oral daily  clopidogrel Tablet 75 milliGRAM(s) Oral daily  dexmedetomidine Infusion 0.5 MICROgram(s)/kG/Hr (11.162 mL/Hr) IV Continuous <Continuous>  fentaNYL   Infusion 0.5 MICROgram(s)/kG/Hr (4.465 mL/Hr) IV Continuous <Continuous>  finasteride 5 milliGRAM(s) Oral daily  heparin  Injectable 5000 Unit(s) SubCutaneous every 12 hours  insulin glargine Injectable (LANTUS) 10 Unit(s) SubCutaneous at bedtime  insulin lispro (HumaLOG) corrective regimen sliding scale   SubCutaneous every 6 hours  meropenem IVPB 500 milliGRAM(s) IV Intermittent every 12 hours  methylPREDNISolone sodium succinate Injectable 60 milliGRAM(s) IV Push every 6 hours  norepinephrine Infusion 0.5 MICROgram(s)/kG/Min (41.859 mL/Hr) IV Continuous <Continuous>  oseltamivir 30 milliGRAM(s) Oral daily  pantoprazole  Injectable 40 milliGRAM(s) IV Push daily  pregabalin 50 milliGRAM(s) Oral two times a day  tamsulosin 0.4 milliGRAM(s) Oral at bedtime    MEDICATIONS  (PRN):  acetaminophen    Suspension 650 milliGRAM(s) Oral every 6 hours PRN For Temp greater than 38 C (100.4 F)        Allergies    doxycycline (Unknown)  penicillin (Unknown)  tetracycline (Unknown)  Valtrex (Unknown)    Intolerances        LABS:                                          11.6   5.8   )-----------( 68       ( 24 Jan 2018 06:26 )             35.4   01-24    129<L>  |  97  |  82<H>  ----------------------------<  379<H>  5.7<H>   |  20<L>  |  5.65<H>    Ca    8.0<L>      24 Jan 2018 06:26  Phos  7.6     01-24  Mg     2.3     01-24    TPro  6.0  /  Alb  2.1<L>  /  TBili  1.2  /  DBili  x   /  AST  105<H>  /  ALT  97<H>  /  AlkPhos  119  01-24      RADIOLOGY & ADDITIONAL TESTS: chest x ray pending today

## 2018-01-24 NOTE — PROGRESS NOTE ADULT - SUBJECTIVE AND OBJECTIVE BOX
Patient is a 72y old  Male who presents with a chief complaint of Respiratory distress (20 Jan 2018 14:30)    PATIENT IS SEEN AND EXAMINED IN ICU .    ALLERGIES:  doxycycline (Unknown)  penicillin (Unknown)  tetracycline (Unknown)  Valtrex (Unknown)    VITALS:    Vital Signs Last 24 Hrs  T(C): 36.9 (24 Jan 2018 15:23), Max: 36.9 (24 Jan 2018 08:00)  T(F): 98.5 (24 Jan 2018 15:23), Max: 98.5 (24 Jan 2018 15:23)  HR: 93 (24 Jan 2018 19:00) (69 - 93)  BP: 140/67 (24 Jan 2018 19:00) (96/57 - 142/68)  BP(mean): 85 (24 Jan 2018 19:00) (65 - 87)  RR: 11 (24 Jan 2018 19:00) (7 - 17)  SpO2: 97% (24 Jan 2018 19:00) (97% - 100%)    LABS:  CBC Full  -  ( 24 Jan 2018 16:06 )  WBC Count : 6.6 K/uL  Hemoglobin : 11.4 g/dL  Hematocrit : 33.6 %  Platelet Count - Automated : 70 K/uL  Mean Cell Volume : 103.1 fl  Mean Cell Hemoglobin : 35.1 pg  Mean Cell Hemoglobin Concentration : 34.0 gm/dL  Auto Neutrophil # : x  Auto Lymphocyte # : x  Auto Monocyte # : x  Auto Eosinophil # : x  Auto Basophil # : x  Auto Neutrophil % : x  Auto Lymphocyte % : x  Auto Monocyte % : x  Auto Eosinophil % : x  Auto Basophil % : x    PT/INR - ( 23 Jan 2018 12:27 )   PT: 10.0 sec;   INR: 0.92 ratio         PTT - ( 24 Jan 2018 06:26 )  PTT:27.6 sec  01-24    133<L>  |  100  |  74<H>  ----------------------------<  303<H>  4.9   |  23  |  5.18<H>    Ca    7.6<L>      24 Jan 2018 16:06  Phos  7.0     01-24  Mg     2.2     01-24    TPro  6.1  /  Alb  2.0<L>  /  TBili  1.1  /  DBili  x   /  AST  114<H>  /  ALT  92<H>  /  AlkPhos  142<H>  01-24    CAPILLARY BLOOD GLUCOSE      POCT Blood Glucose.: 284 mg/dL (24 Jan 2018 17:38)  POCT Blood Glucose.: 386 mg/dL (24 Jan 2018 11:10)  POCT Blood Glucose.: 335 mg/dL (24 Jan 2018 08:11)  POCT Blood Glucose.: 341 mg/dL (24 Jan 2018 06:03)  POCT Blood Glucose.: 392 mg/dL (24 Jan 2018 02:10)  POCT Blood Glucose.: 348 mg/dL (23 Jan 2018 22:44)        LIVER FUNCTIONS - ( 24 Jan 2018 16:06 )  Alb: 2.0 g/dL / Pro: 6.1 g/dL / ALK PHOS: 142 U/L / ALT: 92 U/L DA / AST: 114 U/L / GGT: x             ABG - ( 24 Jan 2018 09:32 )  pH: 7.23  /  pCO2: 44    /  pO2: 95    / HCO3: 18    / Base Excess: -9.3  /  SaO2: 97                  .Sputum Sputumtrap rec'd  01-22 @ 10:48   Few Streptococcus pyogenes (Group A)  Normal Respiratory Tonie present  --    Numerous polymorphonuclear leukocytes  Rare Squamous epithelial cells per low power field  Numerous Gram Positive Cocci in Pairs and Chains per oil power field      .Urine Clean Catch (Midstream)  01-20 @ 22:48   No growth  --  --      .Blood Blood-Peripheral  01-20 @ 17:03   No growth to date.  --  --          MEDICATIONS:    MEDICATIONS  (STANDING):  ALBUTerol    90 MICROgram(s) HFA Inhaler 1 Puff(s) Inhalation every 4 hours  ALBUTerol    90 MICROgram(s) HFA Inhaler 1 Puff(s) Inhalation every 4 hours  aspirin  chewable 81 milliGRAM(s) Oral daily  chlorhexidine 4% Liquid 1 Application(s) Topical daily  clopidogrel Tablet 75 milliGRAM(s) Oral daily  dexmedetomidine Infusion 0.5 MICROgram(s)/kG/Hr (11.162 mL/Hr) IV Continuous <Continuous>  fentaNYL   Infusion 0.5 MICROgram(s)/kG/Hr (4.465 mL/Hr) IV Continuous <Continuous>  finasteride 5 milliGRAM(s) Oral daily  heparin  Injectable 5000 Unit(s) SubCutaneous every 12 hours  insulin glargine Injectable (LANTUS) 10 Unit(s) SubCutaneous at bedtime  insulin lispro (HumaLOG) corrective regimen sliding scale   SubCutaneous every 6 hours  meropenem IVPB 500 milliGRAM(s) IV Intermittent every 12 hours  methylPREDNISolone sodium succinate Injectable 60 milliGRAM(s) IV Push every 6 hours  norepinephrine Infusion 0.5 MICROgram(s)/kG/Min (41.859 mL/Hr) IV Continuous <Continuous>  pantoprazole  Injectable 40 milliGRAM(s) IV Push daily  pregabalin 50 milliGRAM(s) Oral two times a day  sevelamer carbonate Powder 1600 milliGRAM(s) Oral three times a day with meals  tamsulosin 0.4 milliGRAM(s) Oral at bedtime      MEDICATIONS  (PRN):  acetaminophen    Suspension 650 milliGRAM(s) Oral every 6 hours PRN For Temp greater than 38 C (100.4 F)      REVIEW OF SYSTEMS:                           ALL ROS DONE [ X   ]    CONSTITUTIONAL:  LETHARGIC [   ], FEVER [   ], UNRESPONSIVE [   ]  CVS:  CP  [   ], SOB, [   ], PALPITATIONS [   ], DIZZYNESS [   ]  RS: COUGH [   ], SPUTUM [   ]  GI: ABDOMINAL PAIN [   ], NAUSEA [   ], VOMITINGS [   ], DIARRHEA [   ], CONSTIPATION [   ]  :  DYSURIA [   ], NOCTURIA [   ], INCREASED FREQUENCY [   ], DRIBLING [   ],  SKELETAL: PAINFUL JOINTS [   ], SWOLLEN JOINTS [   ], NECK ACHE [   ], LOW BACK ACHE [   ],  SKIN : ULCERS [   ], RASH [   ], ITCHING [   ]  CNS: HEAD ACHE [   ], DOUBLE VISION [   ], BLURRED VISION [   ], AMS / CONFUSION [   ], SEIZURES [   ], WEAKNESS [   ],TINGLING / NUMBNESS [   ]    PHYSICAL EXAMINATION:  GENERAL APPEARANCE: NO DISTRESS  HEENT:  NO PALLOR, NO  JVD,  NO   NODES, NECK SUPPLE ,                         ORAL ET + , NGT +  CVS: S1 +, S2 +,   RS: AEEB,  B/L RALES +,  B/L RONCHI +  ABD: SOFT, NT, NO, BS +  EXT: PE +  SKIN: WARM,   SKELETAL:  ROM ACCEPTABLE  CNS:  AAO X 0   , NO  DEFICITS    RADIOLOGY :    < from: Xray Chest 1 View AP-PORTABLE IMMEDIATE (01.20.18 @ 19:50) >  EXAM:  XR CHEST PORTABLE IMMED 1V                            PROCEDURE DATE:  01/20/2018          INTERPRETATION:  Chest one view    HISTORY: Central line placement    COMPARISON STUDY: Earlier the same day    Frontal expiratory view of the chest shows the heart to be similar in   size. Right jugular line as and placed extending to the level of the   superior vena cava. Feeding tube tip remains in the stomach. Endotracheal   tube is unchanged. The lungs show no definite infiltrate and there is no   evidence of pneumothorax nor pleural effusion.    IMPRESSION:  Right jugular line. No pneumothorax.    < end of copied text >      ASSESSMENT :     Sepsis  Sleep apnea  Hyperlipidemia  CAD (coronary artery disease)  DM (diabetes mellitus)  HTN (hypertension)  S/P foot surgery      PLAN:  HPI:  72 M from [facility] h/o CKD, Anxiety disorder, CAD, BPH, Carpel tunnel syndrome, compulsive disorder, DM, OA, HTN, IBS sent for dyspnea for last 3 days. Dyspnea is constant, moderate to severe in intensity, associated with fever, generalized weakness, lethargy, frequent falls, cough, myalgias and URI symptoms. Cough is productive with non bloody yellowish sputum.     Patient denies chest pain, nausea, vomiting, LOC, focal neurological deficit, abdominal pain, hematochezia, current smoking, alcohol abuse and illicit drug use.    ICU Vital Signs Last 24 Hrs  T(C): 38.4 (20 Jan 2018 10:36), Max: 38.4 (20 Jan 2018 10:22)  T(F): 101.2 (20 Jan 2018 10:36), Max: 101.2 (20 Jan 2018 10:22)  HR: 88 (20 Jan 2018 13:01) (78 - 91)  BP: 130/61 (20 Jan 2018 12:44) (82/53 - 130/61)  RR: 25 (20 Jan 2018 12:44) (25 - 25)  SpO2: 99% (20 Jan 2018 13:01) (91% - 99%) (20 Jan 2018 14:30)    -  INFLUENZA B, ACUTE BRONCHITIS,  B/L PNEUMONIA, SEPSIS, HYPOXIC RESPIRATORY FAILURE  ON  TAMIFLU, IV ROCEPHIN, AZITHROMYCIN,  NEBS. ICU F/UP IS IN PROGRESS. S/P INTUBATION ON VENT , S/P PRESSORS    - AMS DUE TO METABOLIC ENCEPHALOPATHY  - ARF / CKD , WORSENING HYPERKALEMIA AND METABOLIC ACIDOSIS - S/P HD CATHETER INSERTION  AND IS ON  HD   - GI AND DVT PROPHYLAXIS  - POOR PROGNOSIS   - DR. GARCIA

## 2018-01-24 NOTE — PROGRESS NOTE ADULT - ASSESSMENT
72 male nursing home patient with CAD, CKD, DM, HTN, OA, BPH, anxiety, presented with influenza, acute respiratory failure, septic shock.  May have superimposed pneumonia as well      1. Problem: ARDS 2/2 PNA and sepsis   CXR shows B/l pachy infiltrates   patient on MV requiring high O2   ECHO normal EF   patient Had hemodialysis yesterday   /14/100/10  ABG 7.2/45/121/18  gave vanco 1 g and started on meropenem 500 mg Q12hrs ( renal adjusted)   c/w solumedrol 125 iv stat followed by 60 mg iv Q6hrs    2.Problem: Acute hypoxic  Respiratory failure 2/2 flu vs PNA   On droplet isolation, Influenza B positive   starting on tamiflu 30mg Q 12 hrs day 4,  ceftriaxone for aspiration PNA day 2   Lactate 7.6 s/p 2 L bolus which improved to 2.6   - Procalcitonin 80.4  -urine and blood cultures are negative   - CXR Increased interstitial lung markings without significant change. New consolidation right lung. Progressed consolidation left lung.  - f/u urine legionella, failed SBT today, need high O2 to maintain sat concerns for ARDS, continue with Mechanical ventilation PEEP 8  - CT physiotherapy  - solumedrol 125 iv stat followed by 60 mg iv Q6hrs   - ID Dr Minh bain noted      Problem/Plan - 3  ·  Problem: CAD (coronary artery disease).  Plan: c/w ASA, Plavix and Lipitor   patient has H/o of CAD, had elevated trop, start patient on heparin drip for concerns of NSTEMI   - trop trending down ECHO EF 55 %with normal LV functions   - tropnin likely due to demand ischaemia 2/2 septic shock   - Holding heparin drip    - continue with aspirin, plavix  - hold Lipitor as LFT's trending up   - HD stable, currently off the pressor support   - Elevated d-dimer with normal venous doppler with no RV strain on ECHO less likely PE,   - elevated d-dimer 2/2 renal failure and sepsis   - Dr Benson cardio taya noted       Problem/Plan- 4 Thrombocytopenia   Likely due to sepsis, patient on heparin drip currently   - normal fibrogen assay and rt count       Problem/Plan - 5  YANG   patient Cr trending up Cr 3.2>> 4.7, FeNA 0.8, Yang likely pre renal 2/2 septic shock causing ATN  f/u US renal   rigth shiley placed, had first HD yesterday   urine output 5cc /hr   - Monitor Renal functions, avoid nephrotoxic drugs   Dr So nephrology consulted     Problem/Plan- 5   ·  Problem: DM (diabetes mellitus).  Plan: Diet controlled   HbA1c. 6.0  lantus 10 units at bed time   - continue with sliding scale Q 4 hrs   - finger sticksQ 2 hrs      Problem/Plan - 6:  ·  Problem: HTN (hypertension).  Plan: Holding metoprolol as BP is low.   continue with levophed and dobutamine      Problem/Plan - 7  Problem Hyperkalemia: K 6.0 >. 6.2>>5.7  Due to renal failure and metabolic acidosis    - hyperkalemia cocktail( calcium gluconate. insulin albuterol and Kayexalate given)  s/p HD yesterday   f/u potassium      Problem/Plan - 8  Metabolic acidosis2/2 renal failure  continue to monitor real functions   - Had dialysis yesterday      Problem/Plan - 9  ·  Problem: Need for prophylactic measure.  Plan: Improve score 3 on heparin  drip and Protonix for GI prophylaxis 72 male nursing home patient with CAD, CKD, DM, HTN, OA, BPH, anxiety, presented with influenza, acute respiratory failure, septic shock.  May have superimposed pneumonia as well      1. Problem: ARDS 2/2 PNA and sepsis   CXR shows B/l pachy infiltrates   patient on MV requiring high O2   ECHO normal EF   patient Had hemodialysis yesterday   /14/80/8  ABG 7.2/45/95/18  PaO2/FiO2 120 moderate ARDS   gave vanco 1 g and started on meropenem 500 mg Q12hrs ( renal adjusted)   c/w solumedrol 125 iv stat followed by 60 mg iv Q6hrs    2.Problem: Acute hypoxic  Respiratory failure 2/2 flu vs PNA   On droplet isolation, Influenza B positive   starting on tamiflu 30mg Q 12 hrs day 4,  ceftriaxone for aspiration PNA day 2   Lactate 7.6 s/p 2 L bolus which improved to 2.6   - Procalcitonin 80.4  -urine and blood cultures are negative   - CXR Increased interstitial lung markings without significant change. New consolidation right lung. Progressed consolidation left lung.  - f/u urine legionella, failed SBT today, need high O2 to maintain sat concerns for ARDS, continue with Mechanical ventilation PEEP 8  - CT physiotherapy  - solumedrol 125 iv stat followed by 60 mg iv Q6hrs   - ID Dr Minh bain noted      Problem/Plan - 3  ·  Problem: CAD (coronary artery disease).  Plan: c/w ASA, Plavix and Lipitor   patient has H/o of CAD, had elevated trop, start patient on heparin drip for concerns of NSTEMI   - trop trending down ECHO EF 55 %with normal LV functions   - tropnin likely due to demand ischaemia 2/2 septic shock   - Holding heparin drip    - continue with aspirin, plavix  - hold Lipitor as LFT's trending up   - HD stable, currently off the pressor support   - Elevated d-dimer with normal venous doppler with no RV strain on ECHO less likely PE,   - elevated d-dimer 2/2 renal failure and sepsis   - Dr Benson cardio taya noted       Problem/Plan- 4 Thrombocytopenia   Likely due to sepsis, patient on heparin drip currently   - normal fibrogen assay and rt count       Problem/Plan - 5  YANG   patient Cr trending up Cr 3.2>> 4.7, FeNA 0.8, Yang likely pre renal 2/2 septic shock causing ATN  f/u US renal   rigth shiley placed, had first HD yesterday   urine output 5cc /hr   - Monitor Renal functions, avoid nephrotoxic drugs   Dr So nephrology consulted     Problem/Plan- 5   ·  Problem: DM (diabetes mellitus).  Plan: Diet controlled   HbA1c. 6.0  lantus 10 units at bed time   - continue with sliding scale Q 4 hrs   - finger sticksQ 2 hrs      Problem/Plan - 6:  ·  Problem: HTN (hypertension).  Plan: Holding metoprolol as BP is low.   continue with levophed and dobutamine      Problem/Plan - 7  Problem Hyperkalemia: K 6.0 >. 6.2>>5.7  Due to renal failure and metabolic acidosis    - hyperkalemia cocktail( calcium gluconate. insulin albuterol and Kayexalate given)  s/p HD yesterday   f/u potassium      Problem/Plan - 8  Metabolic acidosis2/2 renal failure  continue to monitor real functions   - Had dialysis yesterday      Problem/Plan - 9  ·  Problem: Need for prophylactic measure.  Plan: Improve score 3 on heparin  drip and Protonix for GI prophylaxis

## 2018-01-24 NOTE — PROGRESS NOTE ADULT - ASSESSMENT
72 M from [facility] h/o CKD, Anxiety disorder, CAD, BPH, Carpel tunnel syndrome, compulsive disorder, DM, OA, HTN, IBS sent for dyspnea for last 3 days admitted to ICU for respiratory failure and septic shock from influenza and pneumonia has worsening renal function now in ARDS (UZU=802)    1. ESRD sec to ATN: patient is s/p HD yesterday   - Cr worsening and UO is 5-10cc/hr.   - hold nephrotoxic medications, adjust meds as per egfr  and avoid contrast studies/phosphate  enema, etc  - possible HD again today     2. Hyperkalemia   -sec to renal failure   -add dextrose, insulin, calcium and albuterol Kayexalate for high k  -low K diet   -f/u k level q 12hrs    2. Metabolic acidosis   worsening   multifactorial with acceptable pH   monitor CO 2 daily   keep PH >7.3   plan for dialysis     3. Hypophosphatemia   resolved   keep Phos>3, <5     4.Hyponatremia;mild ,secondary to ning  -avoid iv fluids  -f/u Na level daily  -Lasix as ordered  phos every other day     4. Fluid overload   - pending  CXR today   sec to CHF/NING   suggest diuretics iv lasix 60mg daily if BP ok   plan for dialysis today     5.Hypocalcemia;improving   -suggest to replace q 6hrs 1 gm   -f/u ca level daily 72 M from [facility] h/o CKD, Anxiety disorder, CAD, BPH, Carpel tunnel syndrome, compulsive disorder, DM, OA, HTN, IBS sent for dyspnea for last 3 days admitted to ICU for respiratory failure and septic shock from influenza and pneumonia has worsening renal function now in ARDS (WMZ=392)    1. ESRD sec to ATN: patient is s/p HD yesterday   - Cr worsening and UO is 5-10cc/hr.   - hold nephrotoxic medications, adjust meds as per egfr  and avoid contrast studies/phosphate  enema, etc  - possible HD again today     2. Hyperkalemia   -sec to renal failure   -add dextrose, insulin, calcium and albuterol Kayexalate for high k  -low K diet   -f/u k level q 12hrs    2. Metabolic acidosis   worsening   multifactorial with acceptable pH   monitor CO 2 daily   keep PH >7.3   plan for dialysis     3. Hypophosphatemia   resolved   keep Phos>3, <5     4.Hyponatremia;mild ,secondary to ning  -avoid iv fluids  -f/u Na level daily  -Lasix as ordered  phos every other day     4. Fluid overload   bilateral opacities in the CXR   sec to CHF/NING   suggest diuretics iv lasix 60mg daily if BP ok   plan for dialysis today     5.Hypocalcemia;improving   -suggest to replace q 6hrs 1 gm   -f/u ca level daily 72 M from [facility] h/o CKD, Anxiety disorder, CAD, BPH, Carpel tunnel syndrome, compulsive disorder, DM, OA, HTN, IBS sent for dyspnea for last 3 days admitted to ICU for respiratory failure and septic shock from influenza and pneumonia has worsening renal function now in ARDS (AQA=805)    1. ESRD sec to ATN: patient is s/p HD yesterday   - pts renal function is poor with and U/O is 5-10cc/hr.   - hold nephrotoxic medications, adjust meds as per egfr  and avoid contrast studies/phosphate  enema, etc  - we will plan for  HD again today x 2.5 hrs     2. Hyperkalemia   -sec to renal failure   - dextrose, insulin, calcium and albuterol Kayexalate for high k prn  -keep <5 and >4  -low K diet   -f/u k level q 12hrs  -hd again today    2. Metabolic acidosis   worsening   -add 1 ampule Nahco3 iv push   multifactorial with acceptable pH   monitor CO 2 daily   keep PH >7.3   plan for dialysis     3. Hypophosphatemia   -worsening  -add [id number] mg tid via ngt  -f/u phos level daily  -keep Phos>3, <5     4.Hyponatremia;mild ,secondary to ning  -avoid iv fluids  -f/u Na level daily  -add fluid restriction to 1 liter per day    4. Fluid overload   bilateral opacities in the CXR   sec to CHF/NING   suggest diuretics iv lasix 60mg daily if BP ok   plan for dialysis today     5.Hypocalcemia;improving (corrected ca )  -suggest to replace q 6hrs 1 gm to keep corrected ca >8.5 and <10  -f/u ca level daily 72 M from [facility] h/o CKD, Anxiety disorder, CAD, BPH, Carpel tunnel syndrome, compulsive disorder, DM, OA, HTN, IBS sent for dyspnea for last 3 days admitted to ICU for respiratory failure and septic shock from influenza and pneumonia has worsening renal function now in ARDS (SCW=400)    1. ESRD sec to ATN: patient is s/p HD yesterday   - pts renal function is poor with and U/O is 5-10cc/hr.   - hold nephrotoxic medications, adjust meds as per egfr  and avoid contrast studies/phosphate  enema, etc  - we will plan for  HD again today x 2.5 hrs     2. Hyperkalemia   -sec to renal failure   - dextrose, insulin, calcium and albuterol Kayexalate for high k prn  -keep <5 and >4  -low K diet   -f/u k level q 12hrs  -hd again today    2. Metabolic acidosis   worsening   -add 1 ampule Nahco3 iv push   multifactorial with acceptable pH   monitor CO 2 daily   keep PH >7.3   plan for dialysis     3. Hypophosphatemia   -worsening  -add [id number] mg tid via ngt  -f/u phos level daily  -keep Phos>3, <5     4.Hyponatremia;mild ,secondary to ning  -avoid iv fluids  -f/u Na level daily  -add fluid restriction to 1 liter per day    4. Fluid overload   bilateral opacities in the CXR   sec to CHF/NING   suggest diuretics iv lasix 60mg daily if BP ok   plan for dialysis today     5.Hypocalcemia;improving (corrected ca )  -suggest to replace q 6hrs 1 gm to keep corrected ca >8.5 and <10  -f/u ca level daily  pts prognosis very poor with worsening resp.status/ARDS.

## 2018-01-24 NOTE — PROGRESS NOTE ADULT - SUBJECTIVE AND OBJECTIVE BOX
Patient remains intubated  	  MEDICATIONS:  MEDICATIONS  (STANDING):  ALBUTerol    90 MICROgram(s) HFA Inhaler 1 Puff(s) Inhalation every 4 hours  ALBUTerol    90 MICROgram(s) HFA Inhaler 1 Puff(s) Inhalation every 4 hours  aspirin  chewable 81 milliGRAM(s) Oral daily  clopidogrel Tablet 75 milliGRAM(s) Oral daily  dexmedetomidine Infusion 0.5 MICROgram(s)/kG/Hr (11.162 mL/Hr) IV Continuous <Continuous>  fentaNYL   Infusion 0.5 MICROgram(s)/kG/Hr (4.465 mL/Hr) IV Continuous <Continuous>  finasteride 5 milliGRAM(s) Oral daily  heparin  Injectable 5000 Unit(s) SubCutaneous every 12 hours  insulin glargine Injectable (LANTUS) 10 Unit(s) SubCutaneous at bedtime  insulin lispro (HumaLOG) corrective regimen sliding scale   SubCutaneous every 6 hours  meropenem IVPB 500 milliGRAM(s) IV Intermittent every 12 hours  methylPREDNISolone sodium succinate Injectable 60 milliGRAM(s) IV Push every 6 hours  norepinephrine Infusion 0.5 MICROgram(s)/kG/Min (41.859 mL/Hr) IV Continuous <Continuous>  oseltamivir 30 milliGRAM(s) Oral daily  pantoprazole  Injectable 40 milliGRAM(s) IV Push daily  pregabalin 50 milliGRAM(s) Oral two times a day  tamsulosin 0.4 milliGRAM(s) Oral at bedtime      LABS:	 	    CARDIAC MARKERS:  CARDIAC MARKERS ( 22 Jan 2018 10:16 )  0.911 ng/mL / x     / 1005 U/L / x     / 3.3 ng/mL  CARDIAC MARKERS ( 21 Jan 2018 15:24 )  1.980 ng/mL / x     / 1395 U/L / x     / 2.9 ng/mL                                11.6   5.8   )-----------( 68       ( 24 Jan 2018 06:26 )             35.4     Hemoglobin: 11.6 g/dL (01-24 @ 06:26)  Hemoglobin: 12.5 g/dL (01-23 @ 18:30)  Hemoglobin: 11.7 g/dL (01-23 @ 08:35)  Hemoglobin: 11.9 g/dL (01-23 @ 06:12)  Hemoglobin: 13.4 g/dL (01-22 @ 21:16)      01-24    129<L>  |  97  |  82<H>  ----------------------------<  379<H>  5.7<H>   |  20<L>  |  5.65<H>    Ca    8.0<L>      24 Jan 2018 06:26  Phos  7.6     01-24  Mg     2.3     01-24    TPro  6.0  /  Alb  2.1<L>  /  TBili  1.2  /  DBili  x   /  AST  105<H>  /  ALT  97<H>  /  AlkPhos  119  01-24    Creatinine Trend: 5.65<--, 4.78<--, 5.26<--, 4.94<--, 4.73<--, 3.77<--    COAGS:   PTT - ( 24 Jan 2018 06:26 )  PTT:27.6 sec    proBNP:   Lipid Profile:   HgA1c:   TSH:       PHYSICAL EXAM:  T(C): 36.9 (01-24-18 @ 08:00), Max: 38.7 (01-23-18 @ 15:45)  HR: 85 (01-24-18 @ 09:00) (69 - 103)  BP: 116/60 (01-24-18 @ 09:00) (94/62 - 142/68)  RR: 8 (01-24-18 @ 09:00) (8 - 22)  SpO2: 97% (01-24-18 @ 09:00) (90% - 100%)  Wt(kg): --  I&O's Summary    23 Jan 2018 07:01  -  24 Jan 2018 07:00  --------------------------------------------------------  IN: 1376.8 mL / OUT: 220 mL / NET: 1156.8 mL          HEENT:   Normal oral mucosa,   Lymphatic: No obvious lymphadenopathy , no edema  Cardiovascular: Normal S1 S2, No JVD, 1/6 LAMIN murmur, Peripheral pulses palpable 2+ bilaterally  Respiratory: coarse mechanical BS, normal effort 	  Gastrointestinal:  Soft, Non-tender, + BS	  Skin: No rashes,  No cyanosis, warm to touch  Musculoskeletal: unable to assess  Psychiatry:  Unable to assess Mood & affect     TELEMETRY: 	  sinus       ASSESSMENT/PLAN: 	72y Male [facility] h/o CKD, Anxiety disorder, CAD, BPH, Carpel tunnel syndrome, compulsive disorder, DM, OA, HTN, IBS sent for dyspnea for last 3 days found with (+) flu, PNA likely septic shock normal LV function.    - HD per renal  - Abx per MICU  - Will follow with you    Jake Benson MD, FACC  [facility], [facility]  [address] [place] [address]  PHONE:  [phone]  BEEPER : [phone]

## 2018-01-25 NOTE — PROGRESS NOTE ADULT - ATTENDING COMMENTS
- ARDS on vent support. Less FiO2 requirement today  - anuric on dialysis  -continue steroids, av antibx  - hemodynamic support

## 2018-01-25 NOTE — CONSULT NOTE ADULT - SUBJECTIVE AND OBJECTIVE BOX
HPI:  72 M from [facility] h/o CKD, Anxiety disorder, CAD, BPH, Carpel tunnel syndrome, compulsive disorder, DM, OA, HTN, IBS sent for dyspnea, intubated and admitted to ICU for acute hypoxic respiratory failure secondary to flu, PNA.  Also developed NING, requiring new dialysis.     PAST MEDICAL & SURGICAL HISTORY:  Sleep apnea: hx of spleep  Hyperlipidemia  CAD (coronary artery disease)  DM (diabetes mellitus)  HTN (hypertension)  S/P foot surgery    SOCIAL HISTORY:    Admitted from:  Assisted Living  Substance abuse history:              Tobacco hx: Unknown                 Alcohol hx:  Unknown            Home Opioid hx: Unknown  [employer]: Jain                                   Preferred Language: English    Surrogate/HCP/Guardian: Gene Mcknight (son) [phone]                FAMILY HISTORY: unknown    Baseline ADLs (prior to admission): Mostly independent, per son    Allergies:  doxycycline (Unknown)  penicillin (Unknown)  tetracycline (Unknown)  Valtrex (Unknown)            Review of Systems: Unable to obtain due to poor mentation    MEDICATIONS  (STANDING):  ALBUTerol    90 MICROgram(s) HFA Inhaler 1 Puff(s) Inhalation every 6 hours  aspirin  chewable 81 milliGRAM(s) Oral daily  chlorhexidine 4% Liquid 1 Application(s) Topical daily  clopidogrel Tablet 75 milliGRAM(s) Oral daily  dexmedetomidine Infusion 0.5 MICROgram(s)/kG/Hr (11.162 mL/Hr) IV Continuous <Continuous>  fentaNYL   Infusion 0.5 MICROgram(s)/kG/Hr (4.465 mL/Hr) IV Continuous <Continuous>  finasteride 5 milliGRAM(s) Oral daily  heparin  Injectable 5000 Unit(s) SubCutaneous every 12 hours  insulin glargine Injectable (LANTUS) 10 Unit(s) SubCutaneous at bedtime  insulin lispro (HumaLOG) corrective regimen sliding scale   SubCutaneous every 6 hours  lactulose Syrup 30 Gram(s) Oral daily  meropenem IVPB 500 milliGRAM(s) IV Intermittent every 12 hours  methylPREDNISolone sodium succinate Injectable 60 milliGRAM(s) IV Push every 6 hours  metoprolol     tartrate 12.5 milliGRAM(s) Oral two times a day  pantoprazole  Injectable 40 milliGRAM(s) IV Push daily  polyethylene glycol 3350 17 Gram(s) Oral daily  pregabalin 50 milliGRAM(s) Oral two times a day  propofol Infusion 5 MICROgram(s)/kG/Min (2.88 mL/Hr) IV Continuous <Continuous>  sevelamer carbonate Powder 1600 milliGRAM(s) Oral three times a day with meals  sodium bicarbonate  Injectable 50 milliEquivalent(s) IV Push once  tamsulosin 0.4 milliGRAM(s) Oral at bedtime    MEDICATIONS  (PRN):  acetaminophen    Suspension 650 milliGRAM(s) Oral every 6 hours PRN For Temp greater than 38 C (100.4 F)    PHYSICAL EXAM:    Vital Signs Last 24 Hrs  T(C): 37 (25 Jan 2018 12:00), Max: 38.1 (24 Jan 2018 20:11)  T(F): 98.6 (25 Jan 2018 12:00), Max: 100.5 (24 Jan 2018 20:11)  HR: 99 (25 Jan 2018 15:00) (88 - 110)  BP: 170/76 (25 Jan 2018 15:00) (132/68 - 171/81)  BP(mean): 99 (25 Jan 2018 15:00) (82 - 102)  RR: 17 (25 Jan 2018 15:00) (7 - 20)  SpO2: 88% (25 Jan 2018 15:00) (88% - 100%)    General: alert  oriented x __0__  sedated  Karnofsky Performance Score/Palliative Performance Status Version2:  20%    HEENT: ET tube  Lungs: comfortable  CV: normal   GI:  incontinent  : lopez  Musculoskeletal: weakness   Skin: normal    Neuro: sedated  Oral intake ability: unable/only mouth care  Diet: NPO with tube feeds    LABS:                        11.0   8.0   )-----------( 85       ( 25 Jan 2018 06:12 )             32.1     01-25    134<L>  |  98  |  88<H>  ----------------------------<  322<H>  4.6   |  21<L>  |  6.04<H>    Ca    7.4<L>      25 Jan 2018 06:12  Phos  7.5     01-25  Mg     2.2     01-25    TPro  6.1  /  Alb  2.1<L>  /  TBili  1.1  /  DBili  x   /  AST  129<H>  /  ALT  86<H>  /  AlkPhos  165<H>  01-25    ADVANCE DIRECTIVES: Full Code   Advanced Care Planning discussion total time spent:  30 Minutes

## 2018-01-25 NOTE — PROGRESS NOTE ADULT - ASSESSMENT
72 M from [facility] h/o CKD, Anxiety disorder, CAD, BPH, Carpel tunnel syndrome, compulsive disorder, DM, OA, HTN, IBS sent for dyspnea for last 3 days admitted to ICU for respiratory failure and septic shock from influenza and pneumonia has worsening renal function now in ARDS (PIF today is= 152)    1. ESRD sec to ATN:   - patient had second round of HD yesterday   - pts renal function is poor with U/O still 5-10cc/hr.   - hold nephrotoxic medications, adjust meds as per egfr  and avoid contrast studies/phosphate  enema, etc    2. Hyperkalemia   - resolved   -keep <5 and >4  -low K diet   -f/u k level q 12hrs      2. Metabolic acidosis   better today   multifactorial with acceptable pH   monitor CO 2 daily   keep PH >7.3       3. Hyperphosphatemia   - still elevated   -add [id number] mg tid via ngt  -f/u phos level daily  -keep Phos>3, <5     4.Hyponatremia;mild, secondary to ning  -avoid iv fluids  -f/u Na level daily  -add fluid restriction to 1 liter per day    4. Fluid overload   bilateral opacities in the CXR   sec to CHF/NING   suggest diuretics iv lasix 60mg daily if BP ok   plan for dialysis today     5.Hypocalcemia;improving (corrected ca )  -suggest to replace q 6hrs 1 gm to keep corrected ca >8.5 and <10  -f/u ca level daily  pts prognosis very poor with worsening resp status/ARDS. 72 M from [facility] h/o CKD, Anxiety disorder, CAD, BPH, Carpel tunnel syndrome, compulsive disorder, DM, OA, HTN, IBS sent for dyspnea for last 3 days admitted to ICU for respiratory failure and septic shock from influenza and pneumonia has worsening renal function now in ARDS (PIF today is= 152)    1. ESRD sec to ATN:   - patient had second round of HD yesterday   - pts renal function is poor with U/O still 5-10cc/hr.   -we will plan for hd again tomorrow  - hold nephrotoxic medications, adjust meds as per egfr  and avoid contrast studies/phosphate  enema, etc    2. Hyperkalemia   - resolved   -keep <5 and >4  -low K diet   -f/u k level q 12hrs      2. Metabolic acidosis   better today   multifactorial with acceptable pH   monitor CO 2 daily   keep PH >7.3   add iv Nahco3 1 ampule if PH<7.3 daily  -hd tomorrow      3. Hyperphosphatemia   - still elevated   -continue [id number] mg tid via ngt  -f/u phos level daily  -keep Phos>3, <5     4.Hyponatremia;mild, secondary to ning  -avoid iv fluids  -f/u Na level daily  -continue  fluid restriction to 1 liter per day    4. Fluid overload   bilateral opacities in the CXR , most likely pneumnia, less likely fluid overlaod  sec to ? CHF/NING   -we will plan to remove 2.5 to 3 kg in hd tomorrow as tolerated with bp    5.Hypocalcemia;improving (corrected ca )  -suggest to replace q 6hrs 1 gm to keep corrected ca >8.5 and <10  -f/u ca level daily  pts prognosis very poor with worsening resp status/ARDS.

## 2018-01-25 NOTE — CONSULT NOTE ADULT - PROBLEM SELECTOR RECOMMENDATION 4
Met with son Gene Mcknight and his  and spoke about patient's current condition and overall health prior to this admission.  The patient never previously made his wishes known to his son. For now, the son wants him to remain full code and wants everything to be done for him. He is also communicating with the patient's father who lives in [place].  According to surrogate decision making, his son would be the primary decision maker.  Will continue to have ongoign goals of care discussions with the son. D/W Dr. See

## 2018-01-25 NOTE — PROGRESS NOTE ADULT - ASSESSMENT
72 male nursing home patient with CAD, CKD, DM, HTN, OA, BPH, anxiety, presented with influenza, acute respiratory failure, septic shock.  May have superimposed pneumonia as well      1. Problem: ARDS 2/2 PNA and sepsis   CXR shows B/l pachy infiltrates   patient on MV requiring high O2   ECHO normal EF   patient Had hemodialysis yesterday   /14/60/10  ABG 7.2/41/122/18  PaO2/FiO2 120>> 203 moderate ARDS   gave vanco 1 g and started on meropenem 500 mg Q12hrs ( renal adjusted)   c/w solumedrol 125 iv stat followed by 60 mg iv Q6hrs    2.Problem: Acute hypoxic  Respiratory failure 2/2 flu vs PNA   On droplet isolation, Influenza B positive   starting on tamiflu 30mg Q 12 hrs day 4,  ceftriaxone for aspiration PNA day 2   Lactate 7.6 s/p 2 L bolus which improved to 2.6   - Procalcitonin 80.4  -urine and blood cultures are negative, urine legionella negative   - CXR Increased interstitial lung markings without significant change. New consolidation right lung. Progressed consolidation left lung.  - f/u urine legionella, failed SBT today, need high O2 to maintain sat concerns for ARDS, continue with Mechanical ventilation PEEP 8  - CT physiotherapy  - solumedrol 125 iv stat followed by 60 mg iv Q6hrs   - last day for Tamiflu   - repeat Xray shows similar findings    - ID Dr Minh bain noted      Problem/Plan - 3  ·  Problem: CAD (coronary artery disease).  Plan: c/w ASA, Plavix and Lipitor   patient has H/o of CAD, had elevated trop, start patient on heparin drip for concerns of NSTEMI   - trop trending down ECHO EF 55 %with normal LV functions   - tropnin likely due to demand ischaemia 2/2 septic shock   - Holding heparin drip    - continue with aspirin, plavix  - hold Lipitor as LFT's trending up   - HD stable, currently off the pressor support   - Elevated d-dimer with normal venous doppler with no RV strain on ECHO less likely PE,   - elevated d-dimer 2/2 renal failure and sepsis   - Dr Benson cardio taya noted       Problem/Plan- 4 Thrombocytopenia   Likely due to sepsis,  - normal fibrogen assay and rt count   -continues to improve       Problem/Plan - 5  NING   patient Cr trending up Cr 3.2>> 4.7, FeNA 0.8, Ning likely pre renal 2/2 septic shock causing ATN  f/u US renal   rigth shiley placed, had first HD yesterday   urine output 5cc /hr   - Monitor Renal functions, avoid nephrotoxic drugs   - HD yesterday 1.5 L removed   Dr Judah bain noted     Problem/Plan- 5   ·  Problem: DM (diabetes mellitus).  Plan: Diet controlled   HbA1c. 6.0  lantus 10 units at bed time   - continue with sliding scale Q 4 hrs   - finger sticksQ 2 hrs      Problem/Plan - 6:  ·  Problem: HTN (hypertension).  Plan: Holding metoprolol as BP is low.   continue with levophed and dobutamine      Problem/Plan - 7  Problem Hyperkalemia: K 6.0 >. 6.2>>5.7>>4.7  Due to renal failure and metabolic acidosis    - hyperkalemia cocktail( calcium gluconate. insulin albuterol and Kayexalate given)  s/p HD yesterday   f/u potassium      Problem/Plan - 8   Metabolic acidosis2/2 renal failure,   continues to improve   continue to monitor real functions   - Had dialysis yesterday      Problem/Plan - 9  Hyperphosphatemia  Phosp 7.5   will continue with Renagel 1600mg TID        Problem/Plan - 10  ·  Problem: Need for prophylactic measure.  Plan: Improve score 3 on heparin  drip and Protonix for GI prophylaxis

## 2018-01-25 NOTE — PROGRESS NOTE ADULT - SUBJECTIVE AND OBJECTIVE BOX
INTERVAL HPI/ OVERNIGHT EVENTS: patient is vented and sedated.   U/O poor    VITAL SIGNS:  Vital Signs Last 24 Hrs  T(C): 37.1 (25 Jan 2018 06:00), Max: 38.1 (24 Jan 2018 20:11)  T(F): 98.7 (25 Jan 2018 06:00), Max: 100.5 (24 Jan 2018 20:11)  HR: 95 (25 Jan 2018 08:46) (82 - 104)  BP: 152/72 (25 Jan 2018 08:00) (111/69 - 152/72)  BP(mean): 88 (25 Jan 2018 08:00) (74 - 90)  RR: 14 (25 Jan 2018 08:00) (7 - 16)  SpO2: 98% (25 Jan 2018 08:46) (96% - 100%)      01-24 @ 07:01  -  01-25 @ 07:00  --------------------------------------------------------  IN: 1563.6 mL / OUT: 55 mL / NET: 1508.6 mL          PHYSICAL EXAM:    Constitutional: Patient is sedated  Eyes: pupils dilated  reactive. sclera normal   ENMT: no external lesions   Neck: RIJ+, supple, no JVD   Respiratory: diffuse crackles   Cardiovascular:s1,s2 present   Gastrointestinal: distended, BS +   Extremities: right femoral Shiley.  no cyanosis, edema or clubbing   Vascular: pulses 2+  Neurological: sedated       MEDICATIONS  (STANDING):  MEDICATIONS  (STANDING):  ALBUTerol    90 MICROgram(s) HFA Inhaler 1 Puff(s) Inhalation every 6 hours  aspirin  chewable 81 milliGRAM(s) Oral daily  chlorhexidine 4% Liquid 1 Application(s) Topical daily  clopidogrel Tablet 75 milliGRAM(s) Oral daily  dexmedetomidine Infusion 0.5 MICROgram(s)/kG/Hr (11.162 mL/Hr) IV Continuous <Continuous>  fentaNYL   Infusion 0.5 MICROgram(s)/kG/Hr (4.465 mL/Hr) IV Continuous <Continuous>  finasteride 5 milliGRAM(s) Oral daily  heparin  Injectable 5000 Unit(s) SubCutaneous every 12 hours  insulin glargine Injectable (LANTUS) 10 Unit(s) SubCutaneous at bedtime  insulin lispro (HumaLOG) corrective regimen sliding scale   SubCutaneous every 6 hours  meropenem IVPB 500 milliGRAM(s) IV Intermittent every 12 hours  methylPREDNISolone sodium succinate Injectable 60 milliGRAM(s) IV Push every 6 hours  pantoprazole  Injectable 40 milliGRAM(s) IV Push daily  pregabalin 50 milliGRAM(s) Oral two times a day  sevelamer carbonate Powder 1600 milliGRAM(s) Oral three times a day with meals  tamsulosin 0.4 milliGRAM(s) Oral at bedtime    MEDICATIONS  (PRN):  acetaminophen    Suspension 650 milliGRAM(s) Oral every 6 hours PRN For Temp greater than 38 C (100.4 F)        Allergies    doxycycline (Unknown)  penicillin (Unknown)  tetracycline (Unknown)  Valtrex (Unknown)    Intolerances        LABS:                                             11.0   8.0   )-----------( 85       ( 25 Jan 2018 06:12 )             32.1   01-25    134<L>  |  98  |  88<H>  ----------------------------<  322<H>  4.6   |  21<L>  |  6.04<H>    Ca    7.4<L>      25 Jan 2018 06:12  Phos  7.5     01-25  Mg     2.2     01-25    TPro  6.1  /  Alb  2.1<L>  /  TBili  1.1  /  DBili  x   /  AST  129<H>  /  ALT  86<H>  /  AlkPhos  165<H>  01-25      RADIOLOGY & ADDITIONAL TESTS: chest x ray shows persistent bilateral opacities

## 2018-01-25 NOTE — PROGRESS NOTE ADULT - SUBJECTIVE AND OBJECTIVE BOX
Patient remains intubated  	  acetaminophen    Suspension 650 milliGRAM(s) Oral every 6 hours PRN  ALBUTerol    90 MICROgram(s) HFA Inhaler 1 Puff(s) Inhalation every 6 hours  aspirin  chewable 81 milliGRAM(s) Oral daily  chlorhexidine 4% Liquid 1 Application(s) Topical daily  clopidogrel Tablet 75 milliGRAM(s) Oral daily  dexmedetomidine Infusion 0.5 MICROgram(s)/kG/Hr IV Continuous <Continuous>  fentaNYL   Infusion 0.5 MICROgram(s)/kG/Hr IV Continuous <Continuous>  finasteride 5 milliGRAM(s) Oral daily  heparin  Injectable 5000 Unit(s) SubCutaneous every 12 hours  insulin glargine Injectable (LANTUS) 10 Unit(s) SubCutaneous at bedtime  insulin lispro (HumaLOG) corrective regimen sliding scale   SubCutaneous every 6 hours  lactulose Syrup 30 Gram(s) Oral daily  meropenem IVPB 500 milliGRAM(s) IV Intermittent every 12 hours  methylPREDNISolone sodium succinate Injectable 60 milliGRAM(s) IV Push every 6 hours  pantoprazole  Injectable 40 milliGRAM(s) IV Push daily  polyethylene glycol 3350 17 Gram(s) Oral daily  pregabalin 50 milliGRAM(s) Oral two times a day  sevelamer carbonate Powder 1600 milliGRAM(s) Oral three times a day with meals  tamsulosin 0.4 milliGRAM(s) Oral at bedtime                            11.0   8.0   )-----------( 85       ( 25 Jan 2018 06:12 )             32.1       Hemoglobin: 11.0 g/dL (01-25 @ 06:12)  Hemoglobin: 11.4 g/dL (01-24 @ 16:06)  Hemoglobin: 11.6 g/dL (01-24 @ 06:26)  Hemoglobin: 12.5 g/dL (01-23 @ 18:30)  Hemoglobin: 11.7 g/dL (01-23 @ 08:35)      01-25    134<L>  |  98  |  88<H>  ----------------------------<  322<H>  4.6   |  21<L>  |  6.04<H>    Ca    7.4<L>      25 Jan 2018 06:12  Phos  7.5     01-25  Mg     2.2     01-25    TPro  6.1  /  Alb  2.1<L>  /  TBili  1.1  /  DBili  x   /  AST  129<H>  /  ALT  86<H>  /  AlkPhos  165<H>  01-25    Creatinine Trend: 6.04<--, 5.18<--, 5.65<--, 4.78<--, 5.26<--, 4.94<--    COAGS:           T(C): 37.1 (01-25-18 @ 06:00), Max: 38.1 (01-24-18 @ 20:11)  HR: 106 (01-25-18 @ 11:59) (82 - 106)  BP: 152/71 (01-25-18 @ 09:00) (111/69 - 152/72)  RR: 14 (01-25-18 @ 08:00) (7 - 16)  SpO2: 93% (01-25-18 @ 11:59) (93% - 100%)  Wt(kg): --    I&O's Summary    24 Jan 2018 07:01  -  25 Jan 2018 07:00  --------------------------------------------------------  IN: 1563.6 mL / OUT: 55 mL / NET: 1508.6 mL            HEENT:   Normal oral mucosa,   Lymphatic: No obvious lymphadenopathy , no edema  Cardiovascular: Normal S1 S2, No JVD, 1/6 LAMIN murmur, Peripheral pulses palpable 2+ bilaterally  Respiratory: coarse mechanical BS, normal effort 	  Gastrointestinal:  Soft, Non-tender, + BS	  Skin: No rashes,  No cyanosis, warm to touch  Musculoskeletal: unable to assess  Psychiatry:  Unable to assess Mood & affect     TELEMETRY: 	  sinus       ASSESSMENT/PLAN: 	72y Male [facility] h/o CKD, Anxiety disorder, CAD, BPH, Carpel tunnel syndrome, compulsive disorder, DM, OA, HTN, IBS sent for dyspnea for last 3 days found with (+) flu, PNA likely septic shock normal LV function NING now on HD    - HD per renal  - Abx per MICU  - Will follow with you    Jake Benson MD, FACC  [facility], [facility]  [address] [place] [address]  PHONE:  [phone]  BEEPER : [phone]

## 2018-01-26 NOTE — PROGRESS NOTE ADULT - ASSESSMENT
72 male nursing home patient with CAD, CKD, DM, HTN, OA, BPH, anxiety, presented with influenza, acute respiratory failure, septic shock.  May have superimposed pneumonia as well      1. Problem: ARDS 2/2 PNA and sepsis   CXR shows B/l pachy infiltrates   patient on MV requiring high O2   ECHO normal EF   patient Had hemodialysis yesterday   /14/60/10  ABG 7.2/41/122/18  PaO2/FiO2 120>> 203 moderate ARDS   gave vanco 1 g and started on meropenem 500 mg Q12hrs ( renal adjusted)   c/w solumedrol 125 iv stat followed by 60 mg iv Q6hrs    2.Problem: Acute hypoxic  Respiratory failure 2/2 flu vs PNA   On droplet isolation, Influenza B positive   starting on tamiflu 30mg Q 12 hrs day 4,  ceftriaxone for aspiration PNA day 2   Lactate 7.6 s/p 2 L bolus which improved to 2.6   - Procalcitonin 80.4  -urine and blood cultures are negative, urine legionella negative   - CXR Increased interstitial lung markings without significant change. New consolidation right lung. Progressed consolidation left lung.  - f/u urine legionella, failed SBT today, need high O2 to maintain sat concerns for ARDS, continue with Mechanical ventilation PEEP 8  - CT physiotherapy  - solumedrol 125 iv stat followed by 60 mg iv Q6hrs   - last day for Tamiflu   - repeat Xray shows similar findings    - ID Dr Minh bain noted      Problem/Plan - 3  ·  Problem: CAD (coronary artery disease).  Plan: c/w ASA, Plavix and Lipitor   patient has H/o of CAD, had elevated trop, start patient on heparin drip for concerns of NSTEMI   - trop trending down ECHO EF 55 %with normal LV functions   - tropnin likely due to demand ischaemia 2/2 septic shock   - Holding heparin drip    - continue with aspirin, plavix  - hold Lipitor as LFT's trending up   - HD stable, currently off the pressor support   - Elevated d-dimer with normal venous doppler with no RV strain on ECHO less likely PE,   - elevated d-dimer 2/2 renal failure and sepsis   - Dr Benson cardio taya noted       Problem/Plan- 4 Thrombocytopenia   Likely due to sepsis,  - normal fibrogen assay and rt count   -continues to improve       Problem/Plan - 5  NING   patient Cr trending up Cr 3.2>> 4.7, FeNA 0.8, Ning likely pre renal 2/2 septic shock causing ATN  f/u US renal   rigth shiley placed, had first HD yesterday   urine output 5cc /hr   - Monitor Renal functions, avoid nephrotoxic drugs   - HD yesterday 1.5 L removed   Dr Judah bain noted     Problem/Plan- 5   ·  Problem: DM (diabetes mellitus).  Plan: Diet controlled   HbA1c. 6.0  lantus 10 units at bed time   - continue with sliding scale Q 4 hrs   - finger sticksQ 2 hrs      Problem/Plan - 6:  ·  Problem: HTN (hypertension).  Plan: Holding metoprolol as BP is low.   continue with levophed and dobutamine      Problem/Plan - 7  Problem Hyperkalemia: K 6.0 >. 6.2>>5.7>>4.7  Due to renal failure and metabolic acidosis    - hyperkalemia cocktail( calcium gluconate. insulin albuterol and Kayexalate given)  s/p HD yesterday   f/u potassium      Problem/Plan - 8   Metabolic acidosis2/2 renal failure, give 50 meq Bicarbonate yesterday   continues to improve   continue to monitor real functions   dialysis today      Problem/Plan - 9  Hyperphosphatemia  Phosp 7.5   will continue with Renagel 1600mg TID        Problem/Plan - 10  ·  Problem: Need for prophylactic measure.  Plan: Improve score 3 on heparin  drip and Protonix for GI prophylaxis 72 male nursing home patient with CAD, CKD, DM, HTN, OA, BPH, anxiety, presented with influenza, acute respiratory failure, septic shock.  May have superimposed pneumonia as well      1. Problem: ARDS 2/2 PNA and sepsis   CXR shows B/l pachy infiltrates   patient on MV requiring high O2   ECHO normal EF   patient Had hemodialysis yesterday   /14/70/10  ABG 7.2/44/77/18  PaO2/FiO2 120>.110 moderate ARDS   gave vanco 1 g and started on meropenem 500 mg Q12hrs ( renal adjusted)   c/w solumedrol 125 iv stat followed by 60 mg iv Q6hrs  precedex stopped yesterday currently on propofol and fentanyl     2.Problem: Acute hypoxic  Respiratory failure 2/2 flu vs PNA   On droplet isolation, Influenza B positive   starting on tamiflu 30mg Q 12 hrs day 4,  ceftriaxone for aspiration PNA day 2   Lactate 7.6 s/p 2 L bolus which improved to 2.6   - Procalcitonin 80.4  -urine and blood cultures are negative, urine legionella negative   - CXR Increased interstitial lung markings without significant change. New consolidation right lung. Progressed consolidation left lung.  - need high O2 to maintain sat concerns for ARDS, continue with Mechanical ventilation PEEP 10  - CT physiotherapy  - solumedrol 125 iv stat followed by 60 mg iv Q6hrs   - tamiflu dose completed    - repeat Xray shows similar findings    - ID Dr Minh bain noted      Problem/Plan - 3  ·  Problem: CAD (coronary artery disease).  Plan: c/w ASA, Plavix and Lipitor   patient has H/o of CAD, had elevated trop, start patient on heparin drip for concerns of NSTEMI   - trop trending down ECHO EF 55 %with normal LV functions   - tropnin likely due to demand ischaemia 2/2 septic shock   - Holding heparin drip    - continue with aspirin, plavix  - hold Lipitor as LFT's trending up   - HD stable, currently off the pressor support   - Elevated d-dimer with normal venous doppler with no RV strain on ECHO less likely PE,   - elevated d-dimer 2/2 renal failure and sepsis   - Dr Benson cardio taya noted       Problem/Plan- 4 Thrombocytopenia   Likely due to sepsis,  - normal fibrogen assay and rt count   -continues to improve       Problem/Plan - 5  NING   patient Cr trending up Cr 3.2>> 4.7, FeNA 0.8, Ning likely pre renal 2/2 septic shock causing ATN  f/u US renal   rigth shijay placed, had first HD yesterday   urine output 5cc /hr   - Monitor Renal functions, avoid nephrotoxic drugs   - HD yesterday 1.5 L removed   Dr Judah bain noted     Problem/Plan- 5   ·  Problem: DM (diabetes mellitus).  Plan: Diet controlled   HbA1c. 6.0  lantus 10 units at bed time   - continue with sliding scale Q 4 hrs   - finger sticksQ 2 hrs      Problem/Plan - 6:  ·  Problem: HTN (hypertension).  Plan: Holding metoprolol as BP is low.   continue with levophed and dobutamine      Problem/Plan - 7  Problem Hyperkalemia: K 6.0 >. 6.2>>5.7>>4.7  Due to renal failure and metabolic acidosis    - hyperkalemia cocktail( calcium gluconate. insulin albuterol and Kayexalate given)  s/p HD yesterday   f/u potassium      Problem/Plan - 8   Metabolic acidosis2/2 renal failure, give 50 meq Bicarbonate yesterday   continues to improve   continue to monitor real functions   dialysis today      Problem/Plan - 9  Hyperphosphatemia  Phosp 7.5   will continue with Renagel 1600mg TID        Problem/Plan - 10  ·  Problem: Need for prophylactic measure.  Plan: Improve score 3 on heparin  drip and Protonix for GI prophylaxis

## 2018-01-26 NOTE — PROGRESS NOTE ADULT - SUBJECTIVE AND OBJECTIVE BOX
72y Male    Meds:  meropenem IVPB 500 milliGRAM(s) IV Intermittent every 12 hours    Allergies    doxycycline (Unknown)  penicillin (Unknown)  tetracycline (Unknown)  Valtrex (Unknown)    Intolerances        VITALS:  Vital Signs Last 24 Hrs  T(C): 37.1 (26 Jan 2018 15:24), Max: 37.8 (25 Jan 2018 19:00)  T(F): 98.8 (26 Jan 2018 15:24), Max: 100.1 (25 Jan 2018 19:00)  HR: 97 (26 Jan 2018 16:09) (74 - 97)  BP: 89/54 (26 Jan 2018 16:00) (89/54 - 134/60)  BP(mean): 62 (26 Jan 2018 16:00) (60 - 79)  RR: 12 (26 Jan 2018 16:00) (9 - 14)  SpO2: 94% (26 Jan 2018 16:09) (90% - 97%)    LABS/DIAGNOSTIC TESTS:                          12.9   16.9  )-----------( 133      ( 26 Jan 2018 06:37 )             39.0         01-26    130<L>  |  96  |  122<H>  ----------------------------<  315<H>  6.1<H>   |  18<L>  |  7.62<H>    Ca    6.8<L>      26 Jan 2018 06:37  Phos  10.0     01-26  Mg     3.0     01-26    TPro  5.9<L>  /  Alb  1.9<L>  /  TBili  1.7<H>  /  DBili  x   /  AST  130<H>  /  ALT  72<H>  /  AlkPhos  374<H>  01-26      LIVER FUNCTIONS - ( 26 Jan 2018 06:37 )  Alb: 1.9 g/dL / Pro: 5.9 g/dL / ALK PHOS: 374 U/L / ALT: 72 U/L DA / AST: 130 U/L / GGT: x             CULTURES: .Sputum Sputumtrap rec'd  01-22 @ 10:48   Few Streptococcus pyogenes (Group A)  Normal Respiratory Tonie present  --    Numerous polymorphonuclear leukocytes  Rare Squamous epithelial cells per low power field  Numerous Gram Positive Cocci in Pairs and Chains per oil power field      .Urine Clean Catch (Midstream)  01-20 @ 22:48   No growth  --  --      .Blood Blood-Peripheral  01-20 @ 17:03   No growth at 5 days.  --  --            RADIOLOGY:< from: Xray Chest 1 View AP -PORTABLE-Routine (01.26.18 @ 07:20) >  EXAM:  XR CHEST PORTABLE ROUTINE 1V                            PROCEDURE DATE:  01/26/2018          INTERPRETATION:  INDICATION: ARDS, follow-up assessment.    PRIORS: January 25, 2018 and January 24, 2018    VIEWS: Portable AP radiography of the chest performed.    FINDINGS: Since prior evaluation the position of the indwelling ETT,   right IJ CVC and NGT are unchanged. Extensive airspace opacities are   identified throughout the right lung parenchyma and left lower lung   field, without significant change. No pleural effusion or pneumothorax is   demonstrated. No mediastinal shift is noted. No acute osseous fractures   are evident.    IMPRESSION: No significant interval change, with persistence of extensive   airspace opacity throughout the right lung parenchyma and left lower lung   field.      < end of copied text >        ROS:  [ x ] UNABLE TO ELICIT 72y Male who is still in the ICU , he is sedated and intubated and remains vent dependent, he is not on pressors. He has still been having fevers and still has a right groin shiley which is not working properly. Pt is on meropenem for his pneumonia. wbc count has increased but is on steroids also.    Meds:  meropenem IVPB 500 milliGRAM(s) IV Intermittent every 12 hours    Allergies    doxycycline (Unknown)  penicillin (Unknown)  tetracycline (Unknown)  Valtrex (Unknown)    Intolerances        VITALS:  Vital Signs Last 24 Hrs  T(C): 37.1 (26 Jan 2018 15:24), Max: 37.8 (25 Jan 2018 19:00)  T(F): 98.8 (26 Jan 2018 15:24), Max: 100.1 (25 Jan 2018 19:00)  HR: 97 (26 Jan 2018 16:09) (74 - 97)  BP: 89/54 (26 Jan 2018 16:00) (89/54 - 134/60)  BP(mean): 62 (26 Jan 2018 16:00) (60 - 79)  RR: 12 (26 Jan 2018 16:00) (9 - 14)  SpO2: 94% (26 Jan 2018 16:09) (90% - 97%)    LABS/DIAGNOSTIC TESTS:                          12.9   16.9  )-----------( 133      ( 26 Jan 2018 06:37 )             39.0         01-26    130<L>  |  96  |  122<H>  ----------------------------<  315<H>  6.1<H>   |  18<L>  |  7.62<H>    Ca    6.8<L>      26 Jan 2018 06:37  Phos  10.0     01-26  Mg     3.0     01-26    TPro  5.9<L>  /  Alb  1.9<L>  /  TBili  1.7<H>  /  DBili  x   /  AST  130<H>  /  ALT  72<H>  /  AlkPhos  374<H>  01-26      LIVER FUNCTIONS - ( 26 Jan 2018 06:37 )  Alb: 1.9 g/dL / Pro: 5.9 g/dL / ALK PHOS: 374 U/L / ALT: 72 U/L DA / AST: 130 U/L / GGT: x             CULTURES: .Sputum Sputumtrap rec'd  01-22 @ 10:48   Few Streptococcus pyogenes (Group A)  Normal Respiratory Tonie present  --    Numerous polymorphonuclear leukocytes  Rare Squamous epithelial cells per low power field  Numerous Gram Positive Cocci in Pairs and Chains per oil power field      .Urine Clean Catch (Midstream)  01-20 @ 22:48   No growth  --  --      .Blood Blood-Peripheral  01-20 @ 17:03   No growth at 5 days.  --  --            RADIOLOGY:< from: Xray Chest 1 View AP -PORTABLE-Routine (01.26.18 @ 07:20) >  EXAM:  XR CHEST PORTABLE ROUTINE 1V                            PROCEDURE DATE:  01/26/2018          INTERPRETATION:  INDICATION: ARDS, follow-up assessment.    PRIORS: January 25, 2018 and January 24, 2018    VIEWS: Portable AP radiography of the chest performed.    FINDINGS: Since prior evaluation the position of the indwelling ETT,   right IJ CVC and NGT are unchanged. Extensive airspace opacities are   identified throughout the right lung parenchyma and left lower lung   field, without significant change. No pleural effusion or pneumothorax is   demonstrated. No mediastinal shift is noted. No acute osseous fractures   are evident.    IMPRESSION: No significant interval change, with persistence of extensive   airspace opacity throughout the right lung parenchyma and left lower lung   field.      < end of copied text >        ROS:  [ x ] UNABLE TO ELICIT

## 2018-01-26 NOTE — PROGRESS NOTE ADULT - SUBJECTIVE AND OBJECTIVE BOX
INTERVAL HPI/OVERNIGHT EVENTS: patient had one episode of fever yesterday, overnight afebrile, was hypertensive and tachycardiac started on metoprolol , HD stable not on pressor support, leucocytosis worsening. likely to get dialysis today     PRESSORS: [ ] YES [ x] NO  WHICH:    ANTIBIOTICS:      meropenem            DATE STARTED: 1/20  ANTIBIOTICS:                  DATE STARTED:  ANTIBIOTICS:                  DATE STARTED:    Antimicrobial:  meropenem IVPB 500 milliGRAM(s) IV Intermittent every 12 hours    Cardiovascular:  metoprolol     tartrate 12.5 milliGRAM(s) Oral two times a day  tamsulosin 0.4 milliGRAM(s) Oral at bedtime    Pulmonary:  ALBUTerol    90 MICROgram(s) HFA Inhaler 1 Puff(s) Inhalation every 6 hours  ALBUTerol    90 MICROgram(s) HFA Inhaler 5 Puff(s) Inhalation once    Hematalogic:  aspirin  chewable 81 milliGRAM(s) Oral daily  clopidogrel Tablet 75 milliGRAM(s) Oral daily  heparin  Injectable 5000 Unit(s) SubCutaneous every 12 hours    Other:  acetaminophen    Suspension 650 milliGRAM(s) Oral every 6 hours PRN  calcium gluconate IVPB 1 Gram(s) IV Intermittent once  chlorhexidine 4% Liquid 1 Application(s) Topical daily  dexmedetomidine Infusion 0.5 MICROgram(s)/kG/Hr IV Continuous <Continuous>  fentaNYL   Infusion 0.5 MICROgram(s)/kG/Hr IV Continuous <Continuous>  finasteride 5 milliGRAM(s) Oral daily  insulin glargine Injectable (LANTUS) 10 Unit(s) SubCutaneous at bedtime  insulin lispro (HumaLOG) corrective regimen sliding scale   SubCutaneous every 6 hours  lactulose Syrup 30 Gram(s) Oral daily  methylPREDNISolone sodium succinate Injectable 60 milliGRAM(s) IV Push every 6 hours  pantoprazole  Injectable 40 milliGRAM(s) IV Push daily  polyethylene glycol 3350 17 Gram(s) Oral daily  pregabalin 50 milliGRAM(s) Oral two times a day  propofol Infusion 5 MICROgram(s)/kG/Min IV Continuous <Continuous>  sevelamer carbonate Powder 1600 milliGRAM(s) Oral three times a day with meals    acetaminophen    Suspension 650 milliGRAM(s) Oral every 6 hours PRN  ALBUTerol    90 MICROgram(s) HFA Inhaler 1 Puff(s) Inhalation every 6 hours  ALBUTerol    90 MICROgram(s) HFA Inhaler 5 Puff(s) Inhalation once  aspirin  chewable 81 milliGRAM(s) Oral daily  calcium gluconate IVPB 1 Gram(s) IV Intermittent once  chlorhexidine 4% Liquid 1 Application(s) Topical daily  clopidogrel Tablet 75 milliGRAM(s) Oral daily  dexmedetomidine Infusion 0.5 MICROgram(s)/kG/Hr IV Continuous <Continuous>  fentaNYL   Infusion 0.5 MICROgram(s)/kG/Hr IV Continuous <Continuous>  finasteride 5 milliGRAM(s) Oral daily  heparin  Injectable 5000 Unit(s) SubCutaneous every 12 hours  insulin glargine Injectable (LANTUS) 10 Unit(s) SubCutaneous at bedtime  insulin lispro (HumaLOG) corrective regimen sliding scale   SubCutaneous every 6 hours  lactulose Syrup 30 Gram(s) Oral daily  meropenem IVPB 500 milliGRAM(s) IV Intermittent every 12 hours  methylPREDNISolone sodium succinate Injectable 60 milliGRAM(s) IV Push every 6 hours  metoprolol     tartrate 12.5 milliGRAM(s) Oral two times a day  pantoprazole  Injectable 40 milliGRAM(s) IV Push daily  polyethylene glycol 3350 17 Gram(s) Oral daily  pregabalin 50 milliGRAM(s) Oral two times a day  propofol Infusion 5 MICROgram(s)/kG/Min IV Continuous <Continuous>  sevelamer carbonate Powder 1600 milliGRAM(s) Oral three times a day with meals  tamsulosin 0.4 milliGRAM(s) Oral at bedtime    Drug Dosing Weight  Height (cm): 185.42 (20 Jan 2018 10:22)  Weight (kg): 96 (25 Jan 2018 07:30)  BMI (kg/m2): 27.9 (25 Jan 2018 07:30)  BSA (m2): 2.2 (25 Jan 2018 07:30)    CENTRAL LINE: [x ] YES [ ] NO  LOCATION:   DATE INSERTED:  REMOVE: [ ] YES [ ] NO  EXPLAIN:    BRUNSON: [ x] YES [ ] NO    DATE INSERTED:  REMOVE:  [ ] YES [ ] NO  EXPLAIN:    A-LINE:  [x ] YES [ ] NO  LOCATION:   DATE INSERTED:  REMOVE:  [ ] YES [ ] NO  EXPLAIN:    PMH -reviewed admission note, no change since admission  ]     ICU Vital Signs Last 24 Hrs  T(C): 36.9 (26 Jan 2018 06:00), Max: 39.3 (25 Jan 2018 15:50)  T(F): 98.5 (26 Jan 2018 06:00), Max: 102.8 (25 Jan 2018 15:50)  HR: 83 (26 Jan 2018 07:00) (74 - 110)  BP: 120/54 (26 Jan 2018 07:00) (117/58 - 171/81)  BP(mean): 71 (26 Jan 2018 07:00) (70 - 102)  ABP: --  ABP(mean): --  RR: 12 (26 Jan 2018 07:00) (9 - 20)  SpO2: 94% (26 Jan 2018 07:00) (88% - 98%)      ABG - ( 26 Jan 2018 04:51 )  pH: 7.22  /  pCO2: 44    /  pO2: 77    / HCO3: 17    / Base Excess: -9.5  /  SaO2: 93                    01-25 @ 07:01  -  01-26 @ 07:00  --------------------------------------------------------  IN: 1731.3 mL / OUT: 0 mL / NET: 1731.3 mL        Mode: AC/ CMV (Assist Control/ Continuous Mandatory Ventilation)  RR (machine): 14  TV (machine): 400  FiO2: 70  PEEP: 10  ITime: 0.9  MAP: 13  PIP: 21      PHYSICAL EXAM:  GENERAL: sedated   HEAD:  [x ]Atraumatic, [ x]Normocephalic  EYES:  clear conjunctiva   ENMT: ETT in place   NECK: [c]Supple, normal appearance, [ x]No JVD; RIJ in place   NERVOUS SYSTEM:  patient is sedated and intubated, On mechanical ventilation   CHEST/LUNG: B/l rales and rhonchi   HEART: [ x]Regular rate and rhythm; [x ]No murmurs, rubs, or gallops  ABDOMEN: Abdomen distension, BS diminished, soft   EXTREMITIES:  [x ]2+ Peripheral Pulses, [ ]No clubbing, cyanosis, or edema  LYMPH: [ x]No lymphadenopathy noted  SKIN: [x ]No rashes or lesions;     LABS:  CBC Full  -  ( 26 Jan 2018 06:37 )  WBC Count : 16.9 K/uL  Hemoglobin : 12.9 g/dL  Hematocrit : 39.0 %  Platelet Count - Automated : 133 K/uL  Mean Cell Volume : 103.6 fl  Mean Cell Hemoglobin : 34.1 pg  Mean Cell Hemoglobin Concentration : 33.0 gm/dL  Auto Neutrophil # : 15.4 K/uL  Auto Lymphocyte # : 0.6 K/uL  Auto Monocyte # : 0.7 K/uL  Auto Eosinophil # : 0.0 K/uL  Auto Basophil # : 0.1 K/uL  Auto Neutrophil % : 91.7 %  Auto Lymphocyte % : 3.6 %  Auto Monocyte % : 3.9 %  Auto Eosinophil % : 0.0 %  Auto Basophil % : 0.9 %    01-26    130<L>  |  96  |  122<H>  ----------------------------<  315<H>  6.1<H>   |  18<L>  |  7.62<H>    Ca    6.8<L>      26 Jan 2018 06:37  Phos  7.5     01-25  Mg     3.0     01-26    TPro  5.9<L>  /  Alb  1.9<L>  /  TBili  1.7<H>  /  DBili  x   /  AST  130<H>  /  ALT  72<H>  /  AlkPhos  374<H>  01-26            RADIOLOGY & ADDITIONAL STUDIES REVIEWED:    CXR Advanced diffuse right lung field infiltrate and mild left mid to lower   lung field infiltrate again noted.    Chest is similar to January 24.    IMPRESSION: Unchanged chest as above.    US Renal No hydronephrosis       [ ]GOALS OF CARE DISCUSSION WITH PATIENT/FAMILY/PROXY:    CRITICAL CARE TIME SPENT: 35 minutes INTERVAL HPI/OVERNIGHT EVENTS: patient had one episode of fever yesterday, overnight afebrile, was hypertensive and tachycardiac started on metoprolol , HD stable not on pressor support, leucocytosis worsening. likely to get dialysis today     PRESSORS: [ ] YES [ x] NO  WHICH:    ANTIBIOTICS:      meropenem            DATE STARTED: 1/20  ANTIBIOTICS:                  DATE STARTED:  ANTIBIOTICS:                  DATE STARTED:    Antimicrobial:  meropenem IVPB 500 milliGRAM(s) IV Intermittent every 12 hours    Cardiovascular:  metoprolol     tartrate 12.5 milliGRAM(s) Oral two times a day  tamsulosin 0.4 milliGRAM(s) Oral at bedtime    Pulmonary:  ALBUTerol    90 MICROgram(s) HFA Inhaler 1 Puff(s) Inhalation every 6 hours  ALBUTerol    90 MICROgram(s) HFA Inhaler 5 Puff(s) Inhalation once    Hematalogic:  aspirin  chewable 81 milliGRAM(s) Oral daily  clopidogrel Tablet 75 milliGRAM(s) Oral daily  heparin  Injectable 5000 Unit(s) SubCutaneous every 12 hours    Other:  acetaminophen    Suspension 650 milliGRAM(s) Oral every 6 hours PRN  calcium gluconate IVPB 1 Gram(s) IV Intermittent once  chlorhexidine 4% Liquid 1 Application(s) Topical daily  dexmedetomidine Infusion 0.5 MICROgram(s)/kG/Hr IV Continuous <Continuous>  fentaNYL   Infusion 0.5 MICROgram(s)/kG/Hr IV Continuous <Continuous>  finasteride 5 milliGRAM(s) Oral daily  insulin glargine Injectable (LANTUS) 10 Unit(s) SubCutaneous at bedtime  insulin lispro (HumaLOG) corrective regimen sliding scale   SubCutaneous every 6 hours  lactulose Syrup 30 Gram(s) Oral daily  methylPREDNISolone sodium succinate Injectable 60 milliGRAM(s) IV Push every 6 hours  pantoprazole  Injectable 40 milliGRAM(s) IV Push daily  polyethylene glycol 3350 17 Gram(s) Oral daily  pregabalin 50 milliGRAM(s) Oral two times a day  propofol Infusion 5 MICROgram(s)/kG/Min IV Continuous <Continuous>  sevelamer carbonate Powder 1600 milliGRAM(s) Oral three times a day with meals    acetaminophen    Suspension 650 milliGRAM(s) Oral every 6 hours PRN  ALBUTerol    90 MICROgram(s) HFA Inhaler 1 Puff(s) Inhalation every 6 hours  ALBUTerol    90 MICROgram(s) HFA Inhaler 5 Puff(s) Inhalation once  aspirin  chewable 81 milliGRAM(s) Oral daily  calcium gluconate IVPB 1 Gram(s) IV Intermittent once  chlorhexidine 4% Liquid 1 Application(s) Topical daily  clopidogrel Tablet 75 milliGRAM(s) Oral daily  dexmedetomidine Infusion 0.5 MICROgram(s)/kG/Hr IV Continuous <Continuous>  fentaNYL   Infusion 0.5 MICROgram(s)/kG/Hr IV Continuous <Continuous>  finasteride 5 milliGRAM(s) Oral daily  heparin  Injectable 5000 Unit(s) SubCutaneous every 12 hours  insulin glargine Injectable (LANTUS) 10 Unit(s) SubCutaneous at bedtime  insulin lispro (HumaLOG) corrective regimen sliding scale   SubCutaneous every 6 hours  lactulose Syrup 30 Gram(s) Oral daily  meropenem IVPB 500 milliGRAM(s) IV Intermittent every 12 hours  methylPREDNISolone sodium succinate Injectable 60 milliGRAM(s) IV Push every 6 hours  metoprolol     tartrate 12.5 milliGRAM(s) Oral two times a day  pantoprazole  Injectable 40 milliGRAM(s) IV Push daily  polyethylene glycol 3350 17 Gram(s) Oral daily  pregabalin 50 milliGRAM(s) Oral two times a day  propofol Infusion 5 MICROgram(s)/kG/Min IV Continuous <Continuous>  sevelamer carbonate Powder 1600 milliGRAM(s) Oral three times a day with meals  tamsulosin 0.4 milliGRAM(s) Oral at bedtime    Drug Dosing Weight  Height (cm): 185.42 (20 Jan 2018 10:22)  Weight (kg): 96 (25 Jan 2018 07:30)  BMI (kg/m2): 27.9 (25 Jan 2018 07:30)  BSA (m2): 2.2 (25 Jan 2018 07:30)    CENTRAL LINE: [x ] YES [ ] NO  LOCATION:   DATE INSERTED:  REMOVE: [ ] YES [ ] NO  EXPLAIN:    BRUNSON: [ x] YES [ ] NO    DATE INSERTED:  REMOVE:  [ ] YES [ ] NO  EXPLAIN:    A-LINE:  [x ] YES [ ] NO  LOCATION:   DATE INSERTED:  REMOVE:  [ ] YES [ ] NO  EXPLAIN:    PMH -reviewed admission note, no change since admission  ]     ICU Vital Signs Last 24 Hrs  T(C): 36.9 (26 Jan 2018 06:00), Max: 39.3 (25 Jan 2018 15:50)  T(F): 98.5 (26 Jan 2018 06:00), Max: 102.8 (25 Jan 2018 15:50)  HR: 83 (26 Jan 2018 07:00) (74 - 110)  BP: 120/54 (26 Jan 2018 07:00) (117/58 - 171/81)  BP(mean): 71 (26 Jan 2018 07:00) (70 - 102)  ABP: --  ABP(mean): --  RR: 12 (26 Jan 2018 07:00) (9 - 20)  SpO2: 94% (26 Jan 2018 07:00) (88% - 98%)      ABG - ( 26 Jan 2018 04:51 )  pH: 7.22  /  pCO2: 44    /  pO2: 77    / HCO3: 17    / Base Excess: -9.5  /  SaO2: 93                    01-25 @ 07:01  -  01-26 @ 07:00  --------------------------------------------------------  IN: 1731.3 mL / OUT: 0 mL / NET: 1731.3 mL        Mode: AC/ CMV (Assist Control/ Continuous Mandatory Ventilation)  RR (machine): 14  TV (machine): 400  FiO2: 70  PEEP: 10  ITime: 0.9  MAP: 13  PIP: 21      PHYSICAL EXAM:  GENERAL: sedated   HEAD:  [x ]Atraumatic, [ x]Normocephalic  EYES:  clear conjunctiva   ENMT: ETT in place   NECK: [c]Supple, normal appearance, [ x]No JVD; RIJ in place   NERVOUS SYSTEM:  patient is sedated and intubated, On mechanical ventilation   CHEST/LUNG: B/l rales and rhonchi present, more R>> L   HEART: [ x]Regular rate and rhythm; [x ]No murmurs, rubs, or gallops  ABDOMEN: Abdomen distension, BS diminished, soft   EXTREMITIES:  [x ]2+ Peripheral Pulses, [ ]No clubbing, cyanosis, or edema  LYMPH: [ x]No lymphadenopathy noted  SKIN: [x ]No rashes or lesions;     LABS:  CBC Full  -  ( 26 Jan 2018 06:37 )  WBC Count : 16.9 K/uL  Hemoglobin : 12.9 g/dL  Hematocrit : 39.0 %  Platelet Count - Automated : 133 K/uL  Mean Cell Volume : 103.6 fl  Mean Cell Hemoglobin : 34.1 pg  Mean Cell Hemoglobin Concentration : 33.0 gm/dL  Auto Neutrophil # : 15.4 K/uL  Auto Lymphocyte # : 0.6 K/uL  Auto Monocyte # : 0.7 K/uL  Auto Eosinophil # : 0.0 K/uL  Auto Basophil # : 0.1 K/uL  Auto Neutrophil % : 91.7 %  Auto Lymphocyte % : 3.6 %  Auto Monocyte % : 3.9 %  Auto Eosinophil % : 0.0 %  Auto Basophil % : 0.9 %    01-26    130<L>  |  96  |  122<H>  ----------------------------<  315<H>  6.1<H>   |  18<L>  |  7.62<H>    Ca    6.8<L>      26 Jan 2018 06:37  Phos  7.5     01-25  Mg     3.0     01-26    TPro  5.9<L>  /  Alb  1.9<L>  /  TBili  1.7<H>  /  DBili  x   /  AST  130<H>  /  ALT  72<H>  /  AlkPhos  374<H>  01-26            RADIOLOGY & ADDITIONAL STUDIES REVIEWED:    CXR Advanced diffuse right lung field infiltrate and mild left mid to lower   lung field infiltrate again noted.    Chest is similar to January 24.    IMPRESSION: Unchanged chest as above.    US Renal No hydronephrosis       [ ]GOALS OF CARE DISCUSSION WITH PATIENT/FAMILY/PROXY:    CRITICAL CARE TIME SPENT: 35 minutes

## 2018-01-26 NOTE — PROGRESS NOTE ADULT - SUBJECTIVE AND OBJECTIVE BOX
INTERVAL HPI/ OVERNIGHT EVENTS: Patient is sedated on a ventilator. no major interval events.     VITAL SIGNS:  Vital Signs Last 24 Hrs  T(C): 37.8 (26 Jan 2018 12:00), Max: 39.3 (25 Jan 2018 15:50)  T(F): 100.1 (26 Jan 2018 12:00), Max: 102.8 (25 Jan 2018 15:50)  HR: 90 (26 Jan 2018 12:30) (74 - 110)  BP: 113/45 (26 Jan 2018 12:00) (112/49 - 171/81)  BP(mean): 61 (26 Jan 2018 12:00) (61 - 102)  RR: 13 (26 Jan 2018 12:00) (9 - 20)  SpO2: 94% (26 Jan 2018 12:30) (88% - 98%)      01-25 @ 07:01 - 01-26 @ 07:00  --------------------------------------------------------  IN: 1732.6 mL / OUT: 0 mL / NET: 1732.6 mL    01-26 @ 07:01 - 01-26 @ 12:44  --------------------------------------------------------  IN: 82.5 mL / OUT: 5 mL / NET: 77.5 mL            PHYSICAL EXAM:    Constitutional: Patient is sedated  Eyes: pupils dilated  reactive. sclera normal   ENMT: no external lesions   Neck: RIJ+, supple, no JVD   Respiratory: diffuse crackles   Cardiovascular:s1,s2 present   Gastrointestinal: distended, BS +   Extremities: right femoral Shiley.  no cyanosis, edema or clubbing   Vascular: pulses 2+  Neurological: sedated       MEDICATIONS  (STANDING):  ALBUTerol    90 MICROgram(s) HFA Inhaler 1 Puff(s) Inhalation every 6 hours  aspirin  chewable 81 milliGRAM(s) Oral daily  chlorhexidine 4% Liquid 1 Application(s) Topical daily  clopidogrel Tablet 75 milliGRAM(s) Oral daily  dexmedetomidine Infusion 0.5 MICROgram(s)/kG/Hr (11.162 mL/Hr) IV Continuous <Continuous>  fentaNYL   Infusion 0.5 MICROgram(s)/kG/Hr (4.465 mL/Hr) IV Continuous <Continuous>  finasteride 5 milliGRAM(s) Oral daily  heparin  Injectable 5000 Unit(s) SubCutaneous every 12 hours  insulin glargine Injectable (LANTUS) 10 Unit(s) SubCutaneous at bedtime  insulin lispro (HumaLOG) corrective regimen sliding scale   SubCutaneous every 6 hours  lactulose Syrup 30 Gram(s) Oral daily  meropenem IVPB 500 milliGRAM(s) IV Intermittent every 12 hours  methylPREDNISolone sodium succinate Injectable 40 milliGRAM(s) IV Push every 8 hours  metoprolol     tartrate 12.5 milliGRAM(s) Oral two times a day  pantoprazole  Injectable 40 milliGRAM(s) IV Push daily  pregabalin 50 milliGRAM(s) Oral two times a day  propofol Infusion 5 MICROgram(s)/kG/Min (2.88 mL/Hr) IV Continuous <Continuous>  sevelamer carbonate Powder 1600 milliGRAM(s) Oral three times a day with meals  tamsulosin 0.4 milliGRAM(s) Oral at bedtime    MEDICATIONS  (PRN):  acetaminophen    Suspension 650 milliGRAM(s) Oral every 6 hours PRN For Temp greater than 38 C (100.4 F)    Intolerances        LABS:                                             12.9   16.9  )-----------( 133      ( 26 Jan 2018 06:37 )             39.0   01-26    130<L>  |  96  |  122<H>  ----------------------------<  315<H>  6.1<H>   |  18<L>  |  7.62<H>    Ca    6.8<L>      26 Jan 2018 06:37  Phos  10.0     01-26  Mg     3.0     01-26    TPro  5.9<L>  /  Alb  1.9<L>  /  TBili  1.7<H>  /  DBili  x   /  AST  130<H>  /  ALT  72<H>  /  AlkPhos  374<H>  01-26                           RADIOLOGY & ADDITIONAL TESTS: CXR is slightly improved with less opacities on the RLL; pending read. INTERVAL HPI/ OVERNIGHT EVENTS: Patient is sedated on a ventilator. fever and tachycardia overnight.     VITAL SIGNS:  Vital Signs Last 24 Hrs  T(C): 37.8 (26 Jan 2018 12:00), Max: 39.3 (25 Jan 2018 15:50)  T(F): 100.1 (26 Jan 2018 12:00), Max: 102.8 (25 Jan 2018 15:50)  HR: 90 (26 Jan 2018 12:30) (74 - 110)  BP: 113/45 (26 Jan 2018 12:00) (112/49 - 171/81)  BP(mean): 61 (26 Jan 2018 12:00) (61 - 102)  RR: 13 (26 Jan 2018 12:00) (9 - 20)  SpO2: 94% (26 Jan 2018 12:30) (88% - 98%)      01-25 @ 07:01 - 01-26 @ 07:00  --------------------------------------------------------  IN: 1732.6 mL / OUT: 0 mL / NET: 1732.6 mL    01-26 @ 07:01 - 01-26 @ 12:44  --------------------------------------------------------  IN: 82.5 mL / OUT: 5 mL / NET: 77.5 mL            PHYSICAL EXAM:    Constitutional: Patient is sedated  Eyes: pupils dilated  reactive. sclera normal   ENMT: no external lesions   Neck: RIJ+, supple, no JVD   Respiratory: diffuse crackles   Cardiovascular:s1,s2 present   Gastrointestinal: distended, BS +   Extremities: right femoral Shiley.  no cyanosis, edema or clubbing   Vascular: pulses 2+  Neurological: sedated       MEDICATIONS  (STANDING):  ALBUTerol    90 MICROgram(s) HFA Inhaler 1 Puff(s) Inhalation every 6 hours  aspirin  chewable 81 milliGRAM(s) Oral daily  chlorhexidine 4% Liquid 1 Application(s) Topical daily  clopidogrel Tablet 75 milliGRAM(s) Oral daily  dexmedetomidine Infusion 0.5 MICROgram(s)/kG/Hr (11.162 mL/Hr) IV Continuous <Continuous>  fentaNYL   Infusion 0.5 MICROgram(s)/kG/Hr (4.465 mL/Hr) IV Continuous <Continuous>  finasteride 5 milliGRAM(s) Oral daily  heparin  Injectable 5000 Unit(s) SubCutaneous every 12 hours  insulin glargine Injectable (LANTUS) 10 Unit(s) SubCutaneous at bedtime  insulin lispro (HumaLOG) corrective regimen sliding scale   SubCutaneous every 6 hours  lactulose Syrup 30 Gram(s) Oral daily  meropenem IVPB 500 milliGRAM(s) IV Intermittent every 12 hours  methylPREDNISolone sodium succinate Injectable 40 milliGRAM(s) IV Push every 8 hours  metoprolol     tartrate 12.5 milliGRAM(s) Oral two times a day  pantoprazole  Injectable 40 milliGRAM(s) IV Push daily  pregabalin 50 milliGRAM(s) Oral two times a day  propofol Infusion 5 MICROgram(s)/kG/Min (2.88 mL/Hr) IV Continuous <Continuous>  sevelamer carbonate Powder 1600 milliGRAM(s) Oral three times a day with meals  tamsulosin 0.4 milliGRAM(s) Oral at bedtime    MEDICATIONS  (PRN):  acetaminophen    Suspension 650 milliGRAM(s) Oral every 6 hours PRN For Temp greater than 38 C (100.4 F)    Intolerances        LABS:                                             12.9   16.9  )-----------( 133      ( 26 Jan 2018 06:37 )             39.0   01-26    130<L>  |  96  |  122<H>  ----------------------------<  315<H>  6.1<H>   |  18<L>  |  7.62<H>    Ca    6.8<L>      26 Jan 2018 06:37  Phos  10.0     01-26  Mg     3.0     01-26    TPro  5.9<L>  /  Alb  1.9<L>  /  TBili  1.7<H>  /  DBili  x   /  AST  130<H>  /  ALT  72<H>  /  AlkPhos  374<H>  01-26                           RADIOLOGY & ADDITIONAL TESTS: CXR is slightly improved with less opacities on the RLL; pending read.

## 2018-01-26 NOTE — PROGRESS NOTE ADULT - SUBJECTIVE AND OBJECTIVE BOX
72 M with right femoral vein Shiley catheter in place for renal failure. Patient has new onset fevers with line placed 4 days prior. Surgery consulted for Shiley removal. Right inguinal artea had no swelling, erythema or drainage from the catheter site. Patient seen with Dr. Soriano at bedside, Shiley removed and pressure held for 10 minutes and no hematoma or drainage noted after pressure released.

## 2018-01-26 NOTE — PROGRESS NOTE ADULT - ASSESSMENT
sepsis/ septic shock  Influenza B infection  fevers  pneumonia   respiratory failure    plan - completed tamiflu   cont meropenem to 500mgs iv q12hrs  give vancomycin 1 gm iv x 1 today  remove right groin shiley as soon as possible  time spent - 30 minutes

## 2018-01-26 NOTE — PROGRESS NOTE ADULT - SUBJECTIVE AND OBJECTIVE BOX
Patient remains intubated    acetaminophen    Suspension 650 milliGRAM(s) Oral every 6 hours PRN  ALBUTerol    90 MICROgram(s) HFA Inhaler 1 Puff(s) Inhalation every 6 hours  aspirin  chewable 81 milliGRAM(s) Oral daily  chlorhexidine 4% Liquid 1 Application(s) Topical daily  clopidogrel Tablet 75 milliGRAM(s) Oral daily  dexmedetomidine Infusion 0.5 MICROgram(s)/kG/Hr IV Continuous <Continuous>  fentaNYL   Infusion 0.5 MICROgram(s)/kG/Hr IV Continuous <Continuous>  finasteride 5 milliGRAM(s) Oral daily  heparin  Injectable 5000 Unit(s) SubCutaneous every 12 hours  insulin glargine Injectable (LANTUS) 10 Unit(s) SubCutaneous at bedtime  insulin lispro (HumaLOG) corrective regimen sliding scale   SubCutaneous every 6 hours  lactulose Syrup 30 Gram(s) Oral daily  meropenem IVPB 500 milliGRAM(s) IV Intermittent every 12 hours  methylPREDNISolone sodium succinate Injectable 40 milliGRAM(s) IV Push every 8 hours  metoprolol     tartrate 12.5 milliGRAM(s) Oral two times a day  pantoprazole  Injectable 40 milliGRAM(s) IV Push daily  pregabalin 50 milliGRAM(s) Oral two times a day  propofol Infusion 5 MICROgram(s)/kG/Min IV Continuous <Continuous>  sevelamer carbonate Powder 1600 milliGRAM(s) Oral three times a day with meals  tamsulosin 0.4 milliGRAM(s) Oral at bedtime                            12.9   16.9  )-----------( 133      ( 26 Jan 2018 06:37 )             39.0       Hemoglobin: 12.9 g/dL (01-26 @ 06:37)  Hemoglobin: 11.0 g/dL (01-25 @ 06:12)  Hemoglobin: 11.4 g/dL (01-24 @ 16:06)  Hemoglobin: 11.6 g/dL (01-24 @ 06:26)  Hemoglobin: 12.5 g/dL (01-23 @ 18:30)      01-26    130<L>  |  96  |  122<H>  ----------------------------<  315<H>  6.1<H>   |  18<L>  |  7.62<H>    Ca    6.8<L>      26 Jan 2018 06:37  Phos  10.0     01-26  Mg     3.0     01-26    TPro  5.9<L>  /  Alb  1.9<L>  /  TBili  1.7<H>  /  DBili  x   /  AST  130<H>  /  ALT  72<H>  /  AlkPhos  374<H>  01-26    Creatinine Trend: 7.62<--, 7.14<--, 6.04<--, 5.18<--, 5.65<--, 4.78<--    COAGS:           T(C): 37.8 (01-26-18 @ 12:00), Max: 39.3 (01-25-18 @ 15:50)  HR: 90 (01-26-18 @ 12:30) (74 - 110)  BP: 113/45 (01-26-18 @ 12:00) (112/49 - 171/81)  RR: 13 (01-26-18 @ 12:00) (9 - 20)  SpO2: 94% (01-26-18 @ 12:30) (88% - 98%)  Wt(kg): --    I&O's Summary    25 Jan 2018 07:01  -  26 Jan 2018 07:00  --------------------------------------------------------  IN: 1732.6 mL / OUT: 0 mL / NET: 1732.6 mL    26 Jan 2018 07:01  -  26 Jan 2018 12:54  --------------------------------------------------------  IN: 82.5 mL / OUT: 5 mL / NET: 77.5 mL        HEENT:   Normal oral mucosa,   Lymphatic: No obvious lymphadenopathy , no edema  Cardiovascular: Normal S1 S2, No JVD, 1/6 LAMIN murmur, Peripheral pulses palpable 2+ bilaterally  Respiratory: coarse mechanical BS, normal effort 	  Gastrointestinal:  Soft, Non-tender, + BS	  Skin: No rashes,  No cyanosis, warm to touch  Musculoskeletal: unable to assess  Psychiatry:  Unable to assess Mood & affect     TELEMETRY: 	  sinus       ASSESSMENT/PLAN: 	72y Male [facility] h/o CKD, Anxiety disorder, CAD, BPH, Carpel tunnel syndrome, compulsive disorder, DM, OA, HTN, IBS sent for dyspnea for last 3 days found with (+) flu, PNA likely septic shock normal LV function NING now on HD    - HD per renal  - Abx per MICU  - Palliative f/u noted  - Cont supportive care per MICU      Jake Benson MD, FACC  [facility], [facility]  [address] [place] [address]  PHONE:  [phone]  BEEPER : [phone]

## 2018-01-26 NOTE — PROGRESS NOTE ADULT - ASSESSMENT
72 M from [facility] h/o CKD, Anxiety disorder, CAD, BPH, Carpel tunnel syndrome, compulsive disorder, DM, OA, HTN, IBS sent for dyspnea for last 3 days admitted to ICU for respiratory failure and septic shock from influenza and pneumonia has worsening renal function now in ARDS (PIF today is= 110)    1. ESRD sec to ATN:   - patient to be dialyzed today   - pts renal function is poor with U/O still 5-10cc/hr; net +77ml   -we will plan for hd again tomorrow  - hold nephrotoxic medications, adjust meds as per egfr  and avoid contrast studies/phosphate  enema, etc    2. Hyperkalemia   - elevated to 6.1 today; patient to get HD; repeat BMP post HD  -keep <5 and >4  -low K diet   -f/u k level q 12hrs      2. Metabolic acidosis   multifactorial with acceptable pH   monitor CO 2 daily   keep PH >7.3   add iv Nahco3 1 ampule if PH<7.3 daily  patient to get HD today         3. Hyperphosphatemia   - still elevated (10 today); patient to get HD today   -continue [id number] mg tid via ngt  -f/u phos level daily  -keep Phos>3, <5     4.Hyponatremia;mild, secondary to ning  -avoid iv fluids  -f/u Na level daily  -continue  fluid restriction to 1 liter per day    4. Fluid overload   bilateral opacities in the CXR , most likely pneumnia, less likely fluid overlaod  sec to ? CHF/NING   -we will plan to remove 2.5 to 3 kg in hd tomorrow as tolerated with bp    5.Hypocalcemia;improving (corrected ca )  -suggest to replace q 6hrs 1 gm to keep corrected ca >8.5 and <10  -f/u ca level daily  pts prognosis very poor with worsening resp status/ARDS. 72 M from [facility] h/o CKD, Anxiety disorder, CAD, BPH, Carpel tunnel syndrome, compulsive disorder, DM, OA, HTN, IBS sent for dyspnea for last 3 days admitted to ICU for respiratory failure and septic shock from influenza and pneumonia has worsening renal function now in ARDS (PIF today is= 110)    1. ESRD sec to ATN:   - patient to be dialyzed today   - pts renal function is poor with U/O still 5-10cc/hr; net +77ml   - hold nephrotoxic medications, adjust meds as per egfr  and avoid contrast studies/phosphate  enema, etc    2. Hyperkalemia   - elevated to 6.1 today; patient to get HD; repeat BMP post HD  -keep <5 and >4  -low K diet   -f/u k level q 12hrs      2. Metabolic acidosis   multifactorial with acceptable pH   monitor CO 2 daily   keep PH >7.3   add iv Nahco3 1 ampule if PH<7.3 daily  patient to get HD today     3. Hyperphosphatemia   - still elevated (10 today); patient to get HD today   -continue [id number] mg tid via ngt  -f/u phos level daily  -keep Phos>3, <5     4.Hyponatremia;mild, secondary to ning  -avoid iv fluids  -f/u Na level daily  -continue  fluid restriction to 1 liter per day    4. Fluid overload   bilateral opacities in the CXR , most likely pneumnia, less likely fluid overlaod  sec to ? CHF/NING   -we will plan to remove 2.5 to 3 kg in hd tomorrow as tolerated with bp    5.Hypocalcemia;improving (corrected ca )  -suggest to replace q 6hrs 1 gm to keep corrected ca >8.5 and <10  -f/u ca level daily  pts prognosis very poor with worsening resp status/ARDS. 72 M from [facility] h/o CKD, Anxiety disorder, CAD, BPH, Carpel tunnel syndrome, compulsive disorder, DM, OA, HTN, IBS sent for dyspnea for last 3 days admitted to ICU for respiratory failure and septic shock from influenza and pneumonia has worsening renal function now in ARDS (PIF today is= 110)    1. ESRD sec to ATN: Tolerating HD  - continue dialysis as ordered  - pts renal function is poor with U/O still 5-10cc/hr; net +77ml   - hold nephrotoxic medications, adjust meds as per egfr  and avoid contrast studies/phosphate  enema, etc    2. Hyperkalemia   - elevated to 6.1 today; patient to get HD; repeat BMP post HD  -keep <5 and >4  -low K diet   -f/u k level q 12hrs      2. Metabolic acidosis   multifactorial with acceptable pH   monitor CO 2 daily   keep PH >7.3   add iv Nahco3 1 ampule if PH<7.3 daily      3. Hyperphosphatemia   - still elevated (10 today); patient to get HD today   -continue [id number] mg tid via ngt  -f/u phos level daily  -keep Phos>3, <5     4.Hyponatremia;mild, secondary to ning  -avoid iv fluids  -f/u Na level daily  -continue  fluid restriction to 1 liter per day    4. Fluid overload   bilateral opacities in the CXR , most likely pneumnia, less likely fluid overlaod  sec to ? CHF/NING   -UF as tolerated    5.Hypocalcemia;improving (corrected ca )  -suggest to replace q 6hrs 1 gm to keep corrected ca >8.5 and <10  -f/u ca level daily  pts prognosis very poor with worsening resp. status/ARDS.    I examined and evaluated the patient. I discussed the case with the resident and agree with the findings and plan as documented in the resident's note, for which I collaborated in the composition.

## 2018-01-27 NOTE — PROGRESS NOTE ADULT - ATTENDING COMMENTS
72 male nursing home patient with CAD, CKD, DM, HTN, OA, BPH, anxiety, presented with influenza, acute respiratory failure, septic shock, pneumonia. Now with NING requiring dialysis, resolving ARDS.    Plan:  - abx per ID, completed tamiflu  - HD per nephrology  - wean down peep and fio2  - taper steroids  discussed with son  total cc time 35 min .

## 2018-01-27 NOTE — PROGRESS NOTE ADULT - SUBJECTIVE AND OBJECTIVE BOX
INTERVAL HPI/OVERNIGHT EVENTS: ***    PRESSORS: [ ] YES [ ] NO  WHICH:    ANTIBIOTICS:                  DATE STARTED:  ANTIBIOTICS:                  DATE STARTED:  ANTIBIOTICS:                  DATE STARTED:    Antimicrobial:  meropenem IVPB 500 milliGRAM(s) IV Intermittent every 12 hours    Cardiovascular:  metoprolol     tartrate 12.5 milliGRAM(s) Oral two times a day  tamsulosin 0.4 milliGRAM(s) Oral at bedtime    Pulmonary:  ALBUTerol    90 MICROgram(s) HFA Inhaler 1 Puff(s) Inhalation every 6 hours    Hematalogic:  aspirin  chewable 81 milliGRAM(s) Oral daily  clopidogrel Tablet 75 milliGRAM(s) Oral daily  heparin  Injectable 5000 Unit(s) SubCutaneous every 12 hours    Other:  acetaminophen    Suspension 650 milliGRAM(s) Oral every 6 hours PRN  chlorhexidine 4% Liquid 1 Application(s) Topical daily  dexmedetomidine Infusion 0.5 MICROgram(s)/kG/Hr IV Continuous <Continuous>  fentaNYL   Infusion 0.5 MICROgram(s)/kG/Hr IV Continuous <Continuous>  finasteride 5 milliGRAM(s) Oral daily  insulin glargine Injectable (LANTUS) 10 Unit(s) SubCutaneous at bedtime  insulin lispro (HumaLOG) corrective regimen sliding scale   SubCutaneous every 6 hours  lactulose Syrup 30 Gram(s) Oral daily  methylPREDNISolone sodium succinate Injectable 40 milliGRAM(s) IV Push every 8 hours  pantoprazole  Injectable 40 milliGRAM(s) IV Push daily  pregabalin 50 milliGRAM(s) Oral two times a day  propofol Infusion 5 MICROgram(s)/kG/Min IV Continuous <Continuous>  sevelamer carbonate Powder 1600 milliGRAM(s) Oral three times a day with meals    acetaminophen    Suspension 650 milliGRAM(s) Oral every 6 hours PRN  ALBUTerol    90 MICROgram(s) HFA Inhaler 1 Puff(s) Inhalation every 6 hours  aspirin  chewable 81 milliGRAM(s) Oral daily  chlorhexidine 4% Liquid 1 Application(s) Topical daily  clopidogrel Tablet 75 milliGRAM(s) Oral daily  dexmedetomidine Infusion 0.5 MICROgram(s)/kG/Hr IV Continuous <Continuous>  fentaNYL   Infusion 0.5 MICROgram(s)/kG/Hr IV Continuous <Continuous>  finasteride 5 milliGRAM(s) Oral daily  heparin  Injectable 5000 Unit(s) SubCutaneous every 12 hours  insulin glargine Injectable (LANTUS) 10 Unit(s) SubCutaneous at bedtime  insulin lispro (HumaLOG) corrective regimen sliding scale   SubCutaneous every 6 hours  lactulose Syrup 30 Gram(s) Oral daily  meropenem IVPB 500 milliGRAM(s) IV Intermittent every 12 hours  methylPREDNISolone sodium succinate Injectable 40 milliGRAM(s) IV Push every 8 hours  metoprolol     tartrate 12.5 milliGRAM(s) Oral two times a day  pantoprazole  Injectable 40 milliGRAM(s) IV Push daily  pregabalin 50 milliGRAM(s) Oral two times a day  propofol Infusion 5 MICROgram(s)/kG/Min IV Continuous <Continuous>  sevelamer carbonate Powder 1600 milliGRAM(s) Oral three times a day with meals  tamsulosin 0.4 milliGRAM(s) Oral at bedtime    Drug Dosing Weight  Height (cm): 185.42 (20 Jan 2018 10:22)  Weight (kg): 96 (25 Jan 2018 07:30)  BMI (kg/m2): 27.9 (25 Jan 2018 07:30)  BSA (m2): 2.2 (25 Jan 2018 07:30)    CENTRAL LINE: [ ] YES [ ] NO  LOCATION:   DATE INSERTED:  REMOVE: [ ] YES [ ] NO  EXPLAIN:    BRUNSON: [ ] YES [ ] NO    DATE INSERTED:  REMOVE:  [ ] YES [ ] NO  EXPLAIN:    A-LINE:  [ ] YES [ ] NO  LOCATION:   DATE INSERTED:  REMOVE:  [ ] YES [ ] NO  EXPLAIN:    PMH -reviewed admission note, no change since admission  Heart faliure: acute [ ] chronic [ ] acute or chronic [ ] diastolic [ ] systolic [ ] combied systolic and diastolic[ ]  NING: ATN[ ] renal medullary necrosis [ ] CKD I [ ]CKDII [ ]CKD III [ ]CKD IV [ ]CKD V [ ]Other pathological lesions [ ]  Abdominal Nutrition Status: malnutrition [ ] cachexia [ ] morbid obesity/BMI=40 [ ] Supplement ordered [___________]     ICU Vital Signs Last 24 Hrs  T(C): 37.3 (27 Jan 2018 00:00), Max: 37.9 (26 Jan 2018 19:59)  T(F): 99.1 (27 Jan 2018 00:00), Max: 100.3 (26 Jan 2018 19:59)  HR: 96 (27 Jan 2018 00:08) (79 - 115)  BP: 120/53 (26 Jan 2018 23:00) (89/54 - 144/61)  BP(mean): 70 (26 Jan 2018 23:00) (60 - 105)  ABP: --  ABP(mean): --  RR: 9 (26 Jan 2018 23:00) (8 - 14)  SpO2: 98% (27 Jan 2018 00:08) (93% - 98%)      ABG - ( 26 Jan 2018 04:51 )  pH: 7.22  /  pCO2: 44    /  pO2: 77    / HCO3: 17    / Base Excess: -9.5  /  SaO2: 93                    01-25 @ 07:01  -  01-26 @ 07:00  --------------------------------------------------------  IN: 1732.6 mL / OUT: 0 mL / NET: 1732.6 mL        Mode: AC/ CMV (Assist Control/ Continuous Mandatory Ventilation)  RR (machine): 14  TV (machine): 400  FiO2: 50  PEEP: 10  ITime: 0.9  MAP: 17  PIP: 25      PHYSICAL EXAM:    GENERAL: [ ]NAD, [ ]well-groomed, [ ]well-developed  HEAD:  [ ]Atraumatic, [ ]Normocephalic  EYES: [ ]EOMI, [ ]PERRLA, [ ]conjunctiva and sclera clear  ENMT: [ ]No tonsillar erythema, exudates, or enlargement; [ ]Moist mucous membranes, [ ]Good dentition, [ ]No lesions  NECK: [ ]Supple, normal appearance, [ ]No JVD; [ ]Normal thyroid; [ ]Trachea midline  NERVOUS SYSTEM:  [ ]Alert & Oriented X3, [ ]Good concentration; [ ]Motor Strength 5/5 B/L upper and lower extremities; [ ]DTRs 2+ intact and symmetric  CHEST/LUNG: [ ]No chest deformity; [ ]Normal percussion bilaterally; [ ]No rales, rhonchi, wheezing   HEART: [ ]Regular rate and rhythm; [ ]No murmurs, rubs, or gallops  ABDOMEN: [ ]Soft, Nontender, Nondistended; [ ]Bowel sounds present  EXTREMITIES:  [ ]2+ Peripheral Pulses, [ ]No clubbing, cyanosis, or edema  LYMPH: [ ]No lymphadenopathy noted  SKIN: [ ]No rashes or lesions; [ ]Good capillary refill      LABS:  CBC Full  -  ( 26 Jan 2018 06:37 )  WBC Count : 16.9 K/uL  Hemoglobin : 12.9 g/dL  Hematocrit : 39.0 %  Platelet Count - Automated : 133 K/uL  Mean Cell Volume : 103.6 fl  Mean Cell Hemoglobin : 34.1 pg  Mean Cell Hemoglobin Concentration : 33.0 gm/dL  Auto Neutrophil # : 15.4 K/uL  Auto Lymphocyte # : 0.6 K/uL  Auto Monocyte # : 0.7 K/uL  Auto Eosinophil # : 0.0 K/uL  Auto Basophil # : 0.1 K/uL  Auto Neutrophil % : 91.7 %  Auto Lymphocyte % : 3.6 %  Auto Monocyte % : 3.9 %  Auto Eosinophil % : 0.0 %  Auto Basophil % : 0.9 %    01-26    135  |  100  |  104<H>  ----------------------------<  274<H>  5.2   |  21<L>  |  6.02<H>    Ca    6.6<L>      26 Jan 2018 20:53  Phos  8.0     01-26  Mg     2.5     01-26    TPro  5.9<L>  /  Alb  1.9<L>  /  TBili  1.7<H>  /  DBili  x   /  AST  130<H>  /  ALT  72<H>  /  AlkPhos  374<H>  01-26            RADIOLOGY & ADDITIONAL STUDIES REVIEWED:  ***    [ ]GOALS OF CARE DISCUSSION WITH PATIENT/FAMILY/PROXY:    CRITICAL CARE TIME SPENT: 35 minutes INTERVAL HPI/OVERNIGHT EVENTS: *** Patient was seen and examined at bed side. surgery removed right shiley.     PRESSORS: [x ] YES [ ] NO  WHICH:    ANTIBIOTICS:                  DATE STARTED:  ANTIBIOTICS:                  DATE STARTED:  ANTIBIOTICS:                  DATE STARTED:    Antimicrobial:  meropenem IVPB 500 milliGRAM(s) IV Intermittent every 12 hours    Cardiovascular:  metoprolol     tartrate 12.5 milliGRAM(s) Oral two times a day  tamsulosin 0.4 milliGRAM(s) Oral at bedtime    Pulmonary:  ALBUTerol    90 MICROgram(s) HFA Inhaler 1 Puff(s) Inhalation every 6 hours    Hematalogic:  aspirin  chewable 81 milliGRAM(s) Oral daily  clopidogrel Tablet 75 milliGRAM(s) Oral daily  heparin  Injectable 5000 Unit(s) SubCutaneous every 12 hours    Other:  acetaminophen    Suspension 650 milliGRAM(s) Oral every 6 hours PRN  chlorhexidine 4% Liquid 1 Application(s) Topical daily  dexmedetomidine Infusion 0.5 MICROgram(s)/kG/Hr IV Continuous <Continuous>  fentaNYL   Infusion 0.5 MICROgram(s)/kG/Hr IV Continuous <Continuous>  finasteride 5 milliGRAM(s) Oral daily  insulin glargine Injectable (LANTUS) 10 Unit(s) SubCutaneous at bedtime  insulin lispro (HumaLOG) corrective regimen sliding scale   SubCutaneous every 6 hours  lactulose Syrup 30 Gram(s) Oral daily  methylPREDNISolone sodium succinate Injectable 40 milliGRAM(s) IV Push every 8 hours  pantoprazole  Injectable 40 milliGRAM(s) IV Push daily  pregabalin 50 milliGRAM(s) Oral two times a day  propofol Infusion 5 MICROgram(s)/kG/Min IV Continuous <Continuous>  sevelamer carbonate Powder 1600 milliGRAM(s) Oral three times a day with meals    acetaminophen    Suspension 650 milliGRAM(s) Oral every 6 hours PRN  ALBUTerol    90 MICROgram(s) HFA Inhaler 1 Puff(s) Inhalation every 6 hours  aspirin  chewable 81 milliGRAM(s) Oral daily  chlorhexidine 4% Liquid 1 Application(s) Topical daily  clopidogrel Tablet 75 milliGRAM(s) Oral daily  dexmedetomidine Infusion 0.5 MICROgram(s)/kG/Hr IV Continuous <Continuous>  fentaNYL   Infusion 0.5 MICROgram(s)/kG/Hr IV Continuous <Continuous>  finasteride 5 milliGRAM(s) Oral daily  heparin  Injectable 5000 Unit(s) SubCutaneous every 12 hours  insulin glargine Injectable (LANTUS) 10 Unit(s) SubCutaneous at bedtime  insulin lispro (HumaLOG) corrective regimen sliding scale   SubCutaneous every 6 hours  lactulose Syrup 30 Gram(s) Oral daily  meropenem IVPB 500 milliGRAM(s) IV Intermittent every 12 hours  methylPREDNISolone sodium succinate Injectable 40 milliGRAM(s) IV Push every 8 hours  metoprolol     tartrate 12.5 milliGRAM(s) Oral two times a day  pantoprazole  Injectable 40 milliGRAM(s) IV Push daily  pregabalin 50 milliGRAM(s) Oral two times a day  propofol Infusion 5 MICROgram(s)/kG/Min IV Continuous <Continuous>  sevelamer carbonate Powder 1600 milliGRAM(s) Oral three times a day with meals  tamsulosin 0.4 milliGRAM(s) Oral at bedtime    Drug Dosing Weight  Height (cm): 185.42 (20 Jan 2018 10:22)  Weight (kg): 96 (25 Jan 2018 07:30)  BMI (kg/m2): 27.9 (25 Jan 2018 07:30)  BSA (m2): 2.2 (25 Jan 2018 07:30)    CENTRAL LINE: [ ] YES [ ] NO  LOCATION:   DATE INSERTED:  REMOVE: [ ] YES [ ] NO  EXPLAIN:    BRUNSON: [ ] YES [ ] NO    DATE INSERTED:  REMOVE:  [ ] YES [ ] NO  EXPLAIN:    A-LINE:  [ ] YES [ ] NO  LOCATION:   DATE INSERTED:  REMOVE:  [ ] YES [ ] NO  EXPLAIN:    PMH -reviewed admission note, no change since admission  Heart faliure: acute [ ] chronic [ ] acute or chronic [ ] diastolic [ ] systolic [ ] combied systolic and diastolic[ ]  NING: ATN[ ] renal medullary necrosis [ ] CKD I [ ]CKDII [ ]CKD III [ ]CKD IV [ ]CKD V [ ]Other pathological lesions [ ]  Abdominal Nutrition Status: malnutrition [ ] cachexia [ ] morbid obesity/BMI=40 [ ] Supplement ordered [___________]     ICU Vital Signs Last 24 Hrs  T(C): 37.3 (27 Jan 2018 00:00), Max: 37.9 (26 Jan 2018 19:59)  T(F): 99.1 (27 Jan 2018 00:00), Max: 100.3 (26 Jan 2018 19:59)  HR: 96 (27 Jan 2018 00:08) (79 - 115)  BP: 120/53 (26 Jan 2018 23:00) (89/54 - 144/61)  BP(mean): 70 (26 Jan 2018 23:00) (60 - 105)  ABP: --  ABP(mean): --  RR: 9 (26 Jan 2018 23:00) (8 - 14)  SpO2: 98% (27 Jan 2018 00:08) (93% - 98%)      ABG - ( 26 Jan 2018 04:51 )  pH: 7.22  /  pCO2: 44    /  pO2: 77    / HCO3: 17    / Base Excess: -9.5  /  SaO2: 93                    01-25 @ 07:01  -  01-26 @ 07:00  --------------------------------------------------------  IN: 1732.6 mL / OUT: 0 mL / NET: 1732.6 mL        Mode: AC/ CMV (Assist Control/ Continuous Mandatory Ventilation)  RR (machine): 14  TV (machine): 400  FiO2: 50  PEEP: 10  ITime: 0.9  MAP: 17  PIP: 25      PHYSICAL EXAM:    GENERAL: [ ]NAD, [ ]well-groomed, [ ]well-developed  HEAD:  [ ]Atraumatic, [ ]Normocephalic  EYES: [ ]EOMI, [ ]PERRLA, [ ]conjunctiva and sclera clear  ENMT: [ ]No tonsillar erythema, exudates, or enlargement; [ ]Moist mucous membranes, [ ]Good dentition, [ ]No lesions  NECK: [ ]Supple, normal appearance, [ ]No JVD; [ ]Normal thyroid; [ ]Trachea midline  NERVOUS SYSTEM:  [ ]Alert & Oriented X3, [ ]Good concentration; [ ]Motor Strength 5/5 B/L upper and lower extremities; [ ]DTRs 2+ intact and symmetric  CHEST/LUNG: [ ]No chest deformity; [ ]Normal percussion bilaterally; [ ]No rales, rhonchi, wheezing   HEART: [ ]Regular rate and rhythm; [ ]No murmurs, rubs, or gallops  ABDOMEN: [ ]Soft, Nontender, Nondistended; [ ]Bowel sounds present  EXTREMITIES:  [ ]2+ Peripheral Pulses, [ ]No clubbing, cyanosis, or edema  LYMPH: [ ]No lymphadenopathy noted  SKIN: [ ]No rashes or lesions; [ ]Good capillary refill      LABS:  CBC Full  -  ( 26 Jan 2018 06:37 )  WBC Count : 16.9 K/uL  Hemoglobin : 12.9 g/dL  Hematocrit : 39.0 %  Platelet Count - Automated : 133 K/uL  Mean Cell Volume : 103.6 fl  Mean Cell Hemoglobin : 34.1 pg  Mean Cell Hemoglobin Concentration : 33.0 gm/dL  Auto Neutrophil # : 15.4 K/uL  Auto Lymphocyte # : 0.6 K/uL  Auto Monocyte # : 0.7 K/uL  Auto Eosinophil # : 0.0 K/uL  Auto Basophil # : 0.1 K/uL  Auto Neutrophil % : 91.7 %  Auto Lymphocyte % : 3.6 %  Auto Monocyte % : 3.9 %  Auto Eosinophil % : 0.0 %  Auto Basophil % : 0.9 %    01-26    135  |  100  |  104<H>  ----------------------------<  274<H>  5.2   |  21<L>  |  6.02<H>    Ca    6.6<L>      26 Jan 2018 20:53  Phos  8.0     01-26  Mg     2.5     01-26    TPro  5.9<L>  /  Alb  1.9<L>  /  TBili  1.7<H>  /  DBili  x   /  AST  130<H>  /  ALT  72<H>  /  AlkPhos  374<H>  01-26            RADIOLOGY & ADDITIONAL STUDIES REVIEWED:  ***    [ ]GOALS OF CARE DISCUSSION WITH PATIENT/FAMILY/PROXY:    CRITICAL CARE TIME SPENT: 35 minutes INTERVAL HPI/OVERNIGHT EVENTS: *** Patient was seen and examined at bed side. surgery removed right shiley, post dialysis . patient was hyperkalemic received hyperkalemic cocktail     PRESSORS: [ ] YES [x ] NO  WHICH:      Antimicrobial:  meropenem IVPB 500 milliGRAM(s) IV Intermittent every 12 hours    Cardiovascular:  metoprolol     tartrate 12.5 milliGRAM(s) Oral two times a day  tamsulosin 0.4 milliGRAM(s) Oral at bedtime    Pulmonary:  ALBUTerol    90 MICROgram(s) HFA Inhaler 1 Puff(s) Inhalation every 6 hours    Hematalogic:  aspirin  chewable 81 milliGRAM(s) Oral daily  clopidogrel Tablet 75 milliGRAM(s) Oral daily  heparin  Injectable 5000 Unit(s) SubCutaneous every 12 hours    Other:  acetaminophen    Suspension 650 milliGRAM(s) Oral every 6 hours PRN  chlorhexidine 4% Liquid 1 Application(s) Topical daily  dexmedetomidine Infusion 0.5 MICROgram(s)/kG/Hr IV Continuous <Continuous>  fentaNYL   Infusion 0.5 MICROgram(s)/kG/Hr IV Continuous <Continuous>  finasteride 5 milliGRAM(s) Oral daily  insulin glargine Injectable (LANTUS) 10 Unit(s) SubCutaneous at bedtime  insulin lispro (HumaLOG) corrective regimen sliding scale   SubCutaneous every 6 hours  lactulose Syrup 30 Gram(s) Oral daily  methylPREDNISolone sodium succinate Injectable 40 milliGRAM(s) IV Push every 8 hours  pantoprazole  Injectable 40 milliGRAM(s) IV Push daily  pregabalin 50 milliGRAM(s) Oral two times a day  propofol Infusion 5 MICROgram(s)/kG/Min IV Continuous <Continuous>  sevelamer carbonate Powder 1600 milliGRAM(s) Oral three times a day with meals    acetaminophen    Suspension 650 milliGRAM(s) Oral every 6 hours PRN  ALBUTerol    90 MICROgram(s) HFA Inhaler 1 Puff(s) Inhalation every 6 hours  aspirin  chewable 81 milliGRAM(s) Oral daily  chlorhexidine 4% Liquid 1 Application(s) Topical daily  clopidogrel Tablet 75 milliGRAM(s) Oral daily  dexmedetomidine Infusion 0.5 MICROgram(s)/kG/Hr IV Continuous <Continuous>  fentaNYL   Infusion 0.5 MICROgram(s)/kG/Hr IV Continuous <Continuous>  finasteride 5 milliGRAM(s) Oral daily  heparin  Injectable 5000 Unit(s) SubCutaneous every 12 hours  insulin glargine Injectable (LANTUS) 10 Unit(s) SubCutaneous at bedtime  insulin lispro (HumaLOG) corrective regimen sliding scale   SubCutaneous every 6 hours  lactulose Syrup 30 Gram(s) Oral daily  meropenem IVPB 500 milliGRAM(s) IV Intermittent every 12 hours  methylPREDNISolone sodium succinate Injectable 40 milliGRAM(s) IV Push every 8 hours  metoprolol     tartrate 12.5 milliGRAM(s) Oral two times a day  pantoprazole  Injectable 40 milliGRAM(s) IV Push daily  pregabalin 50 milliGRAM(s) Oral two times a day  propofol Infusion 5 MICROgram(s)/kG/Min IV Continuous <Continuous>  sevelamer carbonate Powder 1600 milliGRAM(s) Oral three times a day with meals  tamsulosin 0.4 milliGRAM(s) Oral at bedtime    Drug Dosing Weight  Height (cm): 185.42 (20 Jan 2018 10:22)  Weight (kg): 96 (25 Jan 2018 07:30)  BMI (kg/m2): 27.9 (25 Jan 2018 07:30)  BSA (m2): 2.2 (25 Jan 2018 07:30)    CENTRAL LINE: [x ] YES [ ] NO  LOCATION:   DATE INSERTED:  REMOVE: [ ] YES [ ] NO  EXPLAIN:    BRUNSON: [ x] YES [ ] NO    DATE INSERTED:  REMOVE:  [ ] YES [ ] NO  EXPLAIN:    A-LINE:  [x ] YES [ ] NO  LOCATION:   DATE INSERTED:  REMOVE:  [ ] YES [ ] NO  EXPLAIN:    PMH -reviewed admission note, no change since admission  Heart faliure: acute [ ] chronic [ ] acute or chronic [ ] diastolic [ ] systolic [ ] combied systolic and diastolic[ ]  NING: ATN[ ] renal medullary necrosis [ ] CKD I [ ]CKDII [ ]CKD III [ ]CKD IV [ ]CKD V [ ]Other pathological lesions [ ]  Abdominal Nutrition Status: malnutrition [ ] cachexia [ ] morbid obesity/BMI=40 [ ] Supplement ordered [___________]     ICU Vital Signs Last 24 Hrs  T(C): 37.3 (27 Jan 2018 00:00), Max: 37.9 (26 Jan 2018 19:59)  T(F): 99.1 (27 Jan 2018 00:00), Max: 100.3 (26 Jan 2018 19:59)  HR: 96 (27 Jan 2018 00:08) (79 - 115)  BP: 120/53 (26 Jan 2018 23:00) (89/54 - 144/61)  BP(mean): 70 (26 Jan 2018 23:00) (60 - 105)  ABP: --  ABP(mean): --  RR: 9 (26 Jan 2018 23:00) (8 - 14)  SpO2: 98% (27 Jan 2018 00:08) (93% - 98%)      ABG - ( 26 Jan 2018 04:51 )  pH: 7.22  /  pCO2: 44    /  pO2: 77    / HCO3: 17    / Base Excess: -9.5  /  SaO2: 93          01-25 @ 07:01  -  01-26 @ 07:00  --------------------------------------------------------  IN: 1732.6 mL / OUT: 0 mL / NET: 1732.6 mL    Mode: AC/ CMV (Assist Control/ Continuous Mandatory Ventilation)  RR (machine): 16  TV (machine): 450  FiO2: 40  PEEP: 10  ITime: 0.9  MAP: 17  PIP: 31        PHYSICAL EXAM:  GENERAL: sedated   HEAD:  [x ]Atraumatic, [ x]Normocephalic  EYES:  clear conjunctiva   ENMT: ETT in place   NECK: [c]Supple, normal appearance, [ x]No JVD; RIJ in place   NERVOUS SYSTEM:  patient is sedated and intubated, On mechanical ventilation   CHEST/LUNG: B/l rales and rhonchi present, more R>> L   HEART: [ x]Regular rate and rhythm; [x ]No murmurs, rubs, or gallops  ABDOMEN: Abdomen distension, BS diminished, soft   EXTREMITIES:  [x ]2+ Peripheral Pulses, [ ]No clubbing, cyanosis, or edema  LYMPH: [ x]No lymphadenopathy noted  SKIN: [x ]No rashes or lesions;     LABS:  CBC Full  -  ( 26 Jan 2018 06:37 )  WBC Count : 16.9 K/uL  Hemoglobin : 12.9 g/dL  Hematocrit : 39.0 %  Platelet Count - Automated : 133 K/uL  Mean Cell Volume : 103.6 fl  Mean Cell Hemoglobin : 34.1 pg  Mean Cell Hemoglobin Concentration : 33.0 gm/dL  Auto Neutrophil # : 15.4 K/uL  Auto Lymphocyte # : 0.6 K/uL  Auto Monocyte # : 0.7 K/uL  Auto Eosinophil # : 0.0 K/uL  Auto Basophil # : 0.1 K/uL  Auto Neutrophil % : 91.7 %  Auto Lymphocyte % : 3.6 %  Auto Monocyte % : 3.9 %  Auto Eosinophil % : 0.0 %  Auto Basophil % : 0.9 %    01-26    135  |  100  |  104<H>  ----------------------------<  274<H>  5.2   |  21<L>  |  6.02<H>    Ca    6.6<L>      26 Jan 2018 20:53  Phos  8.0     01-26  Mg     2.5     01-26    TPro  5.9<L>  /  Alb  1.9<L>  /  TBili  1.7<H>  /  DBili  x   /  AST  130<H>  /  ALT  72<H>  /  AlkPhos  374<H>  01-26            RADIOLOGY & ADDITIONAL STUDIES REVIEWED:  ***    [ ]GOALS OF CARE DISCUSSION WITH PATIENT/FAMILY/PROXY:    CRITICAL CARE TIME SPENT: 35 minutes

## 2018-01-27 NOTE — PROGRESS NOTE ADULT - SUBJECTIVE AND OBJECTIVE BOX
S: no events      acetaminophen    Suspension 650 milliGRAM(s) Oral every 6 hours PRN  ALBUTerol    90 MICROgram(s) HFA Inhaler 1 Puff(s) Inhalation every 6 hours  aspirin  chewable 81 milliGRAM(s) Oral daily  chlorhexidine 4% Liquid 1 Application(s) Topical daily  clopidogrel Tablet 75 milliGRAM(s) Oral daily  dexmedetomidine Infusion 0.5 MICROgram(s)/kG/Hr IV Continuous <Continuous>  fentaNYL   Infusion 0.5 MICROgram(s)/kG/Hr IV Continuous <Continuous>  finasteride 5 milliGRAM(s) Oral daily  heparin  Injectable 5000 Unit(s) SubCutaneous every 12 hours  insulin glargine Injectable (LANTUS) 16 Unit(s) SubCutaneous at bedtime  insulin lispro (HumaLOG) corrective regimen sliding scale   SubCutaneous every 4 hours  lactulose Syrup 30 Gram(s) Oral daily  meropenem IVPB 500 milliGRAM(s) IV Intermittent every 12 hours  methylPREDNISolone sodium succinate Injectable 40 milliGRAM(s) IV Push every 8 hours  metoprolol     tartrate 12.5 milliGRAM(s) Oral two times a day  pantoprazole  Injectable 40 milliGRAM(s) IV Push daily  pregabalin 50 milliGRAM(s) Oral two times a day  propofol Infusion 5 MICROgram(s)/kG/Min IV Continuous <Continuous>  sevelamer carbonate Powder 1600 milliGRAM(s) Oral three times a day with meals  tamsulosin 0.4 milliGRAM(s) Oral at bedtime                            12.5   18.1  )-----------( 133      ( 27 Jan 2018 06:16 )             38.0       01-27    132<L>  |  95<L>  |  125<H>  ----------------------------<  342<H>  5.9<H>   |  21<L>  |  6.96<H>    Ca    6.4<LL>      27 Jan 2018 14:40  Phos  10.0     01-27  Mg     3.0     01-27    TPro  5.9<L>  /  Alb  1.9<L>  /  TBili  1.7<H>  /  DBili  x   /  AST  130<H>  /  ALT  72<H>  /  AlkPhos  374<H>  01-26            T(C): 36.8 (01-27-18 @ 15:00), Max: 37.9 (01-26-18 @ 19:59)  HR: 84 (01-27-18 @ 15:00) (77 - 115)  BP: 132/54 (01-27-18 @ 15:00) (103/53 - 144/61)  RR: 17 (01-27-18 @ 15:00) (8 - 22)  SpO2: 98% (01-27-18 @ 15:00) (96% - 100%)  Wt(kg): --    I&O's Summary    26 Jan 2018 07:01  -  27 Jan 2018 07:00  --------------------------------------------------------  IN: 1556 mL / OUT: 5 mL / NET: 1551 mL    27 Jan 2018 07:01  -  27 Jan 2018 16:32  --------------------------------------------------------  IN: 363.5 mL / OUT: 15 mL / NET: 348.5 mL      Heart: normal S1, S2, RRR, no m/r/g  Lungs: cta b/l  Abd: soft nT, nD  Ext: no edema      TELEMETRY:  SR       ASSESSMENT/PLAN: 	72y Male [facility] h/o CKD, Anxiety disorder, CAD, BPH, Carpel tunnel syndrome, compulsive disorder, DM, OA, HTN, IBS sent for dyspnea for last 3 days found with (+) flu, PNA likely septic shock normal LV function NING now on HD    - f/u palliative care  - monitor tele  - continue with supportive care per ICU  -Abx per primary team       Tony Santiago MD  [facility]  [address] [place] [address], [id number]  [place] [address]  office: [phone]  pager: [phone]

## 2018-01-27 NOTE — PROGRESS NOTE ADULT - SUBJECTIVE AND OBJECTIVE BOX
CC: Patient is sedated, on ventilator, NAD. No fever or chills.    Vital Signs Last 24 Hrs  T(C): 36.8 (27 Jan 2018 15:00), Max: 37.9 (26 Jan 2018 19:59)  T(F): 98.2 (27 Jan 2018 15:00), Max: 100.3 (26 Jan 2018 19:59)  HR: 86 (27 Jan 2018 18:00) (77 - 115)  BP: 140/58 (27 Jan 2018 18:00) (114/47 - 140/58)  BP(mean): 77 (27 Jan 2018 18:00) (65 - 79)  RR: 16 (27 Jan 2018 18:00) (8 - 22)  SpO2: 98% (27 Jan 2018 18:00) (96% - 100%)    01-26 @ 07:01 - 01-27 @ 07:00  --------------------------------------------------------  IN: 1556 mL / OUT: 5 mL / NET: 1551 mL    01-27 @ 07:01 - 01-27 @ 19:10  --------------------------------------------------------  IN: 516 mL / OUT: 20 mL / NET: 496 mL    PHYSICAL EXAM:  Constitutional: Patient is sedated  Eyes: Sclera anicteric  Neck: supple, no JVD   Respiratory: diffuse crackles   Cardiovascular:s1,s2 present   Gastrointestinal: distended, BS +   Extremities: 2+ edema  Neurological: sedated   Skin: normal turgor    MEDICATIONS:  acetaminophen    Suspension 650 milliGRAM(s) Oral every 6 hours PRN  ALBUTerol    90 MICROgram(s) HFA Inhaler 1 Puff(s) Inhalation every 6 hours  aspirin  chewable 81 milliGRAM(s) Oral daily  chlorhexidine 4% Liquid 1 Application(s) Topical daily  clopidogrel Tablet 75 milliGRAM(s) Oral daily  dexmedetomidine Infusion 0.5 MICROgram(s)/kG/Hr IV Continuous <Continuous>  fentaNYL   Infusion 0.5 MICROgram(s)/kG/Hr IV Continuous <Continuous>  finasteride 5 milliGRAM(s) Oral daily  heparin  Injectable 5000 Unit(s) SubCutaneous every 12 hours  insulin glargine Injectable (LANTUS) 16 Unit(s) SubCutaneous at bedtime  insulin lispro (HumaLOG) corrective regimen sliding scale   SubCutaneous every 4 hours  lactulose Syrup 30 Gram(s) Oral daily  meropenem IVPB 500 milliGRAM(s) IV Intermittent every 12 hours  methylPREDNISolone sodium succinate Injectable 40 milliGRAM(s) IV Push every 8 hours  metoprolol     tartrate 12.5 milliGRAM(s) Oral two times a day  pantoprazole  Injectable 40 milliGRAM(s) IV Push daily  propofol Infusion 5 MICROgram(s)/kG/Min IV Continuous <Continuous>  sevelamer carbonate Powder 1600 milliGRAM(s) Oral three times a day with meals  tamsulosin 0.4 milliGRAM(s) Oral at bedtime      LABS:                        12.5   18.1  )-----------( 133      ( 27 Jan 2018 06:16 )             38.0     01-27    132<L>  |  95<L>  |  125<H>  ----------------------------<  342<H>  5.9<H>   |  21<L>  |  6.96<H>    Ca    6.4<LL>      27 Jan 2018 14:40  Phos  10.0     01-27  Mg     3.0     01-27    TPro  5.9<L>  /  Alb  1.9<L>  /  TBili  1.7<H>  /  DBili  x   /  AST  130<H>  /  ALT  72<H>  /  AlkPhos  374<H>  01-26    PT/INR - ( 27 Jan 2018 14:40 )   PT: 11.3 sec;   INR: 1.04 ratio      Magnesium, Serum: 3.0 mg/dL (01-27 @ 06:16)  Phosphorus Level, Serum: 10.0 mg/dL (01-27 @ 06:16)  Magnesium, Serum: 2.5 mg/dL (01-26 @ 20:53)  Phosphorus Level, Serum: 8.0 mg/dL (01-26 @ 20:53    ASSESSMENT AND PLAN:     1. ESRD sec to ATN: No improvement of renal function.  - HD is scheduled for tomorrow  - surgery for a new temporary HD catheter placement  - pts renal function is poor with U/O still 5-10cc/hr; net +77ml   - hold nephrotoxic medications, adjust meds as per egfr  and avoid contrast studies/phosphate  enema, etc    2. Hyperkalemia   -Kayexalate PRN  -keep <5 and >4  -low K diet   -f/u k level q 12hrs      2. Metabolic acidosis   multifactorial with acceptable pH   monitor CO 2 daily   keep PH >7.3   add iv Nahco3 1 ampule if PH<7.3 daily      3. Hyperphosphatemia   - still elevated (10 today); patient to get HD today   -continue [id number] mg tid via ngt  -f/u phos level daily  -keep Phos>3, <5     4.Hyponatremia;mild, secondary to ning  -avoid iv fluids  -f/u Na level daily  -continue  fluid restriction to 1 liter per day    5. Fluid overload   bilateral opacities in the CXR , most likely pneumnia, less likely fluid overlaod  sec to ? CHF/NING   -UF as tolerated    6.Hypocalcemia;improving (corrected ca )  -suggest to replace q 6hrs 1 gm to keep corrected ca >8.5 and <10  -f/u ca level daily  pts prognosis very poor with worsening resp. status/ARDS.  Above discussed with ICU staff.  Time spent 35 min.

## 2018-01-27 NOTE — PROGRESS NOTE ADULT - ASSESSMENT
72 male nursing home patient with CAD, CKD, DM, HTN, OA, BPH, anxiety, presented with influenza, acute respiratory failure, septic shock.  May have superimposed pneumonia as well      1. Problem: ARDS 2/2 PNA and sepsis   CXR shows B/l pachy infiltrates   patient on MV requiring high O2   ECHO normal EF   patient Had hemodialysis yesterday   /14/70/10  ABG 7.2/44/77/18  PaO2/FiO2 120>.110 moderate ARDS   gave vanco 1 g and started on meropenem 500 mg Q12hrs ( renal adjusted)   c/w solumedrol 125 iv stat followed by 60 mg iv Q6hrs  precedex stopped yesterday currently on propofol and fentanyl     2.Problem: Acute hypoxic  Respiratory failure 2/2 flu vs PNA   On droplet isolation, Influenza B positive   starting on tamiflu 30mg Q 12 hrs day 4,  ceftriaxone for aspiration PNA day 2   Lactate 7.6 s/p 2 L bolus which improved to 2.6   - Procalcitonin 80.4  -urine and blood cultures are negative, urine legionella negative   - CXR Increased interstitial lung markings without significant change. New consolidation right lung. Progressed consolidation left lung.  - need high O2 to maintain sat concerns for ARDS, continue with Mechanical ventilation PEEP 10  - CT physiotherapy  - solumedrol 125 iv stat followed by 60 mg iv Q6hrs   - tamiflu dose completed    - repeat Xray shows similar findings    - ID Dr Minh bain noted      Problem/Plan - 3  ·  Problem: CAD (coronary artery disease).  Plan: c/w ASA, Plavix and Lipitor   patient has H/o of CAD, had elevated trop, start patient on heparin drip for concerns of NSTEMI   - trop trending down ECHO EF 55 %with normal LV functions   - tropnin likely due to demand ischaemia 2/2 septic shock   - Holding heparin drip    - continue with aspirin, plavix  - hold Lipitor as LFT's trending up   - HD stable, currently off the pressor support   - Elevated d-dimer with normal venous doppler with no RV strain on ECHO less likely PE,   - elevated d-dimer 2/2 renal failure and sepsis   - Dr Benson cardio taya noted       Problem/Plan- 4 Thrombocytopenia   Likely due to sepsis,  - normal fibrogen assay and rt count   -continues to improve       Problem/Plan - 5  NING   patient Cr trending up Cr 3.2>> 4.7, FeNA 0.8, Ning likely pre renal 2/2 septic shock causing ATN  f/u US renal   rigth shijay placed, had first HD yesterday   urine output 5cc /hr   - Monitor Renal functions, avoid nephrotoxic drugs   - HD yesterday 1.5 L removed   Dr Judah bain noted     Problem/Plan- 5   ·  Problem: DM (diabetes mellitus).  Plan: Diet controlled   HbA1c. 6.0  lantus 10 units at bed time   - continue with sliding scale Q 4 hrs   - finger sticksQ 2 hrs      Problem/Plan - 6:  ·  Problem: HTN (hypertension).  Plan: Holding metoprolol as BP is low.   continue with levophed and dobutamine      Problem/Plan - 7  Problem Hyperkalemia: K 6.0 >. 6.2>>5.7>>4.7  Due to renal failure and metabolic acidosis    - hyperkalemia cocktail( calcium gluconate. insulin albuterol and Kayexalate given)  s/p HD yesterday   f/u potassium      Problem/Plan - 8   Metabolic acidosis2/2 renal failure, give 50 meq Bicarbonate yesterday   continues to improve   continue to monitor real functions   dialysis today      Problem/Plan - 9  Hyperphosphatemia  Phosp 7.5   will continue with Renagel 1600mg TID        Problem/Plan - 10  ·  Problem: Need for prophylactic measure.  Plan: Improve score 3 on heparin  drip and Protonix for GI prophylaxis 72 male nursing home patient with CAD, CKD, DM, HTN, OA, BPH, anxiety, presented with influenza, acute respiratory failure, septic shock.  May have superimposed pneumonia as well      1. Problem: ARDS 2/2 PNA and sepsis   CXR shows B/l pachy infiltrates   patient on MV requiring high O2   ECHO normal EF   patient Had hemodialysis yesterday   /14/70/10  ABG 7.2/44/77/18  PaO2/FiO2 120>.110 moderate ARDS   gave vanco 1 g and started on meropenem 500 mg Q12hrs ( renal adjusted)   c/w solumedrol 125 iv stat followed by 60 mg iv Q6hrs  precedex stopped yesterday currently on propofol and fentanyl     2.Problem: Acute hypoxic  Respiratory failure 2/2 flu vs PNA   On droplet isolation, Influenza B positive   starting on tamiflu 30mg Q 12 hrs day 4,  ceftriaxone for aspiration PNA day 2   Lactate 7.6 s/p 2 L bolus which improved to 2.6   - Procalcitonin 80.4  -urine and blood cultures are negative, urine legionella negative   - CXR Increased interstitial lung markings without significant change. New consolidation right lung. Progressed consolidation left lung.  - need high O2 to maintain sat concerns for ARDS, continue with Mechanical ventilation PEEP 10  - CT physiotherapy  - solumedrol 125 iv stat followed by 60 mg iv Q6hrs   - tamiflu dose completed    - repeat Xray shows similar findings    - ID Dr Minh bain noted      Problem/Plan - 3  ·  Problem: CAD (coronary artery disease).  Plan: c/w ASA, Plavix and Lipitor   patient has H/o of CAD, had elevated trop, start patient on heparin drip for concerns of NSTEMI   - trop trending down ECHO EF 55 %with normal LV functions   - tropnin likely due to demand ischaemia 2/2 septic shock   - Holding heparin drip    - continue with aspirin, plavix  - hold Lipitor as LFT's trending up   - HD stable, currently off the pressor support   - Elevated d-dimer with normal venous doppler with no RV strain on ECHO less likely PE,   - elevated d-dimer 2/2 renal failure and sepsis   - Dr Benson cardio taya noted       Problem/Plan- 4 Thrombocytopenia   Likely due to sepsis,  - normal fibrogen assay and rt count   -continues to improve       Problem/Plan - 5  NING   patient Cr trending up Cr 3.2>> , FeNA 0.8, Ning likely pre renal 2/2 septic shock causing Aurine output 5cc /hr   - Monitor Renal functions, avoid nephrotoxic drugs   - HD yesterday 1.5 L removed   Dr Judah bain noted     Problem/Plan- 5   ·  Problem: DM (diabetes mellitus).  Plan: Diet controlled   HbA1c. 6.0  lantus 10 units at bed time   - continue with sliding scale Q 4 hrs   - finger sticksQ 2 hrs      Problem/Plan - 6:  ·  Problem: HTN (hypertension).  Plan: Holding metoprolol as BP is low.   continue with levophed and dobutamine      Problem/Plan - 7  Problem Hyperkalemia: K 6.0 >. 6.2>>5.7>>4.7  Due to renal failure and metabolic acidosis    - hyperkalemia cocktail( calcium gluconate. insulin albuterol and Kayexalate given)  s/p HD yesterday   f/u potassium      Problem/Plan - 8   Metabolic acidosis2/2 renal failure, give 50 meq Bicarbonate yesterday   continues to improve   continue to monitor real functions   dialysis today      Problem/Plan - 9  Hyperphosphatemia  Phosp 7.5   will continue with Renagel 1600mg TID        Problem/Plan - 10  ·  Problem: Need for prophylactic measure.  Plan: Improve score 3 on heparin  drip and Protonix for GI prophylaxis 72 male nursing home patient with CAD, CKD, DM, HTN, OA, BPH, anxiety, presented with influenza, acute respiratory failure, septic shock.  May have superimposed pneumonia as well      1. Problem: ARDS 2/2 PNA and sepsis   CXR shows B/l pachy infiltrates   patient on MV requiring Fio2 50%,   ECHO normal EF ,patient Had hemodialysis yesterday   gave vanco 1 g and started on meropenem 500 mg Q12hrs ( renal adjusted)   c/w solumedrol 125 iv stat followed by 60 mg iv Q6hrs  - on propofol and fentanyl     2.Problem: Acute hypoxic Respiratory failure 2/2 flu vs PNA   On droplet isolation, Influenza B positive   starting on tamiflu 30mg Q 12 hrs day 4,  ceftriaxone for aspiration PNA day 2   Lactate 7.6 s/p 2 L bolus which improved to 2.6   - Procalcitonin 80.4  -urine and blood cultures are negative, urine legionella negative   - CXR Increased interstitial lung markings without significant change. New consolidation right lung. Progressed consolidation left lung.  - need high O2 to maintain sat concerns for ARDS, continue with Mechanical ventilation PEEP 10  - CT physiotherapy  - solumedrol 125 iv stat followed by 60 mg iv Q6hrs   - tamiflu dose completed    - repeat Xray shows similar findings    - ID Dr Minh bain noted      Problem/Plan - 3  ·  Problem: CAD (coronary artery disease).  Plan: c/w ASA, Plavix and Lipitor   patient has H/o of CAD, had elevated trop, start patient on heparin drip for concerns of NSTEMI   - trop trending down ECHO EF 55 %with normal LV functions   - tropnin likely due to demand ischaemia 2/2 septic shock   - Holding heparin drip    - continue with aspirin, plavix  - hold Lipitor as LFT's trending up   - HD stable, currently off the pressor support   - Elevated d-dimer with normal venous doppler with no RV strain on ECHO less likely PE,   - elevated d-dimer 2/2 renal failure and sepsis   - Dr Kelley bain noted       Problem/Plan- 4 Thrombocytopenia   Likely due to sepsis,  - normal fibrogen assay and rt count   -continues to improve       Problem/Plan - 5  NING   patient Cr trending up Cr 3.2>> , FeNA 0.8, Ning likely pre renal 2/2 septic shock causing Aurine output 5cc /hr   - Monitor Renal functions, avoid nephrotoxic drugs   - HD yesterday 1.5 L removed   Dr Judah bain noted     Problem/Plan- 5   ·  Problem: DM (diabetes mellitus).  Plan: Diet controlled   HbA1c. 6.0  lantus 10 units at bed time   - continue with sliding scale Q 4 hrs   - finger sticksQ 2 hrs      Problem/Plan - 6:  ·  Problem: HTN (hypertension).  Plan: Holding metoprolol as BP is low.   continue with levophed and dobutamine      Problem/Plan - 7  Problem Hyperkalemia: K 6.0 >. 6.2>>5.7>>4.7  Due to renal failure and metabolic acidosis    - hyperkalemia cocktail( calcium gluconate. insulin albuterol and Kayexalate given)  s/p HD yesterday   f/u potassium      Problem/Plan - 8   Metabolic acidosis2/2 renal failure, give 50 meq Bicarbonate yesterday   continues to improve   continue to monitor real functions   dialysis today      Problem/Plan - 9  Hyperphosphatemia  Phosp 7.5   will continue with Renagel 1600mg TID        Problem/Plan - 10  ·  Problem: Need for prophylactic measure.  Plan: Improve score 3 on heparin  drip and Protonix for GI prophylaxis

## 2018-01-28 NOTE — PROGRESS NOTE ADULT - ATTENDING COMMENTS
72 male nursing home patient with CAD, CKD, DM, HTN, OA, BPH, anxiety, presented with influenza, acute respiratory failure, septic shock, pneumonia. Now with NING requiring dialysis, resolving ARDS.    Plan:  - abx per ID, completed tamiflu  - HD per nephrology - will need new dialysis catheter today  - wean down peep and fio2  Total cc time 35 min

## 2018-01-28 NOTE — PROGRESS NOTE ADULT - ASSESSMENT
72 male nursing home patient with CAD, CKD, DM, HTN, OA, BPH, anxiety, presented with influenza, acute respiratory failure, septic shock.  May have superimposed pneumonia as well      1. Problem: ARDS 2/2 PNA and sepsis   CXR shows B/l pachy infiltrates   patient on MV requiring Fio2 40%,   ECHO normal EF ,patient Had hemodialysis yesterday   gave vanco 1 g and started on meropenem 500 mg Q12hrs ( renal adjusted)   c/w solumedrol 125 iv stat followed by 60 mg iv Q6hrs  - on propofol and fentanyl     2.Problem: Acute hypoxic Respiratory failure 2/2 flu vs PNA   On droplet isolation, Influenza B positive   completed tamiflu ,  ceftriaxone for aspiration PNA day 3  Lactate 7.6 s/p 2 L bolus which improved to 2.6   - Procalcitonin 80.4  -urine and blood cultures are negative, urine legionella negative   - CXR Increased interstitial lung markings without significant change. New consolidation right lung. Progressed consolidation left lung.  - need high O2 to maintain sat concerns for ARDS, continue with Mechanical ventilation PEEP 8  - CT physiotherapy  - solumedrol 125 iv stat followed by 60 mg iv Q8hrs   - repeat Xray shows similar findings    - ID Dr Minh bain noted      Problem/Plan - 3  ·  Problem: CAD (coronary artery disease).  Plan: c/w ASA, Plavix and Lipitor   patient has H/o of CAD, had elevated trop, start patient on heparin drip for concerns of NSTEMI   - trop trending down ECHO EF 55 %with normal LV functions   - tropnin likely due to demand ischaemia 2/2 septic shock   - Holding heparin drip    - continue with aspirin, plavix  - hold Lipitor as LFT's trending up   - HD stable, currently off the pressor support   - Elevated d-dimer with normal venous doppler with no RV strain on ECHO less likely PE,   - elevated d-dimer 2/2 renal failure and sepsis   -suggested palliative care consult, due to poor prognosis   - Dr Benson cardio taya noted       Problem/Plan- 4 Thrombocytopenia   Likely due to sepsis,  - normal fibrogen assay and rt count   -continues to improve       Problem/Plan - 5  NING   patient Cr trending up Cr 3.2>> , FeNA 0.8, Ning likely pre renal 2/2 septic shock causing Aurine output 5cc /hr   - Monitor Renal functions, avoid nephrotoxic drugs   - HD tomorrow, after surgery places a shijay bain noted     Problem/Plan- 5   ·  Problem: DM (diabetes mellitus).  Plan: Diet controlled   HbA1c. 6.0  lantus 16 units at bed time   - continue with sliding scale Q 4 hrs   - finger sticksQ 2 hrs      Problem/Plan - 6:  ·  Problem: HTN (hypertension).  Plan: Holding metoprolol as BP is low.   continue with levophed and dobutamine      Problem/Plan - 7  Problem Hyperkalemia: K 6.0 >. 6.2>>5.7>>4.7  Due to renal failure and metabolic acidosis    - hyperkalemia cocktail( calcium gluconate. insulin albuterol and Kayexalate give  HD tomorrow,      Problem/Plan - 8   Metabolic acidosis2/2 renal failure, s/p 50 meq Bicarbonate  continues to improve   continue to monitor real functions   dialysis in am       Problem/Plan - 9  Hyperphosphatemia  Phosp 7.5   will continue with Renagel 1600mg TID        Problem/Plan - 10  ·  Problem: Need for prophylactic measure.  Plan: Improve score 3 on heparin  drip and Protonix for GI prophylaxis 72 male nursing home patient with CAD, CKD, DM, HTN, OA, BPH, anxiety, presented with influenza, acute respiratory failure, septic shock.  May have superimposed pneumonia as well      1. Problem: ARDS 2/2 PNA and sepsis   CXR shows B/l pachy infiltrates   patient on MV requiring Fio2 40%,   ECHO normal EF ,patient Had hemodialysis yesterday   gave vanco 1 g and started on meropenem 500 mg Q12hrs ( renal adjusted)   dc IV steroids and started on prednisone 40 mg  qd  - on propofol and fentanyl     2.Problem: Acute hypoxic Respiratory failure 2/2 flu vs PNA   On droplet isolation, Influenza B positive   completed tamiflu ,  ceftriaxone for aspiration PNA day 3  Lactate 7.6 s/p 2 L bolus which improved to 2.6   - Procalcitonin 80.4  -urine and blood cultures are negative, urine legionella negative   - CXR Increased interstitial lung markings without significant change. New consolidation right lung. Progressed consolidation left lung.  - need high O2 to maintain sat concerns for ARDS, continue with Mechanical ventilation PEEP 8  - CT physiotherapy  - solumedrol 125 iv stat followed by 60 mg iv Q8hrs   - repeat Xray shows similar findings    - ID Dr Minh bain noted      Problem/Plan - 3  ·  Problem: CAD (coronary artery disease).  Plan: c/w ASA, Plavix and Lipitor   patient has H/o of CAD, had elevated trop, start patient on heparin drip for concerns of NSTEMI   - trop trending down ECHO EF 55 %with normal LV functions   - tropnin likely due to demand ischaemia 2/2 septic shock   - Holding heparin drip    - continue with aspirin, plavix  - hold Lipitor as LFT's trending up   - HD stable, currently off the pressor support   - Elevated d-dimer with normal venous doppler with no RV strain on ECHO less likely PE,   - elevated d-dimer 2/2 renal failure and sepsis   -suggested palliative care consult, due to poor prognosis   - Dr Benson cardio taya noted       Problem/Plan- 4 Thrombocytopenia   Likely due to sepsis,  - normal fibrogen assay and rt count       Problem/Plan - 5  ARF     - Monitor Renal functions, avoid nephrotoxic drugs   - new Shiley was placed  - HD today    Dr Judah bain noted     Problem/Plan- 5   ·  Problem: DM (diabetes mellitus).  Plan: Diet controlled   HbA1c. 6.0  lantus 16 units at bed time   - continue with sliding scale Q 4 hrs   - finger sticksQ 2 hrs      Problem/Plan - 6:  ·  Problem: HTN (hypertension).  Plan: Holding metoprolol as BP is low.        Problem/Plan - 7    Problem Hyperkalemia:  Due to renal failure   c/w HD           Problem/Plan - 8   Metabolic acidosis2/2 renal failure, s/p 50 meq Bicarbonate  continues to improve   continue to monitor real functions        Problem/Plan - 9  Hyperphosphatemia  will continue with Renagel 1600mg TID        Problem/Plan - 10  ·  Problem: Need for prophylactic measure.  Plan: Improve score 3 on heparin  drip and Protonix for GI prophylaxis

## 2018-01-28 NOTE — PROGRESS NOTE ADULT - SUBJECTIVE AND OBJECTIVE BOX
INTERVAL HPI/OVERNIGHT EVENTS: Patient was seen and examined at bed side. surgery will place renay tomorrow before dialysis     PRESSORS: [ ] YES [x ] NO  WHICH:    ANTIBIOTICS:                  DATE STARTED:  ANTIBIOTICS:                  DATE STARTED:  ANTIBIOTICS:                  DATE STARTED:    Antimicrobial:  meropenem IVPB 500 milliGRAM(s) IV Intermittent every 12 hours    Cardiovascular:  metoprolol     tartrate 12.5 milliGRAM(s) Oral two times a day  tamsulosin 0.4 milliGRAM(s) Oral at bedtime    Pulmonary:  ALBUTerol    90 MICROgram(s) HFA Inhaler 1 Puff(s) Inhalation every 6 hours    Hematalogic:  aspirin  chewable 81 milliGRAM(s) Oral daily  clopidogrel Tablet 75 milliGRAM(s) Oral daily  heparin  Injectable 5000 Unit(s) SubCutaneous every 12 hours    Other:  acetaminophen    Suspension 650 milliGRAM(s) Oral every 6 hours PRN  chlorhexidine 4% Liquid 1 Application(s) Topical daily  dexmedetomidine Infusion 0.5 MICROgram(s)/kG/Hr IV Continuous <Continuous>  fentaNYL   Infusion 0.5 MICROgram(s)/kG/Hr IV Continuous <Continuous>  finasteride 5 milliGRAM(s) Oral daily  insulin glargine Injectable (LANTUS) 16 Unit(s) SubCutaneous at bedtime  insulin lispro (HumaLOG) corrective regimen sliding scale   SubCutaneous every 4 hours  lactulose Syrup 30 Gram(s) Oral daily  methylPREDNISolone sodium succinate Injectable 40 milliGRAM(s) IV Push every 8 hours  pantoprazole  Injectable 40 milliGRAM(s) IV Push daily  propofol Infusion 5 MICROgram(s)/kG/Min IV Continuous <Continuous>  sevelamer carbonate Powder 1600 milliGRAM(s) Oral three times a day with meals    acetaminophen    Suspension 650 milliGRAM(s) Oral every 6 hours PRN  ALBUTerol    90 MICROgram(s) HFA Inhaler 1 Puff(s) Inhalation every 6 hours  aspirin  chewable 81 milliGRAM(s) Oral daily  chlorhexidine 4% Liquid 1 Application(s) Topical daily  clopidogrel Tablet 75 milliGRAM(s) Oral daily  dexmedetomidine Infusion 0.5 MICROgram(s)/kG/Hr IV Continuous <Continuous>  fentaNYL   Infusion 0.5 MICROgram(s)/kG/Hr IV Continuous <Continuous>  finasteride 5 milliGRAM(s) Oral daily  heparin  Injectable 5000 Unit(s) SubCutaneous every 12 hours  insulin glargine Injectable (LANTUS) 16 Unit(s) SubCutaneous at bedtime  insulin lispro (HumaLOG) corrective regimen sliding scale   SubCutaneous every 4 hours  lactulose Syrup 30 Gram(s) Oral daily  meropenem IVPB 500 milliGRAM(s) IV Intermittent every 12 hours  methylPREDNISolone sodium succinate Injectable 40 milliGRAM(s) IV Push every 8 hours  metoprolol     tartrate 12.5 milliGRAM(s) Oral two times a day  pantoprazole  Injectable 40 milliGRAM(s) IV Push daily  propofol Infusion 5 MICROgram(s)/kG/Min IV Continuous <Continuous>  sevelamer carbonate Powder 1600 milliGRAM(s) Oral three times a day with meals  tamsulosin 0.4 milliGRAM(s) Oral at bedtime    Drug Dosing Weight  Height (cm): 185.42 (20 Jan 2018 10:22)  Weight (kg): 96 (25 Jan 2018 07:30)  BMI (kg/m2): 27.9 (25 Jan 2018 07:30)  BSA (m2): 2.2 (25 Jan 2018 07:30)    CENTRAL LINE: [ ] YES [ ] NO  LOCATION:   DATE INSERTED:  REMOVE: [ ] YES [ ] NO  EXPLAIN:    BRUNSON: [ ] YES [ ] NO    DATE INSERTED:  REMOVE:  [ ] YES [ ] NO  EXPLAIN:    A-LINE:  [ ] YES [ ] NO  LOCATION:   DATE INSERTED:  REMOVE:  [ ] YES [ ] NO  EXPLAIN:    PMH -reviewed admission note, no change since admission  Heart faliure: acute [ ] chronic [ ] acute or chronic [ ] diastolic [ ] systolic [ ] combied systolic and diastolic[ ]  NING: ATN[ ] renal medullary necrosis [ ] CKD I [ ]CKDII [ ]CKD III [ ]CKD IV [ ]CKD V [ ]Other pathological lesions [ ]  Abdominal Nutrition Status: malnutrition [ ] cachexia [ ] morbid obesity/BMI=40 [ ] Supplement ordered [___________]     ICU Vital Signs Last 24 Hrs  T(C): 38.2 (27 Jan 2018 23:57), Max: 38.2 (27 Jan 2018 23:57)  T(F): 100.8 (27 Jan 2018 23:57), Max: 100.8 (27 Jan 2018 23:57)  HR: 98 (28 Jan 2018 02:00) (77 - 98)  BP: 146/58 (28 Jan 2018 02:00) (114/47 - 153/57)  BP(mean): 78 (28 Jan 2018 02:00) (65 - 79)  ABP: 160/65 (28 Jan 2018 02:00) (144/53 - 165/67)  ABP(mean): 90 (28 Jan 2018 02:00) (78 - 94)  RR: 16 (28 Jan 2018 02:00) (8 - 22)  SpO2: 98% (28 Jan 2018 02:00) (96% - 100%)      ABG - ( 27 Jan 2018 04:52 )  pH: 7.25  /  pCO2: 47    /  pO2: 113   / HCO3: 20    / Base Excess: -6.8  /  SaO2: 97                    01-26 @ 07:01  -  01-27 @ 07:00  --------------------------------------------------------  IN: 1556 mL / OUT: 5 mL / NET: 1551 mL        Mode: AC/ CMV (Assist Control/ Continuous Mandatory Ventilation)  RR (machine): 16  TV (machine): 450  FiO2: 40  PEEP: 8  ITime: 1  MAP: 14  PIP: 24      PHYSICAL EXAM:    GENERAL: [ ]NAD, [ ]well-groomed, [ ]well-developed  HEAD:  [ ]Atraumatic, [ ]Normocephalic  EYES: [ ]EOMI, [ ]PERRLA, [ ]conjunctiva and sclera clear  ENMT: [ ]No tonsillar erythema, exudates, or enlargement; [ ]Moist mucous membranes, [ ]Good dentition, [ ]No lesions  NECK: [ ]Supple, normal appearance, [ ]No JVD; [ ]Normal thyroid; [ ]Trachea midline  NERVOUS SYSTEM:  [ ]Alert & Oriented X3, [ ]Good concentration; [ ]Motor Strength 5/5 B/L upper and lower extremities; [ ]DTRs 2+ intact and symmetric  CHEST/LUNG: [ ]No chest deformity; [ ]Normal percussion bilaterally; [ ]No rales, rhonchi, wheezing   HEART: [ ]Regular rate and rhythm; [ ]No murmurs, rubs, or gallops  ABDOMEN: [ ]Soft, Nontender, Nondistended; [ ]Bowel sounds present  EXTREMITIES:  [ ]2+ Peripheral Pulses, [ ]No clubbing, cyanosis, or edema  LYMPH: [ ]No lymphadenopathy noted  SKIN: [ ]No rashes or lesions; [ ]Good capillary refill      LABS:  CBC Full  -  ( 27 Jan 2018 06:16 )  WBC Count : 18.1 K/uL  Hemoglobin : 12.5 g/dL  Hematocrit : 38.0 %  Platelet Count - Automated : 133 K/uL  Mean Cell Volume : 103.5 fl  Mean Cell Hemoglobin : 34.2 pg  Mean Cell Hemoglobin Concentration : 33.0 gm/dL  Auto Neutrophil # : x  Auto Lymphocyte # : x  Auto Monocyte # : x  Auto Eosinophil # : x  Auto Basophil # : x  Auto Neutrophil % : x  Auto Lymphocyte % : x  Auto Monocyte % : x  Auto Eosinophil % : x  Auto Basophil % : x    01-27    132<L>  |  95<L>  |  125<H>  ----------------------------<  342<H>  5.9<H>   |  21<L>  |  6.96<H>    Ca    6.4<LL>      27 Jan 2018 14:40  Phos  10.0     01-27  Mg     3.0     01-27    TPro  5.9<L>  /  Alb  1.9<L>  /  TBili  1.7<H>  /  DBili  x   /  AST  130<H>  /  ALT  72<H>  /  AlkPhos  374<H>  01-26    PT/INR - ( 27 Jan 2018 14:40 )   PT: 11.3 sec;   INR: 1.04 ratio                 RADIOLOGY & ADDITIONAL STUDIES REVIEWED:  ***    [ ]GOALS OF CARE DISCUSSION WITH PATIENT/FAMILY/PROXY:    CRITICAL CARE TIME SPENT: 35 minutes INTERVAL HPI/OVERNIGHT EVENTS: no eventd     PRESSORS: [ ] YES [ x] NO  WHICH:    ANTIBIOTICS:      meropenem            DATE STARTED: 1/20  ANTIBIOTICS:                  DATE STARTED:  ANTIBIOTICS:                  DATE STARTED:    Antimicrobial:  meropenem IVPB 500 milliGRAM(s) IV Intermittent every 12 hours    Cardiovascular:  metoprolol     tartrate 12.5 milliGRAM(s) Oral two times a day  tamsulosin 0.4 milliGRAM(s) Oral at bedtime    Pulmonary:  ALBUTerol    90 MICROgram(s) HFA Inhaler 1 Puff(s) Inhalation every 6 hours    Hematalogic:  aspirin  chewable 81 milliGRAM(s) Oral daily  clopidogrel Tablet 75 milliGRAM(s) Oral daily  heparin  Injectable 5000 Unit(s) SubCutaneous every 12 hours    Other:  acetaminophen    Suspension 650 milliGRAM(s) Oral every 6 hours PRN  chlorhexidine 4% Liquid 1 Application(s) Topical daily  dexmedetomidine Infusion 0.5 MICROgram(s)/kG/Hr IV Continuous <Continuous>  fentaNYL   Infusion 0.5 MICROgram(s)/kG/Hr IV Continuous <Continuous>  finasteride 5 milliGRAM(s) Oral daily  insulin glargine Injectable (LANTUS) 16 Unit(s) SubCutaneous at bedtime  insulin lispro (HumaLOG) corrective regimen sliding scale   SubCutaneous every 4 hours  lactulose Syrup 30 Gram(s) Oral daily  methylPREDNISolone sodium succinate Injectable 40 milliGRAM(s) IV Push every 8 hours  pantoprazole  Injectable 40 milliGRAM(s) IV Push daily  propofol Infusion 5 MICROgram(s)/kG/Min IV Continuous <Continuous>  sevelamer carbonate Powder 1600 milliGRAM(s) Oral three times a day with meals    acetaminophen    Suspension 650 milliGRAM(s) Oral every 6 hours PRN  ALBUTerol    90 MICROgram(s) HFA Inhaler 1 Puff(s) Inhalation every 6 hours  aspirin  chewable 81 milliGRAM(s) Oral daily  chlorhexidine 4% Liquid 1 Application(s) Topical daily  clopidogrel Tablet 75 milliGRAM(s) Oral daily  dexmedetomidine Infusion 0.5 MICROgram(s)/kG/Hr IV Continuous <Continuous>  fentaNYL   Infusion 0.5 MICROgram(s)/kG/Hr IV Continuous <Continuous>  finasteride 5 milliGRAM(s) Oral daily  heparin  Injectable 5000 Unit(s) SubCutaneous every 12 hours  insulin glargine Injectable (LANTUS) 16 Unit(s) SubCutaneous at bedtime  insulin lispro (HumaLOG) corrective regimen sliding scale   SubCutaneous every 4 hours  lactulose Syrup 30 Gram(s) Oral daily  meropenem IVPB 500 milliGRAM(s) IV Intermittent every 12 hours  methylPREDNISolone sodium succinate Injectable 40 milliGRAM(s) IV Push every 8 hours  metoprolol     tartrate 12.5 milliGRAM(s) Oral two times a day  pantoprazole  Injectable 40 milliGRAM(s) IV Push daily  propofol Infusion 5 MICROgram(s)/kG/Min IV Continuous <Continuous>  sevelamer carbonate Powder 1600 milliGRAM(s) Oral three times a day with meals  tamsulosin 0.4 milliGRAM(s) Oral at bedtime    Drug Dosing Weight  Height (cm): 185.42 (20 Jan 2018 10:22)  Weight (kg): 96 (25 Jan 2018 07:30)  BMI (kg/m2): 27.9 (25 Jan 2018 07:30)  BSA (m2): 2.2 (25 Jan 2018 07:30)    CENTRAL LINE: [ ] YES [ ] NO  LOCATION:   DATE INSERTED:  REMOVE: [ ] YES [ ] NO  EXPLAIN:    BRUNSON: [ ] YES [ ] NO    DATE INSERTED:  REMOVE:  [ ] YES [ ] NO  EXPLAIN:    A-LINE:  [ ] YES [ ] NO  LOCATION:   DATE INSERTED:  REMOVE:  [ ] YES [ ] NO  EXPLAIN:    PMH -reviewed admission note, no change since admission  Heart faliure: acute [ ] chronic [ ] acute or chronic [ ] diastolic [ ] systolic [ ] combied systolic and diastolic[ ]  NING: ATN[ ] renal medullary necrosis [ ] CKD I [ ]CKDII [ ]CKD III [ ]CKD IV [ ]CKD V [ ]Other pathological lesions [ ]  Abdominal Nutrition Status: malnutrition [ ] cachexia [ ] morbid obesity/BMI=40 [ ] Supplement ordered [___________]     ICU Vital Signs Last 24 Hrs  T(C): 38.2 (27 Jan 2018 23:57), Max: 38.2 (27 Jan 2018 23:57)  T(F): 100.8 (27 Jan 2018 23:57), Max: 100.8 (27 Jan 2018 23:57)  HR: 98 (28 Jan 2018 02:00) (77 - 98)  BP: 146/58 (28 Jan 2018 02:00) (114/47 - 153/57)  BP(mean): 78 (28 Jan 2018 02:00) (65 - 79)  ABP: 160/65 (28 Jan 2018 02:00) (144/53 - 165/67)  ABP(mean): 90 (28 Jan 2018 02:00) (78 - 94)  RR: 16 (28 Jan 2018 02:00) (8 - 22)  SpO2: 98% (28 Jan 2018 02:00) (96% - 100%)      ABG - ( 27 Jan 2018 04:52 )  pH: 7.25  /  pCO2: 47    /  pO2: 113   / HCO3: 20    / Base Excess: -6.8  /  SaO2: 97                    01-26 @ 07:01  -  01-27 @ 07:00  --------------------------------------------------------  IN: 1556 mL / OUT: 5 mL / NET: 1551 mL        Mode: AC/ CMV (Assist Control/ Continuous Mandatory Ventilation)  RR (machine): 16  TV (machine): 450  FiO2: 40  PEEP: 8  ITime: 1  MAP: 14  PIP: 24      PHYSICAL EXAM:  GENERAL: sedated   HEAD:  [x ]Atraumatic, [ x]Normocephalic  EYES:  clear conjunctiva   ENMT: ETT in place   NECK: [c]Supple, normal appearance, [ x]No JVD; RIJ in place   NERVOUS SYSTEM:  patient is sedated and intubated, On mechanical ventilation   CHEST/LUNG: B/l rales and rhonchi present, more R>> L   HEART: [ x]Regular rate and rhythm; [x ]No murmurs, rubs, or gallops  ABDOMEN: Abdomen distension, BS diminished, soft   EXTREMITIES:  [x ]2+ Peripheral Pulses, [ ]No clubbing, cyanosis, or edema  LYMPH: [ x]No lymphadenopathy noted  SKIN: [x ]No rashes or lesions;         LABS:  CBC Full  -  ( 27 Jan 2018 06:16 )  WBC Count : 18.1 K/uL  Hemoglobin : 12.5 g/dL  Hematocrit : 38.0 %  Platelet Count - Automated : 133 K/uL  Mean Cell Volume : 103.5 fl  Mean Cell Hemoglobin : 34.2 pg  Mean Cell Hemoglobin Concentration : 33.0 gm/dL  Auto Neutrophil # : x  Auto Lymphocyte # : x  Auto Monocyte # : x  Auto Eosinophil # : x  Auto Basophil # : x  Auto Neutrophil % : x  Auto Lymphocyte % : x  Auto Monocyte % : x  Auto Eosinophil % : x  Auto Basophil % : x    01-27    132<L>  |  95<L>  |  125<H>  ----------------------------<  342<H>  5.9<H>   |  21<L>  |  6.96<H>    Ca    6.4<LL>      27 Jan 2018 14:40  Phos  10.0     01-27  Mg     3.0     01-27    TPro  5.9<L>  /  Alb  1.9<L>  /  TBili  1.7<H>  /  DBili  x   /  AST  130<H>  /  ALT  72<H>  /  AlkPhos  374<H>  01-26    PT/INR - ( 27 Jan 2018 14:40 )   PT: 11.3 sec;   INR: 1.04 ratio                 RADIOLOGY & ADDITIONAL STUDIES REVIEWED:  ***    [ ]GOALS OF CARE DISCUSSION WITH PATIENT/FAMILY/PROXY:    CRITICAL CARE TIME SPENT: 35 minutes

## 2018-01-28 NOTE — PROCEDURE NOTE - ADDITIONAL PROCEDURE DETAILS
patient in need of urgent dialysis today due to hyperkalemia, unable to reach family for consent, procedure performed emergently, no complications.

## 2018-01-28 NOTE — PROGRESS NOTE ADULT - SUBJECTIVE AND OBJECTIVE BOX
CC:  Patient is sedated, on ventilator, NAD. No fever or chills.    Vital Signs Last 24 Hrs  T(C): 38.7 (2018 17:00), Max: 38.7 (2018 17:00)  T(F): 101.7 (2018 17:00), Max: 101.7 (2018 17:00)  HR: 100 (2018 18:00) (76 - 109)  BP: 163/63 (2018 18:00) (116/55 - 185/69)  BP(mean): 84 (2018 18:00) (68 - 95)  RR: 18 (2018 18:00) (0 - 25)  SpO2: 96% (2018 18:00) (96% - 100%)     @ 07: @ 07:00  --------------------------------------------------------  IN: 1084.8 mL / OUT: 60 mL / NET: 1024.8 mL     @ 07: @ 19:46  --------------------------------------------------------  IN: 605 mL / OUT: 65 mL / NET: 540 mL    PHYSICAL EXAM:   Constitutional: Patient is sedated  Eyes: Sclera anicteric  Neck: supple, no JVD   Respiratory: diffuse crackles   Cardiovascular:s1,s2 present   Gastrointestinal: distended, BS +   Extremities: 2+ edema  Neurological: sedated   Skin: normal turgor  MEDICATIONS:  acetaminophen    Suspension 650 milliGRAM(s) Oral every 6 hours PRN  ALBUTerol    90 MICROgram(s) HFA Inhaler 1 Puff(s) Inhalation every 6 hours  aspirin  chewable 81 milliGRAM(s) Oral daily  chlorhexidine 4% Liquid 1 Application(s) Topical daily  clopidogrel Tablet 75 milliGRAM(s) Oral daily  dexmedetomidine Infusion 0.5 MICROgram(s)/kG/Hr IV Continuous <Continuous>  fentaNYL   Infusion 0.5 MICROgram(s)/kG/Hr IV Continuous <Continuous>  finasteride 5 milliGRAM(s) Oral daily  heparin  Injectable 5000 Unit(s) SubCutaneous every 12 hours  insulin glargine Injectable (LANTUS) 16 Unit(s) SubCutaneous at bedtime  insulin lispro (HumaLOG) corrective regimen sliding scale   SubCutaneous every 4 hours  lactulose Syrup 30 Gram(s) Oral daily  meropenem IVPB 500 milliGRAM(s) IV Intermittent every 12 hours  metoprolol     tartrate 12.5 milliGRAM(s) Oral two times a day  pantoprazole  Injectable 40 milliGRAM(s) IV Push daily  predniSONE   Tablet 40 milliGRAM(s) Oral daily  propofol Infusion 5 MICROgram(s)/kG/Min IV Continuous <Continuous>  sevelamer carbonate Powder 1600 milliGRAM(s) Oral three times a day with meals  tamsulosin 0.4 milliGRAM(s) Oral at bedtime      LABS:                        11.6   19.9  )-----------( 148      ( 2018 06:40 )             32.8         130<L>  |  94<L>  |  144<H>  ----------------------------<  351<H>  6.0<H>   |  20<L>  |  7.34<H>    Ca    6.4<LL>      2018 06:40  Phos  1.8       Mg     1.9         TPro  3.8<L>  /  Alb  1.6<L>  /  TBili  0.5  /  DBili  x   /  AST  29  /  ALT  22  /  AlkPhos  78      PT/INR - ( 2018 14:40 )   PT: 11.3 sec;   INR: 1.04 ratio      Urinalysis Basic - ( 2018 18:44 )    Color: Yellow / Appearance: Clear / S.015 / pH: x  Gluc: x / Ketone: Trace  / Bili: Small / Urobili: 1   Blood: x / Protein: 30 mg/dL / Nitrite: Positive   Leuk Esterase: Trace / RBC: 10-25 /HPF / WBC 6-10 /HPF   Sq Epi: x / Non Sq Epi: Few /HPF / Bacteria: Moderate /HPF  Magnesium, Serum: 1.9 mg/dL ( @ 06:02)  Phosphorus Level, Serum: 1.8 mg/dL ( @ 06:02)    ASSESSMENT AND PLAN:     1. ESRD sec to ATN: No improvement of renal function. Tolerating HD.  - Next HD is scheduled for tomorrow  - hold nephrotoxic medications, adjust meds as per egfr  and avoid contrast studies/phosphate  enema, etc    2. Hyperkalemia   -Kayexalate PRN  -keep <5 and >4  -low K diet   -f/u k level q 12hrs      2. Metabolic acidosis   multifactorial with acceptable pH   monitor CO 2 daily   keep PH >7.3   add iv Nahco3 1 ampule if PH<7.3 daily      3. Hyperphosphatemia   - still elevated (10 today); patient to get HD today   -continue [id number] mg tid via ngt  -f/u phos level daily  -keep Phos>3, <5     4.Hyponatremia;mild, secondary to ning  -avoid iv fluids  -f/u Na level daily  -continue  fluid restriction to 1 liter per day    5. Fluid overload   bilateral opacities in the CXR , most likely pneumonia, less likely fluid overload  sec to ? CHF/NING   -UF as tolerated    6.Hypocalcemia;improving (corrected ca )  -suggest to replace q 6hrs 1 gm to keep corrected ca >8.5 and <10  -f/u ca level daily  pts prognosis very poor with worsening resp. status/ARDS.  Time spent 35 min.

## 2018-01-28 NOTE — PROGRESS NOTE ADULT - SUBJECTIVE AND OBJECTIVE BOX
S: intubated, sedated       acetaminophen    Suspension 650 milliGRAM(s) Oral every 6 hours PRN  ALBUTerol    90 MICROgram(s) HFA Inhaler 1 Puff(s) Inhalation every 6 hours  aspirin  chewable 81 milliGRAM(s) Oral daily  chlorhexidine 4% Liquid 1 Application(s) Topical daily  clopidogrel Tablet 75 milliGRAM(s) Oral daily  dexmedetomidine Infusion 0.5 MICROgram(s)/kG/Hr IV Continuous <Continuous>  fentaNYL   Infusion 0.5 MICROgram(s)/kG/Hr IV Continuous <Continuous>  finasteride 5 milliGRAM(s) Oral daily  heparin  Injectable 5000 Unit(s) SubCutaneous every 12 hours  insulin glargine Injectable (LANTUS) 16 Unit(s) SubCutaneous at bedtime  insulin lispro (HumaLOG) corrective regimen sliding scale   SubCutaneous every 4 hours  lactulose Syrup 30 Gram(s) Oral daily  meropenem IVPB 500 milliGRAM(s) IV Intermittent every 12 hours  metoprolol     tartrate 12.5 milliGRAM(s) Oral two times a day  pantoprazole  Injectable 40 milliGRAM(s) IV Push daily  predniSONE   Tablet 40 milliGRAM(s) Oral daily  propofol Infusion 5 MICROgram(s)/kG/Min IV Continuous <Continuous>  sevelamer carbonate Powder 1600 milliGRAM(s) Oral three times a day with meals  tamsulosin 0.4 milliGRAM(s) Oral at bedtime                            11.6   19.9  )-----------( 148      ( 28 Jan 2018 06:40 )             32.8       01-28    130<L>  |  94<L>  |  144<H>  ----------------------------<  351<H>  6.0<H>   |  20<L>  |  7.34<H>    Ca    6.4<LL>      28 Jan 2018 06:40  Phos  1.8     01-28  Mg     1.9     01-28    TPro  3.8<L>  /  Alb  1.6<L>  /  TBili  0.5  /  DBili  x   /  AST  29  /  ALT  22  /  AlkPhos  78  01-28            T(C): 36.9 (01-28-18 @ 12:00), Max: 38.2 (01-27-18 @ 23:57)  HR: 80 (01-28-18 @ 11:00) (76 - 98)  BP: 136/55 (01-28-18 @ 08:00) (116/55 - 153/57)  RR: 15 (01-28-18 @ 11:00) (0 - 18)  SpO2: 98% (01-28-18 @ 11:00) (96% - 98%)  Wt(kg): --    I&O's Summary    27 Jan 2018 07:01  -  28 Jan 2018 07:00  --------------------------------------------------------  IN: 1084.8 mL / OUT: 60 mL / NET: 1024.8 mL    28 Jan 2018 07:01  -  28 Jan 2018 12:46  --------------------------------------------------------  IN: 254 mL / OUT: 25 mL / NET: 229 mL          Heart: normal S1, S2, RRR, no m/r/g  Lungs: cta b/l  Abd: soft nT, nD  Ext: no edema      TELEMETRY:  SR       ASSESSMENT/PLAN: 	72y Male [facility] h/o CKD, Anxiety disorder, CAD, BPH, Carpel tunnel syndrome, compulsive disorder, DM, OA, HTN, IBS sent for dyspnea for last 3 days found with (+) flu, PNA likely septic shock normal LV function NING now on HD    - continue with medical management of cad  - monitor tele  - f/u palliative care  - continue with supportive care per ICU       Tony Santiago MD  [facility]  [address], [id number]  [place] [address]  office: [phone]  pager: [phone]

## 2018-01-29 NOTE — PROGRESS NOTE ADULT - SUBJECTIVE AND OBJECTIVE BOX
INTERVAL HPI/OVERNIGHT EVENTS: *** Patient had fever of 101 over night. off sedation this morning for awakening trial.     PRESSORS: [ ] YES [x ] NO  WHICH:    ANTIBIOTICS:         meropenem         DATE STARTED:   ANTIBIOTICS:                  DATE STARTED:  ANTIBIOTICS:                  DATE STARTED:    Antimicrobial:  meropenem IVPB 500 milliGRAM(s) IV Intermittent every 12 hours    Cardiovascular:  metoprolol     tartrate 12.5 milliGRAM(s) Oral two times a day  tamsulosin 0.4 milliGRAM(s) Oral at bedtime    Pulmonary:  ALBUTerol    90 MICROgram(s) HFA Inhaler 1 Puff(s) Inhalation every 6 hours    Hematalogic:  aspirin  chewable 81 milliGRAM(s) Oral daily  clopidogrel Tablet 75 milliGRAM(s) Oral daily  heparin  Injectable 5000 Unit(s) SubCutaneous every 12 hours    Other:  acetaminophen    Suspension 650 milliGRAM(s) Oral every 6 hours PRN  chlorhexidine 4% Liquid 1 Application(s) Topical daily  dexmedetomidine Infusion 0.5 MICROgram(s)/kG/Hr IV Continuous <Continuous>  fentaNYL   Infusion 0.5 MICROgram(s)/kG/Hr IV Continuous <Continuous>  finasteride 5 milliGRAM(s) Oral daily  insulin glargine Injectable (LANTUS) 16 Unit(s) SubCutaneous at bedtime  insulin lispro (HumaLOG) corrective regimen sliding scale   SubCutaneous every 4 hours  lactulose Syrup 30 Gram(s) Oral daily  pantoprazole  Injectable 40 milliGRAM(s) IV Push daily  predniSONE   Tablet 40 milliGRAM(s) Oral daily  propofol Infusion 5 MICROgram(s)/kG/Min IV Continuous <Continuous>  sevelamer carbonate Powder 1600 milliGRAM(s) Oral three times a day with meals    acetaminophen    Suspension 650 milliGRAM(s) Oral every 6 hours PRN  ALBUTerol    90 MICROgram(s) HFA Inhaler 1 Puff(s) Inhalation every 6 hours  aspirin  chewable 81 milliGRAM(s) Oral daily  chlorhexidine 4% Liquid 1 Application(s) Topical daily  clopidogrel Tablet 75 milliGRAM(s) Oral daily  dexmedetomidine Infusion 0.5 MICROgram(s)/kG/Hr IV Continuous <Continuous>  fentaNYL   Infusion 0.5 MICROgram(s)/kG/Hr IV Continuous <Continuous>  finasteride 5 milliGRAM(s) Oral daily  heparin  Injectable 5000 Unit(s) SubCutaneous every 12 hours  insulin glargine Injectable (LANTUS) 16 Unit(s) SubCutaneous at bedtime  insulin lispro (HumaLOG) corrective regimen sliding scale   SubCutaneous every 4 hours  lactulose Syrup 30 Gram(s) Oral daily  meropenem IVPB 500 milliGRAM(s) IV Intermittent every 12 hours  metoprolol     tartrate 12.5 milliGRAM(s) Oral two times a day  pantoprazole  Injectable 40 milliGRAM(s) IV Push daily  predniSONE   Tablet 40 milliGRAM(s) Oral daily  propofol Infusion 5 MICROgram(s)/kG/Min IV Continuous <Continuous>  sevelamer carbonate Powder 1600 milliGRAM(s) Oral three times a day with meals  tamsulosin 0.4 milliGRAM(s) Oral at bedtime    Drug Dosing Weight  Height (cm): 185.42 (2018 10:22)  Weight (kg): 96 (2018 07:30)  BMI (kg/m2): 27.9 (2018 07:30)  BSA (m2): 2.2 (2018 07:30)    CENTRAL LINE: [x ] YES [ ] NO  LOCATION: [facility]  DATE INSERTED:   REMOVE: [ ] YES [ ] NO  EXPLAIN:    BRUNSON: [x ] YES [ ] NO    DATE INSERTED:  REMOVE:  [ ] YES [ ] NO  EXPLAIN:    A-LINE:  [ ] YES [x ] NO  LOCATION:   DATE INSERTED:  REMOVE:  [ ] YES [ ] NO  EXPLAIN:    PMH -reviewed admission note, no change since admission  Heart faliure: acute [ ] chronic [ ] acute or chronic [ ] diastolic [ ] systolic [ ] combied systolic and diastolic[ ]  NING: ATN[ ] renal medullary necrosis [ ] CKD I [ ]CKDII [ ]CKD III [ ]CKD IV [x ]CKD V [ ]Other pathological lesions [ ]  Abdominal Nutrition Status: malnutrition [ ] cachexia [ ] morbid obesity/BMI=40 [ ] Supplement ordered [___________]     ICU Vital Signs Last 24 Hrs  T(C): 35.7 (2018 07:00), Max: 38.7 (2018 17:00)  T(F): 96.3 (2018 07:00), Max: 101.7 (2018 17:00)  HR: 65 (2018 07:45) (65 - 109)  BP: 116/56 (2018 07:00) (95/62 - 185/69)  BP(mean): 70 (2018 07:00) (63 - 95)  ABP: 120/52 (2018 07:00) (108/48 - 204/77)  ABP(mean): 72 (2018 07:00) (66 - 114)  RR: 15 (2018 07:00) (12 - 25)  SpO2: 98% (2018 07:45) (95% - 100%)      ABG - ( 2018 04:47 )  pH: 7.22  /  pCO2: 47    /  pO2: 98    / HCO3: 19    / Base Excess: -8.5  /  SaO2: 96                     @ 07:01  -   @ 07:00  --------------------------------------------------------  IN: 1153 mL / OUT: 120 mL / NET: 1033 mL        Mode: AC/ CMV (Assist Control/ Continuous Mandatory Ventilation)  RR (machine): 16  TV (machine): 450  FiO2: 40  PEEP: 7  ITime: 1  MAP: 12  PIP: 27      PHYSICAL EXAM:    GENERAL: [x ]NAD, [ ]well-groomed, [ ]well-developed  HEAD:  [x ]Atraumatic, [ ]Normocephalic  EYES: [ ]EOMI, [ ]PERRLA, [x ]conjunctiva and sclera clear  ENMT: [ ]No tonsillar erythema, exudates, or enlargement; [x ]Moist mucous membranes, poor dentition, [ ]No lesions  NECK: [x ]Supple, normal appearance, [ ]No JVD; [ ]Normal thyroid; [ ]Trachea midline  NERVOUS SYSTEM:  unresponsive, [ ]Good concentration; [ ]Motor Strength 5/5 B/L upper and lower extremities; [ ]DTRs 2+ intact and symmetric  CHEST/LUNG: [ ]No chest deformity; [ ]Normal percussion bilaterally; [x ]No rales, rhonchi, wheezing   HEART: [x ]Regular rate and rhythm; [ +] murmurs, rubs, or gallops  ABDOMEN: [x ]Soft, Nontender, Nondistended; [x ]Bowel sounds present  EXTREMITIES:  [ x]2+ Peripheral Pulses, [x ]No clubbing, cyanosis, or edema  LYMPH: [x ]No lymphadenopathy noted  SKIN: [x ]No rashes or lesions; [ ]Good capillary refill      LABS:  CBC Full  -  ( 2018 05:57 )  WBC Count : 17.3 K/uL  Hemoglobin : 11.5 g/dL  Hematocrit : 33.6 %  Platelet Count - Automated : 157 K/uL  Mean Cell Volume : 104.7 fl  Mean Cell Hemoglobin : 35.8 pg  Mean Cell Hemoglobin Concentration : 34.2 gm/dL  Auto Neutrophil # : x  Auto Lymphocyte # : x  Auto Monocyte # : x  Auto Eosinophil # : x  Auto Basophil # : x  Auto Neutrophil % : x  Auto Lymphocyte % : x  Auto Monocyte % : x  Auto Eosinophil % : x  Auto Basophil % : x        133<L>  |  95<L>  |  143<H>  ----------------------------<  292<H>  5.1   |  20<L>  |  6.91<H>    Ca    6.8<L>      2018 05:57  Phos  SEE NOTE       Mg     2.7         TPro  5.7<L>  /  Alb  1.5<L>  /  TBili  0.9  /  DBili  0.5<H>  /  AST  50<H>  /  ALT  48  /  AlkPhos  207<H>      PT/INR - ( 2018 14:40 )   PT: 11.3 sec;   INR: 1.04 ratio           Urinalysis Basic - ( 2018 18:44 )    Color: Yellow / Appearance: Clear / S.015 / pH: x  Gluc: x / Ketone: Trace  / Bili: Small / Urobili: 1   Blood: x / Protein: 30 mg/dL / Nitrite: Positive   Leuk Esterase: Trace / RBC: 10-25 /HPF / WBC 6-10 /HPF   Sq Epi: x / Non Sq Epi: Few /HPF / Bacteria: Moderate /HPF          RADIOLOGY & ADDITIONAL STUDIES REVIEWED:  ***    [ ]GOALS OF CARE DISCUSSION WITH PATIENT/FAMILY/PROXY:    CRITICAL CARE TIME SPENT: 35 minutes

## 2018-01-29 NOTE — PROGRESS NOTE ADULT - ATTENDING COMMENTS
72 male nursing home patient with CAD, CKD, DM, HTN, OA, BPH, anxiety, presented with influenza, acute respiratory failure, septic shock, pneumonia. Now with NING requiring dialysis, resolving ARDS.    Plan:  - abx per ID, completed tamiflu  - HD per nephrology - new HD cath placed 1/28  - SBT after HD  - DNR  Total cc time 35 min.

## 2018-01-29 NOTE — PROGRESS NOTE ADULT - PROBLEM SELECTOR PLAN 5
Diet controlled   HbA1c. 6.0  lantus 16 units at bed time   - continue with sliding scale Q 4 hrs   - finger sticksQ 2 hrs

## 2018-01-29 NOTE — ADVANCED PRACTICE NURSE CONSULT - ASSESSMENT
This is a 72yr old male patient admitted for Sepsis, presenting with an intact DTI to the R. Elbow (5cm x 1cm), L. Elbow (1cm x 3cm), and R. Ear (1.5cm x 1cm) without drainage. It is to be noted that this patient currently has CAD, CKD, DM, HTN, OA, BPH, anxiety, presenting with influenza, acute respiratory failure, septic shock, hemodynamic instability, and potential for superimposed pneumonia as well. The patient is being maintained on a low-air loss mattress with pressure prevention resources in place.

## 2018-01-29 NOTE — PROGRESS NOTE ADULT - PROBLEM SELECTOR PLAN 2
meets burlin's criteria of ARDS, acute onset <7 days, non cardiogenic, bilateral involvement, Fio2/PO2 <300  CXR shows B/l pachy infiltrates   patient on MV requiring Fio2 40%,   ECHO normal EF ,  HD today,    -on meropenem 500 mg Q12hrs ( renal adjusted)   - prednisone taper  - off sedation, patient is still unresponsive,   -Poor prognosis, DNR/DNI

## 2018-01-29 NOTE — PROGRESS NOTE ADULT - PROBLEM SELECTOR PLAN 3
could be due to flu/PNA   off droplet isolation, Influenza B positive   completed tamiflu ,  - Procalcitonin 80.4  -urine and blood cultures are negative, urine legionella negative   - continue with Mechanical ventilation PEEP 7  - CT physiotherapy  - ID Dr Wilkinson following

## 2018-01-29 NOTE — PROGRESS NOTE ADULT - ASSESSMENT
72 male nursing home patient with CAD, CKD, DM, HTN, OA, BPH, anxiety, presented with influenza, acute respiratory failure, septic shock.  May have superimposed pneumonia as well      1. Problem: ARDS 2/2 PNA and sepsis   CXR shows B/l pachy infiltrates   patient on MV requiring Fio2 40%,   ECHO normal EF ,patient Had hemodialysis yesterday   gave vanco 1 g and started on meropenem 500 mg Q12hrs ( renal adjusted)   dc IV steroids and started on prednisone 40 mg  qd  - on propofol and fentanyl     2.Problem: Acute hypoxic Respiratory failure 2/2 flu vs PNA   On droplet isolation, Influenza B positive   completed tamiflu ,  ceftriaxone for aspiration PNA day 3  Lactate 7.6 s/p 2 L bolus which improved to 2.6   - Procalcitonin 80.4  -urine and blood cultures are negative, urine legionella negative   - CXR Increased interstitial lung markings without significant change. New consolidation right lung. Progressed consolidation left lung.  - need high O2 to maintain sat concerns for ARDS, continue with Mechanical ventilation PEEP 8  - CT physiotherapy  - solumedrol 125 iv stat followed by 60 mg iv Q8hrs   - repeat Xray shows similar findings    - ID Dr Minh bain noted      Problem/Plan - 3  ·  Problem: CAD (coronary artery disease).  Plan: c/w ASA, Plavix and Lipitor   patient has H/o of CAD, had elevated trop, start patient on heparin drip for concerns of NSTEMI   - trop trending down ECHO EF 55 %with normal LV functions   - tropnin likely due to demand ischaemia 2/2 septic shock   - Holding heparin drip    - continue with aspirin, plavix  - hold Lipitor as LFT's trending up   - HD stable, currently off the pressor support   - Elevated d-dimer with normal venous doppler with no RV strain on ECHO less likely PE,   - elevated d-dimer 2/2 renal failure and sepsis   -suggested palliative care consult, due to poor prognosis   - Dr Benson cardio taya noted       Problem/Plan- 4 Thrombocytopenia   Likely due to sepsis,  - normal fibrogen assay and rt count       Problem/Plan - 5  ARF     - Monitor Renal functions, avoid nephrotoxic drugs   - new Shiley was placed  - HD today    Dr Judah bain noted     Problem/Plan- 5   ·  Problem: DM (diabetes mellitus).  Plan: Diet controlled   HbA1c. 6.0  lantus 16 units at bed time   - continue with sliding scale Q 4 hrs   - finger sticksQ 2 hrs      Problem/Plan - 6:  ·  Problem: HTN (hypertension).  Plan: Holding metoprolol as BP is low.        Problem/Plan - 7    Problem Hyperkalemia:  Due to renal failure   c/w HD           Problem/Plan - 8   Metabolic acidosis2/2 renal failure, s/p 50 meq Bicarbonate  continues to improve   continue to monitor real functions        Problem/Plan - 9  Hyperphosphatemia  will continue with Renagel 1600mg TID        Problem/Plan - 10  ·  Problem: Need for prophylactic measure.  Plan: Improve score 3 on heparin  drip and Protonix for GI prophylaxis 72 male nursing home patient with CAD, CKD, DM, HTN, OA, BPH, anxiety, presented with influenza, acute respiratory failure, septic shock.  May have superimposed pneumonia as well      1. Problem: ARDS 2/2 PNA and sepsis                 Problem/Plan- 4 Thrombocytopenia   Likely due to sepsis,  - normal fibrogen assay and rt count       Problem/Plan - 5  ARF     - Monitor Renal functions, avoid nephrotoxic drugs   - new Shiley was placed  - HD today  .        Problem/Plan - 7    Problem Hyperkalemia:  Due to renal failure   c/w HD             Problem/Plan - 10  ·  Problem: Need for prophylactic measure.  Plan: Improve score 3 on heparin  drip and Protonix for GI prophylaxis

## 2018-01-29 NOTE — ADVANCED PRACTICE NURSE CONSULT - RECOMMEDATIONS
-Clean all wounds with normal saline and apply skin prep to the surrounding skin  -Strict offloading to the Bilateral Elbows using pillows  -Ensure that the patient is not laying on the R. Side of the Head  -Elevate/float the patients heels using heel protectors and reposition the patient Q 2hrs using wedges or pillows

## 2018-01-29 NOTE — PROGRESS NOTE ADULT - SUBJECTIVE AND OBJECTIVE BOX
intubated, sedated       acetaminophen    Suspension 650 milliGRAM(s) Oral every 6 hours PRN  ALBUTerol    90 MICROgram(s) HFA Inhaler 1 Puff(s) Inhalation every 6 hours  aspirin  chewable 81 milliGRAM(s) Oral daily  chlorhexidine 4% Liquid 1 Application(s) Topical daily  clopidogrel Tablet 75 milliGRAM(s) Oral daily  finasteride 5 milliGRAM(s) Oral daily  heparin  Injectable 5000 Unit(s) SubCutaneous every 12 hours  insulin glargine Injectable (LANTUS) 16 Unit(s) SubCutaneous at bedtime  insulin lispro (HumaLOG) corrective regimen sliding scale   SubCutaneous every 4 hours  lactulose Syrup 30 Gram(s) Oral daily  meropenem IVPB 500 milliGRAM(s) IV Intermittent every 12 hours  metoprolol     tartrate 12.5 milliGRAM(s) Oral two times a day  pantoprazole  Injectable 40 milliGRAM(s) IV Push daily  sevelamer carbonate Powder 1600 milliGRAM(s) Oral three times a day with meals  tamsulosin 0.4 milliGRAM(s) Oral at bedtime                            11.5   17.3  )-----------( 157      ( 29 Jan 2018 05:57 )             33.6       Hemoglobin: 11.5 g/dL (01-29 @ 05:57)  Hemoglobin: 11.6 g/dL (01-28 @ 06:40)  Hemoglobin: 9.8 g/dL (01-28 @ 06:03)  Hemoglobin: 12.5 g/dL (01-27 @ 06:16)  Hemoglobin: 12.9 g/dL (01-26 @ 06:37)      01-29    133<L>  |  95<L>  |  143<H>  ----------------------------<  292<H>  5.1   |  20<L>  |  6.91<H>    Ca    6.8<L>      29 Jan 2018 05:57  Phos  SEE NOTE     01-29  Mg     2.7     01-29    TPro  5.7<L>  /  Alb  1.5<L>  /  TBili  0.9  /  DBili  0.5<H>  /  AST  50<H>  /  ALT  48  /  AlkPhos  207<H>  01-29    Creatinine Trend: 6.91<--, 6.63<--, 7.34<--, 0.80<--, 6.96<--, 6.61<--    COAGS:           T(C): 35.8 (01-29-18 @ 12:00), Max: 38.7 (01-28-18 @ 17:00)  HR: 90 (01-29-18 @ 13:23) (65 - 109)  BP: 159/62 (01-29-18 @ 12:00) (95/62 - 185/69)  RR: 14 (01-29-18 @ 10:24) (14 - 25)  SpO2: 97% (01-29-18 @ 13:23) (95% - 100%)  Wt(kg): --    I&O's Summary    28 Jan 2018 07:01  -  29 Jan 2018 07:00  --------------------------------------------------------  IN: 1153 mL / OUT: 120 mL / NET: 1033 mL          Heart: normal S1, S2, RRR, no m/r/g  Lungs: cta b/l  Abd: soft nT, nD  Ext: no edema      TELEMETRY:  SR       ASSESSMENT/PLAN: 	72y Male [facility] h/o CKD, Anxiety disorder, CAD, BPH, Carpel tunnel syndrome, compulsive disorder, DM, OA, HTN, IBS sent for dyspnea for last 3 days found with (+) flu, PNA likely septic shock normal LV function NING now on HD      - HD per renal  - Cont broad spectrum ABX  - Supportive care [facility]  [facility], [facility]  [address] [place] [address]  PHONE:  [phone]  BEEPER : [phone]

## 2018-01-29 NOTE — PROGRESS NOTE ADULT - PROBLEM SELECTOR PLAN 1
-Patient spiked fever again, BP running in 90s/40s. HR is >100.   -repeat blood cultures testing,  -continue with antibiotics,  -previous blood cultures were negative

## 2018-01-29 NOTE — PROGRESS NOTE ADULT - SUBJECTIVE AND OBJECTIVE BOX
pt seen and examined.pts current chart reviewed and case discussed with resident covering.    SUBJECTIVE:  pt feels fine and denies cp,sob,gi or gu/uremic  symptons    REVIEW OF SYSTEMS:  CONSTITUTIONAL: No weakness, fevers or chills  EYES/ENT: No visual changes;  No vertigo or throat pain   NECK: No pain or stiffness  RESPIRATORY: No cough, wheezing, hemoptysis; No shortness of breath  CARDIOVASCULAR: No chest pain or palpitations  GASTROINTESTINAL: No abdominal or epigastric pain. No nausea, vomiting, or hematemesis; No diarrhea or constipation. No melena or hematochezia.  GENITOURINARY: No dysuria, frequency , hematuria, flank pain or nocturia  NEUROLOGICAL: No numbness or weakness  SKIN: No itching, burning, rashes, or lesions   All other review of systems is negative unless indicated above    Current meds:    acetaminophen    Suspension 650 milliGRAM(s) Oral every 6 hours PRN  ALBUTerol    90 MICROgram(s) HFA Inhaler 1 Puff(s) Inhalation every 6 hours  aspirin  chewable 81 milliGRAM(s) Oral daily  chlorhexidine 4% Liquid 1 Application(s) Topical daily  clopidogrel Tablet 75 milliGRAM(s) Oral daily  finasteride 5 milliGRAM(s) Oral daily  heparin  Injectable 5000 Unit(s) SubCutaneous every 12 hours  insulin glargine Injectable (LANTUS) 16 Unit(s) SubCutaneous at bedtime  insulin lispro (HumaLOG) corrective regimen sliding scale   SubCutaneous every 4 hours  lactulose Syrup 30 Gram(s) Oral daily  meropenem IVPB 500 milliGRAM(s) IV Intermittent every 12 hours  metoprolol     tartrate 12.5 milliGRAM(s) Oral two times a day  pantoprazole  Injectable 40 milliGRAM(s) IV Push daily  sevelamer carbonate Powder 1600 milliGRAM(s) Oral three times a day with meals  tamsulosin 0.4 milliGRAM(s) Oral at bedtime      Vital Signs    T(F): 96.5 (18 @ 12:00), Max: 101.7 (18 @ 17:00)  HR: 90 (18 @ 13:23) (65 - 109)  BP: 159/62 (18 @ 12:00) (95/62 - 185/69)  ABP: 176/64 (18 @ 12:00) (108/48 - 204/77)  RR: 14 (18 @ 10:24) (14 - 25)  SpO2: 97% (18 @ 13:23) (95% - 100%)  Wt(kg): --  CVP(cm H2O): --  CO: --  PCWP: --    I and O's:     @ 07:01  -   @ 07:00  --------------------------------------------------------  IN:    fentaNYL  Infusion: 97.8 mL    Jevity: 800 mL    propofol Infusion: 137 mL    Solution: 50 mL  Total IN: 1084.8 mL    OUT:    Indwelling Catheter - Urethral: 60 mL  Total OUT: 60 mL    Total NET: 1024.8 mL       @ 07:01  -   @ 07:00  --------------------------------------------------------  IN:    fentaNYL  Infusion: 103.5 mL    Jevity: 840 mL    propofol Infusion: 159.5 mL    Solution: 50 mL  Total IN: 1153 mL    OUT:    Indwelling Catheter - Urethral: 120 mL  Total OUT: 120 mL    Total NET: 1033 mL        Daily     Daily Weight in k (2018 07:00)    PHYSICAL EXAM:  Constitutional: well developed, well nourished  and in nad  HEENT: PERRLA,  no icteric sclera and mild pallor of conjunctiva noted  Neck: No JVD, thyromegaly or adenopathy  Respiratory: reduced air entry lower lungs with no rales, wheezing or rhonchi  Cardiovascular: S1 and S2 normally heard  Gastrointestinal: soft, nondistended, nontender and normal bowel sounds heard  Extremities: No peripheral edema or cyanosis  Neurological: A/O x 3, no focal deficits  : No flank or cva tenderness palpated.  Skin: No rashes      LABS:    CBC:                          11.5   17.3  )-----------( 157      ( 2018 05:57 )             33.6           BMP:        133<L>  |  95<L>  |  143<H>  ----------------------------<  292<H>  5.1   |  20<L>  |  6.91<H>      134<L>  |  96  |  137<H>  ----------------------------<  309<H>  5.4<H>   |  20<L>  |  6.63<H>      130<L>  |  94<L>  |  144<H>  ----------------------------<  351<H>  6.0<H>   |  20<L>  |  7.34<H>      143  |  113<H>  |  20<H>  ----------------------------<  191<H>  3.2<L>   |  20<L>  |  0.80      132<L>  |  95<L>  |  125<H>  ----------------------------<  342<H>  5.9<H>   |  21<L>  |  6.96<H>      135  |  98  |  114<H>  ----------------------------<  301<H>  6.1<H>   |  19<L>  |  6.61<H>      135  |  100  |  104<H>  ----------------------------<  274<H>  5.2   |  21<L>  |  6.02<H>    Ca    6.8<L>      2018 05:57  Ca    6.6<L>      2018 21:59  Ca    6.4<LL>      2018 06:40  Ca    7.0<L>      2018 06:02  Ca    6.4<LL>      2018 14:40  Ca    6.5      2018 06:16  Ca    6.6<L>      2018 20:53  Phos  SEE NOTE       Phos  1.8       Phos  10.0       Phos  8.0       Mg     2.7       Mg     1.9       Mg     3.0       Mg     2.5         TPro  5.7<L>  /  Alb  1.5<L>  /  TBili  0.9  /  DBili  0.5<H>  /  AST  50<H>  /  ALT  48  /  AlkPhos  207<H>    TPro  3.8<L>  /  Alb  1.6<L>  /  TBili  0.5  /  DBili  x   /  AST  29  /  ALT  22  /  AlkPhos  78            URINE STUDIES:        Creatinine, Random Urine: 85 mg/dL ( @ 22:39)  Protein/Creatinine Ratio Calculation: 0.9 Ratio ( @ 22:39)  Calcium, Random Urine: 1 mg/dL ( @ 22:39)                  RADIOLOGY & ADDITIONAL STUDIES:                  pt seen and examined.pts current chart reviewed and case discussed with resident covering.    SUBJECTIVE:  pt feels fine and denies cp,sob,gi or gu/uremic  symptons    REVIEW OF SYSTEMS:  CONSTITUTIONAL: No weakness, fevers or chills  EYES/ENT: No visual changes;  No vertigo or throat pain   NECK: No pain or stiffness  RESPIRATORY: No cough, wheezing, hemoptysis; No shortness of breath  CARDIOVASCULAR: No chest pain or palpitations  GASTROINTESTINAL: No abdominal or epigastric pain. No nausea, vomiting, or hematemesis; No diarrhea or constipation. No melena or hematochezia.  GENITOURINARY: No dysuria, frequency , hematuria, flank pain or nocturia  NEUROLOGICAL: No numbness or weakness  SKIN: No itching, burning, rashes, or lesions   All other review of systems is negative unless indicated above    Current meds:    acetaminophen    Suspension 650 milliGRAM(s) Oral every 6 hours PRN  ALBUTerol    90 MICROgram(s) HFA Inhaler 1 Puff(s) Inhalation every 6 hours  aspirin  chewable 81 milliGRAM(s) Oral daily  chlorhexidine 4% Liquid 1 Application(s) Topical daily  clopidogrel Tablet 75 milliGRAM(s) Oral daily  finasteride 5 milliGRAM(s) Oral daily  heparin  Injectable 5000 Unit(s) SubCutaneous every 12 hours  insulin glargine Injectable (LANTUS) 16 Unit(s) SubCutaneous at bedtime  insulin lispro (HumaLOG) corrective regimen sliding scale   SubCutaneous every 4 hours  lactulose Syrup 30 Gram(s) Oral daily  meropenem IVPB 500 milliGRAM(s) IV Intermittent every 12 hours  metoprolol     tartrate 12.5 milliGRAM(s) Oral two times a day  pantoprazole  Injectable 40 milliGRAM(s) IV Push daily  sevelamer carbonate Powder 1600 milliGRAM(s) Oral three times a day with meals  tamsulosin 0.4 milliGRAM(s) Oral at bedtime      Vital Signs    T(F): 96.5 (18 @ 12:00), Max: 101.7 (18 @ 17:00)  HR: 90 (18 @ 13:23) (65 - 109)  BP: 159/62 (18 @ 12:00) (95/62 - 185/69)  ABP: 176/64 (18 @ 12:00) (108/48 - 204/77)  RR: 14 (18 @ 10:24) (14 - 25)  SpO2: 97% (18 @ 13:23) (95% - 100%)  Wt(kg): --  CVP(cm H2O): --  CO: --  PCWP: --    I and O's:     @ 07:  -   @ 07:00  --------------------------------------------------------  IN:    fentaNYL  Infusion: 97.8 mL    Jevity: 800 mL    propofol Infusion: 137 mL    Solution: 50 mL  Total IN: 1084.8 mL    OUT:    Indwelling Catheter - Urethral: 60 mL  Total OUT: 60 mL    Total NET: 1024.8 mL       @ 07:  -   @ 07:00  --------------------------------------------------------  IN:    fentaNYL  Infusion: 103.5 mL    Jevity: 840 mL    propofol Infusion: 159.5 mL    Solution: 50 mL  Total IN: 1153 mL    OUT:    Indwelling Catheter - Urethral: 120 mL  Total OUT: 120 mL    Total NET: 1033 mL        Daily     Daily Weight in k (2018 07:00)    PHYSICAL EXAM:  Constitutional: well developed, obese and in nad  HEENT: PERRLA,  no icteric sclera and mild pallor of conjunctiva noted  Neck: No JVD, thyromegaly or adenopathy  Respiratory: reduced air entry lower lungs with no rales, wheezing or rhonchi  Cardiovascular: S1 and S2 normally heard  Gastrointestinal: soft, distended, nontender and normal bowel sounds heard  Extremities: 2 plus peripheral edema noted  Neurological:sedated, non focal on gross exam  : No flank or cva tenderness palpated.  Skin: No rashes      LABS:    CBC:                          11.5   17.3  )-----------( 157      ( 2018 05:57 )             33.6           BMP:        133<L>  |  95<L>  |  143<H>  ----------------------------<  292<H>  5.1   |  20<L>  |  6.91<H>      134<L>  |  96  |  137<H>  ----------------------------<  309<H>  5.4<H>   |  20<L>  |  6.63<H>      130<L>  |  94<L>  |  144<H>  ----------------------------<  351<H>  6.0<H>   |  20<L>  |  7.34<H>      143  |  113<H>  |  20<H>  ----------------------------<  191<H>  3.2<L>   |  20<L>  |  0.80      132<L>  |  95<L>  |  125<H>  ----------------------------<  342<H>  5.9<H>   |  21<L>  |  6.96<H>      135  |  98  |  114<H>  ----------------------------<  301<H>  6.1<H>   |  19<L>  |  6.61<H>      135  |  100  |  104<H>  ----------------------------<  274<H>  5.2   |  21<L>  |  6.02<H>    Ca    6.8<L>      2018 05:57  Ca    6.6<L>      2018 21:59  Ca    6.4<LL>      2018 06:40  Ca    7.0<L>      2018 06:02  Ca    6.4<LL>      2018 14:40  Ca    6.5      2018 06:16  Ca    6.6<L>      2018 20:53  Phos  SEE NOTE       Phos  1.8       Phos  10.0       Phos  8.0       Mg     2.7       Mg     1.9       Mg     3.0       Mg     2.5         TPro  5.7<L>  /  Alb  1.5<L>  /  TBili  0.9  /  DBili  0.5<H>  /  AST  50<H>  /  ALT  48  /  AlkPhos  207<H>    TPro  3.8<L>  /  Alb  1.6<L>  /  TBili  0.5  /  DBili  x   /  AST  29  /  ALT  22  /  AlkPhos  78            URINE STUDIES:        Creatinine, Random Urine: 85 mg/dL ( @ 22:39)  Protein/Creatinine Ratio Calculation: 0.9 Ratio ( @ 22:39)  Calcium, Random Urine: 1 mg/dL ( @ 22:39)                  RADIOLOGY & ADDITIONAL STUDIES: pt seen , examined and case discussed with resident covering.    SUBJECTIVE:  pt is intubated,unresponsive off sedation since 9 am today    REVIEW OF SYSTEMS:  Unobtainable  Current meds:    acetaminophen    Suspension 650 milliGRAM(s) Oral every 6 hours PRN  ALBUTerol    90 MICROgram(s) HFA Inhaler 1 Puff(s) Inhalation every 6 hours  aspirin  chewable 81 milliGRAM(s) Oral daily  chlorhexidine 4% Liquid 1 Application(s) Topical daily  clopidogrel Tablet 75 milliGRAM(s) Oral daily  finasteride 5 milliGRAM(s) Oral daily  heparin  Injectable 5000 Unit(s) SubCutaneous every 12 hours  insulin glargine Injectable (LANTUS) 16 Unit(s) SubCutaneous at bedtime  insulin lispro (HumaLOG) corrective regimen sliding scale   SubCutaneous every 4 hours  lactulose Syrup 30 Gram(s) Oral daily  meropenem IVPB 500 milliGRAM(s) IV Intermittent every 12 hours  metoprolol     tartrate 12.5 milliGRAM(s) Oral two times a day  pantoprazole  Injectable 40 milliGRAM(s) IV Push daily  sevelamer carbonate Powder 1600 milliGRAM(s) Oral three times a day with meals  tamsulosin 0.4 milliGRAM(s) Oral at bedtime      Vital Signs    T(F): 96.5 (18 @ 12:00), Max: 101.7 (18 @ 17:00)  HR: 90 (18 @ 13:23) (65 - 109)  BP: 159/62 (18 @ 12:00) (95/62 - 185/69)  ABP: 176/64 (18 @ 12:00) (108/48 - 204/77)  RR: 14 (18 @ 10:24) (14 - 25)  SpO2: 97% (18 @ 13:23) (95% - 100%)  Wt(kg): --  CVP(cm H2O): --  CO: --  PCWP: --    I and O's:     @ 07:01  -   @ 07:00  --------------------------------------------------------  IN:    fentaNYL  Infusion: 97.8 mL    Jevity: 800 mL    propofol Infusion: 137 mL    Solution: 50 mL  Total IN: 1084.8 mL    OUT:    Indwelling Catheter - Urethral: 60 mL  Total OUT: 60 mL    Total NET: 1024.8 mL       @ 07:01  -   @ 07:00  --------------------------------------------------------  IN:    fentaNYL  Infusion: 103.5 mL    Jevity: 840 mL    propofol Infusion: 159.5 mL    Solution: 50 mL  Total IN: 1153 mL    OUT:    Indwelling Catheter - Urethral: 120 mL  Total OUT: 120 mL    Total NET: 1033 mL        Daily     Daily Weight in k (2018 07:00)    PHYSICAL EXAM:  Constitutional: well developed, obese and in and  HEENT: PERRLA,  no icteric sclera and mild pallor of conjunctiva noted  Neck: No JVD, thyromegaly or adenopathy  Respiratory: reduced air entry lower lungs with no rales, wheezing or rhonchi  Cardiovascular: S1 and S2 normally heard  Gastrointestinal: soft, distended, nontender and normal bowel sounds heard  Extremities: 2 plus peripheral edema noted   shiley cath noted   Neurological: pt is unresponsive  : No flank or cva tenderness palpated.  Skin: No rashes      LABS:    CBC:                          11.5   17.3  )-----------( 157      ( 2018 05:57 )             33.6           BMP:        133<L>  |  95<L>  |  143<H>  ----------------------------<  292<H>  5.1   |  20<L>  |  6.91<H>      134<L>  |  96  |  137<H>  ----------------------------<  309<H>  5.4<H>   |  20<L>  |  6.63<H>      130<L>  |  94<L>  |  144<H>  ----------------------------<  351<H>  6.0<H>   |  20<L>  |  7.34<H>      143  |  113<H>  |  20<H>  ----------------------------<  191<H>  3.2<L>   |  20<L>  |  0.80      132<L>  |  95<L>  |  125<H>  ----------------------------<  342<H>  5.9<H>   |  21<L>  |  6.96<H>      135  |  98  |  114<H>  ----------------------------<  301<H>  6.1<H>   |  19<L>  |  6.61<H>      135  |  100  |  104<H>  ----------------------------<  274<H>  5.2   |  21<L>  |  6.02<H>    Ca    6.8<L>      2018 05:57  Ca    6.6<L>      2018 21:59  Ca    6.4<LL>      2018 06:40  Ca    7.0<L>      2018 06:02  Ca    6.4<LL>      2018 14:40  Ca    6.5      2018 06:16  Ca    6.6<L>      2018 20:53  Phos  SEE NOTE       Phos  1.8       Phos  10.0       Phos  8.0       Mg     2.7       Mg     1.9       Mg     3.0       Mg     2.5         TPro  5.7<L>  /  Alb  1.5<L>  /  TBili  0.9  /  DBili  0.5<H>  /  AST  50<H>  /  ALT  48  /  AlkPhos  207<H>    TPro  3.8<L>  /  Alb  1.6<L>  /  TBili  0.5  /  DBili  x   /  AST  29  /  ALT  22  /  AlkPhos  78            URINE STUDIES:    Creatinine, Random Urine: 85 mg/dL ( @ 22:39)  Protein/Creatinine Ratio Calculation: 0.9 Ratio ( @ 22:39)  Calcium, Random Urine: 1 mg/dL ( @ 22:39)        RADIOLOGY & ADDITIONAL STUDIES:    < from: Xray Chest 1 View AP -PORTABLE-Routine (18 @ 09:04) >  EXAM:  XR CHEST PORTABLE ROUTINE 1V                            PROCEDURE DATE:  2018          INTERPRETATION:  AP chest on 2018 at 8:30 AM. Patient has   sepsis and is respirator dependent.    Poor inspiration crowds the chest. The heart is magnified by technique.    Endotracheal tube, nasogastric tube, and right jugular line remain.    There are significant interstitial infiltrates right greater than left   with accentuation at the right base.    There may be slightly better aeration of these infiltrates compared to   .    IMPRESSION: Question slightly improved infiltrates.    < end of copied text >                pt seen and examined.pts current chart reviewed and case discussed with resident covering.    SUBJECTIVE:  pt feels fine and denies cp,sob,gi or gu/uremic  symptons    REVIEW OF SYSTEMS:  CONSTITUTIONAL: No weakness, fevers or chills  EYES/ENT: No visual changes;  No vertigo or throat pain   NECK: No pain or stiffness  RESPIRATORY: No cough, wheezing, hemoptysis; No shortness of breath  CARDIOVASCULAR: No chest pain or palpitations  GASTROINTESTINAL: No abdominal or epigastric pain. No nausea, vomiting, or hematemesis; No diarrhea or constipation. No melena or hematochezia.  GENITOURINARY: No dysuria, frequency , hematuria, flank pain or nocturia  NEUROLOGICAL: No numbness or weakness  SKIN: No itching, burning, rashes, or lesions   All other review of systems is negative unless indicated above    Current meds:    acetaminophen    Suspension 650 milliGRAM(s) Oral every 6 hours PRN  ALBUTerol    90 MICROgram(s) HFA Inhaler 1 Puff(s) Inhalation every 6 hours  aspirin  chewable 81 milliGRAM(s) Oral daily  chlorhexidine 4% Liquid 1 Application(s) Topical daily  clopidogrel Tablet 75 milliGRAM(s) Oral daily  finasteride 5 milliGRAM(s) Oral daily  heparin  Injectable 5000 Unit(s) SubCutaneous every 12 hours  insulin glargine Injectable (LANTUS) 16 Unit(s) SubCutaneous at bedtime  insulin lispro (HumaLOG) corrective regimen sliding scale   SubCutaneous every 4 hours  lactulose Syrup 30 Gram(s) Oral daily  meropenem IVPB 500 milliGRAM(s) IV Intermittent every 12 hours  metoprolol     tartrate 12.5 milliGRAM(s) Oral two times a day  pantoprazole  Injectable 40 milliGRAM(s) IV Push daily  sevelamer carbonate Powder 1600 milliGRAM(s) Oral three times a day with meals  tamsulosin 0.4 milliGRAM(s) Oral at bedtime      Vital Signs    T(F): 96.5 (18 @ 12:00), Max: 101.7 (18 @ 17:00)  HR: 90 (18 @ 13:23) (65 - 109)  BP: 159/62 (18 @ 12:00) (95/62 - 185/69)  ABP: 176/64 (18 @ 12:00) (108/48 - 204/77)  RR: 14 (18 @ 10:24) (14 - 25)  SpO2: 97% (18 @ 13:23) (95% - 100%)  Wt(kg): --  CVP(cm H2O): --  CO: --  PCWP: --    I and O's:     @ 07:  -   @ 07:00  --------------------------------------------------------  IN:    fentaNYL  Infusion: 97.8 mL    Jevity: 800 mL    propofol Infusion: 137 mL    Solution: 50 mL  Total IN: 1084.8 mL    OUT:    Indwelling Catheter - Urethral: 60 mL  Total OUT: 60 mL    Total NET: 1024.8 mL       @ 07:  -   @ 07:00  --------------------------------------------------------  IN:    fentaNYL  Infusion: 103.5 mL    Jevity: 840 mL    propofol Infusion: 159.5 mL    Solution: 50 mL  Total IN: 1153 mL    OUT:    Indwelling Catheter - Urethral: 120 mL  Total OUT: 120 mL    Total NET: 1033 mL        Daily     Daily Weight in k (2018 07:00)    PHYSICAL EXAM:  Constitutional: well developed, obese and in nad  HEENT: PERRLA,  no icteric sclera and mild pallor of conjunctiva noted  Neck: No JVD, thyromegaly or adenopathy  Respiratory: reduced air entry lower lungs with no rales, wheezing or rhonchi  Cardiovascular: S1 and S2 normally heard  Gastrointestinal: soft, distended, nontender and normal bowel sounds heard  Extremities: 2 plus peripheral edema noted  Neurological:sedated, non focal on gross exam  : No flank or cva tenderness palpated.  Skin: No rashes      LABS:    CBC:                          11.5   17.3  )-----------( 157      ( 2018 05:57 )             33.6           BMP:        133<L>  |  95<L>  |  143<H>  ----------------------------<  292<H>  5.1   |  20<L>  |  6.91<H>      134<L>  |  96  |  137<H>  ----------------------------<  309<H>  5.4<H>   |  20<L>  |  6.63<H>      130<L>  |  94<L>  |  144<H>  ----------------------------<  351<H>  6.0<H>   |  20<L>  |  7.34<H>      143  |  113<H>  |  20<H>  ----------------------------<  191<H>  3.2<L>   |  20<L>  |  0.80      132<L>  |  95<L>  |  125<H>  ----------------------------<  342<H>  5.9<H>   |  21<L>  |  6.96<H>      135  |  98  |  114<H>  ----------------------------<  301<H>  6.1<H>   |  19<L>  |  6.61<H>      135  |  100  |  104<H>  ----------------------------<  274<H>  5.2   |  21<L>  |  6.02<H>    Ca    6.8<L>      2018 05:57  Ca    6.6<L>      2018 21:59  Ca    6.4<LL>      2018 06:40  Ca    7.0<L>      2018 06:02  Ca    6.4<LL>      2018 14:40  Ca    6.5      2018 06:16  Ca    6.6<L>      2018 20:53  Phos  SEE NOTE       Phos  1.8       Phos  10.0       Phos  8.0       Mg     2.7       Mg     1.9       Mg     3.0       Mg     2.5         TPro  5.7<L>  /  Alb  1.5<L>  /  TBili  0.9  /  DBili  0.5<H>  /  AST  50<H>  /  ALT  48  /  AlkPhos  207<H>    TPro  3.8<L>  /  Alb  1.6<L>  /  TBili  0.5  /  DBili  x   /  AST  29  /  ALT  22  /  AlkPhos  78            URINE STUDIES:        Creatinine, Random Urine: 85 mg/dL ( @ 22:39)  Protein/Creatinine Ratio Calculation: 0.9 Ratio ( @ 22:39)  Calcium, Random Urine: 1 mg/dL ( @ 22:39)                  RADIOLOGY & ADDITIONAL STUDIES:

## 2018-01-29 NOTE — PROGRESS NOTE ADULT - PROBLEM SELECTOR PLAN 4
patient had acute renal failure with metabolic acidosis 2/2 sepsis   received 50 meq Bicarbonate  failued to improved, and finally progressed to dialysis  -Patient spiked fever, so R renay was removed by surgery, which was replaced later  ar 1/28 for HD access.     Problem/Plan - 9  Hyperphosphatemia  will continue with Renagel 1600mg TID

## 2018-01-29 NOTE — CHART NOTE - NSCHARTNOTEFT_GEN_A_CORE
Spoke with son Gene Mcknight([phone]) over the phone regarding his father's overall condition.  After speaking with his grandfather, he has decided to make him DNR and Do Not Reintubate.  For now he wants to continue dialysis.  Ongoing discussions are necessary.  Updated ICU team.

## 2018-01-29 NOTE — PROGRESS NOTE ADULT - ASSESSMENT
1. ESRD sec to ATN: No improvement of renal function  - pt is being dialysed again topday for 2 hrs.Hd orders written and discussd with dialysis RN  - hold nephrotoxic medications, adjust meds as per egfr  and avoid contrast studies/phosphate  enema, etc    2. Hyperkalemia , mild, now better  -keep <5 and >4  -low K diet   -f/u k level q 12hrs      2. Metabolic acidosis   multifactorial with acceptable pH   monitor CO 2 daily   keep PH >7.3   add iv Nahco3 1 ampule if PH<7.3 daily      3. Hyperphosphatemia   - still elevated (10 today); patient to get HD today   -continue [id number] mg tid via ngt  -f/u phos level daily  -keep Phos>3, <5     4.Hyponatremia;mild, secondary to ning  -avoid iv fluids  -f/u Na level daily  -continue  fluid restriction to 1 liter per day    5. Fluid overload   bilateral opacities in the CXR , most likely pneumonia, less likely fluid overload  sec to ? CHF/NING   -UF as tolerated    6.Hypocalcemia;improving (corrected ca )  -suggest to replace q 6hrs 1 gm to keep corrected ca >8.5 and <10  -f/u ca level daily  pts prognosis very poor with worsening resp. status/ARDS 1. ESRD sec to ATN: No improvement of renal function.  - pt is being dialysed again topday for 2 hrs.Hd orders written and discussd with dialysis RN  - hold nephrotoxic medications, adjust meds as per egfr  and avoid contrast studies/phosphate  enema, etc    2. Hyperkalemia , mild, now better  -keep <5 and >4  -low K diet   -f/u k level q 12hrs      2. Metabolic acidosis   -multifactorial with acceptable pH   -monitor CO 2 daily   -keep PH >7.3   -add iv Nahco3 1 ampule if PH<7.3 daily      3. Hyperphosphatemia   -f/u phos level for today  -keep Phos>3, <5     4.Hyponatremia;mild, secondary to ning  -avoid iv fluids  -f/u Na level daily  -continue  fluid restriction to 1 liter per day    5. Fluid overload   sec to ? CHF/NING   -UF as tolerated in hd  -avoid iv fluid    6.Hypocalcemia;improving (corrected ca )  -suggest to keep corrected ca >8.5 and <10  -f/u ca level daily  rest as per micu

## 2018-01-29 NOTE — PROGRESS NOTE ADULT - PROBLEM SELECTOR PLAN 7
continue with ASA, Plavix through NGT   patient has H/o of CAD, had elevated trop, dc heparin drip   - trop trending down ECHO EF 55 %with normal LV functions   - tropnin likely due to demand ischaemia 2/2 septic shock   - hold Lipitor as LFT's trending up   - HD stable, currently off the pressor support   - Elevated d-dimer with normal venous doppler with no RV strain on ECHO less likely PE,   - elevated d-dimer 2/2 renal failure and sepsis    - Dr Benson cardio eval noted   -patient is DNR/ and donot reintubate .  palliative care consult noted

## 2018-01-29 NOTE — CONSULT NOTE ADULT - SUBJECTIVE AND OBJECTIVE BOX
Time of visit:    CHIEF COMPLAINT: Patient is a 72y old  Male who presents with a chief complaint of Respiratory distress (2018 14:30)      HPI:  72 M from [facility] h/o CKD, Anxiety disorder, CAD, BPH, Carpel tunnel syndrome, compulsive disorder, DM, OA, HTN, IBS sent for dyspnea for last 3 days. Dyspnea is constant, moderate to severe in intensity, associated with fever, generalized weakness, lethargy, frequent falls, cough, myalgias and URI symptoms. Cough is productive with non bloody yellowish sputum.     Patient denies chest pain, nausea, vomiting, LOC, focal neurological deficit, abdominal pain, hematochezia, current smoking, alcohol abuse and illicit drug use.    ICU Vital Signs Last 24 Hrs  T(C): 38.4 (2018 10:36), Max: 38.4 (2018 10:22)  T(F): 101.2 (2018 10:36), Max: 101.2 (2018 10:22)  HR: 88 (2018 13:01) (78 - 91)  BP: 130/61 (2018 12:44) (82/53 - 130/61)  RR: 25 (2018 12:44) (25 - 25)  SpO2: 99% (2018 13:01) (91% - 99%) (2018 14:30)   Vent: Ac 16  Fio2  PEEP 5    PAST MEDICAL & SURGICAL HISTORY:  Sleep apnea: hx of spleep  Hyperlipidemia  CAD (coronary artery disease)  DM (diabetes mellitus)  HTN (hypertension)  S/P foot surgery      Allergies    doxycycline (Unknown)  penicillin (Unknown)  tetracycline (Unknown)  Valtrex (Unknown)    Intolerances        MEDICATIONS  (STANDING):  ALBUTerol    90 MICROgram(s) HFA Inhaler 1 Puff(s) Inhalation every 6 hours  aspirin  chewable 81 milliGRAM(s) Oral daily  chlorhexidine 4% Liquid 1 Application(s) Topical daily  clopidogrel Tablet 75 milliGRAM(s) Oral daily  finasteride 5 milliGRAM(s) Oral daily  heparin  Injectable 5000 Unit(s) SubCutaneous every 12 hours  insulin glargine Injectable (LANTUS) 16 Unit(s) SubCutaneous at bedtime  insulin lispro (HumaLOG) corrective regimen sliding scale   SubCutaneous every 4 hours  lactulose Syrup 30 Gram(s) Oral daily  meropenem IVPB 500 milliGRAM(s) IV Intermittent every 12 hours  metoprolol     tartrate 12.5 milliGRAM(s) Oral two times a day  pantoprazole  Injectable 40 milliGRAM(s) IV Push daily  sevelamer carbonate Powder 1600 milliGRAM(s) Oral three times a day with meals  tamsulosin 0.4 milliGRAM(s) Oral at bedtime      MEDICATIONS  (PRN):  acetaminophen    Suspension 650 milliGRAM(s) Oral every 6 hours PRN For Temp greater than 38 C (100.4 F)   Medications up to date at time of exam.    Medications up to date at time of exam.    FAMILY HISTORY: pat is intubated and on vent support      SOCIAL HISTORY pat is intubated and on vent support, sedated    Smoking History: [   ] smoking/smoke exposure, [   ] former smoker  Living Condition: [   ] apartment, [   ] private house  Work History:   Travel History: denies recent travel  Illicit Substance Use: denies  Alcohol Use: denies    REVIEW OF SYSTEMS: pat is intubated and on vent support, sedated      CONSTITUTIONAL:  denies fevers, chills, sweats, weight loss    HEENT:  denies diplopia or blurred vision, sore throat or runny nose.    CARDIOVASCULAR:  denies pressure, squeezing, tightness, or heaviness about the chest; no palpitations.    RESPIRATORY:  denies SOB, cough, SANDOVAL, wheezing.    GASTROINTESTINAL:  denies abdominal pain, nausea, vomiting or diarrhea.    GENITOURINARY: denies dysuria, frequency or urgency.    NEUROLOGIC:  denies numbness, tingling, seizures or weakness.    PSYCHIATRIC:  denies disorder of thought or mood.    MSK: denies swelling, redness      PHYSICAL EXAMINATION:    GENERAL: The patient is a well-developed, well-nourished, in no apparent distress.     Vital Signs Last 24 Hrs  T(C): 35.8 (2018 12:00), Max: 38.7 (2018 17:00)  T(F): 96.5 (2018 12:00), Max: 101.7 (2018 17:00)  HR: 90 (2018 13:23) (65 - 109)  BP: 159/62 (2018 12:00) (95/62 - 185/69)  BP(mean): 85 (2018 12:00) (63 - 105)  RR: 14 (2018 10:24) (14 - 25)  SpO2: 97% (2018 13:23) (95% - 100%)  Mode: AC/ CMV (Assist Control/ Continuous Mandatory Ventilation)  RR (machine): 16  TV (machine): 450  FiO2: 40  PEEP: 7  ITime: 1  MAP: 8  PIP: 18   (if applicable)    Chest Tube (if applicable)    HEENT: Head is normocephalic and atraumatic. Extraocular muscles are intact. Mucous membranes are moist. + ETT    NECK: Supple, no palpable adenopathy.    LUNGS: Clear to auscultation, no wheezing, rales, or rhonchi.    HEART: Regular rate and rhythm without murmur.    ABDOMEN: Soft, nontender, and nondistended.  No hepatosplenomegaly is noted.    EXTREMITIES: +2  edema.    NEUROLOGy  sedated    SKIN: Warm, dry, good turgor.      LABS:                        11.5   17.3  )-----------( 157      ( 2018 05:57 )             33.6         133<L>  |  95<L>  |  143<H>  ----------------------------<  292<H>  5.1   |  20<L>  |  6.91<H>    Ca    6.8<L>      2018 05:57  Phos  SEE NOTE       Mg     2.7         TPro  5.7<L>  /  Alb  1.5<L>  /  TBili  0.9  /  DBili  0.5<H>  /  AST  50<H>  /  ALT  48  /  AlkPhos  207<H>      PT/INR - ( 2018 14:40 )   PT: 11.3 sec;   INR: 1.04 ratio           Urinalysis Basic - ( 2018 18:44 )    Color: Yellow / Appearance: Clear / S.015 / pH: x  Gluc: x / Ketone: Trace  / Bili: Small / Urobili: 1   Blood: x / Protein: 30 mg/dL / Nitrite: Positive   Leuk Esterase: Trace / RBC: 10-25 /HPF / WBC 6-10 /HPF   Sq Epi: x / Non Sq Epi: Few /HPF / Bacteria: Moderate /HPF      ABG - ( 2018 04:47 )  pH: 7.22  /  pCO2: 47    /  pO2: 98    / HCO3: 19    / Base Excess: -8.5  /  SaO2: 96                              MICROBIOLOGY: (if applicable)    RADIOLOGY & ADDITIONAL STUDIES:  EKG:   CXR:< from: Xray Chest 1 View AP -PORTABLE-Routine (18 @ 09:04) >  INTERPRETATION:  AP chest on 2018 at 8:30 AM. Patient has   sepsis and is respirator dependent.    Poor inspiration crowds the chest. The heart is magnified by technique.    Endotracheal tube, nasogastric tube, and right jugular line remain.    There are significant interstitial infiltrates right greater than left   with accentuation at the right base.    There may be slightly better aeration of these infiltrates compared to   .    IMPRESSION: Question slightly improved infiltrates.          < end of copied text >    ECHO: < from: Transthoracic Echocardiogram (18 @ 14:41) >  INDICATION: Shortness of breath (R06.02)  HISTORY:  ------------------------------------------------------------------------  DIMENSIONS:  Dimensions:     Normal Values:  LA:     3.0 cm    2.0 - 4.0 cm  Ao:     3.4 cm    2.0 - 3.8 cm  SEPTUM: 0.9 cm    0.6 - 1.2 cm  PWT:    0.8 cm    0.6 - 1.1 cm  LVIDd:  3.6 cm    3.0 - 5.6 cm  LVIDs:  2.6 cm    1.8 - 4.0 cm      Derived Variables:  LVMI: 39 g/m2  RWT: 0.44  Ejection Fraction Visual Estimate: 55 %    ------------------------------------------------------------------------  OBSERVATIONS:  Mitral Valve: Normal mitral valve.  Aortic Root: Aortic Root: 3.4 cm.    Aortic Valve: Normal trileaflet aortic valve. Mild aortic  insufficiency.  Left Atrium: Normal left atrium.  Left Ventricle: Normal Left VentricularSystolic Function,  (EF = 55%) Normal left ventricular internal dimensions and  wall thicknesses. Grade I diastolic dysfunction (Impaired  relaxation).  Right Heart: Normal right atrium. Normal right ventricular  size and function. Normal tricuspid valve. There is mild  pulmonic regurgitation.  Pericardium/PleuraNormal pericardium with no pericardial  effusion.  Hemodynamic: Unable to estimate RVSP.  ------------------------------------------------------------------------  CONCLUSIONS:  1. Normalmitral valve.  2. Normal trileaflet aortic valve. Mild aortic  insufficiency.  3. Aortic Root: 3.4 cm.  4. Normal left atrium.  5. Normal left ventricular internal dimensions and wall  thicknesses.    < end of copied text >      IMPRESSION: 72y Male PAST MEDICAL & SURGICAL HISTORY:  Sleep apnea: hx of spleep  Hyperlipidemia  CAD (coronary artery disease)  DM (diabetes mellitus)  HTN (hypertension)  S/P foot surgery   p/w     Imp:  This is a 72 yrs old man N[facility] with prior mentioned medical condition presented to ER with SOb, intubated for acute hypoxic resp failure due to influenza s/p course of tamiflu . He progressed to ARDS, improved. He is requiring less FiO 2, off pressors. Acute renal failure on hemodialysis.    Sugg:  -continue vent support  -SAT/SBT when ok by icu  team  -dialysis as per renal  -re-evaluae antibx or taper coverage  -hemodynamic support and monitoring  c/d with icu team  -  -

## 2018-01-30 NOTE — PROCEDURE NOTE - NSSITEPREP_SKIN_A_CORE
chlorhexidine
chlorhexidine
chlorhexidine/Adherence to aseptic technique: hand hygiene prior to donning barriers (gown, gloves), don cap and mask, sterile drape over patient
chlorhexidine

## 2018-01-30 NOTE — PROGRESS NOTE ADULT - SUBJECTIVE AND OBJECTIVE BOX
INTERVAL HPI/OVERNIGHT EVENTS: ***    PRESSORS: [x ] YES [ ] NO  WHICH:     ANTIBIOTICS:                  DATE STARTED:  ANTIBIOTICS:                  DATE STARTED:  ANTIBIOTICS:                  DATE STARTED:    Antimicrobial:  meropenem IVPB 500 milliGRAM(s) IV Intermittent every 12 hours    Cardiovascular:  metoprolol     tartrate 12.5 milliGRAM(s) Oral two times a day  norepinephrine Infusion 0.5 MICROgram(s)/kG/Min IV Continuous <Continuous>  phenylephrine    Infusion 0.5 MICROgram(s)/kG/Min IV Continuous <Continuous>  tamsulosin 0.4 milliGRAM(s) Oral at bedtime    Pulmonary:  ALBUTerol    90 MICROgram(s) HFA Inhaler 1 Puff(s) Inhalation every 6 hours    Hematalogic:  aspirin  chewable 81 milliGRAM(s) Oral daily  clopidogrel Tablet 75 milliGRAM(s) Oral daily  heparin  Injectable 5000 Unit(s) SubCutaneous every 12 hours    Other:  acetaminophen    Suspension 650 milliGRAM(s) Oral every 6 hours PRN  chlorhexidine 4% Liquid 1 Application(s) Topical daily  fentaNYL   Infusion 0.5 MICROgram(s)/kG/Hr IV Continuous <Continuous>  finasteride 5 milliGRAM(s) Oral daily  insulin glargine Injectable (LANTUS) 16 Unit(s) SubCutaneous at bedtime  insulin lispro (HumaLOG) corrective regimen sliding scale   SubCutaneous every 6 hours  lactulose Syrup 30 Gram(s) Oral daily  pantoprazole  Injectable 40 milliGRAM(s) IV Push daily  predniSONE   Tablet 30 milliGRAM(s) Oral daily  sevelamer carbonate Powder 1600 milliGRAM(s) Oral three times a day with meals  sodium bicarbonate 650 milliGRAM(s) Oral three times a day    acetaminophen    Suspension 650 milliGRAM(s) Oral every 6 hours PRN  ALBUTerol    90 MICROgram(s) HFA Inhaler 1 Puff(s) Inhalation every 6 hours  aspirin  chewable 81 milliGRAM(s) Oral daily  chlorhexidine 4% Liquid 1 Application(s) Topical daily  clopidogrel Tablet 75 milliGRAM(s) Oral daily  fentaNYL   Infusion 0.5 MICROgram(s)/kG/Hr IV Continuous <Continuous>  finasteride 5 milliGRAM(s) Oral daily  heparin  Injectable 5000 Unit(s) SubCutaneous every 12 hours  insulin glargine Injectable (LANTUS) 16 Unit(s) SubCutaneous at bedtime  insulin lispro (HumaLOG) corrective regimen sliding scale   SubCutaneous every 6 hours  lactulose Syrup 30 Gram(s) Oral daily  meropenem IVPB 500 milliGRAM(s) IV Intermittent every 12 hours  metoprolol     tartrate 12.5 milliGRAM(s) Oral two times a day  norepinephrine Infusion 0.5 MICROgram(s)/kG/Min IV Continuous <Continuous>  pantoprazole  Injectable 40 milliGRAM(s) IV Push daily  phenylephrine    Infusion 0.5 MICROgram(s)/kG/Min IV Continuous <Continuous>  predniSONE   Tablet 30 milliGRAM(s) Oral daily  sevelamer carbonate Powder 1600 milliGRAM(s) Oral three times a day with meals  sodium bicarbonate 650 milliGRAM(s) Oral three times a day  tamsulosin 0.4 milliGRAM(s) Oral at bedtime    Drug Dosing Weight  Height (cm): 185.42 (2018 10:22)  Weight (kg): 96 (2018 07:30)  BMI (kg/m2): 27.9 (2018 07:30)  BSA (m2): 2.2 (2018 07:30)    CENTRAL LINE: [ ] YES [ ] NO  LOCATION:   DATE INSERTED:  REMOVE: [ ] YES [ ] NO  EXPLAIN:    BRUNSON: [ ] YES [ ] NO    DATE INSERTED:  REMOVE:  [ ] YES [ ] NO  EXPLAIN:    A-LINE:  [ ] YES [ ] NO  LOCATION:   DATE INSERTED:  REMOVE:  [ ] YES [ ] NO  EXPLAIN:    PMH -reviewed admission note, no change since admission  Heart faliure: acute [ ] chronic [ ] acute or chronic [ ] diastolic [ ] systolic [ ] combied systolic and diastolic[ ]  NING: ATN[ ] renal medullary necrosis [ ] CKD I [ ]CKDII [ ]CKD III [ ]CKD IV [ ]CKD V [ ]Other pathological lesions [ ]  Abdominal Nutrition Status: malnutrition [ ] cachexia [ ] morbid obesity/BMI=40 [ ] Supplement ordered [___________]     ICU Vital Signs Last 24 Hrs  T(C): 37.7 (2018 06:00), Max: 38.7 (2018 19:57)  T(F): 99.8 (2018 06:00), Max: 101.6 (2018 19:57)  HR: 81 (2018 08:30) (72 - 157)  BP: 132/56 (2018 21:30) (77/41 - 209/82)  BP(mean): 73 (2018 21:30) (49 - 113)  ABP: 153/57 (2018 08:30) (62/35 - 257/91)  ABP(mean): 79 (2018 08:30) (44 - 144)  RR: 24 (2018 08:30) (14 - 25)  SpO2: 97% (2018 08:30) (87% - 100%)      ABG - ( 2018 03:16 )  pH: 7.23  /  pCO2: 49    /  pO2: 52    / HCO3: 20    / Base Excess: -7.0  /  SaO2: 79                     @ 07:01  -   @ 07:00  --------------------------------------------------------  IN: 1233.5 mL / OUT: 315 mL / NET: 918.5 mL        Mode: AC/ CMV (Assist Control/ Continuous Mandatory Ventilation)  RR (machine): 24  TV (machine): 450  FiO2: 40  PEEP: 5  ITime: 1  MAP: 14  PIP: 35      PHYSICAL EXAM:    GENERAL: [ ]NAD, [ ]well-groomed, [ ]well-developed  HEAD:  [ ]Atraumatic, [ ]Normocephalic  EYES: [ ]EOMI, [ ]PERRLA, [ ]conjunctiva and sclera clear  ENMT: [ ]No tonsillar erythema, exudates, or enlargement; [ ]Moist mucous membranes, [ ]Good dentition, [ ]No lesions  NECK: [ ]Supple, normal appearance, [ ]No JVD; [ ]Normal thyroid; [ ]Trachea midline  NERVOUS SYSTEM:  [ ]Alert & Oriented X3, [ ]Good concentration; [ ]Motor Strength 5/5 B/L upper and lower extremities; [ ]DTRs 2+ intact and symmetric  CHEST/LUNG: [ ]No chest deformity; [ ]Normal percussion bilaterally; [ ]No rales, rhonchi, wheezing   HEART: [ ]Regular rate and rhythm; [ ]No murmurs, rubs, or gallops  ABDOMEN: [ ]Soft, Nontender, Nondistended; [ ]Bowel sounds present  EXTREMITIES:  [ ]2+ Peripheral Pulses, [ ]No clubbing, cyanosis, or edema  LYMPH: [ ]No lymphadenopathy noted  SKIN: [ ]No rashes or lesions; [ ]Good capillary refill      LABS:  CBC Full  -  ( 2018 05:24 )  WBC Count : 12.9 K/uL  Hemoglobin : 10.6 g/dL  Hematocrit : 31.1 %  Platelet Count - Automated : 183 K/uL  Mean Cell Volume : 104.3 fl  Mean Cell Hemoglobin : 35.6 pg  Mean Cell Hemoglobin Concentration : 34.1 gm/dL  Auto Neutrophil # : x  Auto Lymphocyte # : x  Auto Monocyte # : x  Auto Eosinophil # : x  Auto Basophil # : x  Auto Neutrophil % : x  Auto Lymphocyte % : x  Auto Monocyte % : x  Auto Eosinophil % : x  Auto Basophil % : x        134<L>  |  97  |  161  ----------------------------<  236<H>  5.1   |  21<L>  |  7.25<H>    Ca    6.5<LL>      2018 05:24  Phos  10.2       Mg     2.9         TPro  6.0  /  Alb  1.6<L>  /  TBili  0.8  /  DBili  x   /  AST  33  /  ALT  36  /  AlkPhos  160<H>        Urinalysis Basic - ( 2018 05:49 )    Color: Yellow / Appearance: Clear / S.020 / pH: x  Gluc: x / Ketone: Negative  / Bili: Negative / Urobili: 1   Blood: x / Protein: 30 mg/dL / Nitrite: Negative   Leuk Esterase: Trace / RBC: 10-25 /HPF / WBC 6-10 /HPF   Sq Epi: x / Non Sq Epi: Few /HPF / Bacteria: Moderate /HPF      Culture Results:   Normal Respiratory Tonie present ( @ 13:45)      RADIOLOGY & ADDITIONAL STUDIES REVIEWED:  ***    [ ]GOALS OF CARE DISCUSSION WITH PATIENT/FAMILY/PROXY:    CRITICAL CARE TIME SPENT: 35 minutes INTERVAL HPI/OVERNIGHT EVENTS: *** Patient had left IJ placement, and went into SVT. received 1 dose adenosine.     PRESSORS: [x ] YES [ ] NO  WHICH: pheny     ANTIBIOTICS:       meropenem           DATE STARTED:  ANTIBIOTICS:                  DATE STARTED:  ANTIBIOTICS:                  DATE STARTED:    Antimicrobial:  meropenem IVPB 500 milliGRAM(s) IV Intermittent every 12 hours    Cardiovascular:  metoprolol     tartrate 12.5 milliGRAM(s) Oral two times a day  norepinephrine Infusion 0.5 MICROgram(s)/kG/Min IV Continuous <Continuous>  phenylephrine    Infusion 0.5 MICROgram(s)/kG/Min IV Continuous <Continuous>  tamsulosin 0.4 milliGRAM(s) Oral at bedtime    Pulmonary:  ALBUTerol    90 MICROgram(s) HFA Inhaler 1 Puff(s) Inhalation every 6 hours    Hematalogic:  aspirin  chewable 81 milliGRAM(s) Oral daily  clopidogrel Tablet 75 milliGRAM(s) Oral daily  heparin  Injectable 5000 Unit(s) SubCutaneous every 12 hours    Other:  acetaminophen    Suspension 650 milliGRAM(s) Oral every 6 hours PRN  chlorhexidine 4% Liquid 1 Application(s) Topical daily  fentaNYL   Infusion 0.5 MICROgram(s)/kG/Hr IV Continuous <Continuous>  finasteride 5 milliGRAM(s) Oral daily  insulin glargine Injectable (LANTUS) 16 Unit(s) SubCutaneous at bedtime  insulin lispro (HumaLOG) corrective regimen sliding scale   SubCutaneous every 6 hours  lactulose Syrup 30 Gram(s) Oral daily  pantoprazole  Injectable 40 milliGRAM(s) IV Push daily  predniSONE   Tablet 30 milliGRAM(s) Oral daily  sevelamer carbonate Powder 1600 milliGRAM(s) Oral three times a day with meals  sodium bicarbonate 650 milliGRAM(s) Oral three times a day    acetaminophen    Suspension 650 milliGRAM(s) Oral every 6 hours PRN  ALBUTerol    90 MICROgram(s) HFA Inhaler 1 Puff(s) Inhalation every 6 hours  aspirin  chewable 81 milliGRAM(s) Oral daily  chlorhexidine 4% Liquid 1 Application(s) Topical daily  clopidogrel Tablet 75 milliGRAM(s) Oral daily  fentaNYL   Infusion 0.5 MICROgram(s)/kG/Hr IV Continuous <Continuous>  finasteride 5 milliGRAM(s) Oral daily  heparin  Injectable 5000 Unit(s) SubCutaneous every 12 hours  insulin glargine Injectable (LANTUS) 16 Unit(s) SubCutaneous at bedtime  insulin lispro (HumaLOG) corrective regimen sliding scale   SubCutaneous every 6 hours  lactulose Syrup 30 Gram(s) Oral daily  meropenem IVPB 500 milliGRAM(s) IV Intermittent every 12 hours  metoprolol     tartrate 12.5 milliGRAM(s) Oral two times a day  norepinephrine Infusion 0.5 MICROgram(s)/kG/Min IV Continuous <Continuous>  pantoprazole  Injectable 40 milliGRAM(s) IV Push daily  phenylephrine    Infusion 0.5 MICROgram(s)/kG/Min IV Continuous <Continuous>  predniSONE   Tablet 30 milliGRAM(s) Oral daily  sevelamer carbonate Powder 1600 milliGRAM(s) Oral three times a day with meals  sodium bicarbonate 650 milliGRAM(s) Oral three times a day  tamsulosin 0.4 milliGRAM(s) Oral at bedtime    Drug Dosing Weight  Height (cm): 185.42 (2018 10:22)  Weight (kg): 96 (2018 07:30)  BMI (kg/m2): 27.9 (2018 07:30)  BSA (m2): 2.2 (2018 07:30)    CENTRAL LINE: [x ] YES [ ] NO  LOCATION: J  DATE INSERTED:   REMOVE: [ ] YES [ ] NO  EXPLAIN:    BRUNSON: [x ] YES [ ] NO    DATE INSERTED:  REMOVE:  [ ] YES [ ] NO  EXPLAIN:    A-LINE:  [x ] YES [ ] NO  LOCATION:  right DATE INSERTED:  REMOVE:  [ ] YES [ ] NO  EXPLAIN:    PMH -reviewed admission note, no change since admission  Heart faliure: acute [ ] chronic [ ] acute or chronic [ ] diastolic [ ] systolic [ ] combied systolic and diastolic[ ]  NING: ATN[ ] renal medullary necrosis [ ] CKD I [ ]CKDII [ ]CKD III [ ]CKD IV [ ]CKD V [ ]Other pathological lesions [ ]  Abdominal Nutrition Status: malnutrition [ ] cachexia [ ] morbid obesity/BMI=40 [ ] Supplement ordered [___________]     ICU Vital Signs Last 24 Hrs  T(C): 37.7 (2018 06:00), Max: 38.7 (2018 19:57)  T(F): 99.8 (2018 06:00), Max: 101.6 (2018 19:57)  HR: 81 (2018 08:30) (72 - 157)  BP: 132/56 (2018 21:30) (77/41 - 209/82)  BP(mean): 73 (2018 21:30) (49 - 113)  ABP: 153/57 (2018 08:30) (62/35 - 257/91)  ABP(mean): 79 (2018 08:30) (44 - 144)  RR: 24 (2018 08:30) (14 - 25)  SpO2: 97% (2018 08:30) (87% - 100%)      ABG - ( 2018 03:16 )  pH: 7.23  /  pCO2: 49    /  pO2: 52    / HCO3: 20    / Base Excess: -7.0  /  SaO2: 79                     @ 07:01  -   @ 07:00  --------------------------------------------------------  IN: 1233.5 mL / OUT: 315 mL / NET: 918.5 mL        Mode: AC/ CMV (Assist Control/ Continuous Mandatory Ventilation)  RR (machine): 24  TV (machine): 450  FiO2: 40  PEEP: 5  ITime: 1  MAP: 14  PIP: 35      PHYSICAL EXAM:    GENERAL: [x ]NAD, [ ]well-groomed, [ ]well-developed  HEAD:  [x ]Atraumatic, [ ]Normocephalic  EYES: [x ]EOMI, [ ]PERRLA, [ ]conjunctiva and sclera clear  ENMT: [ ]No tonsillar erythema, exudates, or enlargement; [ ]Moist mucous membranes, [ ]Good dentition, [ ]No lesions  NECK: [ ]Supple, normal appearance, [ ]No JVD; [ ]Normal thyroid; [ ]Trachea midline  NERVOUS SYSTEM:  [ ]Alert & Oriented X0, [ ]Good concentration; [ ]Motor Strength 5/5 B/L upper and lower extremities; [ ]DTRs 2+ intact and symmetric  CHEST/LUNG: [ ]No chest deformity; [ ]Normal percussion bilaterally; [x ]No rales, rhonchi, wheezing   HEART: [x ]Regular rate and rhythm; [ ]No murmurs, rubs, or gallops  ABDOMEN: [x ]Soft, Nontender, Nondistended; [x ]Bowel sounds present  EXTREMITIES:  [ ]2+ Peripheral Pulses, [ ]No clubbing, cyanosis, or edema  LYMPH: [ ]No lymphadenopathy noted  SKIN: [ ]No rashes or lesions; [ ]Good capillary refill      LABS:  CBC Full  -  ( 2018 05:24 )  WBC Count : 12.9 K/uL  Hemoglobin : 10.6 g/dL  Hematocrit : 31.1 %  Platelet Count - Automated : 183 K/uL  Mean Cell Volume : 104.3 fl  Mean Cell Hemoglobin : 35.6 pg  Mean Cell Hemoglobin Concentration : 34.1 gm/dL  Auto Neutrophil # : x  Auto Lymphocyte # : x  Auto Monocyte # : x  Auto Eosinophil # : x  Auto Basophil # : x  Auto Neutrophil % : x  Auto Lymphocyte % : x  Auto Monocyte % : x  Auto Eosinophil % : x  Auto Basophil % : x        134<L>  |  97  |  161  ----------------------------<  236<H>  5.1   |  21<L>  |  7.25<H>    Ca    6.5<LL>      2018 05:24  Phos  10.2       Mg     2.9         TPro  6.0  /  Alb  1.6<L>  /  TBili  0.8  /  DBili  x   /  AST  33  /  ALT  36  /  AlkPhos  160<H>        Urinalysis Basic - ( 2018 05:49 )    Color: Yellow / Appearance: Clear / S.020 / pH: x  Gluc: x / Ketone: Negative  / Bili: Negative / Urobili: 1   Blood: x / Protein: 30 mg/dL / Nitrite: Negative   Leuk Esterase: Trace / RBC: 10-25 /HPF / WBC 6-10 /HPF   Sq Epi: x / Non Sq Epi: Few /HPF / Bacteria: Moderate /HPF      Culture Results:   Normal Respiratory Tonie present ( @ 13:45)      RADIOLOGY & ADDITIONAL STUDIES REVIEWED:  ***    [ ]GOALS OF CARE DISCUSSION WITH PATIENT/FAMILY/PROXY:    CRITICAL CARE TIME SPENT: 35 minutes

## 2018-01-30 NOTE — PROGRESS NOTE ADULT - PROBLEM SELECTOR PLAN 1
-Patient spiked fever 100.8, BP stable 130/55. HR in 90s  -RIJ removed, new LIJ placed.1/30   -repeat blood cultures testing,  -continue with antibiotics,

## 2018-01-30 NOTE — PROCEDURE NOTE - NSINDICATIONS_GEN_A_CORE
monitoring purposes/critical patient
dialysis/CRRT
volume resuscitation/critical illness/venous access
emergency venous access/critical illness
dialysis/CRRT

## 2018-01-30 NOTE — PROCEDURE NOTE - NSPROCNAME_GEN_A_CORE
Arterial Puncture/Cannulation
Central Line Insertion

## 2018-01-30 NOTE — PROGRESS NOTE ADULT - SUBJECTIVE AND OBJECTIVE BOX
pt seen and examined, pt sedated on fentanyl   BP support with yamile/ levo   pt tolerating tube feeds  Afebrile at present   Tele stable on exam     acetaminophen    Suspension 650 milliGRAM(s) Oral every 6 hours PRN  ALBUTerol    90 MICROgram(s) HFA Inhaler 1 Puff(s) Inhalation every 6 hours  aspirin  chewable 81 milliGRAM(s) Oral daily  chlorhexidine 4% Liquid 1 Application(s) Topical daily  clopidogrel Tablet 75 milliGRAM(s) Oral daily  fentaNYL   Infusion 0.5 MICROgram(s)/kG/Hr IV Continuous <Continuous>  finasteride 5 milliGRAM(s) Oral daily  heparin  Injectable 5000 Unit(s) SubCutaneous every 12 hours  insulin glargine Injectable (LANTUS) 16 Unit(s) SubCutaneous at bedtime  insulin lispro (HumaLOG) corrective regimen sliding scale   SubCutaneous every 6 hours  lactulose Syrup 30 Gram(s) Oral daily  meropenem IVPB 500 milliGRAM(s) IV Intermittent every 12 hours  metoprolol     tartrate 12.5 milliGRAM(s) Oral two times a day  norepinephrine Infusion 0.5 MICROgram(s)/kG/Min IV Continuous <Continuous>  pantoprazole  Injectable 40 milliGRAM(s) IV Push daily  phenylephrine    Infusion 0.5 MICROgram(s)/kG/Min IV Continuous <Continuous>  predniSONE   Tablet 30 milliGRAM(s) Oral daily  sevelamer carbonate Powder 1600 milliGRAM(s) Oral three times a day with meals  sodium bicarbonate 650 milliGRAM(s) Oral three times a day  tamsulosin 0.4 milliGRAM(s) Oral at bedtime                            10.6   12.9  )-----------( 183      ( 30 Jan 2018 05:24 )             31.1       Hemoglobin: 10.6 g/dL (01-30 @ 05:24)  Hemoglobin: 11.5 g/dL (01-29 @ 05:57)  Hemoglobin: 11.6 g/dL (01-28 @ 06:40)  Hemoglobin: 9.8 g/dL (01-28 @ 06:03)  Hemoglobin: 12.5 g/dL (01-27 @ 06:16)      01-30    134<L>  |  97  |  161  ----------------------------<  236<H>  5.1   |  21<L>  |  7.25<H>    Ca    6.5<LL>      30 Jan 2018 05:24  Phos  10.2     01-30  Mg     2.9     01-30    TPro  6.0  /  Alb  1.6<L>  /  TBili  0.8  /  DBili  x   /  AST  33  /  ALT  36  /  AlkPhos  160<H>  01-30    Creatinine Trend: 7.25<--, 6.91<--, 6.91<--, 6.63<--, 7.34<--, 0.80<--    COAGS:           T(C): 37.7 (01-30-18 @ 06:00), Max: 38.7 (01-29-18 @ 19:57)  HR: 80 (01-30-18 @ 08:05) (70 - 157)  BP: 132/56 (01-29-18 @ 21:30) (77/41 - 209/82)  RR: 24 (01-30-18 @ 06:30) (14 - 25)  SpO2: 99% (01-30-18 @ 08:05) (87% - 99%)  Wt(kg): --    I&O's Summary    29 Jan 2018 07:01  -  30 Jan 2018 07:00  --------------------------------------------------------  IN: 1233.5 mL / OUT: 315 mL / NET: 918.5 mL      Heart: normal S1, S2, RRR, no m/r/g  Lungs: cta b/l  Abd: soft nT, nD  Ext: no edema      TELEMETRY:  SR       ASSESSMENT/PLAN: 	72y Male [facility] h/o CKD, Anxiety disorder, CAD, BPH, Carpel tunnel syndrome, compulsive disorder, DM, OA, HTN, IBS sent for dyspnea for last 3 days found with (+) flu, PNA likely septic shock normal LV function NING now on HD    DAPT for cad   cont vent support   ABX per Medicine   steroid taper    HD per renal   GI / DVT prophylaxis.   keep K>4, mag >2.0   Supportive care per MICU  D/W Dr Benson

## 2018-01-30 NOTE — PROGRESS NOTE ADULT - SUBJECTIVE AND OBJECTIVE BOX
Time of Visit:1430  Patient seen and examined. icu    MEDICATIONS  (STANDING):  ALBUTerol    90 MICROgram(s) HFA Inhaler 1 Puff(s) Inhalation every 6 hours  aspirin  chewable 81 milliGRAM(s) Oral daily  chlorhexidine 4% Liquid 1 Application(s) Topical daily  clopidogrel Tablet 75 milliGRAM(s) Oral daily  fentaNYL   Infusion 0.5 MICROgram(s)/kG/Hr (4.8 mL/Hr) IV Continuous <Continuous>  finasteride 5 milliGRAM(s) Oral daily  heparin  Injectable 5000 Unit(s) SubCutaneous every 12 hours  insulin glargine Injectable (LANTUS) 16 Unit(s) SubCutaneous at bedtime  insulin lispro (HumaLOG) corrective regimen sliding scale   SubCutaneous every 6 hours  lactulose Syrup 30 Gram(s) Oral daily  meropenem IVPB 500 milliGRAM(s) IV Intermittent every 12 hours  metoprolol     tartrate 12.5 milliGRAM(s) Oral two times a day  norepinephrine Infusion 0.5 MICROgram(s)/kG/Min (45 mL/Hr) IV Continuous <Continuous>  pantoprazole  Injectable 40 milliGRAM(s) IV Push daily  phenylephrine    Infusion 0.5 MICROgram(s)/kG/Min (9 mL/Hr) IV Continuous <Continuous>  predniSONE   Tablet 30 milliGRAM(s) Oral daily  sevelamer carbonate Powder 1600 milliGRAM(s) Oral three times a day with meals  sodium bicarbonate 650 milliGRAM(s) Oral three times a day  tamsulosin 0.4 milliGRAM(s) Oral at bedtime      MEDICATIONS  (PRN):  acetaminophen    Suspension 650 milliGRAM(s) Oral every 6 hours PRN For Temp greater than 38 C (100.4 F)       Medications up to date at time of exam.      PHYSICAL EXAMINATION:  Patient has no new complaints.  GENERAL: The patient is a well-developed, well-nourished, in no apparent distress.     Vital Signs Last 24 Hrs  T(C): 38.2 (2018 09:00), Max: 38.7 (2018 19:57)  T(F): 100.8 (2018 09:00), Max: 101.6 (2018 19:57)  HR: 85 (2018 12:45) (80 - 157)  BP: 132/56 (2018 21:30) (77/41 - 209/82)  BP(mean): 73 (2018 21:30) (49 - 113)  RR: 24 (2018 11:30) (16 - 25)  SpO2: 97% (2018 12:45) (87% - 100%)  Mode: AC/ CMV (Assist Control/ Continuous Mandatory Ventilation)  RR (machine): 24  TV (machine): 450  FiO2: 40  PEEP: 5  ITime: 1  MAP: 14  PIP: 29   (if applicable)    Chest Tube (if applicable)    HEENT: Head is normocephalic and atraumatic. Extraocular muscles are intact. Mucous membranes are moist. +ETT    NECK: Supple, no palpable adenopathy.    LUNGS: Clear to auscultation, no wheezing, rales, or rhonchi.    HEART: Regular rate and rhythm without murmur.    ABDOMEN: Soft, nontender, and nondistended.  No hepatosplenomegaly is noted.    EXTREMITIES: Without any cyanosis, clubbing, rash, lesions or edema.    NEUROLOGIC: sedated    SKIN: Warm, dry, good turgor.      LABS:                        10.6   12.9  )-----------( 183      ( 2018 05:24 )             31.1     01-30    134<L>  |  97  |  161  ----------------------------<  236<H>  5.1   |  21<L>  |  7.25<H>    Ca    6.5<LL>      2018 05:24  Phos  10.2       Mg     2.9         TPro  6.0  /  Alb  1.6<L>  /  TBili  0.8  /  DBili  x   /  AST  33  /  ALT  36  /  AlkPhos  160<H>  01-30      Urinalysis Basic - ( 2018 05:49 )    Color: Yellow / Appearance: Clear / S.020 / pH: x  Gluc: x / Ketone: Negative  / Bili: Negative / Urobili: 1   Blood: x / Protein: 30 mg/dL / Nitrite: Negative   Leuk Esterase: Trace / RBC: 10-25 /HPF / WBC 6-10 /HPF   Sq Epi: x / Non Sq Epi: Few /HPF / Bacteria: Moderate /HPF      ABG - ( 2018 03:16 )  pH: 7.23  /  pCO2: 49    /  pO2: 52    / HCO3: 20    / Base Excess: -7.0  /  SaO2: 79                              MICROBIOLOGY: (if applicable)    RADIOLOGY & ADDITIONAL STUDIES:  EKG:   CXR:< from: Xray Chest 1 View AP-PORTABLE IMMEDIATE (18 @ 04:03) >  NTERPRETATION:  AP chest on 2018 at 3:56 AM. Patient had   left jugular line placement and is respirator dependent.    Poor inspiration crowds the chest. The heart is magnified by technique.    Endotracheal tube, nasogastric tube, and right jugular line remain. There   is a new left jugular line with tip in the superior vena caval area now   evident.    There are diffuse advanced bilateral infiltrates showing increase from   .    IMPRESSION: Increasing diffuse infiltrates. Left jugular line inserted.      < end of copied text >    ECHO:    IMPRESSION: 72y Male PAST MEDICAL & SURGICAL HISTORY:  Sleep apnea: hx of spleep  Hyperlipidemia  CAD (coronary artery disease)  DM (diabetes mellitus)  HTN (hypertension)  S/P foot surgery   p/w       Imp:  This is a 72 yrs old man [facility] with prior mentioned medical condition presented to ER with SOb, intubated for acute hypoxic resp failure due to influenza s/p course of tamiflu . He progressed to ARDS, improved. He is requiring less FiO 2, off pressors. Acute renal failure on hemodialysis.    Sugg:  -continue vent support  -SAT/SBT when ok by icu  team  -dialysis as per renal  -re-evaluae antibx or taper coverage  -hemodynamic support and monitoring      d/c with icu team

## 2018-01-30 NOTE — PROGRESS NOTE ADULT - ASSESSMENT
1. ESRD sec to ATN: No improvement of renal function.  - pt is being dialysed again topday for 2 hrs.Hd orders written and discussd with dialysis RN  - hold nephrotoxic medications, adjust meds as per egfr  and avoid contrast studies/phosphate  enema, etc    2. Hyperkalemia , mild, now better  -keep <5 and >4  -low K diet   -f/u k level q 12hrs      2. Metabolic and resp.acidosis   -multifactorial with acceptable pH   -monitor CO 2 daily   -keep PH >7.3   -add iv Nahco3 1 ampule if PH<7.3 daily      3. Hyperphosphatemia   -worsening  -add Amphogel plus Renagel   -keep Phos>3, <5     4.Hyponatremia;mild, secondary to ning  -avoid iv fluids  -f/u Na level daily  -continue  fluid restriction to 1 liter per day    5. Fluid overload/Edema  sec to ? CHF/NING   -UF as tolerated in hd  -avoid iv fluid    6.Hypocalcemia;improving (corrected ca )  -suggest to keep corrected ca >8.5 and <10  -f/u ca level daily  rest as per micu

## 2018-01-30 NOTE — PROGRESS NOTE ADULT - SUBJECTIVE AND OBJECTIVE BOX
pt seen and examined.pts current chart reviewed and case discussed with resident covering.    SUBJECTIVE:  pt is sedated on vent and in nad  pt is off pressors now  u/o poor    REVIEW OF SYSTEMS:  Unobtainable  Current meds:    acetaminophen    Suspension 650 milliGRAM(s) Oral every 6 hours PRN  ALBUTerol    90 MICROgram(s) HFA Inhaler 1 Puff(s) Inhalation every 6 hours  aspirin  chewable 81 milliGRAM(s) Oral daily  chlorhexidine 4% Liquid 1 Application(s) Topical daily  clopidogrel Tablet 75 milliGRAM(s) Oral daily  fentaNYL   Infusion 0.5 MICROgram(s)/kG/Hr IV Continuous <Continuous>  finasteride 5 milliGRAM(s) Oral daily  heparin  Injectable 5000 Unit(s) SubCutaneous every 12 hours  insulin glargine Injectable (LANTUS) 16 Unit(s) SubCutaneous at bedtime  insulin lispro (HumaLOG) corrective regimen sliding scale   SubCutaneous every 6 hours  lactulose Syrup 30 Gram(s) Oral daily  meropenem IVPB 500 milliGRAM(s) IV Intermittent every 12 hours  metoprolol     tartrate 12.5 milliGRAM(s) Oral two times a day  norepinephrine Infusion 0.5 MICROgram(s)/kG/Min IV Continuous <Continuous>  pantoprazole  Injectable 40 milliGRAM(s) IV Push daily  phenylephrine    Infusion 0.5 MICROgram(s)/kG/Min IV Continuous <Continuous>  predniSONE   Tablet 30 milliGRAM(s) Oral daily  sevelamer carbonate Powder 1600 milliGRAM(s) Oral three times a day with meals  sodium bicarbonate 650 milliGRAM(s) Oral three times a day  tamsulosin 0.4 milliGRAM(s) Oral at bedtime      Vital Signs    T(F): 100.8 (18 @ 09:00), Max: 101.6 (18 @ 19:57)  HR: 85 (18 @ 12:45) (80 - 157)  BP: 132/56 (18 @ 21:30) (77/41 - 209/82)  ABP: 164/66 (18 @ 11:30) (62/35 - 257/91)  RR: 24 (18 @ 11:30) (16 - 25)  SpO2: 97% (18 @ 12:45) (87% - 100%)  Wt(kg): --  CVP(cm H2O): --  CO: --  PCWP: --    I and O's:     @ 07:01  -   @ 07:00  --------------------------------------------------------  IN:    fentaNYL  Infusion: 103.5 mL    Jevity: 840 mL    propofol Infusion: 159.5 mL    Solution: 50 mL  Total IN: 1153 mL    OUT:    Indwelling Catheter - Urethral: 120 mL  Total OUT: 120 mL    Total NET: 1033 mL       @ 07:01  -   @ 07:00  --------------------------------------------------------  IN:    fentaNYL  Infusion: 436.8 mL    Jevity: 720 mL    norepinephrine Infusion: 40.7 mL    phenylephrine   Infusion: 36 mL  Total IN: 1233.5 mL    OUT:    Indwelling Catheter - Urethral: 315 mL  Total OUT: 315 mL    Total NET: 918.5 mL       @ 07:  -   @ 14:03  --------------------------------------------------------  IN:    fentaNYL  Infusion: 134.4 mL    Jevity: 160 mL    phenylephrine   Infusion: 14.7 mL  Total IN: 309.1 mL    OUT:    Indwelling Catheter - Urethral: 120 mL  Total OUT: 120 mL    Total NET: 189.1 mL        Daily     Daily Weight in k (2018 07:00)    PHYSICAL EXAM:  Constitutional: well developed, obese and in and  HEENT: PERRLA,  no icteric sclera and mild pallor of conjunctiva noted  Neck: No JVD, thyromegaly or adenopathy  Respiratory: reduced air entry lower lungs with no rales, wheezing or rhonchi  Cardiovascular: S1 and S2 normally heard  Gastrointestinal: soft, distended, nontender and normal bowel sounds heard  Extremities: 2 plus peripheral edema noted   shiley cath noted   Neurological: pt is unresponsive  : No flank or cva tenderness palpated.  Skin: No rashes        LABS:    CBC:                        10.6   12.9  )-----------( 183      ( 2018 05:24 )             31.1     BMP:        134<L>  |  97  |  161  ----------------------------<  236<H>  5.1   |  21<L>  |  7.25<H>      136  |  97  |  156<H>  ----------------------------<  314<H>  5.3   |  23  |  6.91<H>      133<L>  |  95<L>  |  143<H>  ----------------------------<  292<H>  5.1   |  20<L>  |  6.91<H>      134<L>  |  96  |  137<H>  ----------------------------<  309<H>  5.4<H>   |  20<L>  |  6.63<H>      130<L>  |  94<L>  |  144<H>  ----------------------------<  351<H>  6.0<H>   |  20<L>  |  7.34<H>      143  |  113<H>  |  20<H>  ----------------------------<  191<H>  3.2<L>   |  20<L>  |  0.80      132<L>  |  95<L>  |  125<H>  ----------------------------<  342<H>  5.9<H>   |  21<L>  |  6.96<H>    Ca    6.5<LL>      2018 05:24  Ca    6.4<LL>      2018 22:25  Ca    6.8<L>      2018 05:57  Ca    6.6<L>      2018 21:59  Ca    6.4<LL>      2018 06:40  Ca    7.0<L>      2018 06:02  Ca    6.4<LL>      2018 14:40  Phos  10.2       Phos  9.2       Phos  SEE NOTE       Phos  1.8       Mg     2.9       Mg     2.7       Mg     1.9         TPro  6.0  /  Alb  1.6<L>  /  TBili  0.8  /  DBili  x   /  AST  33  /  ALT  36  /  AlkPhos  160<H>    TPro  5.7<L>  /  Alb  1.5<L>  /  TBili  0.9  /  DBili  0.5<H>  /  AST  50<H>  /  ALT  48  /  AlkPhos  207<H>    TPro  3.8<L>  /  Alb  1.6<L>  /  TBili  0.5  /  DBili  x   /  AST  29  /  ALT  22  /  AlkPhos  78        Blood Gas Profile - Arterial in AM (18 @ 03:16)    pH, Arterial: 7.23    pCO2, Arterial: 49 mmHg    pO2, Arterial: 52 mmHg    HCO3, Arterial: 20 mmol/L    Base Excess, Arterial: -7.0 mmol/L    Oxygen Saturation, Arterial: 79 %    FIO2, Arterial: 60.0    Blood Gas Comments Arterial: AC 22/450/60/5, vbg drawn by MD          RADIOLOGY & ADDITIONAL STUDIES:  < from: Xray Chest 1 View AP-PORTABLE IMMEDIATE (18 @ 04:03) >  EXAM:  XR CHEST PORTABLE IMMED 1V                            PROCEDURE DATE:  2018          INTERPRETATION:  AP chest on 2018 at 3:56 AM. Patient had   left jugular line placement and is respirator dependent.    Poor inspiration crowds the chest. The heart is magnified by technique.    Endotracheal tube, nasogastric tube, and right jugular line remain. There   is a new left jugular line with tip in the superior vena caval area now   evident.    There are diffuse advanced bilateral infiltrates showing increase from   .    IMPRESSION: Increasing diffuse infiltrates. Left jugular line inserted.      < end of copied text >

## 2018-01-30 NOTE — PROGRESS NOTE ADULT - PROBLEM SELECTOR PLAN 2
meets burlin's criteria of ARDS, acute onset <7 days, non cardiogenic, bilateral involvement, Fio2/PO2 <300  CXR shows B/l pachy infiltrates   patient on MV requiring Fio2 40%,   ECHO normal EF ,  HD today,    -on meropenem 500 mg Q12hrs ( renal adjusted)   - prednisone taper  - off sedation, patient successfully completed SAT. he was calm, and opened his eyes, will reassess tomorrow.   -Poor prognosis, DNR/DNI

## 2018-01-30 NOTE — PROCEDURE NOTE - NSPOSTCAREGUIDE_GEN_A_CORE
Care for catheter as per unit/ICU protocols
Verbal/written post procedure instructions were given to patient/caregiver/Instructed patient/caregiver to follow-up with primary care physician/Care for catheter as per unit/ICU protocols/Instructed patient/caregiver regarding signs and symptoms of infection/Keep the cast/splint/dressing clean and dry
Care for catheter as per unit/ICU protocols

## 2018-01-30 NOTE — PROGRESS NOTE ADULT - PROBLEM SELECTOR PLAN 4
patient had acute renal failure with metabolic acidosis 2/2 sepsis   received oral bicarb x 3  hemodialysis today, making urine.

## 2018-01-30 NOTE — PROGRESS NOTE ADULT - PROBLEM SELECTOR PLAN 7
continue with ASA, Plavix through NGT   patient has H/o of CAD, had elevated trop, dc heparin drip   - trop trended down ECHO EF 55 %with normal LV functions   - tropnin likely due to demand ischaemia 2/2 septic shock   - hold Lipitor as LFT's trending up   - HD stable, currently off the pressor support   - Elevated d-dimer with normal venous doppler with no RV strain on ECHO less likely PE,   - elevated d-dimer 2/2 renal failure and sepsis    - Dr Benson cardio eval noted   -patient is DNR/ and donot reintubate .  palliative care consult noted

## 2018-01-30 NOTE — PROCEDURE NOTE - NSPOSTPRCRAD_GEN_A_CORE
post-procedure radiography performed
central line located in the/post-procedure radiography performed/central line located in the superior vena cava/depth of insertion/no pneumothorax
sono confirm

## 2018-01-30 NOTE — PROCEDURE NOTE - NSINFORMCONSENT_GEN_A_CORE
Benefits, risks, and possible complications of procedure explained to patient/caregiver who verbalized understanding and gave verbal consent.
Benefits, risks, and possible complications of procedure explained to patient/caregiver who verbalized understanding and gave verbal consent.
This was an emergent procedure./unable to reach family
Benefits, risks, and possible complications of procedure explained to patient/caregiver who verbalized understanding and gave verbal consent./telephone consent son,pau
Benefits, risks, and possible complications of procedure explained to patient/caregiver who verbalized understanding and gave written consent.

## 2018-01-30 NOTE — PROGRESS NOTE ADULT - ASSESSMENT
72 male nursing home patient with CAD, CKD, DM, HTN, OA, BPH, anxiety, presented with influenza, acute respiratory failure, septic shock.  May have superimposed pneumonia as well      1. Problem: ARDS 2/2 PNA and sepsis                 Problem/Plan- 4 Thrombocytopenia   Likely due to sepsis,  - normal fibrogen assay and rt count       Problem/Plan - 5  ARF     - Monitor Renal functions, avoid nephrotoxic drugs   - new Shiley was placed  - HD today  .        Problem/Plan - 7    Problem Hyperkalemia:  Due to renal failure   c/w HD             Problem/Plan - 10  ·  Problem: Need for prophylactic measure.  Plan: Improve score 3 on heparin  drip and Protonix for GI prophylaxis

## 2018-01-30 NOTE — PROCEDURE NOTE - NSPROCDETAILS_GEN_ALL_CORE
guidewire recovered/lumen(s) aspirated and flushed/sterile dressing applied
positive blood return obtained via catheter/sutured in place/hemostasis with direct pressure, dressing applied/location identified, draped/prepped, sterile technique used, needle inserted/introduced/connected to a pressurized flush line
lumen(s) aspirated and flushed/guidewire recovered/sterile dressing applied/sterile technique, catheter placed
guidewire recovered/lumen(s) aspirated and flushed/sterile dressing applied/ultrasound guidance/sterile technique, catheter placed
sterile dressing applied/sterile technique, catheter placed/ultrasound guidance/guidewire recovered

## 2018-01-30 NOTE — PROGRESS NOTE ADULT - PROBLEM SELECTOR PLAN 3
could be due to flu/PNA   off droplet isolation, Influenza B positive   completed tamiflu ,  - Procalcitonin 80.4  -urine and blood cultures are negative, urine legionella negative   - continue with Mechanical ventilation PEEP 5  - CT physiotherapy  - ID Dr Wilkinson following

## 2018-01-30 NOTE — CHART NOTE - NSCHARTNOTEFT_GEN_A_CORE
patient had one episode of bloody bowel movement at 11 pm. blood was bright red, patient is HD stable, on Physical exam abdomen is soft, non distended, diminished  bowel sounds.    Plan  follow up CBC, BMP. PT/INR type and cross match   will hold aspirin, plavix and heparin   transfuse IF HB < 7.0   f/u CT abd in am patient had one episode of bloody bowel movement at 11 pm. blood was bright red, patient is HD stable, on Physical exam abdomen is soft, non distended, diminished  bowel sounds.    Plan  follow up CBC, BMP. PT/INR type and cross match   will hold aspirin, plavix and heparin   start on Protonix drip   transfuse IF HB < 7.0   GI consult in am   f/u CT abd in am patient had one episode of bloody bowel movement at 11 pm. blood was bright red, patient is HD stable, on Physical exam abdomen is soft, non distended, diminished  bowel sounds.    Plan  follow up CBC, BMP. PT/INR type and cross match   will hold aspirin, plavix and heparin   start on Protonix drip   transfuse IF HB < 7.0   make NPO  GI consult in am   IF bleeding continues will send for a CTA to assess for the site of the bleed

## 2018-01-30 NOTE — PROGRESS NOTE ADULT - ATTENDING COMMENTS
Patient seen and examined, agree with above assessment and plan as transcribed above.    - Cont supportive care per MICU  - Palliative f/u    Jake Benson MD, FACC  [facility], [facility]  [address] [place] [address]  PHONE:  [phone]  BEEPER : [phone] Patient seen and examined, agree with above assessment and plan as transcribed above.    - Cont supportive care per ANA MARIA Benson MD, FACC  [facility], [facility]  [address] [place] [address]  PHONE:  [phone]  BEEPER : [phone]

## 2018-01-30 NOTE — PROGRESS NOTE ADULT - ATTENDING COMMENTS
72 male nursing home patient with CAD, CKD, DM, HTN, OA, BPH, anxiety, presented with influenza, acute respiratory failure, septic shock, pneumonia. Now with NING requiring dialysis, resolving ARDS.    Overall poor prognosis, family aware.  Plan:  - abx per ID, completed tamiflu  - HD per nephrology - new HD cath placed 1/28  - SBT after HD  - DNR  Total cc time 35 min.

## 2018-01-31 NOTE — PROGRESS NOTE ADULT - PROBLEM SELECTOR PLAN 2
meets burlin's criteria of ARDS, acute onset <7 days, non cardiogenic, bilateral involvement, Fio2/PO2 <300  CXR shows B/l pachy infiltrates   patient on MV requiring Fio2 40%,   ECHO normal EF ,  HD today,    -on meropenem 500 mg Q12hrs ( renal adjusted)   - prednisone taper  - off sedation, patient successfully completed SAT. he was calm, and opened his eyes, will reassess tomorrow.   -Poor prognosis, DNR/DNI -no fevers in 24 hours, BP stable 120/56. HR in 80s  -RIJ removed, new LIJ placed.1/30   -repeat blood cultures, sputum cx -ve  -meropenem day 9

## 2018-01-31 NOTE — PROGRESS NOTE ADULT - PROBLEM SELECTOR PLAN 7
continue with ASA, Plavix through NGT   patient has H/o of CAD, had elevated trop, dc heparin drip   - trop trended down ECHO EF 55 %with normal LV functions   - tropnin likely due to demand ischaemia 2/2 septic shock   - hold Lipitor as LFT's trending up   - HD stable, currently off the pressor support   - Elevated d-dimer with normal venous doppler with no RV strain on ECHO less likely PE,   - elevated d-dimer 2/2 renal failure and sepsis    - Dr Benson cardio eval noted   -patient is DNR/ and donot reintubate .  palliative care consult noted resumed metoprolol

## 2018-01-31 NOTE — PROGRESS NOTE ADULT - PROBLEM SELECTOR PLAN 6
resumed metoprolol Diet controlled   HbA1c. 6.0  lantus 20 units at bed time  will hold morning lantus added 20 as patient is npo for possible lower GI bleed.    - continue with sliding scale Q 6 hrs

## 2018-01-31 NOTE — PROGRESS NOTE ADULT - PROBLEM SELECTOR PLAN 4
patient had acute renal failure with metabolic acidosis 2/2 sepsis   received oral bicarb x 3  hemodialysis today, making urine. could be due to flu/PNA   off droplet isolation, Influenza B positive   completed tamiflu ,  - Procalcitonin 80.4  -urine and blood cultures are negative, urine legionella negative   - continue with Mechanical ventilation PEEP 5  - CT physiotherapy  - ID Dr Wilkinson following

## 2018-01-31 NOTE — PROGRESS NOTE ADULT - PROBLEM SELECTOR PLAN 5
Diet controlled   HbA1c. 6.0  lantus 16 units at bed time   - continue with sliding scale Q 6 hrs patient had acute renal failure with metabolic acidosis 2/2 sepsis   hemodialysis tomorrow, making urine.  will give trial of lasix 100 mg IV.

## 2018-01-31 NOTE — PROGRESS NOTE ADULT - ASSESSMENT
1. ESRD sec to ATN: pts u/o is better but serum cr still high  - pt will be dialysed tomorrow  - hold nephrotoxic medications, adjust meds as per egfr  and avoid contrast studies/phosphate  enema, etc  -renal hd tube feeding     2. Hyperkalemia : now improved  -keep <5 and >4  -low K diet   -f/u k level q 12hrs      2. Metabolic and resp.acidosis   -multifactorial , now improving   -monitor CO 2 daily   -keep PH >7.3       3. Hyperphosphatemia   -worsening  -add Amphogel plus Renagel as d/w resident  -keep Phos>3, <5     4.Hyponatremia: now improved, secondary to ning  -avoid iv fluids  -f/u Na level daily  -continue  fluid restriction to 1 liter per day    5. Fluid overload/Edema  sec to ? CHF/NING , now mildly better  -UF as tolerated in hd  -avoid iv fluid and add iv Lasix prn    6.Hypocalcemia;improving (corrected ca )  -suggest to keep corrected ca >8.5 and <10  -f/u ca level daily

## 2018-01-31 NOTE — PROGRESS NOTE ADULT - ASSESSMENT
s/p sepsis/ septic shock  s/p Influenza B infection  fevers - ? left groin shiley infection or other source  pneumonia       plan - completed tamiflu   cont meropenem to 500mgs iv q12hrs  give vancomycin 1 gm iv x 1 today  pt is DNI now     time spent - 30 minutes

## 2018-01-31 NOTE — PROGRESS NOTE ADULT - PROBLEM SELECTOR PLAN 8
-held ASA, Plavix due to GI bleed  -Patient finished heparin drip, troponin were elevated due to stress. ECHO EF 55 %with normal LV functions   - tropnin likely due to demand ischaemia 2/2 septic shock   - held lipitor due to worsening LFTs  - HD stable, currently off the pressor support   - Elevated d-dimer with normal venous doppler with no RV strain on ECHO less likely PE,   - elevated d-dimer 2/2 renal failure and sepsis    - Dr Benson cardio eval noted   -patient is DNR/ and donot reintubate .  palliative care consult noted

## 2018-01-31 NOTE — PROGRESS NOTE ADULT - SUBJECTIVE AND OBJECTIVE BOX
Patient extubated to face mask oxygen    acetaminophen    Suspension 650 milliGRAM(s) Oral every 6 hours PRN  ALBUTerol    90 MICROgram(s) HFA Inhaler 1 Puff(s) Inhalation every 6 hours  aluminum hydroxide Suspension 30 milliLiter(s) Oral every 6 hours  chlorhexidine 4% Liquid 1 Application(s) Topical daily  finasteride 5 milliGRAM(s) Oral daily  insulin glargine Injectable (LANTUS) 20 Unit(s) SubCutaneous at bedtime  insulin glargine Injectable (LANTUS) 20 Unit(s) SubCutaneous every morning  insulin lispro (HumaLOG) corrective regimen sliding scale   SubCutaneous every 6 hours  meropenem IVPB 500 milliGRAM(s) IV Intermittent every 12 hours  metoprolol     tartrate 12.5 milliGRAM(s) Oral two times a day  pantoprazole  Injectable 40 milliGRAM(s) IV Push every 12 hours  predniSONE   Tablet 20 milliGRAM(s) Oral daily  sevelamer carbonate Powder 1600 milliGRAM(s) Oral three times a day with meals  sodium bicarbonate 650 milliGRAM(s) Oral three times a day  tamsulosin 0.4 milliGRAM(s) Oral at bedtime                            9.3    18.6  )-----------( 214      ( 31 Jan 2018 12:37 )             27.3       Hemoglobin: 9.3 g/dL (01-31 @ 12:37)  Hemoglobin: 9.0 g/dL (01-31 @ 06:12)  Hemoglobin: 10.1 g/dL (01-30 @ 23:09)  Hemoglobin: 10.6 g/dL (01-30 @ 05:24)  Hemoglobin: 11.5 g/dL (01-29 @ 05:57)      01-31    137  |  99  |  175<H>  ----------------------------<  304<H>  4.1   |  20<L>  |  6.87<H>    Ca    6.2<LL>      31 Jan 2018 06:12  Phos  11.1     01-31  Mg     3.0     01-31    TPro  5.9<L>  /  Alb  1.5<L>  /  TBili  0.7  /  DBili  x   /  AST  29  /  ALT  36  /  AlkPhos  145<H>  01-30    Creatinine Trend: 6.87<--, 6.75<--, 7.25<--, 6.91<--, 6.91<--, 6.63<--    COAGS: PT/INR - ( 30 Jan 2018 23:09 )   PT: 11.9 sec;   INR: 1.09 ratio                   T(C): 36.1 (01-31-18 @ 15:15), Max: 38.8 (01-30-18 @ 19:00)  HR: 81 (01-31-18 @ 17:00) (78 - 103)  BP: 138/63 (01-31-18 @ 16:00) (121/52 - 168/67)  RR: 20 (01-31-18 @ 17:00) (15 - 27)  SpO2: 98% (01-31-18 @ 17:00) (96% - 100%)  Wt(kg): --    I&O's Summary    30 Jan 2018 07:01  -  31 Jan 2018 07:00  --------------------------------------------------------  IN: 672.7 mL / OUT: 495 mL / NET: 177.7 mL    31 Jan 2018 07:01  -  31 Jan 2018 18:16  --------------------------------------------------------  IN: 30 mL / OUT: 470 mL / NET: -440 mL          Heart: normal S1, S2, RRR, no m/r/g  Lungs: cta b/l  Abd: soft nT, nD  Ext: no edema      TELEMETRY:  SR       ASSESSMENT/PLAN: 	72y Male [facility] h/o CKD, Anxiety disorder, CAD, BPH, Carpel tunnel syndrome, compulsive disorder, DM, OA, HTN, IBS sent for dyspnea for last 3 days found with (+) flu, PNA likely septic shock normal LV function NING now on HD    - Cont broad spectrum abx and supportive care per MICU  - close respiratory status monitoring    Jake Benson MD, FACC  [facility], [facility]  [address] [place] [address]  PHONE:  [phone]  BEEPER : [phone]

## 2018-01-31 NOTE — PROGRESS NOTE ADULT - SUBJECTIVE AND OBJECTIVE BOX
INTERVAL HPI/OVERNIGHT EVENTS: *** Patient had 3 episodes of bright red blood per rectum over night.     PRESSORS: [ ] YES [ ] NO  WHICH:    ANTIBIOTICS:                  DATE STARTED:  ANTIBIOTICS:                  DATE STARTED:  ANTIBIOTICS:                  DATE STARTED:    Antimicrobial:  meropenem IVPB 500 milliGRAM(s) IV Intermittent every 12 hours    Cardiovascular:  metoprolol     tartrate 12.5 milliGRAM(s) Oral two times a day  norepinephrine Infusion 0.5 MICROgram(s)/kG/Min IV Continuous <Continuous>  phenylephrine    Infusion 0.5 MICROgram(s)/kG/Min IV Continuous <Continuous>  tamsulosin 0.4 milliGRAM(s) Oral at bedtime    Pulmonary:  ALBUTerol    90 MICROgram(s) HFA Inhaler 1 Puff(s) Inhalation every 6 hours    Hematalogic:    Other:  acetaminophen    Suspension 650 milliGRAM(s) Oral every 6 hours PRN  chlorhexidine 4% Liquid 1 Application(s) Topical daily  fentaNYL   Infusion 0.5 MICROgram(s)/kG/Hr IV Continuous <Continuous>  finasteride 5 milliGRAM(s) Oral daily  insulin glargine Injectable (LANTUS) 16 Unit(s) SubCutaneous at bedtime  insulin lispro (HumaLOG) corrective regimen sliding scale   SubCutaneous every 6 hours  lactulose Syrup 30 Gram(s) Oral daily  pantoprazole Infusion 8 mG/Hr IV Continuous <Continuous>  predniSONE   Tablet 30 milliGRAM(s) Oral daily  sevelamer carbonate Powder 1600 milliGRAM(s) Oral three times a day with meals  sodium bicarbonate 650 milliGRAM(s) Oral three times a day    acetaminophen    Suspension 650 milliGRAM(s) Oral every 6 hours PRN  ALBUTerol    90 MICROgram(s) HFA Inhaler 1 Puff(s) Inhalation every 6 hours  chlorhexidine 4% Liquid 1 Application(s) Topical daily  fentaNYL   Infusion 0.5 MICROgram(s)/kG/Hr IV Continuous <Continuous>  finasteride 5 milliGRAM(s) Oral daily  insulin glargine Injectable (LANTUS) 16 Unit(s) SubCutaneous at bedtime  insulin lispro (HumaLOG) corrective regimen sliding scale   SubCutaneous every 6 hours  lactulose Syrup 30 Gram(s) Oral daily  meropenem IVPB 500 milliGRAM(s) IV Intermittent every 12 hours  metoprolol     tartrate 12.5 milliGRAM(s) Oral two times a day  norepinephrine Infusion 0.5 MICROgram(s)/kG/Min IV Continuous <Continuous>  pantoprazole Infusion 8 mG/Hr IV Continuous <Continuous>  phenylephrine    Infusion 0.5 MICROgram(s)/kG/Min IV Continuous <Continuous>  predniSONE   Tablet 30 milliGRAM(s) Oral daily  sevelamer carbonate Powder 1600 milliGRAM(s) Oral three times a day with meals  sodium bicarbonate 650 milliGRAM(s) Oral three times a day  tamsulosin 0.4 milliGRAM(s) Oral at bedtime    Drug Dosing Weight  Height (cm): 185.42 (2018 10:22)  Weight (kg): 96 (2018 07:30)  BMI (kg/m2): 27.9 (2018 07:30)  BSA (m2): 2.2 (2018 07:30)    CENTRAL LINE: [ ] YES [ ] NO  LOCATION:   DATE INSERTED:  REMOVE: [ ] YES [ ] NO  EXPLAIN:    BRUNSON: [ ] YES [ ] NO    DATE INSERTED:  REMOVE:  [ ] YES [ ] NO  EXPLAIN:    A-LINE:  [ ] YES [ ] NO  LOCATION:   DATE INSERTED:  REMOVE:  [ ] YES [ ] NO  EXPLAIN:    PMH -reviewed admission note, no change since admission  Heart faliure: acute [ ] chronic [ ] acute or chronic [ ] diastolic [ ] systolic [ ] combied systolic and diastolic[ ]  NING: ATN[ ] renal medullary necrosis [ ] CKD I [ ]CKDII [ ]CKD III [ ]CKD IV [ ]CKD V [ ]Other pathological lesions [ ]  Abdominal Nutrition Status: malnutrition [ ] cachexia [ ] morbid obesity/BMI=40 [ ] Supplement ordered [___________]     ICU Vital Signs Last 24 Hrs  T(C): 36.7 (2018 05:25), Max: 38.8 (2018 19:00)  T(F): 98.1 (2018 05:25), Max: 101.9 (2018 19:00)  HR: 86 (2018 07:30) (80 - 103)  BP: 140/61 (2018 07:00) (121/53 - 168/67)  BP(mean): 79 (2018 07:00) (64 - 91)  ABP: 139/58 (2018 07:30) (105/52 - 207/84)  ABP(mean): 79 (2018 07:30) (66 - 114)  RR: 24 (2018 07:30) (19 - 27)  SpO2: 99% (2018 07:30) (96% - 100%)      ABG - ( 2018 04:43 )  pH: 7.39  /  pCO2: 32    /  pO2: 102   / HCO3: 19    / Base Excess: -4.6  /  SaO2: 97                     @ 07:01  -   @ 07:00  --------------------------------------------------------  IN: 662.7 mL / OUT: 495 mL / NET: 167.7 mL        Mode: AC/ CMV (Assist Control/ Continuous Mandatory Ventilation)  RR (machine): 24  TV (machine): 450  FiO2: 40  PEEP: 5  ITime: 1  MAP: 13  PIP: 26      PHYSICAL EXAM:    GENERAL: [ ]NAD, [ ]well-groomed, [ ]well-developed  HEAD:  [ ]Atraumatic, [ ]Normocephalic  EYES: [ ]EOMI, [ ]PERRLA, [ ]conjunctiva and sclera clear  ENMT: [ ]No tonsillar erythema, exudates, or enlargement; [ ]Moist mucous membranes, [ ]Good dentition, [ ]No lesions  NECK: [ ]Supple, normal appearance, [ ]No JVD; [ ]Normal thyroid; [ ]Trachea midline  NERVOUS SYSTEM:  [ ]Alert & Oriented X3, [ ]Good concentration; [ ]Motor Strength 5/5 B/L upper and lower extremities; [ ]DTRs 2+ intact and symmetric  CHEST/LUNG: [ ]No chest deformity; [ ]Normal percussion bilaterally; [ ]No rales, rhonchi, wheezing   HEART: [ ]Regular rate and rhythm; [ ]No murmurs, rubs, or gallops  ABDOMEN: [ ]Soft, Nontender, Nondistended; [ ]Bowel sounds present  EXTREMITIES:  [ ]2+ Peripheral Pulses, [ ]No clubbing, cyanosis, or edema  LYMPH: [ ]No lymphadenopathy noted  SKIN: [ ]No rashes or lesions; [ ]Good capillary refill      LABS:  CBC Full  -  ( 2018 06:12 )  WBC Count : 15.4 K/uL  Hemoglobin : 9.0 g/dL  Hematocrit : 27.6 %  Platelet Count - Automated : 186 K/uL  Mean Cell Volume : 101.3 fl  Mean Cell Hemoglobin : 33.2 pg  Mean Cell Hemoglobin Concentration : 32.7 gm/dL  Auto Neutrophil # : x  Auto Lymphocyte # : x  Auto Monocyte # : x  Auto Eosinophil # : x  Auto Basophil # : x  Auto Neutrophil % : x  Auto Lymphocyte % : x  Auto Monocyte % : x  Auto Eosinophil % : x  Auto Basophil % : x        137  |  99  |  x   ----------------------------<  304<H>  4.1   |  20<L>  |  6.87<H>    Ca    6.2<LL>      2018 06:12  Phos  10.2       Mg     3.0         TPro  5.9<L>  /  Alb  1.5<L>  /  TBili  0.7  /  DBili  x   /  AST  29  /  ALT  36  /  AlkPhos  145<H>      PT/INR - ( 2018 23:09 )   PT: 11.9 sec;   INR: 1.09 ratio           Urinalysis Basic - ( 2018 05:49 )    Color: Yellow / Appearance: Clear / S.020 / pH: x  Gluc: x / Ketone: Negative  / Bili: Negative / Urobili: 1   Blood: x / Protein: 30 mg/dL / Nitrite: Negative   Leuk Esterase: Trace / RBC: 10-25 /HPF / WBC 6-10 /HPF   Sq Epi: x / Non Sq Epi: Few /HPF / Bacteria: Moderate /HPF      Culture Results:   Normal Respiratory Tonie present ( @ 13:45)  Culture Results:   No growth to date. ( @ 11:43)  Culture Results:   No growth to date. ( @ 11:43)      RADIOLOGY & ADDITIONAL STUDIES REVIEWED:  ***    [ ]GOALS OF CARE DISCUSSION WITH PATIENT/FAMILY/PROXY:    CRITICAL CARE TIME SPENT: 35 minutes INTERVAL HPI/OVERNIGHT EVENTS: *** Patient had 3 episodes of bright red blood per rectum over night.     PRESSORS: [ ] YES [x ] NO  WHICH:    ANTIBIOTICS:         meropenem (day 9)         DATE STARTED:  ANTIBIOTICS:                  DATE STARTED:  ANTIBIOTICS:                  DATE STARTED:    Antimicrobial:  meropenem IVPB 500 milliGRAM(s) IV Intermittent every 12 hours    Cardiovascular:  metoprolol     tartrate 12.5 milliGRAM(s) Oral two times a day  norepinephrine Infusion 0.5 MICROgram(s)/kG/Min IV Continuous <Continuous>  phenylephrine    Infusion 0.5 MICROgram(s)/kG/Min IV Continuous <Continuous>  tamsulosin 0.4 milliGRAM(s) Oral at bedtime    Pulmonary:  ALBUTerol    90 MICROgram(s) HFA Inhaler 1 Puff(s) Inhalation every 6 hours    Hematalogic:    Other:  acetaminophen    Suspension 650 milliGRAM(s) Oral every 6 hours PRN  chlorhexidine 4% Liquid 1 Application(s) Topical daily  fentaNYL   Infusion 0.5 MICROgram(s)/kG/Hr IV Continuous <Continuous>  finasteride 5 milliGRAM(s) Oral daily  insulin glargine Injectable (LANTUS) 16 Unit(s) SubCutaneous at bedtime  insulin lispro (HumaLOG) corrective regimen sliding scale   SubCutaneous every 6 hours  lactulose Syrup 30 Gram(s) Oral daily  pantoprazole Infusion 8 mG/Hr IV Continuous <Continuous>  predniSONE   Tablet 30 milliGRAM(s) Oral daily  sevelamer carbonate Powder 1600 milliGRAM(s) Oral three times a day with meals  sodium bicarbonate 650 milliGRAM(s) Oral three times a day    acetaminophen    Suspension 650 milliGRAM(s) Oral every 6 hours PRN  ALBUTerol    90 MICROgram(s) HFA Inhaler 1 Puff(s) Inhalation every 6 hours  chlorhexidine 4% Liquid 1 Application(s) Topical daily  fentaNYL   Infusion 0.5 MICROgram(s)/kG/Hr IV Continuous <Continuous>  finasteride 5 milliGRAM(s) Oral daily  insulin glargine Injectable (LANTUS) 16 Unit(s) SubCutaneous at bedtime  insulin lispro (HumaLOG) corrective regimen sliding scale   SubCutaneous every 6 hours  lactulose Syrup 30 Gram(s) Oral daily  meropenem IVPB 500 milliGRAM(s) IV Intermittent every 12 hours  metoprolol     tartrate 12.5 milliGRAM(s) Oral two times a day  norepinephrine Infusion 0.5 MICROgram(s)/kG/Min IV Continuous <Continuous>  pantoprazole Infusion 8 mG/Hr IV Continuous <Continuous>  phenylephrine    Infusion 0.5 MICROgram(s)/kG/Min IV Continuous <Continuous>  predniSONE   Tablet 30 milliGRAM(s) Oral daily  sevelamer carbonate Powder 1600 milliGRAM(s) Oral three times a day with meals  sodium bicarbonate 650 milliGRAM(s) Oral three times a day  tamsulosin 0.4 milliGRAM(s) Oral at bedtime    Drug Dosing Weight  Height (cm): 185.42 (2018 10:22)  Weight (kg): 96 (2018 07:30)  BMI (kg/m2): 27.9 (2018 07:30)  BSA (m2): 2.2 (2018 07:30)    CENTRAL LINE: [x ] YES [ ] NO  LOCATION:[facility]    DATE INSERTED:   REMOVE: [ ] YES [ ] NO  EXPLAIN:    BRUNSON: [x ] YES [ ] NO    DATE INSERTED:  REMOVE:  [ ] YES [ ] NO  EXPLAIN:    A-LINE:  [x ] YES [ ] NO  LOCATION:   DATE INSERTED:  REMOVE:  [ ] YES [ ] NO  EXPLAIN:    PMH -reviewed admission note, no change since admission  Heart faliure: acute [ ] chronic [ ] acute or chronic [ ] diastolic [ ] systolic [ ] combied systolic and diastolic[ ]  NING: ATN[ ] renal medullary necrosis [ ] CKD I [ ]CKDII [ ]CKD III [ ]CKD IV [ ]CKD V [x ]Other pathological lesions [ ]  Abdominal Nutrition Status: malnutrition [ ] cachexia [x ] morbid obesity/BMI=40 [ ] Supplement ordered [___________]     ICU Vital Signs Last 24 Hrs  T(C): 36.7 (2018 05:25), Max: 38.8 (2018 19:00)  T(F): 98.1 (2018 05:25), Max: 101.9 (2018 19:00)  HR: 86 (2018 07:30) (80 - 103)  BP: 140/61 (2018 07:00) (121/53 - 168/67)  BP(mean): 79 (2018 07:00) (64 - 91)  ABP: 139/58 (2018 07:30) (105/52 - 207/84)  ABP(mean): 79 (2018 07:30) (66 - 114)  RR: 24 (2018 07:30) (19 - 27)  SpO2: 99% (2018 07:30) (96% - 100%)      ABG - ( 2018 04:43 )  pH: 7.39  /  pCO2: 32    /  pO2: 102   / HCO3: 19    / Base Excess: -4.6  /  SaO2: 97                     @ 07:01  -   @ 07:00  --------------------------------------------------------  IN: 662.7 mL / OUT: 495 mL / NET: 167.7 mL        Mode: AC/ CMV (Assist Control/ Continuous Mandatory Ventilation)  RR (machine): 24  TV (machine): 450  FiO2: 40  PEEP: 5  ITime: 1  MAP: 13  PIP: 26      PHYSICAL EXAM:    GENERAL: [x ]NAD, [ ]well-groomed, [ ]well-developed  HEAD:  [ ]Atraumatic, [ ]Normocephalic  EYES: [x ]EOMI, [ ]PERRLA, [ ]conjunctiva and sclera clear  ENMT: [ ]No tonsillar erythema, exudates, or enlargement; [x ]Moist mucous membranes, [ ]Good dentition, [ ]No lesions  NECK: [x ]Supple, normal appearance, [ ]No JVD; [ ]Normal thyroid; [ ]Trachea midline  NERVOUS SYSTEM:  spontaneous opening of eyes on stimulus ,CHEST/LUNG: [ ]No chest deformity; [ ]Normal percussion bilaterally; [ ]No rales, rhonchi, wheezing   HEART: [x ]Regular rate and rhythm; + murmur  ABDOMEN: [x ]Soft, Nontender, Nondistended; [x ]Bowel sounds present  EXTREMITIES:  [x ]2+ Peripheral Pulses, [ ]No clubbing, cyanosis, or edema  LYMPH: [ x]No lymphadenopathy noted  SKIN: [x ]No rashes or lesions; [ ]Good capillary refill      LABS:  CBC Full  -  ( 2018 06:12 )  WBC Count : 15.4 K/uL  Hemoglobin : 9.0 g/dL  Hematocrit : 27.6 %  Platelet Count - Automated : 186 K/uL  Mean Cell Volume : 101.3 fl  Mean Cell Hemoglobin : 33.2 pg  Mean Cell Hemoglobin Concentration : 32.7 gm/dL  Auto Neutrophil # : x  Auto Lymphocyte # : x  Auto Monocyte # : x  Auto Eosinophil # : x  Auto Basophil # : x  Auto Neutrophil % : x  Auto Lymphocyte % : x  Auto Monocyte % : x  Auto Eosinophil % : x  Auto Basophil % : x        137  |  99  |  x   ----------------------------<  304<H>  4.1   |  20<L>  |  6.87<H>    Ca    6.2<LL>      2018 06:12  Phos  10.2       Mg     3.0         TPro  5.9<L>  /  Alb  1.5<L>  /  TBili  0.7  /  DBili  x   /  AST  29  /  ALT  36  /  AlkPhos  145<H>      PT/INR - ( 2018 23:09 )   PT: 11.9 sec;   INR: 1.09 ratio           Urinalysis Basic - ( 2018 05:49 )    Color: Yellow / Appearance: Clear / S.020 / pH: x  Gluc: x / Ketone: Negative  / Bili: Negative / Urobili: 1   Blood: x / Protein: 30 mg/dL / Nitrite: Negative   Leuk Esterase: Trace / RBC: 10-25 /HPF / WBC 6-10 /HPF   Sq Epi: x / Non Sq Epi: Few /HPF / Bacteria: Moderate /HPF      Culture Results:   Normal Respiratory Tonie present ( @ 13:45)  Culture Results:   No growth to date. ( @ 11:43)  Culture Results:   No growth to date. ( @ 11:43)      RADIOLOGY & ADDITIONAL STUDIES REVIEWED:  ***    [x ]GOALS OF CARE DISCUSSION WITH PATIENT/FAMILY/PROXY: DNR/DNI    CRITICAL CARE TIME SPENT: 35 minutes

## 2018-01-31 NOTE — PROGRESS NOTE ADULT - PROBLEM SELECTOR PLAN 3
could be due to flu/PNA   off droplet isolation, Influenza B positive   completed tamiflu ,  - Procalcitonin 80.4  -urine and blood cultures are negative, urine legionella negative   - continue with Mechanical ventilation PEEP 5  - CT physiotherapy  - ID Dr Wilkinson following meets burlin's criteria of ARDS, acute onset <7 days, non cardiogenic, bilateral involvement, Fio2/PO2 245,   CXR shows B/l pachy infiltrates   patient on MV requiring Fio2 40%,   ECHO normal EF ,  HD tomorrow,   -on meropenem 500 mg Q12hrs ( renal adjusted)   - prednisone taper  - Extubated today. 1/31/18  -Poor prognosis, DNR/DNI

## 2018-01-31 NOTE — PROGRESS NOTE ADULT - PROBLEM SELECTOR PLAN 1
-Patient spiked fever 100.8, BP stable 130/55. HR in 90s  -RIJ removed, new LIJ placed.1/30   -repeat blood cultures testing,  -continue with antibiotics, -Patient had 3 episodes of BRBPR.   -on IV protonix BID, held protonix drip  -NPO  -H/H 9.0 will continue to monitor.

## 2018-01-31 NOTE — PROGRESS NOTE ADULT - ATTENDING COMMENTS
72 male nursing home patient with CAD, CKD, DM, HTN, OA, BPH, anxiety, presented with influenza, acute respiratory failure, septic shock, pneumonia. Now with NING requiring dialysis, resolving ARDS.    Doing well on breathing trial but mental status and renal function are still poor.   Plan:  - abx per ID, completed tamiflu  - HD per nephrology - new HD cath placed 1/28  - trial of lasix  - DNR/ Do not re-intubate per son  son Gene updated, will extubate today if patient does poorly then will transition to comfort measures only  Total cc time 35 min.

## 2018-01-31 NOTE — PROGRESS NOTE ADULT - SUBJECTIVE AND OBJECTIVE BOX
72y Male    Meds:  meropenem IVPB 500 milliGRAM(s) IV Intermittent every 12 hours    Allergies    doxycycline (Unknown)  penicillin (Unknown)  tetracycline (Unknown)  Valtrex (Unknown)    Intolerances        VITALS:  Vital Signs Last 24 Hrs  T(C): 36.1 (31 Jan 2018 15:15), Max: 38.8 (30 Jan 2018 20:00)  T(F): 97 (31 Jan 2018 15:15), Max: 101.9 (30 Jan 2018 20:00)  HR: 82 (31 Jan 2018 18:00) (78 - 100)  BP: 146/61 (31 Jan 2018 18:00) (121/52 - 168/67)  BP(mean): 80 (31 Jan 2018 18:00) (59 - 91)  RR: 19 (31 Jan 2018 18:00) (15 - 27)  SpO2: 100% (31 Jan 2018 18:00) (96% - 100%)    LABS/DIAGNOSTIC TESTS:                          9.3    18.6  )-----------( 214      ( 31 Jan 2018 12:37 )             27.3     Lactate, Blood: 0.8 mmol/L (01-31 @ 06:12)      01-31    137  |  99  |  175<H>  ----------------------------<  304<H>  4.1   |  20<L>  |  6.87<H>    Ca    6.2<LL>      31 Jan 2018 06:12  Phos  11.1     01-31  Mg     3.0     01-31    TPro  5.9<L>  /  Alb  1.5<L>  /  TBili  0.7  /  DBili  x   /  AST  29  /  ALT  36  /  AlkPhos  145<H>  01-30      LIVER FUNCTIONS - ( 30 Jan 2018 23:09 )  Alb: 1.5 g/dL / Pro: 5.9 g/dL / ALK PHOS: 145 U/L / ALT: 36 U/L DA / AST: 29 U/L / GGT: x             CULTURES: .Sputum Sputum trap  01-29 @ 13:45   Normal Respiratory Tonie present  --    Few polymorphonuclear leukocytes  Rare Squamous epithelial cells per low power field  Numerous Yeast per oil power field      .Blood Blood-Peripheral  01-29 @ 11:43   No growth to date.  --  --      .Sputum Sputumtrap rec'd  01-22 @ 10:48   Few Streptococcus pyogenes (Group A)  Normal Respiratory Tonie present  --    Numerous polymorphonuclear leukocytes  Rare Squamous epithelial cells per low power field  Numerous Gram Positive Cocci in Pairs and Chains per oil power field      .Urine Clean Catch (Midstream)  01-20 @ 22:48   No growth  --  --      .Blood Blood-Peripheral  01-20 @ 17:03   No growth at 5 days.  --  --            RADIOLOGY:      ROS:  [  ] UNABLE TO ELICIT 72y Male who is s/p extubation , he remains in the ICU, he remains unresponsive at this time but was apparently opening his eyes to tactile stimuli earlier today, he was having fevers as high as 101.9 yesterday despite being on meropenem. He has been dilyzed almost daily due to his electrolytes not correcting but not today. He is coughing in from of me. He wbc count has been increasing daily also.    Meds:  meropenem IVPB 500 milliGRAM(s) IV Intermittent every 12 hours    Allergies    doxycycline (Unknown)  penicillin (Unknown)  tetracycline (Unknown)  Valtrex (Unknown)    Intolerances        VITALS:  Vital Signs Last 24 Hrs  T(C): 36.1 (31 Jan 2018 15:15), Max: 38.8 (30 Jan 2018 20:00)  T(F): 97 (31 Jan 2018 15:15), Max: 101.9 (30 Jan 2018 20:00)  HR: 82 (31 Jan 2018 18:00) (78 - 100)  BP: 146/61 (31 Jan 2018 18:00) (121/52 - 168/67)  BP(mean): 80 (31 Jan 2018 18:00) (59 - 91)  RR: 19 (31 Jan 2018 18:00) (15 - 27)  SpO2: 100% (31 Jan 2018 18:00) (96% - 100%)    LABS/DIAGNOSTIC TESTS:                          9.3    18.6  )-----------( 214      ( 31 Jan 2018 12:37 )             27.3     Lactate, Blood: 0.8 mmol/L (01-31 @ 06:12)      01-31    137  |  99  |  175<H>  ----------------------------<  304<H>  4.1   |  20<L>  |  6.87<H>    Ca    6.2<LL>      31 Jan 2018 06:12  Phos  11.1     01-31  Mg     3.0     01-31    TPro  5.9<L>  /  Alb  1.5<L>  /  TBili  0.7  /  DBili  x   /  AST  29  /  ALT  36  /  AlkPhos  145<H>  01-30      LIVER FUNCTIONS - ( 30 Jan 2018 23:09 )  Alb: 1.5 g/dL / Pro: 5.9 g/dL / ALK PHOS: 145 U/L / ALT: 36 U/L DA / AST: 29 U/L / GGT: x             CULTURES: .Sputum Sputum trap  01-29 @ 13:45   Normal Respiratory Tonie present  --    Few polymorphonuclear leukocytes  Rare Squamous epithelial cells per low power field  Numerous Yeast per oil power field      .Blood Blood-Peripheral  01-29 @ 11:43   No growth to date.  --  --      .Sputum Sputumtrap rec'd  01-22 @ 10:48   Few Streptococcus pyogenes (Group A)  Normal Respiratory Tonie present  --    Numerous polymorphonuclear leukocytes  Rare Squamous epithelial cells per low power field  Numerous Gram Positive Cocci in Pairs and Chains per oil power field      .Urine Clean Catch (Midstream)  01-20 @ 22:48   No growth  --  --      .Blood Blood-Peripheral  01-20 @ 17:03   No growth at 5 days.  --  --            RADIOLOGY:      ROS:  [ x ] UNABLE TO ELICIT

## 2018-01-31 NOTE — PROGRESS NOTE ADULT - SUBJECTIVE AND OBJECTIVE BOX
pt seen and examined.pts current chart reviewed and case discussed with resident covering.    SUBJECTIVE:  pt is awake ,responds to verbal stimuli and in and  pt has been extubated today and off pressors  u/o mildly better last 24 hrs    REVIEW OF SYSTEMS:  Unobtainable    Current meds:    acetaminophen    Suspension 650 milliGRAM(s) Oral every 6 hours PRN  ALBUTerol    90 MICROgram(s) HFA Inhaler 1 Puff(s) Inhalation every 6 hours  aluminum hydroxide Suspension 30 milliLiter(s) Oral every 6 hours  chlorhexidine 4% Liquid 1 Application(s) Topical daily  fentaNYL   Infusion 0.5 MICROgram(s)/kG/Hr IV Continuous <Continuous>  finasteride 5 milliGRAM(s) Oral daily  insulin glargine Injectable (LANTUS) 20 Unit(s) SubCutaneous at bedtime  insulin glargine Injectable (LANTUS) 20 Unit(s) SubCutaneous every morning  insulin lispro (HumaLOG) corrective regimen sliding scale   SubCutaneous every 6 hours  meropenem IVPB 500 milliGRAM(s) IV Intermittent every 12 hours  metoprolol     tartrate 12.5 milliGRAM(s) Oral two times a day  norepinephrine Infusion 0.5 MICROgram(s)/kG/Min IV Continuous <Continuous>  pantoprazole  Injectable 40 milliGRAM(s) IV Push every 12 hours  phenylephrine    Infusion 0.5 MICROgram(s)/kG/Min IV Continuous <Continuous>  predniSONE   Tablet 20 milliGRAM(s) Oral daily  sevelamer carbonate Powder 1600 milliGRAM(s) Oral three times a day with meals  sodium bicarbonate 650 milliGRAM(s) Oral three times a day  tamsulosin 0.4 milliGRAM(s) Oral at bedtime      Vital Signs    T(F): 97 (18 @ 15:15), Max: 101.9 (18 @ 19:00)  HR: 78 (18 @ 15:30) (78 - 103)  BP: 141/61 (18 @ 14:00) (121/52 - 168/67)  ABP: 162/64 (18 @ 15:30) (105/52 - 204/70)  RR: 16 (18 @ 15:30) (15 - 27)  SpO2: 97% (18 @ 15:30) (96% - 100%)  Wt(kg): --  CVP(cm H2O): --  CO: --  PCWP: --    I and O's:     @ 07:01  -   @ 07:00  --------------------------------------------------------  IN:    fentaNYL  Infusion: 436.8 mL    Jevity: 720 mL    norepinephrine Infusion: 40.7 mL    phenylephrine   Infusion: 36 mL  Total IN: 1233.5 mL    OUT:    Indwelling Catheter - Urethral: 315 mL  Total OUT: 315 mL    Total NET: 918.5 mL       @ 07:  -   @ 07:00  --------------------------------------------------------  IN:    fentaNYL  Infusion: 168 mL    Jevity: 320 mL    pantoprazole Infusion: 70 mL    phenylephrine   Infusion: 14.7 mL    Solution: 100 mL  Total IN: 672.7 mL    OUT:    Indwelling Catheter - Urethral: 495 mL  Total OUT: 495 mL    Total NET: 177.7 mL       @ : @ 16:12  --------------------------------------------------------  IN:    pantoprazole Infusion: 30 mL  Total IN: 30 mL    OUT:    Indwelling Catheter - Urethral: 375 mL  Total OUT: 375 mL    Total NET: -345 mL        Daily     Daily Weight in k.2 (2018 08:00)    PHYSICAL EXAM:    Constitutional: well developed, obese and in and  HEENT: PERRLA,  no icteric sclera and mild pallor of conjunctiva noted  Neck: No JVD, thyromegaly or adenopathy  Respiratory: reduced air entry lower lungs with no rales, wheezing or rhonchi  Cardiovascular: S1 and S2 normally heard  Gastrointestinal: soft, distended, nontender and normal bowel sounds heard  Extremities: 2 plus peripheral edema noted   Neurological: pt is responsive but not oriented  : No flank or cva tenderness palpated.  Skin: No rashes    LABS:    CBC:                     9.3    18.6  )-----------( 214      ( 2018 12:37 )             27.3     Blood Gas Profile - Arterial (18 @ 04:43)    pH, Arterial: 7.39    pCO2, Arterial: 32 mmHg    pO2, Arterial: 102 mmHg    HCO3, Arterial: 19 mmol/L    Base Excess, Arterial: -4.6 mmol/L    Oxygen Saturation, Arterial: 97 %    FIO2, Arterial: 40.0    Blood Gas Comments Arterial: Patient on A/C, 24, 450, 40%, +5. EVA    BMP:        137  |  99  |  175<H>  ----------------------------<  304<H>  4.1   |  20<L>  |  6.87<H>  01    133<L>  |  97  |  161<H>  ----------------------------<  419<H>  4.3   |  18<L>  |  6.75<H>  01-30    134<L>  |  97  |  161  ----------------------------<  236<H>  5.1   |  21<L>  |  7.25<H>  0129    136  |  97  |  156<H>  ----------------------------<  314<H>  5.3   |  23  |  6.91<H>  01-29    133<L>  |  95<L>  |  143<H>  ----------------------------<  292<H>  5.1   |  20<L>  |  6.91<H>  01-28    134<L>  |  96  |  137<H>  ----------------------------<  309<H>  5.4<H>   |  20<L>  |  6.63<H>    Ca    6.2<LL>      2018 06:12  Ca    6.3<LL>      2018 23:09  Ca    6.5<LL>      2018 05:24  Ca    6.4<LL>      2018 22:25  Ca    6.8<L>      2018 05:57  Ca    6.6<L>      2018 21:59  Phos  11.1       Phos  10.2       Phos  9.2       Phos  SEE NOTE       Mg     3.0       Mg     2.9       Mg     2.7         TPro  5.9<L>  /  Alb  1.5<L>  /  TBili  0.7  /  DBili  x   /  AST  29  /  ALT  36  /  AlkPhos  145<H>    TPro  6.0  /  Alb  1.6<L>  /  TBili  0.8  /  DBili  x   /  AST  33  /  ALT  36  /  AlkPhos  160<H>    TPro  5.7<L>  /  Alb  1.5<L>  /  TBili  0.9  /  DBili  0.5<H>  /  AST  50<H>  /  ALT  48  /  AlkPhos  207<H>            RADIOLOGY & ADDITIONAL STUDIES:      < from: Xray Chest 1 View AP-PORTABLE IMMEDIATE (18 @ 04:03) >    EXAM:  XR CHEST PORTABLE IMMED 1V                            PROCEDURE DATE:  2018          INTERPRETATION:  AP chest on 2018 at 3:56 AM. Patient had   left jugular line placement and is respirator dependent.    Poor inspiration crowds the chest. The heart is magnified by technique.    Endotracheal tube, nasogastric tube, and right jugular line remain. There   is a new left jugular line with tip in the superior vena caval area now   evident.    There are diffuse advanced bilateral infiltrates showing increase from   .    IMPRESSION: Increasing diffuse infiltrates. Left jugular line inserted.    < end of copied text >

## 2018-02-01 NOTE — CHART NOTE - NSCHARTNOTEFT_GEN_A_CORE
Patient went into SVT. HR 180s, BP 77/56. Patient recievec carotid massage, HR lowered down to 170s. patient received 1 stat dose of adenosine. HR came down to 70s. patient's metoprolol 12.5 Q8 hours. will continue to follow up.

## 2018-02-01 NOTE — CHART NOTE - NSCHARTNOTEFT_GEN_A_CORE
Patient had another episode of SVT. . patient received 1 stat dose of adenosine 6 mg  IV stat. HR reduced to 93 b/min. discussed with  PGY-3 Dr lee. will continue to monitor HR

## 2018-02-01 NOTE — PROGRESS NOTE ADULT - PROBLEM SELECTOR PLAN 7
resumed metoprolol resumed metoprolol 12.5 Q8 hour, patient had an episode of SVT. patient received 1 stat dose of adenosine. will continue to follow

## 2018-02-01 NOTE — PROGRESS NOTE ADULT - PROBLEM SELECTOR PLAN 2
-no fevers in 24 hours, BP stable 120/56. HR in 80s  -RIJ removed, new LIJ placed.1/30   -repeat blood cultures, sputum cx -ve  -meropenem day 9 -no fevers in 44 hours, BP stable 104/50 HR in 80s  -RIJ removed, new LIJ placed.1/30   -repeat blood cultures, sputum cx -ve  -meropenem day 10

## 2018-02-01 NOTE — PROGRESS NOTE ADULT - ATTENDING COMMENTS
72 male nursing home patient with CAD, CKD, DM, HTN, OA, BPH, anxiety, presented with influenza, acute respiratory failure, septic shock, pneumonia. Now with NING requiring dialysis, encephalopathy, acute resp failure.  Extubated 1/31.   Doing poorly today, poor mental status, requiring more FiO2  Plan:  - abx per ID, completed tamiflu  - HD per nephrology - new HD cath placed 1/28  - DNR/ Do not re-intubate per son  will attempt to remove more fluid today with dialysis in hopes of improving respiratory status.  poor prognosis  Total cc time 35 min.

## 2018-02-01 NOTE — PROGRESS NOTE ADULT - PROBLEM SELECTOR PLAN 3
meets burlin's criteria of ARDS, acute onset <7 days, non cardiogenic, bilateral involvement, Fio2/PO2 245,   CXR shows B/l pachy infiltrates   patient on MV requiring Fio2 40%,   ECHO normal EF ,  HD tomorrow,   -on meropenem 500 mg Q12hrs ( renal adjusted)   - prednisone taper  - Extubated today. 1/31/18  -Poor prognosis, DNR/DNI meets burlin's criteria of ARDS, acute onset <7 days, non cardiogenic, bilateral involvement, Fio2/PO2 245,   CXR shows B/l pachy infiltrates   patient on MV requiring Fio2 40%,   ECHO normal EF ,  HD today,  -on meropenem 500 mg Q12hrs ( renal adjusted)   - prednisone taper  - Extubated today. 1/31/18  -Poor prognosis, DNR/DNI

## 2018-02-01 NOTE — PROGRESS NOTE ADULT - SUBJECTIVE AND OBJECTIVE BOX
ICU visit:  72y Male is under our care for pneumonia, septic shock and leukocytosis.  Patient was extubated yesterday, but not doing very well and still on phenylephrine drip  Patient is DNR/ DNI. Has not had any fevers, but wbc count has increased (likely from steroids).    REVIEW OF SYSTEMS:  [ X ] Not able to illicit    MEDS:  meropenem IVPB 500 milliGRAM(s) IV Intermittent every 12 hours    ALLERGIES: doxycycline (Unknown)  penicillin (Unknown)  tetracycline (Unknown)  Valtrex (Unknown)    VITALS:  ICU Vital Signs Last 24 Hrs  T(C): 35.5 (01 Feb 2018 15:00), Max: 36.4 (31 Jan 2018 19:00)  T(F): 95.9 (01 Feb 2018 15:00), Max: 97.6 (31 Jan 2018 19:00)  HR: 94 (01 Feb 2018 16:00) (71 - 177)  BP: 107/62 (01 Feb 2018 16:00) (71/39 - 177/99)  BP(mean): 71 (01 Feb 2018 16:00) (47 - 111)  ABP: 138/53 (01 Feb 2018 16:00) (68/36 - 193/69)  ABP(mean): 77 (01 Feb 2018 16:00) (45 - 104)  RR: 19 (01 Feb 2018 16:00) (12 - 23)  SpO2: 100% (01 Feb 2018 16:00) (85% - 100%)      PHYSICAL EXAM:  HEENT: +NGT +NRB mask  Neck: left neck CVP  Respiratory: bilateral rhonchorous sounds no rales  Cardiovascular: S1 S2 tachy no murmurs  Gastrointestinal: +BS with soft, mildly distended abdomen; nontender  +lopez  Extremities: +anasarca  Skin: no rashes  Ortho: n/a  Neuro: minimally arousable with sternal rub      LABS/DIAGNOSTIC TESTS:                        8.5    18.9  )-----------( 245      ( 01 Feb 2018 06:32 )             26.0     WBC Count: 18.9 K/uL (02-01 @ 06:32)  WBC Count: 18.6 K/uL (01-31 @ 12:37)  WBC Count: 15.4 K/uL (01-31 @ 06:12)  WBC Count: 12.5 K/uL (01-30 @ 23:09)  WBC Count: 12.9 K/uL (01-30 @ 05:24)    02-01    137  |  96  |  204  ----------------------------<  254<H>  5.0   |  18<L>  |  7.72<H>    Ca    6.1      01 Feb 2018 06:32  Phos  13.4     02-01  Mg     3.1     02-01    TPro  6.0  /  Alb  1.6<L>  /  TBili  0.7  /  DBili  x   /  AST  34  /  ALT  32  /  AlkPhos  118  02-01    ABG - ( 01 Feb 2018 05:02 )  pH: 7.28  /  pCO2: 38    /  pO2: 171   / HCO3: 17    / Base Excess: -8.1  /  SaO2: 99          CULTURES:   .Sputum Sputum trap  01-29 @ 13:45   Normal Respiratory Tonie present  --    Few polymorphonuclear leukocytes  Rare Squamous epithelial cells per low power field  Numerous Yeast per oil power field      .Blood Blood-Peripheral  01-29 @ 11:43   No growth to date.  --  --      .Sputum Sputumtrap rec'd  01-22 @ 10:48   Few Streptococcus pyogenes (Group A)  Normal Respiratory Tonie present  --    Numerous polymorphonuclear leukocytes  Rare Squamous epithelial cells per low power field  Numerous Gram Positive Cocci in Pairs and Chains per oil power field      .Urine Clean Catch (Midstream)  01-20 @ 22:48   No growth  --  --      .Blood Blood-Peripheral  01-20 @ 17:03   No growth at 5 days.  --  --      RADIOLOGY:  EXAM:  XR CHEST PORTABLE ROUTINE 1V                        PROCEDURE DATE:  02/01/2018    INTERPRETATION:  A single chest x-ray was obtained on February 1, 2018.    Indication: Shortness of breath.    Impression:    The heart is enlarged.Left pleural effusion. Left lower lobe pneumonia   and/or atelectasis. Endotracheal tube was removed. NG tube is in the   stomach however its tip is not seen on the current study. A central line   is seen on the left and the tip is in the entrance tothe superior vena   cava. Loss of volume right lung. Platelike atelectasis right lower lobe.

## 2018-02-01 NOTE — PROGRESS NOTE ADULT - ASSESSMENT
1. ESRD sec to ATN: pt s renal function is unchanged(no recovery yet)  - pt was  dialysed today.pt tolerate dthe dialysis well .Hd orders written and discussed with dialysis RN  -pts cr/bun  is very high even after hd  -we will check uric acid/ck and get pre bun and post bun next hd to calculate URR     - hold nephrotoxic medications, adjust meds as per egfr  and avoid contrast studies/phosphate  enema, etc  -renal hd tube feeding     2. Hyperkalemia : now improved  -keep <5 and >4  -low K diet   -f/u k level q 12hrs      2. Metabolic and resp.acidosis   -multifactorial , s/p HD today  -monitor CO 2 daily   -keep PH >7.3       3. Hyperphosphatemia   -worsening  -add Amphogel plus Renagel as d/w resident  -keep Phos>3, <5     4.Hyponatremia: now improved, secondary to ning  -avoid iv fluids  -f/u Na level daily  -continue  fluid restriction to 1 liter per day    5. Fluid overload/Edema  sec to ? CHF/NING , now mildly better  -UF as tolerated in hd  -avoid iv fluid and add iv Lasix prn    6.Hypocalcemia;improving (corrected ca )  -suggest to keep corrected ca >8.5 and <10  -f/u ca level daily 1. ESRD sec to ATN: pt s renal function is unchanged(no recovery yet)  - pt was  dialysed today.pt tolerate dthe dialysis well .Hd orders written and discussed with dialysis RN  -pts cr/bun  is very high even after hd as pts cath flow is not great and will need new cath like PC if medically stable to get PC via IR  -we will check uric acid/ck and get pre bun and post bun next hd to calculate URR     - hold nephrotoxic medications, adjust meds as per egfr  and avoid contrast studies/phosphate  enema, etc  -renal hd tube feeding     2. Hyperkalemia : now improved  -keep <5 and >4  -low K diet   -f/u k level q 12hrs      2. Metabolic and resp.acidosis   -multifactorial , s/p HD today  -monitor CO 2 daily   -keep PH >7.3       3. Hyperphosphatemia   -worsening  -add Amphogel plus Renagel as d/w resident  -keep Phos>3, <5     4.Hyponatremia: now improved, secondary to ning  -avoid iv fluids  -f/u Na level daily  -continue  fluid restriction to 1 liter per day    5. Fluid overload/Edema  sec to ? CHF/NING , now mildly better  -UF as tolerated in hd  -avoid iv fluid and add iv Lasix prn    6.Hypocalcemia;improving (corrected ca )  -suggest to keep corrected ca >8.5 and <10  -f/u ca level daily

## 2018-02-01 NOTE — PROGRESS NOTE ADULT - ASSESSMENT
1.	Pneumonia   2.	Leukocytosis ?steroids  3.	Septic shock  4.	S/p fevers  ·	cont meropenem to 500mg IV q12h D10  Time spent - 30 minutes

## 2018-02-01 NOTE — PROGRESS NOTE ADULT - SUBJECTIVE AND OBJECTIVE BOX
Patient extubated to face mask oxygen    acetaminophen    Suspension 650 milliGRAM(s) Oral every 6 hours PRN  adenosine Injectable (ADENOCARD) 6 milliGRAM(s) IV Push once  ALBUTerol    90 MICROgram(s) HFA Inhaler 1 Puff(s) Inhalation every 4 hours  ALBUTerol/ipratropium for Nebulization 3 milliLiter(s) Nebulizer every 6 hours  aluminum hydroxide Suspension 30 milliLiter(s) Oral every 6 hours  chlorhexidine 4% Liquid 1 Application(s) Topical daily  finasteride 5 milliGRAM(s) Oral daily  insulin glargine Injectable (LANTUS) 20 Unit(s) SubCutaneous at bedtime  insulin glargine Injectable (LANTUS) 20 Unit(s) SubCutaneous every morning  insulin lispro (HumaLOG) corrective regimen sliding scale   SubCutaneous every 6 hours  meropenem IVPB 500 milliGRAM(s) IV Intermittent every 12 hours  metoprolol     tartrate 12.5 milliGRAM(s) Oral three times a day  pantoprazole  Injectable 40 milliGRAM(s) IV Push every 12 hours  phenylephrine    Infusion 0.5 MICROgram(s)/kG/Min IV Continuous <Continuous>  predniSONE   Tablet 20 milliGRAM(s) Oral daily  sevelamer carbonate Powder 1600 milliGRAM(s) Oral three times a day with meals  tamsulosin 0.4 milliGRAM(s) Oral at bedtime  tiotropium 18 MICROgram(s) Capsule 1 Capsule(s) Inhalation daily                            8.5    18.9  )-----------( 245      ( 01 Feb 2018 06:32 )             26.0       Hemoglobin: 8.5 g/dL (02-01 @ 06:32)  Hemoglobin: 9.3 g/dL (01-31 @ 12:37)  Hemoglobin: 9.0 g/dL (01-31 @ 06:12)  Hemoglobin: 10.1 g/dL (01-30 @ 23:09)  Hemoglobin: 10.6 g/dL (01-30 @ 05:24)      02-01    137  |  96  |  204  ----------------------------<  254<H>  5.0   |  18<L>  |  7.72<H>    Ca    6.1      01 Feb 2018 06:32  Phos  13.4     02-01  Mg     3.1     02-01    TPro  6.0  /  Alb  1.6<L>  /  TBili  0.7  /  DBili  x   /  AST  34  /  ALT  32  /  AlkPhos  118  02-01    Creatinine Trend: 7.72<--, 6.87<--, 6.75<--, 7.25<--, 6.91<--, 6.91<--    COAGS:           T(C): 35.8 (02-01-18 @ 12:05), Max: 36.4 (01-31-18 @ 19:00)  HR: 99 (02-01-18 @ 14:00) (71 - 177)  BP: 142/53 (02-01-18 @ 14:00) (71/39 - 177/99)  RR: 20 (02-01-18 @ 14:00) (12 - 23)  SpO2: 100% (02-01-18 @ 14:00) (85% - 100%)  Wt(kg): --    I&O's Summary    31 Jan 2018 07:01  -  01 Feb 2018 07:00  --------------------------------------------------------  IN: 441.1 mL / OUT: 935 mL / NET: -493.9 mL    01 Feb 2018 07:01  -  01 Feb 2018 14:40  --------------------------------------------------------  IN: 133.6 mL / OUT: 205 mL / NET: -71.4 mL            Heart: normal S1, S2, RRR, no m/r/g  Lungs: cta b/l  Abd: soft nT, nD  Ext: no edema      TELEMETRY:  SR       ASSESSMENT/PLAN: 	72y Male [facility] h/o CKD, Anxiety disorder, CAD, BPH, Carpel tunnel syndrome, compulsive disorder, DM, OA, HTN, IBS sent for dyspnea for last 3 days found with (+) flu, PNA likely septic shock normal LV function NING now on HD    - Cont broad spectrum abx and supportive care per MICU  - close respiratory status monitoring  - Hold antihypertensives until BP stable    Jake Benson MD, FACC  [facility], [facility]  [address] [place] [address]  PHONE:  [phone]  BEEPER : [phone]

## 2018-02-01 NOTE — PROGRESS NOTE ADULT - PROBLEM SELECTOR PLAN 6
Diet controlled   HbA1c. 6.0  lantus 20 units at bed time  will hold morning lantus added 20 as patient is npo for possible lower GI bleed.    - continue with sliding scale Q 6 hrs

## 2018-02-01 NOTE — PROGRESS NOTE ADULT - PROBLEM SELECTOR PLAN 5
patient had acute renal failure with metabolic acidosis 2/2 sepsis   hemodialysis tomorrow, making urine.  will give trial of lasix 100 mg IV. patient had acute renal failure with metabolic acidosis 2/2 sepsis   hemodialysis tomorrow, making urine.  urine output net negative

## 2018-02-01 NOTE — PROGRESS NOTE ADULT - PROBLEM SELECTOR PLAN 1
-Patient had 3 episodes of BRBPR.   -on IV protonix BID, held protonix drip  -NPO  -H/H 9.0 will continue to monitor. -Patient had 1 episode of BRBPR over night.   -on IV protonix BID,   -trickle feeds  -H/H 8.5 will continue to monitor.

## 2018-02-01 NOTE — PROGRESS NOTE ADULT - SUBJECTIVE AND OBJECTIVE BOX
INTERVAL HPI/OVERNIGHT EVENTS: ***    PRESSORS: [ ] YES [ ] NO  WHICH:    ANTIBIOTICS:                  DATE STARTED:  ANTIBIOTICS:                  DATE STARTED:  ANTIBIOTICS:                  DATE STARTED:    Antimicrobial:  meropenem IVPB 500 milliGRAM(s) IV Intermittent every 12 hours    Cardiovascular:  metoprolol     tartrate 12.5 milliGRAM(s) Oral two times a day  phenylephrine    Infusion 0.5 MICROgram(s)/kG/Min IV Continuous <Continuous>  tamsulosin 0.4 milliGRAM(s) Oral at bedtime    Pulmonary:  ALBUTerol    90 MICROgram(s) HFA Inhaler 1 Puff(s) Inhalation every 4 hours  ALBUTerol/ipratropium for Nebulization 3 milliLiter(s) Nebulizer every 6 hours  tiotropium 18 MICROgram(s) Capsule 1 Capsule(s) Inhalation daily    Hematalogic:    Other:  acetaminophen    Suspension 650 milliGRAM(s) Oral every 6 hours PRN  aluminum hydroxide Suspension 30 milliLiter(s) Oral every 6 hours  chlorhexidine 4% Liquid 1 Application(s) Topical daily  finasteride 5 milliGRAM(s) Oral daily  insulin glargine Injectable (LANTUS) 20 Unit(s) SubCutaneous at bedtime  insulin glargine Injectable (LANTUS) 20 Unit(s) SubCutaneous every morning  insulin lispro (HumaLOG) corrective regimen sliding scale   SubCutaneous every 6 hours  pantoprazole  Injectable 40 milliGRAM(s) IV Push every 12 hours  predniSONE   Tablet 20 milliGRAM(s) Oral daily  sevelamer carbonate Powder 1600 milliGRAM(s) Oral three times a day with meals  sodium bicarbonate 650 milliGRAM(s) Oral three times a day    acetaminophen    Suspension 650 milliGRAM(s) Oral every 6 hours PRN  ALBUTerol    90 MICROgram(s) HFA Inhaler 1 Puff(s) Inhalation every 4 hours  ALBUTerol/ipratropium for Nebulization 3 milliLiter(s) Nebulizer every 6 hours  aluminum hydroxide Suspension 30 milliLiter(s) Oral every 6 hours  chlorhexidine 4% Liquid 1 Application(s) Topical daily  finasteride 5 milliGRAM(s) Oral daily  insulin glargine Injectable (LANTUS) 20 Unit(s) SubCutaneous at bedtime  insulin glargine Injectable (LANTUS) 20 Unit(s) SubCutaneous every morning  insulin lispro (HumaLOG) corrective regimen sliding scale   SubCutaneous every 6 hours  meropenem IVPB 500 milliGRAM(s) IV Intermittent every 12 hours  metoprolol     tartrate 12.5 milliGRAM(s) Oral two times a day  pantoprazole  Injectable 40 milliGRAM(s) IV Push every 12 hours  phenylephrine    Infusion 0.5 MICROgram(s)/kG/Min IV Continuous <Continuous>  predniSONE   Tablet 20 milliGRAM(s) Oral daily  sevelamer carbonate Powder 1600 milliGRAM(s) Oral three times a day with meals  sodium bicarbonate 650 milliGRAM(s) Oral three times a day  tamsulosin 0.4 milliGRAM(s) Oral at bedtime  tiotropium 18 MICROgram(s) Capsule 1 Capsule(s) Inhalation daily    Drug Dosing Weight  Height (cm): 185.42 (20 Jan 2018 10:22)  Weight (kg): 96 (25 Jan 2018 07:30)  BMI (kg/m2): 27.9 (25 Jan 2018 07:30)  BSA (m2): 2.2 (25 Jan 2018 07:30)    CENTRAL LINE: [ ] YES [ ] NO  LOCATION:   DATE INSERTED:  REMOVE: [ ] YES [ ] NO  EXPLAIN:    BRUNSON: [ ] YES [ ] NO    DATE INSERTED:  REMOVE:  [ ] YES [ ] NO  EXPLAIN:    A-LINE:  [ ] YES [ ] NO  LOCATION:   DATE INSERTED:  REMOVE:  [ ] YES [ ] NO  EXPLAIN:    PMH -reviewed admission note, no change since admission  Heart faliure: acute [ ] chronic [ ] acute or chronic [ ] diastolic [ ] systolic [ ] combied systolic and diastolic[ ]  NING: ATN[ ] renal medullary necrosis [ ] CKD I [ ]CKDII [ ]CKD III [ ]CKD IV [ ]CKD V [ ]Other pathological lesions [ ]  Abdominal Nutrition Status: malnutrition [ ] cachexia [ ] morbid obesity/BMI=40 [ ] Supplement ordered [___________]     ICU Vital Signs Last 24 Hrs  T(C): 35.8 (01 Feb 2018 00:00), Max: 37.4 (31 Jan 2018 08:00)  T(F): 96.4 (01 Feb 2018 00:00), Max: 99.4 (31 Jan 2018 08:00)  HR: 86 (01 Feb 2018 07:00) (71 - 88)  BP: 154/54 (01 Feb 2018 04:00) (116/49 - 154/54)  BP(mean): 74 (01 Feb 2018 04:00) (59 - 80)  ABP: 131/54 (01 Feb 2018 07:00) (102/47 - 193/69)  ABP(mean): 76 (01 Feb 2018 07:00) (64 - 104)  RR: 14 (01 Feb 2018 07:00) (12 - 24)  SpO2: 100% (01 Feb 2018 07:00) (85% - 100%)      ABG - ( 01 Feb 2018 05:02 )  pH: 7.28  /  pCO2: 38    /  pO2: 171   / HCO3: 17    / Base Excess: -8.1  /  SaO2: 99                    01-31 @ 07:01  -  02-01 @ 07:00  --------------------------------------------------------  IN: 441.1 mL / OUT: 935 mL / NET: -493.9 mL        Mode: CPAP with PS  FiO2: 40  PEEP: 5  PS: 5  ITime: 1  MAP: 7  PIP: 12      PHYSICAL EXAM:    GENERAL: [ ]NAD, [ ]well-groomed, [ ]well-developed  HEAD:  [ ]Atraumatic, [ ]Normocephalic  EYES: [ ]EOMI, [ ]PERRLA, [ ]conjunctiva and sclera clear  ENMT: [ ]No tonsillar erythema, exudates, or enlargement; [ ]Moist mucous membranes, [ ]Good dentition, [ ]No lesions  NECK: [ ]Supple, normal appearance, [ ]No JVD; [ ]Normal thyroid; [ ]Trachea midline  NERVOUS SYSTEM:  [ ]Alert & Oriented X3, [ ]Good concentration; [ ]Motor Strength 5/5 B/L upper and lower extremities; [ ]DTRs 2+ intact and symmetric  CHEST/LUNG: [ ]No chest deformity; [ ]Normal percussion bilaterally; [ ]No rales, rhonchi, wheezing   HEART: [ ]Regular rate and rhythm; [ ]No murmurs, rubs, or gallops  ABDOMEN: [ ]Soft, Nontender, Nondistended; [ ]Bowel sounds present  EXTREMITIES:  [ ]2+ Peripheral Pulses, [ ]No clubbing, cyanosis, or edema  LYMPH: [ ]No lymphadenopathy noted  SKIN: [ ]No rashes or lesions; [ ]Good capillary refill      LABS:  CBC Full  -  ( 01 Feb 2018 06:32 )  WBC Count : 18.9 K/uL  Hemoglobin : 8.5 g/dL  Hematocrit : 26.0 %  Platelet Count - Automated : 245 K/uL  Mean Cell Volume : 101.9 fl  Mean Cell Hemoglobin : 33.2 pg  Mean Cell Hemoglobin Concentration : 32.5 gm/dL  Auto Neutrophil # : 16.1 K/uL  Auto Lymphocyte # : 1.0 K/uL  Auto Monocyte # : 1.5 K/uL  Auto Eosinophil # : 0.2 K/uL  Auto Basophil # : 0.2 K/uL  Auto Neutrophil % : 84.9 %  Auto Lymphocyte % : 5.1 %  Auto Monocyte % : 8.1 %  Auto Eosinophil % : 0.9 %  Auto Basophil % : 0.9 %    02-01    137  |  96  |  x   ----------------------------<  254<H>  5.0   |  18<L>  |  7.72<H>    Ca    6.1      01 Feb 2018 06:32  Phos  11.1     01-31  Mg     3.1     02-01    TPro  6.0  /  Alb  1.6<L>  /  TBili  0.7  /  DBili  x   /  AST  34  /  ALT  32  /  AlkPhos  118  02-01    PT/INR - ( 30 Jan 2018 23:09 )   PT: 11.9 sec;   INR: 1.09 ratio             Culture Results:   Normal Respiratory Tonie present (01-29 @ 13:45)  Culture Results:   No growth to date. (01-29 @ 11:43)  Culture Results:   No growth to date. (01-29 @ 11:43)      RADIOLOGY & ADDITIONAL STUDIES REVIEWED:  ***    [ ]GOALS OF CARE DISCUSSION WITH PATIENT/FAMILY/PROXY:    CRITICAL CARE TIME SPENT: 35 minutes INTERVAL HPI/OVERNIGHT EVENTS: *** patient was having trouble breathing with stridor. patient received 1 dose of racemic epinephrine. patient had an episode of SVT. recieved 6 mg adenosine stat. HR dec to 70s    PRESSORS: [ ] YES [ x] NO  WHICH:    ANTIBIOTICS: meropenem                 DATE STARTED:  ANTIBIOTICS:                  DATE STARTED:  ANTIBIOTICS:                  DATE STARTED:    Antimicrobial:  meropenem IVPB 500 milliGRAM(s) IV Intermittent every 12 hours    Cardiovascular:  metoprolol     tartrate 12.5 milliGRAM(s) Oral two times a day  phenylephrine    Infusion 0.5 MICROgram(s)/kG/Min IV Continuous <Continuous>  tamsulosin 0.4 milliGRAM(s) Oral at bedtime    Pulmonary:  ALBUTerol    90 MICROgram(s) HFA Inhaler 1 Puff(s) Inhalation every 4 hours  ALBUTerol/ipratropium for Nebulization 3 milliLiter(s) Nebulizer every 6 hours  tiotropium 18 MICROgram(s) Capsule 1 Capsule(s) Inhalation daily    Hematalogic:    Other:  acetaminophen    Suspension 650 milliGRAM(s) Oral every 6 hours PRN  aluminum hydroxide Suspension 30 milliLiter(s) Oral every 6 hours  chlorhexidine 4% Liquid 1 Application(s) Topical daily  finasteride 5 milliGRAM(s) Oral daily  insulin glargine Injectable (LANTUS) 20 Unit(s) SubCutaneous at bedtime  insulin glargine Injectable (LANTUS) 20 Unit(s) SubCutaneous every morning  insulin lispro (HumaLOG) corrective regimen sliding scale   SubCutaneous every 6 hours  pantoprazole  Injectable 40 milliGRAM(s) IV Push every 12 hours  predniSONE   Tablet 20 milliGRAM(s) Oral daily  sevelamer carbonate Powder 1600 milliGRAM(s) Oral three times a day with meals  sodium bicarbonate 650 milliGRAM(s) Oral three times a day    acetaminophen    Suspension 650 milliGRAM(s) Oral every 6 hours PRN  ALBUTerol    90 MICROgram(s) HFA Inhaler 1 Puff(s) Inhalation every 4 hours  ALBUTerol/ipratropium for Nebulization 3 milliLiter(s) Nebulizer every 6 hours  aluminum hydroxide Suspension 30 milliLiter(s) Oral every 6 hours  chlorhexidine 4% Liquid 1 Application(s) Topical daily  finasteride 5 milliGRAM(s) Oral daily  insulin glargine Injectable (LANTUS) 20 Unit(s) SubCutaneous at bedtime  insulin glargine Injectable (LANTUS) 20 Unit(s) SubCutaneous every morning  insulin lispro (HumaLOG) corrective regimen sliding scale   SubCutaneous every 6 hours  meropenem IVPB 500 milliGRAM(s) IV Intermittent every 12 hours  metoprolol     tartrate 12.5 milliGRAM(s) Oral two times a day  pantoprazole  Injectable 40 milliGRAM(s) IV Push every 12 hours  phenylephrine    Infusion 0.5 MICROgram(s)/kG/Min IV Continuous <Continuous>  predniSONE   Tablet 20 milliGRAM(s) Oral daily  sevelamer carbonate Powder 1600 milliGRAM(s) Oral three times a day with meals  sodium bicarbonate 650 milliGRAM(s) Oral three times a day  tamsulosin 0.4 milliGRAM(s) Oral at bedtime  tiotropium 18 MICROgram(s) Capsule 1 Capsule(s) Inhalation daily    Drug Dosing Weight  Height (cm): 185.42 (20 Jan 2018 10:22)  Weight (kg): 96 (25 Jan 2018 07:30)  BMI (kg/m2): 27.9 (25 Jan 2018 07:30)  BSA (m2): 2.2 (25 Jan 2018 07:30)    CENTRAL LINE: x[ ] YES [ ] NO  LOCATION:   J DATE INSERTED:  REMOVE: [ ] YES [ ] NO  EXPLAIN:    BRUNSON: [x ] YES [ ] NO    DATE INSERTED:  REMOVE:  [ ] YES [ ] NO  EXPLAIN:    A-LINE:  [x ] YES [ ] NO  LOCATION:   DATE INSERTED:  REMOVE:  [ ] YES [ ] NO  EXPLAIN:    PMH -reviewed admission note, no change since admission  Heart faliure: acute [ ] chronic [ ] acute or chronic [ ] diastolic [ ] systolic [ ] combied systolic and diastolic[ ]  NING: ATN[ ] renal medullary necrosis [ ] CKD I [ ]CKDII [ ]CKD III [ ]CKD IV [ ]CKD V [x ]Other pathological lesions [ ]  Abdominal Nutrition Status: malnutrition [ ] cachexia [ ] morbid obesity/BMI=40 [ ] Supplement ordered [___________]     ICU Vital Signs Last 24 Hrs  T(C): 35.8 (01 Feb 2018 00:00), Max: 37.4 (31 Jan 2018 08:00)  T(F): 96.4 (01 Feb 2018 00:00), Max: 99.4 (31 Jan 2018 08:00)  HR: 86 (01 Feb 2018 07:00) (71 - 88)  BP: 154/54 (01 Feb 2018 04:00) (116/49 - 154/54)  BP(mean): 74 (01 Feb 2018 04:00) (59 - 80)  ABP: 131/54 (01 Feb 2018 07:00) (102/47 - 193/69)  ABP(mean): 76 (01 Feb 2018 07:00) (64 - 104)  RR: 14 (01 Feb 2018 07:00) (12 - 24)  SpO2: 100% (01 Feb 2018 07:00) (85% - 100%)      ABG - ( 01 Feb 2018 05:02 )  pH: 7.28  /  pCO2: 38    /  pO2: 171   / HCO3: 17    / Base Excess: -8.1  /  SaO2: 99                    01-31 @ 07:01  -  02-01 @ 07:00  --------------------------------------------------------  IN: 441.1 mL / OUT: 935 mL / NET: -493.9 mL        Mode: CPAP with PS  FiO2: 40  PEEP: 5  PS: 5  ITime: 1  MAP: 7  PIP: 12      PHYSICAL EXAM:    GENERAL: [x ]NAD, [ ]well-groomed, [ ]well-developed  HEAD:  [x ]Atraumatic, [ ]Normocephalic  EYES: [x ]EOMI, [ ]PERRLA, [ ]conjunctiva and sclera clear  ENMT: [ ]No tonsillar erythema, exudates, or enlargement; [x ]Moist mucous membranes, [ ]Good dentition, [ ]No lesions  NECK: [x ]Supple, normal appearance, [ ]No JVD; [ ]Normal thyroid; [ ]Trachea midline  NERVOUS SYSTEM:  [x ]Alert & Oriented X3, [ ]Good concentration; [ ]Motor Strength 5/5 B/L upper and lower extremities; [ ]DTRs 2+ intact and symmetric  CHEST/LUNG: [x ]No chest deformity; [ ]Normal percussion bilaterally; [x ]No rales, rhonchi, wheezing   HEART: [x ]Regular rate and rhythm; [ ]No murmurs, rubs, or gallops  ABDOMEN: [x ]Soft, Nontender, Nondistended; [ ]Bowel sounds present  EXTREMITIES:  [x ]2+ Peripheral Pulses, [ ]No clubbing, cyanosis, or edema  LYMPH: [x ]No lymphadenopathy noted  SKIN: [ x]No rashes or lesions; [ ]Good capillary refill      LABS:  CBC Full  -  ( 01 Feb 2018 06:32 )  WBC Count : 18.9 K/uL  Hemoglobin : 8.5 g/dL  Hematocrit : 26.0 %  Platelet Count - Automated : 245 K/uL  Mean Cell Volume : 101.9 fl  Mean Cell Hemoglobin : 33.2 pg  Mean Cell Hemoglobin Concentration : 32.5 gm/dL  Auto Neutrophil # : 16.1 K/uL  Auto Lymphocyte # : 1.0 K/uL  Auto Monocyte # : 1.5 K/uL  Auto Eosinophil # : 0.2 K/uL  Auto Basophil # : 0.2 K/uL  Auto Neutrophil % : 84.9 %  Auto Lymphocyte % : 5.1 %  Auto Monocyte % : 8.1 %  Auto Eosinophil % : 0.9 %  Auto Basophil % : 0.9 %    02-01    137  |  96  |  x   ----------------------------<  254<H>  5.0   |  18<L>  |  7.72<H>    Ca    6.1      01 Feb 2018 06:32  Phos  11.1     01-31  Mg     3.1     02-01    TPro  6.0  /  Alb  1.6<L>  /  TBili  0.7  /  DBili  x   /  AST  34  /  ALT  32  /  AlkPhos  118  02-01    PT/INR - ( 30 Jan 2018 23:09 )   PT: 11.9 sec;   INR: 1.09 ratio             Culture Results:   Normal Respiratory Tonie present (01-29 @ 13:45)  Culture Results:   No growth to date. (01-29 @ 11:43)  Culture Results:   No growth to date. (01-29 @ 11:43)      RADIOLOGY & ADDITIONAL STUDIES REVIEWED:  ***    [x ]GOALS OF CARE DISCUSSION WITH PATIENT/FAMILY/PROXY: DNR/DNI    CRITICAL CARE TIME SPENT: 35 minutes

## 2018-02-01 NOTE — PROGRESS NOTE ADULT - SUBJECTIVE AND OBJECTIVE BOX
pt seen and examined.pts current chart reviewed and case discussed with resident covering.  SUBJECTIVE:  pt is awake ,responds to verbal stimuli and in and  pt is on 50% AM  u/o 30 ml per hr    REVIEW OF SYSTEMS:  Unobtainable  Current meds:    acetaminophen    Suspension 650 milliGRAM(s) Oral every 6 hours PRN  ALBUTerol    90 MICROgram(s) HFA Inhaler 1 Puff(s) Inhalation every 4 hours  ALBUTerol/ipratropium for Nebulization 3 milliLiter(s) Nebulizer every 6 hours  aluminum hydroxide Suspension 30 milliLiter(s) Oral every 6 hours  chlorhexidine 4% Liquid 1 Application(s) Topical daily  finasteride 5 milliGRAM(s) Oral daily  insulin glargine Injectable (LANTUS) 20 Unit(s) SubCutaneous at bedtime  insulin glargine Injectable (LANTUS) 20 Unit(s) SubCutaneous every morning  insulin lispro (HumaLOG) corrective regimen sliding scale   SubCutaneous every 6 hours  meropenem IVPB 500 milliGRAM(s) IV Intermittent every 12 hours  metoprolol     tartrate 12.5 milliGRAM(s) Oral three times a day  pantoprazole  Injectable 40 milliGRAM(s) IV Push every 12 hours  phenylephrine    Infusion 0.5 MICROgram(s)/kG/Min IV Continuous <Continuous>  predniSONE   Tablet 20 milliGRAM(s) Oral daily  sevelamer carbonate Powder 1600 milliGRAM(s) Oral three times a day with meals  tamsulosin 0.4 milliGRAM(s) Oral at bedtime  tiotropium 18 MICROgram(s) Capsule 1 Capsule(s) Inhalation daily      Vital Signs    T(F): 95.9 (18 @ 15:00), Max: 97.6 (18 @ 19:00)  HR: 94 (18 @ 16:00) (71 - 177)  BP: 107/62 (18 @ 16:00) (71/39 - 177/99)  ABP: 138/53 (18 @ 16:00) (68/36 - 193/69)  RR: 19 (18 @ 16:00) (12 - 23)  SpO2: 100% (18 @ 16:00) (85% - 100%)  Wt(kg): --  CVP(cm H2O): --  CO: --  PCWP: --    I and O's:     @ 07: @ 07:00  --------------------------------------------------------  IN:    fentaNYL  Infusion: 168 mL    Jevity: 320 mL    pantoprazole Infusion: 70 mL    phenylephrine   Infusion: 14.7 mL    Solution: 100 mL  Total IN: 672.7 mL    OUT:    Indwelling Catheter - Urethral: 495 mL  Total OUT: 495 mL    Total NET: 177.7 mL       @ 07: @ 07:00  --------------------------------------------------------  IN:    Enteral Tube Flush: 400 mL    pantoprazole Infusion: 30 mL    phenylephrine   Infusion: 11.1 mL  Total IN: 441.1 mL    OUT:    Indwelling Catheter - Urethral: 935 mL  Total OUT: 935 mL    Total NET: -493.9 mL       @ 07: @ 16:20  --------------------------------------------------------  IN:    phenylephrine   Infusion: 133.6 mL  Total IN: 133.6 mL    OUT:    Indwelling Catheter - Urethral: 205 mL  Total OUT: 205 mL    Total NET: -71.4 mL        Daily     Daily Weight in k.9 (2018 12:05)    PHYSICAL EXAM:  Constitutional: well developed, obese and in and  HEENT: PERRLA,  no icteric sclera and mild pallor of conjunctiva noted  Neck: No JVD, thyromegaly or adenopathy  Respiratory: reduced air entry lower lungs with no rales, wheezing or rhonchi  Cardiovascular: S1 and S2 normally heard  Gastrointestinal: soft, distended, nontender and normal bowel sounds heard  Extremities: 2 plus peripheral edema noted   Neurological: pt is responsive but not oriented  : No flank or cva tenderness palpated.  Skin: No rashes    LABS:    CBC:                          8.5    18.9  )-----------( 245      ( 2018 06:32 )             26.0           BMP:        137  |  96  |  204  ----------------------------<  254<H>  5.0   |  18<L>  |  7.72<H>      137  |  99  |  175<H>  ----------------------------<  304<H>  4.1   |  20<L>  |  6.87<H>      133<L>  |  97  |  161<H>  ----------------------------<  419<H>  4.3   |  18<L>  |  6.75<H>      134<L>  |  97  |  161  ----------------------------<  236<H>  5.1   |  21<L>  |  7.25<H>      136  |  97  |  156<H>  ----------------------------<  314<H>  5.3   |  23  |  6.91<H>    Ca    6.1      2018 06:32  Ca    6.2<LL>      2018 06:12  Ca    6.3<LL>      2018 23:09  Ca    6.5<LL>      2018 05:24  Ca    6.4<LL>      2018 22:25  Phos  13.4       Phos  11.1       Phos  10.2       Phos  9.2       Mg     3.1       Mg     3.0       Mg     2.9         TPro  6.0  /  Alb  1.6<L>  /  TBili  0.7  /  DBili  x   /  AST  34  /  ALT  32  /  AlkPhos  118    TPro  5.9<L>  /  Alb  1.5<L>  /  TBili  0.7  /  DBili  x   /  AST  29  /  ALT  36  /  AlkPhos  145<H>    TPro  6.0  /  Alb  1.6<L>  /  TBili  0.8  /  DBili  x   /  AST  33  /  ALT  36  /  AlkPhos  160<H>      RADIOLOGY & ADDITIONAL STUDIES:    < from: Xray Chest 1 View- PORTABLE-Routine (18 @ 06:45) >  EXAM:  XR CHEST PORTABLE ROUTINE 1V                            PROCEDURE DATE:  2018          INTERPRETATION:  A single chest x-ray was obtained on 2018.    Indication: Shortness of breath.    Impression:    The heart is enlarged.Left pleural effusion. Left lower lobe pneumonia   and/or atelectasis. Endotracheal tube was removed. NG tube is in the   stomach however its tip is not seen on the current study. A central line   is seen on the left and the tip is in the entrance tothe superior vena   cava. Loss of volume right lung. Platelike atelectasis right lower lobe.    < end of copied text >

## 2018-02-01 NOTE — PROGRESS NOTE ADULT - ASSESSMENT
72 male nursing home patient with CAD, CKD, DM, HTN, OA, BPH, anxiety, presented with influenza, acute respiratory failure, septic shock.  May have superimposed pneumonia as well      1. Problem: ARDS 2/2 PNA and sepsis                 Problem/Plan- 4 Thrombocytopenia   Likely due to sepsis,  - normal fibrogen assay and rt count       Problem/Plan - 5  ARF     - Monitor Renal functions, avoid nephrotoxic drugs   - new Shiley was placed  - HD today  .        Problem/Plan - 7    Problem Hyperkalemia:  Due to renal failure   c/w HD             Problem/Plan - 10  ·  Problem: Need for prophylactic measure.  Plan: Improve score 3 on heparin  drip and Protonix for GI prophylaxis 72 male nursing home patient with CAD, CKD, DM, HTN, OA, BPH, anxiety, presented with influenza, acute respiratory failure, septic shock.  May have superimposed pneumonia as well

## 2018-02-02 NOTE — PROGRESS NOTE ADULT - SUBJECTIVE AND OBJECTIVE BOX
INTERVAL HPI/OVERNIGHT EVENTS: ***    PRESSORS: [ ] YES [ ] NO  WHICH:    ANTIBIOTICS:                  DATE STARTED:  ANTIBIOTICS:                  DATE STARTED:  ANTIBIOTICS:                  DATE STARTED:    Antimicrobial:  meropenem IVPB 500 milliGRAM(s) IV Intermittent every 12 hours    Cardiovascular:  metoprolol     tartrate 12.5 milliGRAM(s) Oral three times a day  phenylephrine    Infusion 0.5 MICROgram(s)/kG/Min IV Continuous <Continuous>  tamsulosin 0.4 milliGRAM(s) Oral at bedtime    Pulmonary:  ALBUTerol    90 MICROgram(s) HFA Inhaler 1 Puff(s) Inhalation every 4 hours  ALBUTerol/ipratropium for Nebulization 3 milliLiter(s) Nebulizer every 6 hours  tiotropium 18 MICROgram(s) Capsule 1 Capsule(s) Inhalation daily    Hematalogic:    Other:  acetaminophen    Suspension 650 milliGRAM(s) Oral every 6 hours PRN  aluminum hydroxide Suspension 30 milliLiter(s) Oral every 6 hours  chlorhexidine 4% Liquid 1 Application(s) Topical daily  finasteride 5 milliGRAM(s) Oral daily  insulin glargine Injectable (LANTUS) 20 Unit(s) SubCutaneous at bedtime  insulin glargine Injectable (LANTUS) 20 Unit(s) SubCutaneous every morning  insulin lispro (HumaLOG) corrective regimen sliding scale   SubCutaneous every 6 hours  pantoprazole  Injectable 40 milliGRAM(s) IV Push every 12 hours  predniSONE   Tablet 20 milliGRAM(s) Oral daily  sevelamer carbonate Powder 1600 milliGRAM(s) Oral three times a day with meals    acetaminophen    Suspension 650 milliGRAM(s) Oral every 6 hours PRN  ALBUTerol    90 MICROgram(s) HFA Inhaler 1 Puff(s) Inhalation every 4 hours  ALBUTerol/ipratropium for Nebulization 3 milliLiter(s) Nebulizer every 6 hours  aluminum hydroxide Suspension 30 milliLiter(s) Oral every 6 hours  chlorhexidine 4% Liquid 1 Application(s) Topical daily  finasteride 5 milliGRAM(s) Oral daily  insulin glargine Injectable (LANTUS) 20 Unit(s) SubCutaneous at bedtime  insulin glargine Injectable (LANTUS) 20 Unit(s) SubCutaneous every morning  insulin lispro (HumaLOG) corrective regimen sliding scale   SubCutaneous every 6 hours  meropenem IVPB 500 milliGRAM(s) IV Intermittent every 12 hours  metoprolol     tartrate 12.5 milliGRAM(s) Oral three times a day  pantoprazole  Injectable 40 milliGRAM(s) IV Push every 12 hours  phenylephrine    Infusion 0.5 MICROgram(s)/kG/Min IV Continuous <Continuous>  predniSONE   Tablet 20 milliGRAM(s) Oral daily  sevelamer carbonate Powder 1600 milliGRAM(s) Oral three times a day with meals  tamsulosin 0.4 milliGRAM(s) Oral at bedtime  tiotropium 18 MICROgram(s) Capsule 1 Capsule(s) Inhalation daily    Drug Dosing Weight  Height (cm): 185.42 (20 Jan 2018 10:22)  Weight (kg): 96 (25 Jan 2018 07:30)  BMI (kg/m2): 27.9 (25 Jan 2018 07:30)  BSA (m2): 2.2 (25 Jan 2018 07:30)    CENTRAL LINE: [ ] YES [ ] NO  LOCATION:   DATE INSERTED:  REMOVE: [ ] YES [ ] NO  EXPLAIN:    BRUNSON: [ ] YES [ ] NO    DATE INSERTED:  REMOVE:  [ ] YES [ ] NO  EXPLAIN:    A-LINE:  [ ] YES [ ] NO  LOCATION:   DATE INSERTED:  REMOVE:  [ ] YES [ ] NO  EXPLAIN:    PMH -reviewed admission note, no change since admission  Heart faliure: acute [ ] chronic [ ] acute or chronic [ ] diastolic [ ] systolic [ ] combied systolic and diastolic[ ]  NING: ATN[ ] renal medullary necrosis [ ] CKD I [ ]CKDII [ ]CKD III [ ]CKD IV [ ]CKD V [ ]Other pathological lesions [ ]  Abdominal Nutrition Status: malnutrition [ ] cachexia [ ] morbid obesity/BMI=40 [ ] Supplement ordered [___________]     ICU Vital Signs Last 24 Hrs  T(C): 37.1 (02 Feb 2018 05:00), Max: 38.3 (02 Feb 2018 00:00)  T(F): 98.7 (02 Feb 2018 05:00), Max: 100.9 (02 Feb 2018 00:00)  HR: 83 (02 Feb 2018 06:30) (82 - 177)  BP: 116/45 (02 Feb 2018 06:30) (71/39 - 177/99)  BP(mean): 63 (02 Feb 2018 06:30) (47 - 111)  ABP: 118/46 (02 Feb 2018 06:30) (68/36 - 167/55)  ABP(mean): 64 (02 Feb 2018 06:30) (45 - 94)  RR: 22 (02 Feb 2018 06:30) (14 - 27)  SpO2: 100% (02 Feb 2018 06:30) (94% - 100%)      ABG - ( 01 Feb 2018 05:02 )  pH: 7.28  /  pCO2: 38    /  pO2: 171   / HCO3: 17    / Base Excess: -8.1  /  SaO2: 99                    01-31 @ 07:01  -  02-01 @ 07:00  --------------------------------------------------------  IN: 441.1 mL / OUT: 935 mL / NET: -493.9 mL            PHYSICAL EXAM:    GENERAL: [ ]NAD, [ ]well-groomed, [ ]well-developed  HEAD:  [ ]Atraumatic, [ ]Normocephalic  EYES: [ ]EOMI, [ ]PERRLA, [ ]conjunctiva and sclera clear  ENMT: [ ]No tonsillar erythema, exudates, or enlargement; [ ]Moist mucous membranes, [ ]Good dentition, [ ]No lesions  NECK: [ ]Supple, normal appearance, [ ]No JVD; [ ]Normal thyroid; [ ]Trachea midline  NERVOUS SYSTEM:  [ ]Alert & Oriented X3, [ ]Good concentration; [ ]Motor Strength 5/5 B/L upper and lower extremities; [ ]DTRs 2+ intact and symmetric  CHEST/LUNG: [ ]No chest deformity; [ ]Normal percussion bilaterally; [ ]No rales, rhonchi, wheezing   HEART: [ ]Regular rate and rhythm; [ ]No murmurs, rubs, or gallops  ABDOMEN: [ ]Soft, Nontender, Nondistended; [ ]Bowel sounds present  EXTREMITIES:  [ ]2+ Peripheral Pulses, [ ]No clubbing, cyanosis, or edema  LYMPH: [ ]No lymphadenopathy noted  SKIN: [ ]No rashes or lesions; [ ]Good capillary refill      LABS:  CBC Full  -  ( 01 Feb 2018 06:32 )  WBC Count : 18.9 K/uL  Hemoglobin : 8.5 g/dL  Hematocrit : 26.0 %  Platelet Count - Automated : 245 K/uL  Mean Cell Volume : 101.9 fl  Mean Cell Hemoglobin : 33.2 pg  Mean Cell Hemoglobin Concentration : 32.5 gm/dL  Auto Neutrophil # : 16.1 K/uL  Auto Lymphocyte # : 1.0 K/uL  Auto Monocyte # : 1.5 K/uL  Auto Eosinophil # : 0.2 K/uL  Auto Basophil # : 0.2 K/uL  Auto Neutrophil % : 84.9 %  Auto Lymphocyte % : 5.1 %  Auto Monocyte % : 8.1 %  Auto Eosinophil % : 0.9 %  Auto Basophil % : 0.9 %    02-01    137  |  99  |  167<H>  ----------------------------<  227<H>  4.4   |  22  |  6.45<H>    Ca    6.3<LL>      01 Feb 2018 21:28  Phos  13.4     02-01  Mg     3.1     02-01    TPro  6.0  /  Alb  1.6<L>  /  TBili  0.7  /  DBili  x   /  AST  34  /  ALT  32  /  AlkPhos  118  02-01            RADIOLOGY & ADDITIONAL STUDIES REVIEWED:  ***    [ ]GOALS OF CARE DISCUSSION WITH PATIENT/FAMILY/PROXY:    CRITICAL CARE TIME SPENT: 35 minutes INTERVAL HPI/OVERNIGHT EVENTS: *** patient was seen and examined at bed side. patient is not following commands, had an episode of SVT over night.     PRESSORS: [ ] YES [x ] NO  WHICH:    ANTIBIOTICS:       meropenem           DATE STARTED:  ANTIBIOTICS:                  DATE STARTED:  ANTIBIOTICS:                  DATE STARTED:    Antimicrobial:  meropenem IVPB 500 milliGRAM(s) IV Intermittent every 12 hours    Cardiovascular:  metoprolol     tartrate 12.5 milliGRAM(s) Oral three times a day  phenylephrine    Infusion 0.5 MICROgram(s)/kG/Min IV Continuous <Continuous>  tamsulosin 0.4 milliGRAM(s) Oral at bedtime    Pulmonary:  ALBUTerol    90 MICROgram(s) HFA Inhaler 1 Puff(s) Inhalation every 4 hours  ALBUTerol/ipratropium for Nebulization 3 milliLiter(s) Nebulizer every 6 hours  tiotropium 18 MICROgram(s) Capsule 1 Capsule(s) Inhalation daily    Hematalogic:    Other:  acetaminophen    Suspension 650 milliGRAM(s) Oral every 6 hours PRN  aluminum hydroxide Suspension 30 milliLiter(s) Oral every 6 hours  chlorhexidine 4% Liquid 1 Application(s) Topical daily  finasteride 5 milliGRAM(s) Oral daily  insulin glargine Injectable (LANTUS) 20 Unit(s) SubCutaneous at bedtime  insulin glargine Injectable (LANTUS) 20 Unit(s) SubCutaneous every morning  insulin lispro (HumaLOG) corrective regimen sliding scale   SubCutaneous every 6 hours  pantoprazole  Injectable 40 milliGRAM(s) IV Push every 12 hours  predniSONE   Tablet 20 milliGRAM(s) Oral daily  sevelamer carbonate Powder 1600 milliGRAM(s) Oral three times a day with meals    acetaminophen    Suspension 650 milliGRAM(s) Oral every 6 hours PRN  ALBUTerol    90 MICROgram(s) HFA Inhaler 1 Puff(s) Inhalation every 4 hours  ALBUTerol/ipratropium for Nebulization 3 milliLiter(s) Nebulizer every 6 hours  aluminum hydroxide Suspension 30 milliLiter(s) Oral every 6 hours  chlorhexidine 4% Liquid 1 Application(s) Topical daily  finasteride 5 milliGRAM(s) Oral daily  insulin glargine Injectable (LANTUS) 20 Unit(s) SubCutaneous at bedtime  insulin glargine Injectable (LANTUS) 20 Unit(s) SubCutaneous every morning  insulin lispro (HumaLOG) corrective regimen sliding scale   SubCutaneous every 6 hours  meropenem IVPB 500 milliGRAM(s) IV Intermittent every 12 hours  metoprolol     tartrate 12.5 milliGRAM(s) Oral three times a day  pantoprazole  Injectable 40 milliGRAM(s) IV Push every 12 hours  phenylephrine    Infusion 0.5 MICROgram(s)/kG/Min IV Continuous <Continuous>  predniSONE   Tablet 20 milliGRAM(s) Oral daily  sevelamer carbonate Powder 1600 milliGRAM(s) Oral three times a day with meals  tamsulosin 0.4 milliGRAM(s) Oral at bedtime  tiotropium 18 MICROgram(s) Capsule 1 Capsule(s) Inhalation daily    Drug Dosing Weight  Height (cm): 185.42 (20 Jan 2018 10:22)  Weight (kg): 96 (25 Jan 2018 07:30)  BMI (kg/m2): 27.9 (25 Jan 2018 07:30)  BSA (m2): 2.2 (25 Jan 2018 07:30)    CENTRAL LINE: [x ] YES [ ] NO  LOCATION: [facility]  DATE INSERTED:  REMOVE: [ ] YES [ ] NO  EXPLAIN:    BRUNSON: [x ] YES [ ] NO    DATE INSERTED:  REMOVE:  [ ] YES [ ] NO  EXPLAIN:    A-LINE:  [x ] YES [ ] NO  LOCATION:   DATE INSERTED:  REMOVE:  [ ] YES [ ] NO  EXPLAIN:    PMH -reviewed admission note, no change since admission  Heart faliure: acute [ ] chronic [ ] acute or chronic [ ] diastolic [ ] systolic [ ] combied systolic and diastolic[ ]  NING: ATN[ ] renal medullary necrosis [ ] CKD I [ ]CKDII [ ]CKD III [ ]CKD IV [ ]CKD V [ ]Other pathological lesions [ ]  Abdominal Nutrition Status: malnutrition [ ] cachexia [ ] morbid obesity/BMI=40 [ ] Supplement ordered [___________]     ICU Vital Signs Last 24 Hrs  T(C): 37.1 (02 Feb 2018 05:00), Max: 38.3 (02 Feb 2018 00:00)  T(F): 98.7 (02 Feb 2018 05:00), Max: 100.9 (02 Feb 2018 00:00)  HR: 83 (02 Feb 2018 06:30) (82 - 177)  BP: 116/45 (02 Feb 2018 06:30) (71/39 - 177/99)  BP(mean): 63 (02 Feb 2018 06:30) (47 - 111)  ABP: 118/46 (02 Feb 2018 06:30) (68/36 - 167/55)  ABP(mean): 64 (02 Feb 2018 06:30) (45 - 94)  RR: 22 (02 Feb 2018 06:30) (14 - 27)  SpO2: 100% (02 Feb 2018 06:30) (94% - 100%)      ABG - ( 01 Feb 2018 05:02 )  pH: 7.28  /  pCO2: 38    /  pO2: 171   / HCO3: 17    / Base Excess: -8.1  /  SaO2: 99                    01-31 @ 07:01  -  02-01 @ 07:00  --------------------------------------------------------  IN: 441.1 mL / OUT: 935 mL / NET: -493.9 mL            PHYSICAL EXAM:    GENERAL: [x ]NAD, [ ]well-groomed, [ ]well-developed  HEAD:  [ ]Atraumatic, [ ]Normocephalic  EYES: [ ]EOMI, [ ]PERRLA, [x ]conjunctiva and sclera clear  ENMT: [ ]No tonsillar erythema, exudates, or enlargement; [x ]Moist mucous membranes, [ ]Good dentition, [ ]No lesions  NECK: [ ]Supple, normal appearance, [x ]No JVD; [ ]Normal thyroid; [ ]Trachea midline  NERVOUS SYSTEM: awake but not alert, not following commands.   CHEST/LUNG: [x ]No chest deformity; [ ]Normal percussion bilaterally; [ ]No rales, rhonchi, wheezing   HEART: [ ]Regular rate and rhythm; [x ]No murmurs, rubs, or gallops  ABDOMEN: [ x]Soft, Nontender, Nondistended; [ ]Bowel sounds present  EXTREMITIES:  [x ]2+ Peripheral Pulses, [x ]No clubbing, cyanosis, or edema  LYMPH: [x ]No lymphadenopathy noted  SKIN: [x ]No rashes or lesions; [ ]Good capillary refill      LABS:  CBC Full  -  ( 01 Feb 2018 06:32 )  WBC Count : 18.9 K/uL  Hemoglobin : 8.5 g/dL  Hematocrit : 26.0 %  Platelet Count - Automated : 245 K/uL  Mean Cell Volume : 101.9 fl  Mean Cell Hemoglobin : 33.2 pg  Mean Cell Hemoglobin Concentration : 32.5 gm/dL  Auto Neutrophil # : 16.1 K/uL  Auto Lymphocyte # : 1.0 K/uL  Auto Monocyte # : 1.5 K/uL  Auto Eosinophil # : 0.2 K/uL  Auto Basophil # : 0.2 K/uL  Auto Neutrophil % : 84.9 %  Auto Lymphocyte % : 5.1 %  Auto Monocyte % : 8.1 %  Auto Eosinophil % : 0.9 %  Auto Basophil % : 0.9 %    02-01    137  |  99  |  167<H>  ----------------------------<  227<H>  4.4   |  22  |  6.45<H>    Ca    6.3<LL>      01 Feb 2018 21:28  Phos  13.4     02-01  Mg     3.1     02-01    TPro  6.0  /  Alb  1.6<L>  /  TBili  0.7  /  DBili  x   /  AST  34  /  ALT  32  /  AlkPhos  118  02-01            RADIOLOGY & ADDITIONAL STUDIES REVIEWED:  ***    [ ]GOALS OF CARE DISCUSSION WITH PATIENT/FAMILY/PROXY:    CRITICAL CARE TIME SPENT: 35 minutes

## 2018-02-02 NOTE — CONSULT NOTE ADULT - PROVIDER SPECIALTY LIST ADULT
Cardiology
Gastroenterology
Internal Medicine
Nephrology
Palliative Care
Pulmonology
Vascular Surgery
Infectious Disease

## 2018-02-02 NOTE — PROGRESS NOTE ADULT - PROBLEM SELECTOR PLAN 8
-held ASA, Plavix due to GI bleed  -Patient finished heparin drip, troponin were elevated due to stress. ECHO EF 55 %with normal LV functions   - tropnin likely due to demand ischaemia 2/2 septic shock   - held lipitor due to worsening LFTs  - HD stable, currently off the pressor support   - Elevated d-dimer with normal venous doppler with no RV strain on ECHO less likely PE,   - elevated d-dimer 2/2 renal failure and sepsis    - Dr Benson cardio eval noted   -patient is DNR/ and donot reintubate .  palliative care consult noted -held ASA, Plavix due to GI bleed  -Patient finished heparin drip, troponin were elevated due to stress. ECHO EF 55 %with normal LV functions   - tropnin likely due to demand ischaemia 2/2 septic shock   - held lipitor due to worsening LFTs  - HD stable, currently off the pressor support   - Elevated d-dimer with normal venous doppler with no RV strain on ECHO less likely PE,   - elevated d-dimer 2/2 renal failure and sepsis    - Dr Benson cardio eval noted   -patient is DNR/ and do not reintubate .  palliative care consult noted

## 2018-02-02 NOTE — PROGRESS NOTE ADULT - PROBLEM SELECTOR PLAN 6
Diet controlled   HbA1c. 6.0  lantus 20 units at bed time  will hold morning lantus added 20 as patient is npo for possible lower GI bleed.    - continue with sliding scale Q 6 hrs Diet controlled   HbA1c. 6.0  lantus 20 units at bed time  - continue with sliding scale Q 6 hrs

## 2018-02-02 NOTE — PROGRESS NOTE ADULT - ASSESSMENT
1.	Pneumonia   2.	Leukocytosis - improving  3.	Fevers  4.	S/p septic shock  ·	cont meropenem to 500mg IV q12h D11  ·	will monitor temps for now  Time spent - 35 minutes

## 2018-02-02 NOTE — PROGRESS NOTE ADULT - SUBJECTIVE AND OBJECTIVE BOX
Patient extubated to face mask oxygen    acetaminophen    Suspension 650 milliGRAM(s) Oral every 6 hours PRN  ALBUTerol    90 MICROgram(s) HFA Inhaler 1 Puff(s) Inhalation every 4 hours  ALBUTerol/ipratropium for Nebulization 3 milliLiter(s) Nebulizer every 6 hours  chlorhexidine 4% Liquid 1 Application(s) Topical daily  finasteride 5 milliGRAM(s) Oral daily  insulin glargine Injectable (LANTUS) 20 Unit(s) SubCutaneous at bedtime  insulin glargine Injectable (LANTUS) 20 Unit(s) SubCutaneous every morning  insulin lispro (HumaLOG) corrective regimen sliding scale   SubCutaneous every 6 hours  meropenem IVPB 500 milliGRAM(s) IV Intermittent every 12 hours  metoprolol     tartrate 25 milliGRAM(s) Oral every 8 hours  pantoprazole  Injectable 40 milliGRAM(s) IV Push every 12 hours  phytonadione   Solution 5 milliGRAM(s) Oral daily  sevelamer carbonate Powder 1600 milliGRAM(s) Oral three times a day with meals  tamsulosin 0.4 milliGRAM(s) Oral at bedtime  tiotropium 18 MICROgram(s) Capsule 1 Capsule(s) Inhalation daily                            7.5    15.0  )-----------( 271      ( 02 Feb 2018 14:42 )             23.4       Hemoglobin: 7.5 g/dL (02-02 @ 14:42)  Hemoglobin: 7.7 g/dL (02-02 @ 06:43)  Hemoglobin: 8.5 g/dL (02-01 @ 06:32)  Hemoglobin: 9.3 g/dL (01-31 @ 12:37)  Hemoglobin: 9.0 g/dL (01-31 @ 06:12)      02-02    138  |  99  |  161  ----------------------------<  265<H>  4.2   |  20<L>  |  6.87<H>    Ca    6.5<LL>      02 Feb 2018 06:43  Phos  9.9     02-02  Mg     3.0     02-02    TPro  6.0  /  Alb  1.6<L>  /  TBili  0.7  /  DBili  x   /  AST  34  /  ALT  32  /  AlkPhos  118  02-01    Creatinine Trend: 6.87<--, 6.45<--, 7.72<--, 6.87<--, 6.75<--, 7.25<--    COAGS: PT/INR - ( 02 Feb 2018 06:43 )   PT: 14.3 sec;   INR: 1.30 ratio             CARDIAC MARKERS ( 01 Feb 2018 17:34 )  x     / x     / 700 U/L / x     / x            T(C): 36.4 (02-02-18 @ 16:00), Max: 38.3 (02-02-18 @ 00:00)  HR: 68 (02-02-18 @ 19:00) (68 - 99)  BP: 104/46 (02-02-18 @ 19:00) (104/46 - 153/61)  RR: 21 (02-02-18 @ 19:00) (20 - 28)  SpO2: 97% (02-02-18 @ 19:00) (96% - 100%)  Wt(kg): --    I&O's Summary    01 Feb 2018 07:01  -  02 Feb 2018 07:00  --------------------------------------------------------  IN: 383.6 mL / OUT: 790 mL / NET: -406.4 mL    02 Feb 2018 07:01  -  02 Feb 2018 19:32  --------------------------------------------------------  IN: 325 mL / OUT: 607 mL / NET: -282 mL                Heart: normal S1, S2, RRR, no m/r/g  Lungs: cta b/l  Abd: soft nT, nD  Ext: no edema      TELEMETRY:  SR, Brief PAT      ASSESSMENT/PLAN: 	72y Male [facility] h/o CKD, Anxiety disorder, CAD, BPH, Carpel tunnel syndrome, compulsive disorder, DM, OA, HTN, IBS sent for dyspnea for last 3 days found with (+) flu, PNA likely septic shock normal LV function NING now on HD    - Cont broad spectrum abx and supportive care per MICU  - close respiratory status monitoring  - Hold antihypertensives until BP stable  - cont BB for brief PAT if BP remains stable    Jake Benson MD, FACC  [facility], [facility]  [address] [place] [address]  PHONE:  [phone]  BEEPER : [phone]

## 2018-02-02 NOTE — PROGRESS NOTE ADULT - SUBJECTIVE AND OBJECTIVE BOX
pt seen and examined.    SUBJECTIVE:  pt is lethargic, arousable on nasal o2    REVIEW OF SYSTEMS:  Unobtainable  Current meds:    acetaminophen    Suspension 650 milliGRAM(s) Oral every 6 hours PRN  ALBUTerol    90 MICROgram(s) HFA Inhaler 1 Puff(s) Inhalation every 4 hours  ALBUTerol/ipratropium for Nebulization 3 milliLiter(s) Nebulizer every 6 hours  chlorhexidine 4% Liquid 1 Application(s) Topical daily  finasteride 5 milliGRAM(s) Oral daily  insulin glargine Injectable (LANTUS) 20 Unit(s) SubCutaneous at bedtime  insulin glargine Injectable (LANTUS) 20 Unit(s) SubCutaneous every morning  insulin lispro (HumaLOG) corrective regimen sliding scale   SubCutaneous every 6 hours  meropenem IVPB 500 milliGRAM(s) IV Intermittent every 12 hours  metoprolol     tartrate 25 milliGRAM(s) Oral every 8 hours  pantoprazole  Injectable 40 milliGRAM(s) IV Push every 12 hours  sevelamer carbonate Powder 1600 milliGRAM(s) Oral three times a day with meals  tamsulosin 0.4 milliGRAM(s) Oral at bedtime  tiotropium 18 MICROgram(s) Capsule 1 Capsule(s) Inhalation daily      Vital Signs    T(F): 97.5 (18 @ 16:00), Max: 100.9 (18 @ 00:00)  HR: 68 (18 @ 18:00) (68 - 165)  BP: 108/46 (18 @ 18:00) (105/45 - 153/61)  ABP: 144/54 (18 @ 18:00) (77/45 - 172/59)  RR: 20 (18 @ 18:00) (17 - 28)  SpO2: 98% (18 @ 18:00) (96% - 100%)  Wt(kg): --  CVP(cm H2O): --  CO: --  PCWP: --    I and O's:     @ 07:01  -   @ 07:00  --------------------------------------------------------  IN:    Enteral Tube Flush: 400 mL    pantoprazole Infusion: 30 mL    phenylephrine   Infusion: 11.1 mL  Total IN: 441.1 mL    OUT:    Indwelling Catheter - Urethral: 935 mL  Total OUT: 935 mL    Total NET: -493.9 mL       @ 07:01  -   @ 07:00  --------------------------------------------------------  IN:    Nepro with Carb Steady: 150 mL    phenylephrine   Infusion: 133.6 mL    Solution: 100 mL  Total IN: 383.6 mL    OUT:    Indwelling Catheter - Urethral: 790 mL  Total OUT: 790 mL    Total NET: -406.4 mL       @ 07:01   @ 18:45  --------------------------------------------------------  IN:    Enteral Tube Flush: 325 mL  Total IN: 325 mL    OUT:    Indwelling Catheter - Urethral: 570 mL  Total OUT: 570 mL    Total NET: -245 mL        Daily     Daily Weight in k.7 (2018 07:00)    PHYSICAL EXAM:  Constitutional: well developed, obese and in and  HEENT: PERRLA,  no icteric sclera and mild pallor of conjunctiva noted  Neck: No JVD, thyromegaly or adenopathy  Respiratory: reduced air entry lower lungs with no rales, wheezing or rhonchi  Cardiovascular: S1 and S2 normally heard  Gastrointestinal: soft, distended, nontender and normal bowel sounds heard  Extremities: 2 plus peripheral edema noted   Neurological: pt is responsive but not oriented  : No flank or cva tenderness palpated.  Skin: No rashes      LABS:    CBC:                          7.5    15.0  )-----------( 271      ( 2018 14:42 )             23.4           BMP:        138  |  99  |  161  ----------------------------<  265<H>  4.2   |  20<L>  |  6.87<H>  02    137  |  99  |  167<H>  ----------------------------<  227<H>  4.4   |  22  |  6.45<H>  02    137  |  96  |  204  ----------------------------<  254<H>  5.0   |  18<L>  |  7.72<H>      137  |  99  |  175<H>  ----------------------------<  304<H>  4.1   |  20<L>  |  6.87<H>      133<L>  |  97  |  161<H>  ----------------------------<  419<H>  4.3   |  18<L>  |  6.75<H>    Ca    6.5<LL>      2018 06:43  Ca    6.3<LL>      2018 21:28  Ca    6.1      2018 06:32  Ca    6.2<LL>      2018 06:12  Ca    6.3<LL>      2018 23:09  Phos  9.9     -  Phos  13.4     -  Phos  11.1     -  Mg     3.0     -  Mg     3.1     -  Mg     3.0         TPro  6.0  /  Alb  1.6<L>  /  TBili  0.7  /  DBili  x   /  AST  34  /  ALT  32  /  AlkPhos  118    TPro  5.9<L>  /  Alb  1.5<L>  /  TBili  0.7  /  DBili  x   /  AST  29  /  ALT  36  /  AlkPhos  145<H>

## 2018-02-02 NOTE — PROGRESS NOTE ADULT - PROBLEM SELECTOR PLAN 7
resumed metoprolol 12.5 Q8 hour, patient had an episode of SVT. patient received 1 stat dose of adenosine. will continue to follow increased metoprolol 25 Q8 hour, patient had an episode of SVT. yesterday   patient received 1 stat dose of adenosine. will continue to follow  Dr Benson following, suggested to continue BB

## 2018-02-02 NOTE — CONSULT NOTE ADULT - ASSESSMENT
72M w/respiratory and multiorgan failure, DNR/DNI developed lower GI bleeding.  -lower GI bleeding is due to either diverticular bleeding or hemorrhoidal bleeding (internal hemorrhoids)  -it is less likely that he has UGI bleeding as he is hemodynamically stable and not on pressors  -given pt's grave condition, the risks of colonoscopic intervention far outweigh the benefits  -would tx pt conservatively with supportive care  -check CBC q6h and transfuse if Hb <7  -would given Vit K 5 mg PO daily x 3 days  -restart Tube Feeds  -would minimize anticoagulation of any kind in setting of current lower GI bleeding  -further care per ICU and palliative care teams

## 2018-02-02 NOTE — PROGRESS NOTE ADULT - PROBLEM SELECTOR PLAN 1
-Patient had 1 episode of BRBPR over night.   -on IV protonix BID,   -trickle feeds  -H/H 8.5 will continue to monitor. -Patient had no episode of BRBPR over night.   -on IV protonix BID,   -NPO, fecal occult blood positive  -H/H 7.3 will continue to monitor.  Gi dr cox consult appreciated

## 2018-02-02 NOTE — PROGRESS NOTE ADULT - SUBJECTIVE AND OBJECTIVE BOX
Time of Visit:  Patient seen and examined.     MEDICATIONS  (STANDING):  ALBUTerol    90 MICROgram(s) HFA Inhaler 1 Puff(s) Inhalation every 4 hours  ALBUTerol/ipratropium for Nebulization 3 milliLiter(s) Nebulizer every 6 hours  chlorhexidine 4% Liquid 1 Application(s) Topical daily  finasteride 5 milliGRAM(s) Oral daily  insulin glargine Injectable (LANTUS) 20 Unit(s) SubCutaneous at bedtime  insulin glargine Injectable (LANTUS) 20 Unit(s) SubCutaneous every morning  insulin lispro (HumaLOG) corrective regimen sliding scale   SubCutaneous every 6 hours  meropenem IVPB 500 milliGRAM(s) IV Intermittent every 12 hours  metoprolol     tartrate 25 milliGRAM(s) Oral every 8 hours  pantoprazole  Injectable 40 milliGRAM(s) IV Push every 12 hours  sevelamer carbonate Powder 1600 milliGRAM(s) Oral three times a day with meals  tamsulosin 0.4 milliGRAM(s) Oral at bedtime  tiotropium 18 MICROgram(s) Capsule 1 Capsule(s) Inhalation daily      MEDICATIONS  (PRN):  acetaminophen    Suspension 650 milliGRAM(s) Oral every 6 hours PRN For Temp greater than 38 C (100.4 F)       Medications up to date at time of exam.      PHYSICAL EXAMINATION:  Patient has no new complaints.  GENERAL: The patient is a well-developed, well-nourished, in no apparent distress.     Vital Signs Last 24 Hrs  T(C): 36.4 (02 Feb 2018 16:00), Max: 38.3 (02 Feb 2018 00:00)  T(F): 97.5 (02 Feb 2018 16:00), Max: 100.9 (02 Feb 2018 00:00)  HR: 71 (02 Feb 2018 17:00) (70 - 169)  BP: 129/50 (02 Feb 2018 17:00) (105/45 - 153/61)  BP(mean): 68 (02 Feb 2018 17:00) (60 - 88)  RR: 24 (02 Feb 2018 17:00) (17 - 28)  SpO2: 100% (02 Feb 2018 17:00) (96% - 100%)   (if applicable)    Chest Tube (if applicable)    HEENT: Head is normocephalic and atraumatic. Extraocular muscles are intact. Mucous membranes are moist.     NECK: Supple, no palpable adenopathy.    LUNGS: Clear to auscultation, no wheezing, rales, or rhonchi.    HEART: Regular rate and rhythm without murmur.    ABDOMEN: Soft, nontender, and nondistended.  No hepatosplenomegaly is noted.    EXTREMITIES: Without any cyanosis, clubbing, rash, lesions or edema.    NEUROLOGIC: Awake, alert, oriented.     SKIN: Warm, dry, good turgor.      LABS:                        7.5    15.0  )-----------( 271      ( 02 Feb 2018 14:42 )             23.4     02-02    138  |  99  |  161  ----------------------------<  265<H>  4.2   |  20<L>  |  6.87<H>    Ca    6.5<LL>      02 Feb 2018 06:43  Phos  9.9     02-02  Mg     3.0     02-02    TPro  6.0  /  Alb  1.6<L>  /  TBili  0.7  /  DBili  x   /  AST  34  /  ALT  32  /  AlkPhos  118  02-01    PT/INR - ( 02 Feb 2018 06:43 )   PT: 14.3 sec;   INR: 1.30 ratio             ABG - ( 01 Feb 2018 05:02 )  pH: 7.28  /  pCO2: 38    /  pO2: 171   / HCO3: 17    / Base Excess: -8.1  /  SaO2: 99                CARDIAC MARKERS ( 01 Feb 2018 17:34 )  x     / x     / 700 U/L / x     / x                    MICROBIOLOGY: (if applicable)    RADIOLOGY & ADDITIONAL STUDIES:  EKG:   CXR:  ECHO:    IMPRESSION: 72y Male PAST MEDICAL & SURGICAL HISTORY:  Sleep apnea: hx of spleep  Hyperlipidemia  CAD (coronary artery disease)  DM (diabetes mellitus)  HTN (hypertension)  S/P foot surgery   p/w     RECOMMENDATIONS: Time of Visit:  Patient seen and examined.     MEDICATIONS  (STANDING):  ALBUTerol    90 MICROgram(s) HFA Inhaler 1 Puff(s) Inhalation every 4 hours  ALBUTerol/ipratropium for Nebulization 3 milliLiter(s) Nebulizer every 6 hours  chlorhexidine 4% Liquid 1 Application(s) Topical daily  finasteride 5 milliGRAM(s) Oral daily  insulin glargine Injectable (LANTUS) 20 Unit(s) SubCutaneous at bedtime  insulin glargine Injectable (LANTUS) 20 Unit(s) SubCutaneous every morning  insulin lispro (HumaLOG) corrective regimen sliding scale   SubCutaneous every 6 hours  meropenem IVPB 500 milliGRAM(s) IV Intermittent every 12 hours  metoprolol     tartrate 25 milliGRAM(s) Oral every 8 hours  pantoprazole  Injectable 40 milliGRAM(s) IV Push every 12 hours  sevelamer carbonate Powder 1600 milliGRAM(s) Oral three times a day with meals  tamsulosin 0.4 milliGRAM(s) Oral at bedtime  tiotropium 18 MICROgram(s) Capsule 1 Capsule(s) Inhalation daily      MEDICATIONS  (PRN):  acetaminophen    Suspension 650 milliGRAM(s) Oral every 6 hours PRN For Temp greater than 38 C (100.4 F)       Medications up to date at time of exam.      PHYSICAL EXAMINATION:  Patient has no new complaints.  GENERAL: The patient is a well-developed, well-nourished, in no apparent distress.     Vital Signs Last 24 Hrs  T(C): 36.4 (02 Feb 2018 16:00), Max: 38.3 (02 Feb 2018 00:00)  T(F): 97.5 (02 Feb 2018 16:00), Max: 100.9 (02 Feb 2018 00:00)  HR: 71 (02 Feb 2018 17:00) (70 - 169)  BP: 129/50 (02 Feb 2018 17:00) (105/45 - 153/61)  BP(mean): 68 (02 Feb 2018 17:00) (60 - 88)  RR: 24 (02 Feb 2018 17:00) (17 - 28)  SpO2: 100% (02 Feb 2018 17:00) (96% - 100%)   (if applicable)    Chest Tube (if applicable)    HEENT: Head is normocephalic and atraumatic. Extraocular muscles are intact. Mucous membranes are moist.     NECK: Supple, no palpable adenopathy.    LUNGS: Clear to auscultation, no wheezing, rales, or rhonchi.    HEART: Regular rate and rhythm without murmur.    ABDOMEN: Soft, nontender, and nondistended.  No hepatosplenomegaly is noted.    EXTREMITIES: Without any cyanosis, clubbing, rash, lesions or edema.    NEUROLOGIC: Awake, alert, oriented.     SKIN: Warm, dry, good turgor.      LABS:                        7.5    15.0  )-----------( 271      ( 02 Feb 2018 14:42 )             23.4     02-02    138  |  99  |  161  ----------------------------<  265<H>  4.2   |  20<L>  |  6.87<H>    Ca    6.5<LL>      02 Feb 2018 06:43  Phos  9.9     02-02  Mg     3.0     02-02    TPro  6.0  /  Alb  1.6<L>  /  TBili  0.7  /  DBili  x   /  AST  34  /  ALT  32  /  AlkPhos  118  02-01    PT/INR - ( 02 Feb 2018 06:43 )   PT: 14.3 sec;   INR: 1.30 ratio             ABG - ( 01 Feb 2018 05:02 )  pH: 7.28  /  pCO2: 38    /  pO2: 171   / HCO3: 17    / Base Excess: -8.1  /  SaO2: 99                CARDIAC MARKERS ( 01 Feb 2018 17:34 )  x     / x     / 700 U/L / x     / x                    MICROBIOLOGY: (if applicable)    RADIOLOGY & ADDITIONAL STUDIES:  EKG:   CXR:  ECHO:    IMPRESSION: 72y Male PAST MEDICAL & SURGICAL HISTORY:  Sleep apnea: hx of spleep  Hyperlipidemia  CAD (coronary artery disease)  DM (diabetes mellitus)  HTN (hypertension)  S/P foot surgery   p/w     Imp:  This is a 72 yrs old man NHR with prior mentioned medical condition presented to ER with SOb, intubated for acute hypoxic resp failure due to influenza s/p course of tamiflu  now extubated. He progressed to ARDS, improved. He is requiring less FiO 2, off pressors. Acute renal failure on hemodialysis.    Sugg:  -asp precaution  -dialysis as per renal  -re-evaluae antibx or taper coverage  -hemodynamic support and monitoring

## 2018-02-02 NOTE — CONSULT NOTE ADULT - CONSULT REQUESTED DATE/TIME
02-Feb-2018 19:21
20-Jan-2018 16:09
22-Jan-2018 10:41
22-Jan-2018 13:25
23-Jan-2018 14:33
25-Jan-2018 15:38
29-Jan-2018 13:57
21-Jan-2018 15:16

## 2018-02-02 NOTE — PROGRESS NOTE ADULT - ASSESSMENT
1. ESRD sec to ATN: pt s renal function is unchanged(no recovery yet)  - pt will be dialysed tomorrow  -pts BUN is very high , most likley secondary to prednisone plus gi bleed(occult blood positive)  -pt needs new dilaysis cath as current cath is not working well   - hold nephrotoxic medications, adjust meds as per egfr  and avoid contrast studies/phosphate  enema, etc  -renal hd tube feeding     2. Hyperkalemia : now improved  -keep <5 and >4  -low K diet   -f/u k level q 12hrs      2. Metabolic and resp.acidosis   -multifactorial , s/p HD today  -monitor CO 2 daily   -keep PH >7.3       3. Hyperphosphatemia   -now mildly better today  -continue  Amphogel plus Renagel as d/w resident  -keep Phos>3, <5     4.Hyponatremia: now improved, secondary to ning  -avoid iv fluids  -f/u Na level daily  -continue  fluid restriction to 1 liter per day    5. Fluid overload/Edema  sec to ? CHF/NING , now mildly better  -UF as tolerated in hd  -avoid iv fluid and add iv Lasix prn    6.Hypocalcemia;improving (corrected ca )  -suggest to keep corrected ca >8.5 and <10  -f/u ca level daily

## 2018-02-02 NOTE — CONSULT NOTE ADULT - SUBJECTIVE AND OBJECTIVE BOX
GI INITIAL CONSULT    HPI: 72M w/stated PMH p/w dyspnea that quickly degraded to respiratory failure due to influenza. Pt subsequently developed multiorgan failure and is currently in the ICU with AMS and DNR/DNI given the severe debilitation and multiorgan failure. Pt also developed GI bleeding. Team reports intermittent lower GI bleeding over the past few days with a decline in his Hb. No melena or hematemesis, only hematochezia.    PMH: CAD, CKD, BPH, ALEJANDRO, DM2, HTN, CECILIO  PSH: no reported h/o abd surgeries    Meds: MEDICATIONS  (STANDING):  ALBUTerol    90 MICROgram(s) HFA Inhaler 1 Puff(s) Inhalation every 4 hours  ALBUTerol/ipratropium for Nebulization 3 milliLiter(s) Nebulizer every 6 hours  chlorhexidine 4% Liquid 1 Application(s) Topical daily  finasteride 5 milliGRAM(s) Oral daily  insulin glargine Injectable (LANTUS) 20 Unit(s) SubCutaneous at bedtime  insulin glargine Injectable (LANTUS) 20 Unit(s) SubCutaneous every morning  insulin lispro (HumaLOG) corrective regimen sliding scale   SubCutaneous every 6 hours  meropenem IVPB 500 milliGRAM(s) IV Intermittent every 12 hours  metoprolol     tartrate 25 milliGRAM(s) Oral every 8 hours  pantoprazole  Injectable 40 milliGRAM(s) IV Push every 12 hours  sevelamer carbonate Powder 1600 milliGRAM(s) Oral three times a day with meals  tamsulosin 0.4 milliGRAM(s) Oral at bedtime  tiotropium 18 MICROgram(s) Capsule 1 Capsule(s) Inhalation daily    SH: unable to obtain  FH: unable to obtain  ROS: unable to obtain    Vitals: T(C): 36.4 (02-02-18 @ 16:00)  T(F): 97.5 (02-02-18 @ 16:00), Max: 100.9 (02-02-18 @ 00:00)  HR: 68 (02-02-18 @ 19:00) (68 - 99)  BP: 104/46 (02-02-18 @ 19:00) (104/46 - 153/61)    Gen: NAD  CVS: RRR; S1/S2  Chest; coarse BS B/L  Abd: S/NT/ND  Rectal: blood-covered solid stool; no external hemorrhoids; no masses appreciated                        7.5    15.0  )-----------( 271      ( 02 Feb 2018 14:42 )             23.4   02-02    138  |  99  |  161  ----------------------------<  265<H>  4.2   |  20<L>  |  6.87<H>    Ca    6.5<LL>      02 Feb 2018 06:43  Phos  9.9     02-02  Mg     3.0     02-02    TPro  6.0  /  Alb  1.6<L>  /  TBili  0.7  /  DBili  x   /  AST  34  /  ALT  32  /  AlkPhos  118  02-01

## 2018-02-02 NOTE — PROGRESS NOTE ADULT - SUBJECTIVE AND OBJECTIVE BOX
ICU visit:  72y Male is under our care for pneumonia and leukocytosis.  Patient began with low grade fevers overnight though wbc count has improved. Recently taken off phenylephrine drip and tolerating.     REVIEW OF SYSTEMS:  [ X ] Not able to illicit    MEDS:  meropenem IVPB 500 milliGRAM(s) IV Intermittent every 12 hours    ALLERGIES: doxycycline (Unknown)  penicillin (Unknown)  tetracycline (Unknown)  Valtrex (Unknown)    VITALS:  ICU Vital Signs Last 24 Hrs  T(C): 36.4 (02 Feb 2018 16:00), Max: 38.3 (02 Feb 2018 00:00)  T(F): 97.5 (02 Feb 2018 16:00), Max: 100.9 (02 Feb 2018 00:00)  HR: 71 (02 Feb 2018 17:00) (70 - 169)  BP: 129/50 (02 Feb 2018 17:00) (105/45 - 153/61)  BP(mean): 68 (02 Feb 2018 17:00) (60 - 88)  ABP: 172/59 (02 Feb 2018 17:00) (77/45 - 172/59)  ABP(mean): 87 (02 Feb 2018 17:00) (56 - 88)  RR: 24 (02 Feb 2018 17:00) (17 - 28)  SpO2: 100% (02 Feb 2018 17:00) (96% - 100%)      PHYSICAL EXAM:  HEENT: +NGT +NC @ 2L/min  Neck: left neck CVP  Respiratory: bilateral coarse rhonchi no rales  Cardiovascular: S1 S2 reg no murmurs  Gastrointestinal: +BS with soft, mildly distended abdomen; nontender  +lopez  Extremities: +anasarca  Skin: no rashes  Ortho: n/a  Neuro: lethargic      LABS/DIAGNOSTIC TESTS:                        7.5    15.0  )-----------( 271      ( 02 Feb 2018 14:42 )             23.4   WBC Count: 15.0 K/uL (02-02 @ 14:42)  WBC Count: 14.4 K/uL (02-02 @ 06:43)  WBC Count: 18.9 K/uL (02-01 @ 06:32)  WBC Count: 18.6 K/uL (01-31 @ 12:37)  WBC Count: 15.4 K/uL (01-31 @ 06:12)    02-02    138  |  99  |  161  ----------------------------<  265<H>  4.2   |  20<L>  |  6.87<H>    Ca    6.5<LL>      02 Feb 2018 06:43  Phos  9.9     02-02  Mg     3.0     02-02    TPro  6.0  /  Alb  1.6<L>  /  TBili  0.7  /  DBili  x   /  AST  34  /  ALT  32  /  AlkPhos  118  02-01          CULTURES:   .Sputum Sputum trap  01-29 @ 13:45   Normal Respiratory Tonie present  --    Few polymorphonuclear leukocytes  Rare Squamous epithelial cells per low power field  Numerous Yeast per oil power field      .Blood Blood-Peripheral  01-29 @ 11:43   No growth to date.  --  --      .Sputum Sputumtrap rec'd  01-22 @ 10:48   Few Streptococcus pyogenes (Group A)  Normal Respiratory Tonie present  --    Numerous polymorphonuclear leukocytes  Rare Squamous epithelial cells per low power field  Numerous Gram Positive Cocci in Pairs and Chains per oil power field      .Urine Clean Catch (Midstream)  01-20 @ 22:48   No growth  --  --      .Blood Blood-Peripheral  01-20 @ 17:03   No growth at 5 days.  --  --      RADIOLOGY: no new studies

## 2018-02-02 NOTE — PROGRESS NOTE ADULT - ATTENDING COMMENTS
72 male nursing home patient with CAD, CKD, DM, HTN, OA, BPH, anxiety, presented with influenza, acute respiratory failure, septic shock, pneumonia. Now with NING requiring dialysis, encephalopathy, acute resp failure.  Extubated 1/31.   Still with encephalopathy.  Dialysis through groin HD catheter has been ineffective, can try a new HD catheter.  Overall prognosis poor.  Plan:  - abx per ID, completed tamiflu  - HD per nephrology - new HD cath placed 1/28- will try to place new catheter   - DNR/ Do not re-intubate per son

## 2018-02-02 NOTE — PROGRESS NOTE ADULT - PROBLEM SELECTOR PLAN 2
-no fevers in 44 hours, BP stable 104/50 HR in 80s  -RIJ removed, new LIJ placed.1/30   -repeat blood cultures, sputum cx -ve  -meropenem day 10 -no fevers in 72 hours, BP stable 104/50 HR in 80s  -RIJ removed, new LIJ placed.1/30   -repeat blood cultures, sputum cx -ve  -meropenem day 11

## 2018-02-02 NOTE — CONSULT NOTE ADULT - CONSULT REASON
Lower GI bleeding
MEDICAL MANAGEMENT
NING
Respiratory failure   NSTEMI
goals of care
resp failure
shiley placement for renal failure
post viral pneumonia

## 2018-02-02 NOTE — PROGRESS NOTE ADULT - PROBLEM SELECTOR PLAN 3
meets burlin's criteria of ARDS, acute onset <7 days, non cardiogenic, bilateral involvement, Fio2/PO2 245,   CXR shows B/l pachy infiltrates   patient on MV requiring Fio2 40%,   ECHO normal EF ,  HD today,  -on meropenem 500 mg Q12hrs ( renal adjusted)   - prednisone taper  - Extubated today. 1/31/18  -Poor prognosis, DNR/DNI meets burlin's criteria of ARDS, acute onset <7 days, non cardiogenic, bilateral involvement, Fio2/PO2 245,   CXR shows B/l pachy infiltrates   patient on MV requiring Fio2 40%,   ECHO normal EF ,  HD tomorrow,  -on meropenem 500 mg Q12hrs ( renal adjusted)   - prednisone taper  - Extubated on 1/31/18    -Poor prognosis, DNR/DNI

## 2018-02-02 NOTE — PROGRESS NOTE ADULT - ASSESSMENT
72 male nursing home patient with CAD, CKD, DM, HTN, OA, BPH, anxiety, presented with influenza, acute respiratory failure, septic shock.  May have superimposed pneumonia as well

## 2018-02-02 NOTE — PROGRESS NOTE ADULT - PROBLEM SELECTOR PLAN 5
patient had acute renal failure with metabolic acidosis 2/2 sepsis   hemodialysis tomorrow, making urine.  urine output net negative patient had acute renal failure with metabolic acidosis 2/2 sepsis   hemodialysis tomorrow, making urine.  -shiley needs to be changed by surgery tomorrow,

## 2018-02-03 NOTE — PROGRESS NOTE ADULT - ASSESSMENT
72 male nursing home patient with CAD, CKD, DM, HTN, OA, BPH, anxiety, presented with influenza, acute respiratory failure, septic shock.  May have superimposed pneumonia as well           Problem/Plan - 1:  ·  Problem: GI bleed.  Plan: -Patient had no episode of BRBPR over night.   -on IV protonix BID,   -NPO, fecal occult blood positive  -H/H 7.3 --> 7.6  Gi dr cox consult noted  as per GI likely lower GI bleed 2/2 hemorrhoids vs diverticular  continue to monitor CBC Q 6 hrs, Vit K 5 mg PO for 3 days for high INR   will HOLD on heparin, aspirin and plavix due to risk of bleeding.    - No colonoscopy as risks higher then benefits      Problem/Plan - 2:  ·  Problem: Sepsis.  Plan: -no fevers in 72 hours, BP stable 104/50 HR in 80s  -RIJ removed, new LIJ placed.1/30   -repeat blood cultures, sputum cx -ve  -meropenem day 12.     Problem/Plan - 3:  ·  Problem: ARDS (adult respiratory distress syndrome).  Plan: meets burlin's criteria of ARDS, acute onset <7 days, non cardiogenic, bilateral involvement, Fio2/PO2 245,   CXR shows B/l pachy infiltrates   patient on MV requiring Fio2 40%,   ECHO normal EF ,  HD tomorrow,  -on meropenem 500 mg Q12hrs ( renal adjusted)   - prednisone taper  - Extubated on 1/31/18    -Poor prognosis, DNR/DNI.     Problem/Plan - 4:  ·  Problem: Acute respiratory failure with hypoxia.  Plan: could be due to flu/PNA   off droplet isolation, Influenza B positive   completed tamiflu ,  - Procalcitonin 80.4  -urine and blood cultures are negative, urine legionella negative   - continue with Mechanical ventilation PEEP 5  - CT physiotherapy  - ID Dr Wilkinson following.      Problem/Plan - 5:  ·  Problem: Renal failure.  Plan: patient had acute renal failure with metabolic acidosis 2/2 sepsis   hemodialysis tomorrow, making urine.  -shiley needs to be changed by surgery tomorrow,     Problem/Plan - 6:  Problem: DM (diabetes mellitus). Plan: Diet controlled   HbA1c. 6.0  lantus 20 units at bed time  - continue with sliding scale Q 6 hrs.     Problem/Plan - 7:  ·  Problem: HTN (hypertension).  Plan: increased metoprolol 25 Q8 hour, patient had an episode of SVT. yesterday   patient received 1 stat dose of adenosine. will continue to follow  Dr Benson following, suggested to continue BB.     Problem/Plan - 8:  ·  Problem: CAD (coronary artery disease).  Plan: -held ASA, Plavix due to GI bleed  -Patient finished heparin drip, troponin were elevated due to stress. ECHO EF 55 %with normal LV functions   - tropnin likely due to demand ischaemia 2/2 septic shock   - held lipitor due to worsening LFTs  - HD stable, currently off the pressor support   - Elevated d-dimer with normal venous doppler with no RV strain on ECHO less likely PE,   - elevated d-dimer 2/2 renal failure and sepsis    - Dr Benson cardio eval noted   -patient is DNR/ and do not reintubate .  palliative care consult noted.

## 2018-02-03 NOTE — PROGRESS NOTE ADULT - ASSESSMENT
1. ESRD sec to ATN: pt s renal function is unchanged(no recovery yet)  - pt is not tolerating the dialysis and very unstable during dialysis  -pts BUN is very high , most likley secondary to prednisone plus gi bleed(occult blood positive)  -pt needs gi f/u for positive occult blood  - hold nephrotoxic medications, adjust meds as per egfr  and avoid contrast studies/phosphate  enema, etc  -renal hd tube feeding     2. Hyperkalemia : now improved  -keep <5 and >4  -low K diet   -f/u k level q 12hrs      2. Metabolic and resp.acidosis   -multifactorial   -monitor CO 2 daily   -keep PH >7.3       3. Hyperphosphatemia   -now worsening   -continue  Amphogel plus Renagel as d/w resident  -keep Phos>3, <5     4.Hyponatremia: now improved, secondary to ning  -avoid iv fluids  -f/u Na level daily  -continue  fluid restriction to 1 liter per day    5. Fluid overload/Edema  sec to ? CHF/NING , now mildly better  -we will add Lasix high dose to Rx edema/fluid overlaod as pt is not tolerating dialysis well   -avoid iv fluid     6.Hypocalcemia;improving (corrected ca )  -suggest to keep corrected ca >8.5 and <10  -f/u ca level daily

## 2018-02-03 NOTE — CHART NOTE - NSCHARTNOTEFT_GEN_A_CORE
Patient had new Shiley placed by the surgery team. He then had dialysis for ~15 minutes. Patient went into V Tach and BP started dropping. HD was discontinued. He receive 6mg of adenosine with no improvement in HR. Gave 12 mg Adenosine after which the patient went back to normal sinus rhythm.

## 2018-02-03 NOTE — PROGRESS NOTE ADULT - SUBJECTIVE AND OBJECTIVE BOX
Informed by icu team that patient needs emergecy hd today, however right shiley is not functioning well. this is confirmed by review of nephrology documentation.  Consideration for aleteplace via catheter, however with pt anemia and hx GI Bleed, will not risk use alteplace via catheter.  Under sterile conditions, catheter was manipulated, carefuly with sterile saline, and find unable to flush adequately with much resistance on withdrawl from both lumens.  Catheter was pulled back 1.5 cm, and continued effort to irrigate lumen results with Excallent flow antegrade and retrograde, via both catheters. This was confirmed with (2) 5cc sterile saline flushes, and (1) 10cc sterile saline flush on both lumens.  Lumens were filled with heparin solution to maximum volume indicated.  Sono at bedside confirms catheter in Left femoral vein.  For emergent HD today.  If continued HD is expected, may remove this catheter after next HD and plan for IR placement of permacath.

## 2018-02-03 NOTE — PROGRESS NOTE ADULT - ASSESSMENT
1.	Pneumonia   2.	Leukocytosis - improving  3.	Fevers - improved  4.	S/p septic shock  ·	cont meropenem to 500mg IV q12h D12  ·	will monitor temps for now  Time spent - 35 minutes

## 2018-02-03 NOTE — PROGRESS NOTE ADULT - ATTENDING COMMENTS
72 male nursing home patient with CAD, CKD, DM, HTN, OA, BPH, anxiety, presented with influenza, acute respiratory failure, septic shock.  May have superimposed pneumonia as well           Problem/Plan - 1:  ·  Problem: GI bleed.  Plan: -Patient had no episode of BRBPR over night.   -on IV protonix BID,   -NPO, fecal occult blood positive  -H/H 7.3 --> 7.6  Gi dr cox consult noted  as per GI likely lower GI bleed 2/2 hemorrhoids vs diverticular  continue to monitor CBC Q 6 hrs, Vit K 5 mg PO for 3 days for high INR   will HOLD on heparin, aspirin and plavix due to risk of bleeding.    - No colonoscopy as risks higher then benefits      Problem/Plan - 2:  ·  Problem: Sepsis.  Plan: -no fevers in 72 hours, BP stable 104/50 HR in 80s  -RIJ removed, new LIJ placed.1/30   -repeat blood cultures, sputum cx -ve  -meropenem day 12.     Problem/Plan - 3:  ·  Problem: ARDS (adult respiratory distress syndrome).  Plan: meets burlin's criteria of ARDS, acute onset <7 days, non cardiogenic, bilateral involvement, Fio2/PO2 245,   CXR shows B/l pachy infiltrates   patient on MV requiring Fio2 40%,   ECHO normal EF ,  HD tomorrow,  -on meropenem 500 mg Q12hrs ( renal adjusted)   - prednisone taper  - Extubated on 1/31/18    -Poor prognosis, DNR/DNI.     Problem/Plan - 4:  ·  Problem: Acute respiratory failure with hypoxia.  Plan: could be due to flu/PNA   off droplet isolation, Influenza B positive   completed tamiflu ,  - Procalcitonin 80.4  -urine and blood cultures are negative, urine legionella negative   - continue with Mechanical ventilation PEEP 5  - CT physiotherapy  - ID Dr Wilkinson following.      Problem/Plan - 5:  ·  Problem: Renal failure.  Plan: patient had acute renal failure with metabolic acidosis 2/2 sepsis   hemodialysis tomorrow, making urine.  -shiley needs to be changed by surgery tomorrow,     Problem/Plan - 6:  Problem: DM (diabetes mellitus). Plan: Diet controlled   HbA1c. 6.0  lantus 20 units at bed time  - continue with sliding scale Q 6 hrs.

## 2018-02-03 NOTE — PROGRESS NOTE ADULT - SUBJECTIVE AND OBJECTIVE BOX
72 years old male, remains in ICU, in our care for pneumonia and leukocytosis.  Pt is not in distress, on supplemental O2, afebrile, leukocytosis is improving, worsening renal function. Pt likely will be getting PermCath today for dialysis.    MEDS:  meropenem IVPB 500 milliGRAM(s) IV Intermittent every 12 hours    Allergies    doxycycline (Unknown)  penicillin (Unknown)  tetracycline (Unknown)  Valtrex (Unknown)    VITALS:  Vital Signs Last 24 Hrs  T(C): 36.8 (03 Feb 2018 04:24), Max: 36.8 (03 Feb 2018 04:24)  T(F): 98.2 (03 Feb 2018 04:24), Max: 98.2 (03 Feb 2018 04:24)  HR: 78 (03 Feb 2018 15:00) (68 - 148)  BP: 107/50 (03 Feb 2018 15:00) (80/52 - 142/55)  BP(mean): 64 (03 Feb 2018 15:00) (55 - 76)  RR: 26 (03 Feb 2018 15:00) (20 - 32)  SpO2: 100% (03 Feb 2018 15:00) (89% - 100%)    LABS/DIAGNOSTIC TESTS:                          7.7    14.1  )-----------( 278      ( 03 Feb 2018 06:53 )             22.3         02-03    141  |  102  |  194<H>  ----------------------------<  140<H>  4.1   |  20<L>  |  7.46<H>    Ca    6.4<LL>      03 Feb 2018 06:53  Phos  11.8     02-03  Mg     3.0     02-03        CULTURES:   .Sputum Sputum trap  01-29 @ 13:45   Normal Respiratory Tonie present  --    Few polymorphonuclear leukocytes  Rare Squamous epithelial cells per low power field  Numerous Yeast per oil power field      .Blood Blood-Peripheral  01-29 @ 11:43   No growth at 5 days.  --  --      .Sputum Sputumtrap rec'd  01-22 @ 10:48   Few Streptococcus pyogenes (Group A)  Normal Respiratory Tonie present  --    Numerous polymorphonuclear leukocytes  Rare Squamous epithelial cells per low power field  Numerous Gram Positive Cocci in Pairs and Chains per oil power field      .Urine Clean Catch (Midstream)  01-20 @ 22:48   No growth  --  --      .Blood Blood-Peripheral  01-20 @ 17:03   No growth at 5 days.  --  --    ROS:  [ x ] UNABLE TO ELICIT

## 2018-02-03 NOTE — PROGRESS NOTE ADULT - RS GEN PE MLT RESP DETAILS PC
rhonchi/breath sounds equal/no wheezes/no rales
no wheezes/no rhonchi/rales/breath sounds equal
good air movement/no rales/no wheezes/rhonchi
rhonchi/good air movement/rales/no wheezes/breath sounds equal
bilateral fine rales, on supplemental O2

## 2018-02-03 NOTE — PROGRESS NOTE ADULT - SUBJECTIVE AND OBJECTIVE BOX
pt seen and examined.    SUBJECTIVE:  pt is lethargic on 100 % NRBM  Pt did not tolerate hd as pt was tachycardic and Hypotensive   pts u/o is improving     REVIEW OF SYSTEMS:    Current meds:    acetaminophen    Suspension 650 milliGRAM(s) Oral every 6 hours PRN  chlorhexidine 4% Liquid 1 Application(s) Topical daily  finasteride 5 milliGRAM(s) Oral daily  insulin glargine Injectable (LANTUS) 20 Unit(s) SubCutaneous at bedtime  insulin glargine Injectable (LANTUS) 20 Unit(s) SubCutaneous every morning  insulin lispro (HumaLOG) corrective regimen sliding scale   SubCutaneous every 6 hours  meropenem IVPB 500 milliGRAM(s) IV Intermittent every 12 hours  metoprolol     tartrate 25 milliGRAM(s) Oral every 8 hours  pantoprazole  Injectable 40 milliGRAM(s) IV Push every 12 hours  phytonadione   Solution 5 milliGRAM(s) Oral daily  predniSONE   Tablet 10 milliGRAM(s) Oral daily  sevelamer carbonate Powder 1600 milliGRAM(s) Oral three times a day with meals  tamsulosin 0.4 milliGRAM(s) Oral at bedtime  tiotropium 18 MICROgram(s) Capsule 1 Capsule(s) Inhalation daily      Vital Signs    T(F): 98.2 (02-03-18 @ 04:24), Max: 98.2 (02-03-18 @ 04:24)  HR: 78 (02-03-18 @ 15:00) (68 - 148)  BP: 107/50 (02-03-18 @ 15:00) (80/52 - 142/55)  ABP: 148/57 (02-03-18 @ 15:00) (91/54 - 184/59)  RR: 26 (02-03-18 @ 15:00) (20 - 32)  SpO2: 100% (02-03-18 @ 15:00) (89% - 100%)  Wt(kg): --  CVP(cm H2O): --  CO: --  PCWP: --    I and O's:    02-01 @ 07:01  -  02-02 @ 07:00  --------------------------------------------------------  IN:    Nepro with Carb Steady: 150 mL    phenylephrine   Infusion: 133.6 mL    Solution: 100 mL  Total IN: 383.6 mL    OUT:    Indwelling Catheter - Urethral: 790 mL  Total OUT: 790 mL    Total NET: -406.4 mL      02-02 @ 07:01  -  02-03 @ 07:00  --------------------------------------------------------  IN:    Enteral Tube Flush: 325 mL    Solution: 50 mL  Total IN: 375 mL    OUT:    Indwelling Catheter - Urethral: 1142 mL  Total OUT: 1142 mL    Total NET: -767 mL        Daily     Daily     PHYSICAL EXAM:  Constitutional: well developed, obese and in and  HEENT: PERRLA,  no icteric sclera and mild pallor of conjunctiva noted  Neck: No JVD, thyromegaly or adenopathy  Respiratory: reduced air entry lower lungs with no rales, wheezing or rhonchi  Cardiovascular: S1 and S2 normally heard  Gastrointestinal: soft, distended, nontender and normal bowel sounds heard  Extremities: 2 plus peripheral edema noted   Neurological: pt is responsive but not oriented  : No flank or cva tenderness palpated.  Skin: No rashes      LABS:    CBC:                          7.7    14.1  )-----------( 278      ( 03 Feb 2018 06:53 )             22.3     BMP:    02-03    141  |  102  |  194<H>  ----------------------------<  140<H>  4.1   |  20<L>  |  7.46<H>  02-02    138  |  99  |  161  ----------------------------<  265<H>  4.2   |  20<L>  |  6.87<H>  02-01    137  |  99  |  167<H>  ----------------------------<  227<H>  4.4   |  22  |  6.45<H>  02-01    137  |  96  |  204  ----------------------------<  254<H>  5.0   |  18<L>  |  7.72<H>    Ca    6.4<LL>      03 Feb 2018 06:53  Ca    6.5<LL>      02 Feb 2018 06:43  Ca    6.3<LL>      01 Feb 2018 21:28  Ca    6.1      01 Feb 2018 06:32  Phos  11.8     02-03  Phos  9.9     02-02  Phos  13.4     02-01  Mg     3.0     02-03  Mg     3.0     02-02  Mg     3.1     02-01    TPro  6.0  /  Alb  1.6<L>  /  TBili  0.7  /  DBili  x   /  AST  34  /  ALT  32  /  AlkPhos  118  02-01

## 2018-02-04 NOTE — PROGRESS NOTE ADULT - ASSESSMENT
1. ESRD sec to [facility]: pt s renal function is unchanged(no recovery yet)  - pt is not tolerating the dialysis and very unstable during dialysis  -pts BUN is very high , most likley secondary to prednisone plus gi bleed(occult blood positive)  -pt needs gi f/u for positive occult blood  - hold nephrotoxic medications, adjust meds as per egfr  and avoid contrast studies/phosphate  enema, etc  -renal hd tube feeding     2. Hyperkalemia : now improved  -keep <5 and >4  -low K diet   -f/u k level q 12hrs      2. Metabolic and resp.acidosis   -multifactorial   -monitor CO 2 daily   -keep PH >7.3       3. Hyperphosphatemia   -now worsening   -continue  Amphogel plus Renagel as d/w resident  -keep Phos>3, <5     4.Hyponatremia: now improved, secondary to ning  -avoid iv fluids  -f/u Na level daily  -continue  fluid restriction to 1 liter per day    5. Fluid overload/Edema  sec to ? CHF/NING , now mildly better  -we will continue  Lasix high dose to Rx edema/fluid overlaod as pt is not tolerating dialysis well   -avoid iv fluid     6.Hypocalcemia;improving (corrected ca )  -suggest to keep corrected ca >8.5 and <10  -f/u ca level daily

## 2018-02-04 NOTE — CHART NOTE - NSCHARTNOTEFT_GEN_A_CORE
patient had 2 episodes of SVT, which reverted to NSR after 6mg of adenosine each time, discussed with cardiology Dr. Gonzales, who recommended patient is not a candidate for ablation, start on amiodarone 200mg TID for 5 days, will continue to follow further, family Son/ HCP at the bedside aware of patient's clinical condition. patient had 2 episodes of SVT, which reverted to NSR after 6mg of adenosine each time, discussed with cardiology Dr. Gonzales, who recommended patient is not a candidate for ablation, start on amiodarone 200mg TID for 5 days, will continue to follow further, family Son/ HCP at the bedside aware of patient's clinical condition.s

## 2018-02-04 NOTE — PROGRESS NOTE ADULT - NECK DETAILS
no JVD/supple
no JVD/supple
no JVD/supple/left neck CVP site clean
Right CVP line is clean/supple/no JVD
left IJ line in place, dressing intact/supple/no JVD
left IJ line in place, clean dressing/no JVD/supple

## 2018-02-04 NOTE — PROGRESS NOTE ADULT - CONSTITUTIONAL DETAILS
well-developed/no distress
well-developed/no distress
gets agitated on examination
well-nourished/no distress
no distress
no distress

## 2018-02-04 NOTE — PROGRESS NOTE ADULT - SUBJECTIVE AND OBJECTIVE BOX
ICU visit  72 years old male in our care for pneumonia, fever and leukocytosis.  Pt remains in ICU, had an episode of SVT overnight, adenosine was given. Currently pt is not in distress, Tmax 100 this am, increase in leukocytosis noted, pt's renal function continues worsening, for dialysis tomorrow via [facility]. Today's chest xray: Small left effusion and left basilar opacity. Clear right lung.    MEDS:  meropenem IVPB 500 milliGRAM(s) IV Intermittent every 12 hours    allergies    doxycycline (Unknown)  penicillin (Unknown)  tetracycline (Unknown)  Valtrex (Unknown)      VITALS:  Vital Signs Last 24 Hrs  T(C): 37.8 (04 Feb 2018 05:00), Max: 37.8 (04 Feb 2018 05:00)  T(F): 100 (04 Feb 2018 05:00), Max: 100 (04 Feb 2018 05:00)  HR: 95 (04 Feb 2018 11:00) (75 - 148)  BP: 120/47 (04 Feb 2018 11:00) (80/52 - 142/59)  BP(mean): 64 (04 Feb 2018 11:00) (58 - 78)  RR: 24 (04 Feb 2018 11:00) (24 - 31)  SpO2: 100% (04 Feb 2018 11:00) (93% - 100%)    LABS/DIAGNOSTIC TESTS:                          8.0    16.4  )-----------( 354      ( 04 Feb 2018 06:22 )             25.2     WBC trend  16.4  16.6  14.1  14.3  15.0  14.4      02-04    143  |  105  |  204  ----------------------------<  169<H>  4.3   |  18<L>  |  7.70<H>    Ca    6.7<L>      04 Feb 2018 06:22  Phos  12     02-04  Mg     3.3     02-04        CULTURES:   .Sputum Sputum trap  01-29 @ 13:45   Normal Respiratory Tonie present  --    Few polymorphonuclear leukocytes  Rare Squamous epithelial cells per low power field  Numerous Yeast per oil power field      .Blood Blood-Peripheral  01-29 @ 11:43   No growth at 5 days.  --  --      .Sputum Sputumtrap rec'd  01-22 @ 10:48   Few Streptococcus pyogenes (Group A)  Normal Respiratory Tonie present  --    Numerous polymorphonuclear leukocytes  Rare Squamous epithelial cells per low power field  Numerous Gram Positive Cocci in Pairs and Chains per oil power field      .Urine Clean Catch (Midstream)  01-20 @ 22:48   No growth  --  --      .Blood Blood-Peripheral  01-20 @ 17:03   No growth at 5 days.  --  --      RADIOLOGY:    < from: Xray Chest 1 View- PORTABLE-Routine (02.04.18 @ 12:01) >  EXAM:  XR CHEST PORTABLE ROUTINE 1V                            PROCEDURE DATE:  02/04/2018          INTERPRETATION:  CLINICAL INFORMATION: Nasogastric tube.    A single portable view of the chest was obtained.     Comparison: 2/1/2018.     The mediastinal cardiac silhouette is unremarkable. Nasogastric tube   below the hemidiaphragms tip not visualized. Left IJ line with tip in   superior vena cava. No pneumothorax.    Small left effusion and left basilar opacity. Clear right lung.    No acute osseous finding.     IMPRESSION:    As above.      ANNABELLE PATTON M.D., ATTENDING RADIOLOGIST  This document has been electronically signed. Feb 4 2018 11:36AM    < end of copied text >      ROS:  [ x ] UNABLE TO ELICIT

## 2018-02-04 NOTE — PROGRESS NOTE ADULT - [facility] HE PE MLT D E PC
regular rate and rhythm
regular rate and rhythm/no murmur
regular rate and rhythm
regular rate and rhythm/no rub/no murmur

## 2018-02-04 NOTE — PROGRESS NOTE ADULT - SUBJECTIVE AND OBJECTIVE BOX
CARDIOLOGY ATTENDING    TELEMETRY:  SR, recurrent episodes of SVT    no palpitations, no syncope, no angina    acetaminophen    Suspension 650 milliGRAM(s) Oral every 6 hours PRN  amiodarone    Tablet 200 milliGRAM(s) Oral three times a day  chlorhexidine 4% Liquid 1 Application(s) Topical daily  finasteride 5 milliGRAM(s) Oral daily  furosemide   Injectable 80 milliGRAM(s) IV Push daily  insulin glargine Injectable (LANTUS) 20 Unit(s) SubCutaneous at bedtime  insulin glargine Injectable (LANTUS) 20 Unit(s) SubCutaneous every morning  insulin lispro (HumaLOG) corrective regimen sliding scale   SubCutaneous every 6 hours  meropenem IVPB 500 milliGRAM(s) IV Intermittent every 12 hours  metoprolol     tartrate 25 milliGRAM(s) Oral every 6 hours  pantoprazole  Injectable 40 milliGRAM(s) IV Push every 12 hours  predniSONE   Tablet 10 milliGRAM(s) Oral daily  sevelamer carbonate Powder 1600 milliGRAM(s) Oral three times a day with meals  tamsulosin 0.4 milliGRAM(s) Oral at bedtime  tiotropium 18 MICROgram(s) Capsule 1 Capsule(s) Inhalation daily                            8.0    16.4  )-----------( 354      ( 04 Feb 2018 06:22 )             25.2       02-04    143  |  105  |  204  ----------------------------<  169<H>  4.3   |  18<L>  |  7.70<H>    Ca    6.7<L>      04 Feb 2018 06:22  Phos  12     02-04  Mg     3.3     02-04        CARDIAC MARKERS ( 01 Feb 2018 17:34 )  x     / x     / 700 U/L / x     / x            T(C): 37.1 (02-04-18 @ 15:30), Max: 37.8 (02-04-18 @ 05:00)  HR: 79 (02-04-18 @ 13:00) (79 - 95)  BP: 109/59 (02-04-18 @ 13:00) (108/52 - 142/59)  RR: 24 (02-04-18 @ 13:00) (24 - 31)  SpO2: 99% (02-04-18 @ 13:00) (99% - 100%)  Wt(kg): --    no JVD  RRR, no murmurs  CTAB  soft nt/nd  no c/c/e          A/P) 71 y/o male nursing home resident, CKD, CAD, BPH, DM, HTN admitted with influenza resulting in NING now requiring HD. Echo shows normal LV function. Tele showing frequent episodes of asymptomatic SVT    - Cont broad spectrum abx and supportive care per MICU  - close respiratory status monitoring  - Hold antihypertensives until BP stable  - use amio 400 tid for 5 days for recurrent SVT

## 2018-02-04 NOTE — PROGRESS NOTE ADULT - ENMT COMMENTS
orally intubated, NGT in left nostril
left nostril NGT in place, on a ventimask
orally intubated
orally intubated, NGT in place
NG tube via left nostril
NGT via left nostril

## 2018-02-04 NOTE — PROGRESS NOTE ADULT - MENTAL STATUS
unresponsive currently
More awake than yesterday
sedated
sedated but easily arousable
sedated
pt is alert and awake, non-verbal

## 2018-02-04 NOTE — PROGRESS NOTE ADULT - SUBJECTIVE AND OBJECTIVE BOX
INTERVAL HPI/OVERNIGHT EVENTS: ***    PRESSORS: [ ] YES [ ] NO  WHICH:    ANTIBIOTICS:                  DATE STARTED:  ANTIBIOTICS:                  DATE STARTED:  ANTIBIOTICS:                  DATE STARTED:    Antimicrobial:  meropenem IVPB 500 milliGRAM(s) IV Intermittent every 12 hours    Cardiovascular:  furosemide   Injectable 80 milliGRAM(s) IV Push daily  metoprolol     tartrate 25 milliGRAM(s) Oral every 6 hours  tamsulosin 0.4 milliGRAM(s) Oral at bedtime    Pulmonary:  tiotropium 18 MICROgram(s) Capsule 1 Capsule(s) Inhalation daily    Hematalogic:    Other:  acetaminophen    Suspension 650 milliGRAM(s) Oral every 6 hours PRN  chlorhexidine 4% Liquid 1 Application(s) Topical daily  finasteride 5 milliGRAM(s) Oral daily  insulin glargine Injectable (LANTUS) 20 Unit(s) SubCutaneous at bedtime  insulin glargine Injectable (LANTUS) 20 Unit(s) SubCutaneous every morning  insulin lispro (HumaLOG) corrective regimen sliding scale   SubCutaneous every 6 hours  pantoprazole  Injectable 40 milliGRAM(s) IV Push every 12 hours  phytonadione   Solution 5 milliGRAM(s) Oral daily  predniSONE   Tablet 10 milliGRAM(s) Oral daily  sevelamer carbonate Powder 1600 milliGRAM(s) Oral three times a day with meals    acetaminophen    Suspension 650 milliGRAM(s) Oral every 6 hours PRN  chlorhexidine 4% Liquid 1 Application(s) Topical daily  finasteride 5 milliGRAM(s) Oral daily  furosemide   Injectable 80 milliGRAM(s) IV Push daily  insulin glargine Injectable (LANTUS) 20 Unit(s) SubCutaneous at bedtime  insulin glargine Injectable (LANTUS) 20 Unit(s) SubCutaneous every morning  insulin lispro (HumaLOG) corrective regimen sliding scale   SubCutaneous every 6 hours  meropenem IVPB 500 milliGRAM(s) IV Intermittent every 12 hours  metoprolol     tartrate 25 milliGRAM(s) Oral every 6 hours  pantoprazole  Injectable 40 milliGRAM(s) IV Push every 12 hours  phytonadione   Solution 5 milliGRAM(s) Oral daily  predniSONE   Tablet 10 milliGRAM(s) Oral daily  sevelamer carbonate Powder 1600 milliGRAM(s) Oral three times a day with meals  tamsulosin 0.4 milliGRAM(s) Oral at bedtime  tiotropium 18 MICROgram(s) Capsule 1 Capsule(s) Inhalation daily    Drug Dosing Weight  Height (cm): 185.42 (20 Jan 2018 10:22)  Weight (kg): 96 (25 Jan 2018 07:30)  BMI (kg/m2): 27.9 (25 Jan 2018 07:30)  BSA (m2): 2.2 (25 Jan 2018 07:30)    CENTRAL LINE: [ ] YES [ ] NO  LOCATION:   DATE INSERTED:  REMOVE: [ ] YES [ ] NO  EXPLAIN:    BRUNSON: [ ] YES [ ] NO    DATE INSERTED:  REMOVE:  [ ] YES [ ] NO  EXPLAIN:    A-LINE:  [ ] YES [ ] NO  LOCATION:   DATE INSERTED:  REMOVE:  [ ] YES [ ] NO  EXPLAIN:    PMH -reviewed admission note, no change since admission  Heart faliure: acute [ ] chronic [ ] acute or chronic [ ] diastolic [ ] systolic [ ] combied systolic and diastolic[ ]  NING: ATN[ ] renal medullary necrosis [ ] CKD I [ ]CKDII [ ]CKD III [ ]CKD IV [ ]CKD V [ ]Other pathological lesions [ ]  Abdominal Nutrition Status: malnutrition [ ] cachexia [ ] morbid obesity/BMI=40 [ ] Supplement ordered [___________]     ICU Vital Signs Last 24 Hrs  T(C): 37.7 (04 Feb 2018 00:00), Max: 37.7 (04 Feb 2018 00:00)  T(F): 99.8 (04 Feb 2018 00:00), Max: 99.8 (04 Feb 2018 00:00)  HR: 89 (04 Feb 2018 02:00) (75 - 148)  BP: 114/49 (04 Feb 2018 02:00) (80/52 - 142/59)  BP(mean): 64 (04 Feb 2018 02:00) (58 - 78)  ABP: 135/59 (04 Feb 2018 02:00) (91/54 - 174/68)  ABP(mean): 79 (04 Feb 2018 02:00) (66 - 95)  RR: 30 (04 Feb 2018 02:00) (24 - 30)  SpO2: 100% (04 Feb 2018 02:00) (93% - 100%)            02-02 @ 07:01  -  02-03 @ 07:00  --------------------------------------------------------  IN: 375 mL / OUT: 1142 mL / NET: -767 mL            PHYSICAL EXAM:    GENERAL: [ ]NAD, [ ]well-groomed, [ ]well-developed  HEAD:  [ ]Atraumatic, [ ]Normocephalic  EYES: [ ]EOMI, [ ]PERRLA, [ ]conjunctiva and sclera clear  ENMT: [ ]No tonsillar erythema, exudates, or enlargement; [ ]Moist mucous membranes, [ ]Good dentition, [ ]No lesions  NECK: [ ]Supple, normal appearance, [ ]No JVD; [ ]Normal thyroid; [ ]Trachea midline  NERVOUS SYSTEM:  [ ]Alert & Oriented X3, [ ]Good concentration; [ ]Motor Strength 5/5 B/L upper and lower extremities; [ ]DTRs 2+ intact and symmetric  CHEST/LUNG: [ ]No chest deformity; [ ]Normal percussion bilaterally; [ ]No rales, rhonchi, wheezing   HEART: [ ]Regular rate and rhythm; [ ]No murmurs, rubs, or gallops  ABDOMEN: [ ]Soft, Nontender, Nondistended; [ ]Bowel sounds present  EXTREMITIES:  [ ]2+ Peripheral Pulses, [ ]No clubbing, cyanosis, or edema  LYMPH: [ ]No lymphadenopathy noted  SKIN: [ ]No rashes or lesions; [ ]Good capillary refill      LABS:  CBC Full  -  ( 03 Feb 2018 22:27 )  WBC Count : 16.6 K/uL  Hemoglobin : 8.2 g/dL  Hematocrit : 25.1 %  Platelet Count - Automated : 335 K/uL  Mean Cell Volume : 101.9 fl  Mean Cell Hemoglobin : 33.3 pg  Mean Cell Hemoglobin Concentration : 32.7 gm/dL  Auto Neutrophil # : x  Auto Lymphocyte # : x  Auto Monocyte # : x  Auto Eosinophil # : x  Auto Basophil # : x  Auto Neutrophil % : x  Auto Lymphocyte % : x  Auto Monocyte % : x  Auto Eosinophil % : x  Auto Basophil % : x    02-03    141  |  102  |  194<H>  ----------------------------<  140<H>  4.1   |  20<L>  |  7.46<H>    Ca    6.4<LL>      03 Feb 2018 06:53  Phos  11.8     02-03  Mg     3.0     02-03      PT/INR - ( 03 Feb 2018 06:53 )   PT: 12.8 sec;   INR: 1.17 ratio                 RADIOLOGY & ADDITIONAL STUDIES REVIEWED:  ***    [ ]GOALS OF CARE DISCUSSION WITH PATIENT/FAMILY/PROXY:    CRITICAL CARE TIME SPENT: 35 minutes INTERVAL HPI/OVERNIGHT EVENTS: *** patient had 2 episodes of SVT over night. adenosine was given. metoprolol increased to 25 Q6 hours. will continue to follow. updated patient's family on his current status    PRESSORS: [ ] YES [x ] NO  WHICH:    ANTIBIOTICS:                  DATE STARTED:  ANTIBIOTICS:                  DATE STARTED:  ANTIBIOTICS:                  DATE STARTED:    Antimicrobial:  meropenem IVPB 500 milliGRAM(s) IV Intermittent every 12 hours    Cardiovascular:  furosemide   Injectable 80 milliGRAM(s) IV Push daily  metoprolol     tartrate 25 milliGRAM(s) Oral every 6 hours  tamsulosin 0.4 milliGRAM(s) Oral at bedtime    Pulmonary:  tiotropium 18 MICROgram(s) Capsule 1 Capsule(s) Inhalation daily    Hematalogic:    Other:  acetaminophen    Suspension 650 milliGRAM(s) Oral every 6 hours PRN  chlorhexidine 4% Liquid 1 Application(s) Topical daily  finasteride 5 milliGRAM(s) Oral daily  insulin glargine Injectable (LANTUS) 20 Unit(s) SubCutaneous at bedtime  insulin glargine Injectable (LANTUS) 20 Unit(s) SubCutaneous every morning  insulin lispro (HumaLOG) corrective regimen sliding scale   SubCutaneous every 6 hours  pantoprazole  Injectable 40 milliGRAM(s) IV Push every 12 hours  phytonadione   Solution 5 milliGRAM(s) Oral daily  predniSONE   Tablet 10 milliGRAM(s) Oral daily  sevelamer carbonate Powder 1600 milliGRAM(s) Oral three times a day with meals    acetaminophen    Suspension 650 milliGRAM(s) Oral every 6 hours PRN  chlorhexidine 4% Liquid 1 Application(s) Topical daily  finasteride 5 milliGRAM(s) Oral daily  furosemide   Injectable 80 milliGRAM(s) IV Push daily  insulin glargine Injectable (LANTUS) 20 Unit(s) SubCutaneous at bedtime  insulin glargine Injectable (LANTUS) 20 Unit(s) SubCutaneous every morning  insulin lispro (HumaLOG) corrective regimen sliding scale   SubCutaneous every 6 hours  meropenem IVPB 500 milliGRAM(s) IV Intermittent every 12 hours  metoprolol     tartrate 25 milliGRAM(s) Oral every 6 hours  pantoprazole  Injectable 40 milliGRAM(s) IV Push every 12 hours  phytonadione   Solution 5 milliGRAM(s) Oral daily  predniSONE   Tablet 10 milliGRAM(s) Oral daily  sevelamer carbonate Powder 1600 milliGRAM(s) Oral three times a day with meals  tamsulosin 0.4 milliGRAM(s) Oral at bedtime  tiotropium 18 MICROgram(s) Capsule 1 Capsule(s) Inhalation daily    Drug Dosing Weight  Height (cm): 185.42 (20 Jan 2018 10:22)  Weight (kg): 96 (25 Jan 2018 07:30)  BMI (kg/m2): 27.9 (25 Jan 2018 07:30)  BSA (m2): 2.2 (25 Jan 2018 07:30)    CENTRAL LINE: [ ] YES [ ] NO  LOCATION:   DATE INSERTED:  REMOVE: [ ] YES [ ] NO  EXPLAIN:    BRUNSON: [ ] YES [ ] NO    DATE INSERTED:  REMOVE:  [ ] YES [ ] NO  EXPLAIN:    A-LINE:  [ ] YES [ ] NO  LOCATION:   DATE INSERTED:  REMOVE:  [ ] YES [ ] NO  EXPLAIN:    PMH -reviewed admission note, no change since admission  Heart faliure: acute [ ] chronic [ ] acute or chronic [ ] diastolic [ ] systolic [ ] combied systolic and diastolic[ ]  NING: ATN[ ] renal medullary necrosis [ ] CKD I [ ]CKDII [ ]CKD III [ ]CKD IV [ ]CKD V [ ]Other pathological lesions [ ]  Abdominal Nutrition Status: malnutrition [ ] cachexia [ ] morbid obesity/BMI=40 [ ] Supplement ordered [___________]     ICU Vital Signs Last 24 Hrs  T(C): 37.7 (04 Feb 2018 00:00), Max: 37.7 (04 Feb 2018 00:00)  T(F): 99.8 (04 Feb 2018 00:00), Max: 99.8 (04 Feb 2018 00:00)  HR: 89 (04 Feb 2018 02:00) (75 - 148)  BP: 114/49 (04 Feb 2018 02:00) (80/52 - 142/59)  BP(mean): 64 (04 Feb 2018 02:00) (58 - 78)  ABP: 135/59 (04 Feb 2018 02:00) (91/54 - 174/68)  ABP(mean): 79 (04 Feb 2018 02:00) (66 - 95)  RR: 30 (04 Feb 2018 02:00) (24 - 30)  SpO2: 100% (04 Feb 2018 02:00) (93% - 100%)            02-02 @ 07:01  -  02-03 @ 07:00  --------------------------------------------------------  IN: 375 mL / OUT: 1142 mL / NET: -767 mL            PHYSICAL EXAM:    GENERAL: [ ]NAD, [ ]well-groomed, [ ]well-developed  HEAD:  [ ]Atraumatic, [ ]Normocephalic  EYES: [ ]EOMI, [ ]PERRLA, [ ]conjunctiva and sclera clear  ENMT: [ ]No tonsillar erythema, exudates, or enlargement; [ ]Moist mucous membranes, [ ]Good dentition, [ ]No lesions  NECK: [ ]Supple, normal appearance, [ ]No JVD; [ ]Normal thyroid; [ ]Trachea midline  NERVOUS SYSTEM:  [ ]Alert & Oriented X3, [ ]Good concentration; [ ]Motor Strength 5/5 B/L upper and lower extremities; [ ]DTRs 2+ intact and symmetric  CHEST/LUNG: [ ]No chest deformity; [ ]Normal percussion bilaterally; [ ]No rales, rhonchi, wheezing   HEART: [ ]Regular rate and rhythm; [ ]No murmurs, rubs, or gallops  ABDOMEN: [ ]Soft, Nontender, Nondistended; [ ]Bowel sounds present  EXTREMITIES:  [ ]2+ Peripheral Pulses, [ ]No clubbing, cyanosis, or edema  LYMPH: [ ]No lymphadenopathy noted  SKIN: [ ]No rashes or lesions; [ ]Good capillary refill      LABS:  CBC Full  -  ( 03 Feb 2018 22:27 )  WBC Count : 16.6 K/uL  Hemoglobin : 8.2 g/dL  Hematocrit : 25.1 %  Platelet Count - Automated : 335 K/uL  Mean Cell Volume : 101.9 fl  Mean Cell Hemoglobin : 33.3 pg  Mean Cell Hemoglobin Concentration : 32.7 gm/dL  Auto Neutrophil # : x  Auto Lymphocyte # : x  Auto Monocyte # : x  Auto Eosinophil # : x  Auto Basophil # : x  Auto Neutrophil % : x  Auto Lymphocyte % : x  Auto Monocyte % : x  Auto Eosinophil % : x  Auto Basophil % : x    02-03    141  |  102  |  194<H>  ----------------------------<  140<H>  4.1   |  20<L>  |  7.46<H>    Ca    6.4<LL>      03 Feb 2018 06:53  Phos  11.8     02-03  Mg     3.0     02-03      PT/INR - ( 03 Feb 2018 06:53 )   PT: 12.8 sec;   INR: 1.17 ratio                 RADIOLOGY & ADDITIONAL STUDIES REVIEWED:  ***    [ ]GOALS OF CARE DISCUSSION WITH PATIENT/FAMILY/PROXY:    CRITICAL CARE TIME SPENT: 35 minutes INTERVAL HPI/OVERNIGHT EVENTS: *** patient had 2 episodes of SVT over night. adenosine was given. metoprolol increased to 25 Q6 hours. will continue to follow. updated patient's family on his current status    PRESSORS: [ ] YES [x ] NO  WHICH:    ANTIBIOTICS:                  DATE STARTED:  ANTIBIOTICS:                  DATE STARTED:  ANTIBIOTICS:                  DATE STARTED:    Antimicrobial:  meropenem IVPB 500 milliGRAM(s) IV Intermittent every 12 hours    Cardiovascular:  furosemide   Injectable 80 milliGRAM(s) IV Push daily  metoprolol     tartrate 25 milliGRAM(s) Oral every 6 hours  tamsulosin 0.4 milliGRAM(s) Oral at bedtime    Pulmonary:  tiotropium 18 MICROgram(s) Capsule 1 Capsule(s) Inhalation daily    Hematalogic:    Other:  acetaminophen    Suspension 650 milliGRAM(s) Oral every 6 hours PRN  chlorhexidine 4% Liquid 1 Application(s) Topical daily  finasteride 5 milliGRAM(s) Oral daily  insulin glargine Injectable (LANTUS) 20 Unit(s) SubCutaneous at bedtime  insulin glargine Injectable (LANTUS) 20 Unit(s) SubCutaneous every morning  insulin lispro (HumaLOG) corrective regimen sliding scale   SubCutaneous every 6 hours  pantoprazole  Injectable 40 milliGRAM(s) IV Push every 12 hours  phytonadione   Solution 5 milliGRAM(s) Oral daily  predniSONE   Tablet 10 milliGRAM(s) Oral daily  sevelamer carbonate Powder 1600 milliGRAM(s) Oral three times a day with meals    acetaminophen    Suspension 650 milliGRAM(s) Oral every 6 hours PRN  chlorhexidine 4% Liquid 1 Application(s) Topical daily  finasteride 5 milliGRAM(s) Oral daily  furosemide   Injectable 80 milliGRAM(s) IV Push daily  insulin glargine Injectable (LANTUS) 20 Unit(s) SubCutaneous at bedtime  insulin glargine Injectable (LANTUS) 20 Unit(s) SubCutaneous every morning  insulin lispro (HumaLOG) corrective regimen sliding scale   SubCutaneous every 6 hours  meropenem IVPB 500 milliGRAM(s) IV Intermittent every 12 hours  metoprolol     tartrate 25 milliGRAM(s) Oral every 6 hours  pantoprazole  Injectable 40 milliGRAM(s) IV Push every 12 hours  phytonadione   Solution 5 milliGRAM(s) Oral daily  predniSONE   Tablet 10 milliGRAM(s) Oral daily  sevelamer carbonate Powder 1600 milliGRAM(s) Oral three times a day with meals  tamsulosin 0.4 milliGRAM(s) Oral at bedtime  tiotropium 18 MICROgram(s) Capsule 1 Capsule(s) Inhalation daily    Drug Dosing Weight  Height (cm): 185.42 (20 Jan 2018 10:22)  Weight (kg): 96 (25 Jan 2018 07:30)  BMI (kg/m2): 27.9 (25 Jan 2018 07:30)  BSA (m2): 2.2 (25 Jan 2018 07:30)    CENTRAL LINE: [x ] YES [ ] NO  LOCATION:   DATE INSERTED:  REMOVE: [ ] YES [ ] NO  EXPLAIN:    BRUNSON: [x ] YES [ ] NO    DATE INSERTED:  REMOVE:  [ ] YES [ ] NO  EXPLAIN:    A-LINE:  [x ] YES [ ] NO  LOCATION:   DATE INSERTED:  REMOVE:  [ ] YES [ ] NO  EXPLAIN:    PMH -reviewed admission note, no change since admission      ICU Vital Signs Last 24 Hrs  T(C): 37.7 (04 Feb 2018 00:00), Max: 37.7 (04 Feb 2018 00:00)  T(F): 99.8 (04 Feb 2018 00:00), Max: 99.8 (04 Feb 2018 00:00)  HR: 89 (04 Feb 2018 02:00) (75 - 148)  BP: 114/49 (04 Feb 2018 02:00) (80/52 - 142/59)  BP(mean): 64 (04 Feb 2018 02:00) (58 - 78)  ABP: 135/59 (04 Feb 2018 02:00) (91/54 - 174/68)  ABP(mean): 79 (04 Feb 2018 02:00) (66 - 95)  RR: 30 (04 Feb 2018 02:00) (24 - 30)  SpO2: 100% (04 Feb 2018 02:00) (93% - 100%)            02-02 @ 07:01  -  02-03 @ 07:00  --------------------------------------------------------  IN: 375 mL / OUT: 1142 mL / NET: -767 mL            PHYSICAL EXAM:    GENERAL: [x ]NAD, [ ]well-groomed, [ ]well-developed  HEAD:  [ ]Atraumatic, [ ]Normocephalic  EYES: [ ]EOMI, [ ]PERRLA, [x ]conjunctiva and sclera clear  ENMT: [x ]No tonsillar erythema, exudates, or enlargement; [x ]Moist mucous membranes, NGT in place   NECK: [x]Supple, normal appearance, [x ]No JVD; [ ]Normal thyroid; [ ]Trachea midline  NERVOUS SYSTEM: awake but not alert, not following commands.   CHEST/LUNG: [x ]No chest deformity; [ ]Normal percussion bilaterally; bilateral rales noted  HEART: [ ]Regular rate and rhythm; [x ]No murmurs, rubs, or gallops  ABDOMEN: [ x]Soft, Nontender, distended; [ ]Bowel sounds present  EXTREMITIES:  [x ]2+ Peripheral Pulses, [x ]No clubbing, cyanosis, pedal edema+  LYMPH: [x ]No lymphadenopathy noted  SKIN: [x ]No rashes or lesions; [ ]Good capillary refill      LABS:  CBC Full  -  ( 03 Feb 2018 22:27 )  WBC Count : 16.6 K/uL  Hemoglobin : 8.2 g/dL  Hematocrit : 25.1 %  Platelet Count - Automated : 335 K/uL  Mean Cell Volume : 101.9 fl  Mean Cell Hemoglobin : 33.3 pg  Mean Cell Hemoglobin Concentration : 32.7 gm/dL  Auto Neutrophil # : x  Auto Lymphocyte # : x  Auto Monocyte # : x  Auto Eosinophil # : x  Auto Basophil # : x  Auto Neutrophil % : x  Auto Lymphocyte % : x  Auto Monocyte % : x  Auto Eosinophil % : x  Auto Basophil % : x    02-03    141  |  102  |  194<H>  ----------------------------<  140<H>  4.1   |  20<L>  |  7.46<H>    Ca    6.4<LL>      03 Feb 2018 06:53  Phos  11.8     02-03  Mg     3.0     02-03      PT/INR - ( 03 Feb 2018 06:53 )   PT: 12.8 sec;   INR: 1.17 ratio                 RADIOLOGY & ADDITIONAL STUDIES REVIEWED:  ***    [ ]GOALS OF CARE DISCUSSION WITH PATIENT/FAMILY/PROXY:    CRITICAL CARE TIME SPENT: 35 minutes

## 2018-02-04 NOTE — PROGRESS NOTE ADULT - ASSESSMENT
72 male nursing home patient with CAD, CKD, DM, HTN, OA, BPH, anxiety, presented with influenza, acute respiratory failure, septic shock.  May have superimposed pneumonia as well           Problem/Plan - 1:  ·  Problem: GI bleed.  Plan: -Patient had no episode of BRBPR over night.   -on IV protonix BID,   -NPO, fecal occult blood positive  -H/H 7.3 --> 7.6  Gi dr cox consult noted  as per GI likely lower GI bleed 2/2 hemorrhoids vs diverticular  continue to monitor CBC Q 6 hrs, Vit K 5 mg PO for 3 days for high INR   will HOLD on heparin, aspirin and plavix due to risk of bleeding.    - No colonoscopy as risks higher then benefits      Problem/Plan - 2:  ·  Problem: Sepsis.  Plan: -no fevers in 72 hours, BP stable 104/50 HR in 80s  -RIJ removed, new LIJ placed.1/30   -repeat blood cultures, sputum cx -ve  -meropenem day 12.     Problem/Plan - 3:  ·  Problem: ARDS (adult respiratory distress syndrome).  Plan: meets burlin's criteria of ARDS, acute onset <7 days, non cardiogenic, bilateral involvement, Fio2/PO2 245,   CXR shows B/l pachy infiltrates   patient on MV requiring Fio2 40%,   ECHO normal EF ,  HD tomorrow,  -on meropenem 500 mg Q12hrs ( renal adjusted)   - prednisone taper  - Extubated on 1/31/18    -Poor prognosis, DNR/DNI.     Problem/Plan - 4:  ·  Problem: Acute respiratory failure with hypoxia.  Plan: could be due to flu/PNA   off droplet isolation, Influenza B positive   completed tamiflu ,  - Procalcitonin 80.4  -urine and blood cultures are negative, urine legionella negative   - continue with Mechanical ventilation PEEP 5  - CT physiotherapy  - ID Dr Wilkinson following.      Problem/Plan - 5:  ·  Problem: Renal failure.  Plan: patient had acute renal failure with metabolic acidosis 2/2 sepsis   hemodialysis tomorrow, making urine.  -shiley needs to be changed by surgery tomorrow,     Problem/Plan - 6:  Problem: DM (diabetes mellitus). Plan: Diet controlled   HbA1c. 6.0  lantus 20 units at bed time  - continue with sliding scale Q 6 hrs.     Problem/Plan - 7:  ·  Problem: HTN (hypertension).  Plan: increased metoprolol 25 Q8 hour, patient had an episode of SVT. yesterday   patient received 1 stat dose of adenosine. will continue to follow  Dr Benson following, suggested to continue BB.     Problem/Plan - 8:  ·  Problem: CAD (coronary artery disease).  Plan: -held ASA, Plavix due to GI bleed  -Patient finished heparin drip, troponin were elevated due to stress. ECHO EF 55 %with normal LV functions   - tropnin likely due to demand ischaemia 2/2 septic shock   - held lipitor due to worsening LFTs  - HD stable, currently off the pressor support   - Elevated d-dimer with normal venous doppler with no RV strain on ECHO less likely PE,   - elevated d-dimer 2/2 renal failure and sepsis    - Dr Benson cardio eval noted   -patient is DNR/ and do not reintubate .  palliative care consult noted. 72 male nursing home patient with CAD, CKD, DM, HTN, OA, BPH, anxiety, presented with influenza, acute respiratory failure, septic shock.  May have superimposed pneumonia as well           Problem/Plan - 1:  ·  Problem: GI bleed.  Plan: -Patient had no episode of BRBPR over night.   -on IV protonix BID,   -NPO, fecal occult blood positive  -H/H 7.3 --> 8.6  Gi dr cox consult noted  as per GI likely lower GI bleed 2/2 hemorrhoids vs diverticular  continue to monitor CBC Q 6 hrs, Vit K 5 mg PO for 3 days for high INR   will HOLD on heparin, aspirin and plavix due to risk of bleeding.    - No colonoscopy as risks higher then benefits      Problem/Plan - 2:  ·  Problem: Sepsis.  Plan: -no fevers in  4 days, BP stable 104/50 HR in 80s  -RIJ removed, new LIJ placed.1/30   -repeat blood cultures, sputum cx -ve  -meropenem day 13.     Problem/Plan - 3:  ·  Problem: ARDS (adult respiratory distress syndrome).  Plan: meets burlin's criteria of ARDS, acute onset <7 days, non cardiogenic, bilateral involvement, Fio2/PO2 245,   CXR shows B/l pachy infiltrates, off mechanical vent,  ECHO normal EF ,  HD couldn't be completed yesterday and patient's blood pressure dropped, new shiley was placed by surgery, will try to do dialysis today  -on meropenem 500 mg Q12hrs ( renal adjusted)   - prednisone taper  - Extubated on 1/31/18    -Poor prognosis, DNR/DNI.     Problem/Plan - 4:  ·  Problem: Acute respiratory failure with hypoxia.  Plan: could be due to flu/PNA   off droplet isolation, Influenza B positive   completed tamiflu ,  - Procalcitonin 80.4  -urine and blood cultures are negative, urine legionella negative   - IV lasix 80 mg daily stat, monitor I/Os  - CT physiotherapy  - ID Dr Wilkinson following.      Problem/Plan - 5:  ·  Problem: Renal failure.  Plan: patient had acute renal failure with metabolic acidosis 2/2 sepsis, failed HD due to low BP,will try to get   hemodialysis today, making urine.  -shiley changed by surgery yesterday.     Problem/Plan - 6:  Problem: DM (diabetes mellitus). Plan: Diet controlled   HbA1c. 6.0  lantus 20 units at bed time  - continue with sliding scale Q 6 hrs.     Problem/Plan - 7:  ·  Problem: HTN (hypertension).  Plan: increased metoprolol 25 Q6 hour, patient had x2 episode of SVT.    patient received 2 stat doses of adenosine.EKG showed NSR s/p adenosine, will continue to follow  Dr Benson following,      Problem/Plan - 8:  ·  Problem: CAD (coronary artery disease).  Plan: -held ASA, Plavix due to GI bleed  -Patient finished heparin drip, troponin were elevated due to stress. ECHO EF 55 %with normal LV functions   - tropnin likely due to demand ischaemia 2/2 septic shock   - held lipitor due to worsening LFTs  - HD stable, currently off the pressor support   - Elevated d-dimer with normal venous doppler with no RV strain on ECHO less likely PE,   - elevated d-dimer 2/2 renal failure and sepsis    - Dr Benson cardio eval noted   -patient is DNR/ and do not reintubate .  palliative care consult noted. 72 male nursing home patient with CAD, CKD, DM, HTN, OA, BPH, anxiety, presented with influenza, acute respiratory failure, septic shock.  May have superimposed pneumonia as well           Problem/Plan - 1:  ·  Problem: GI bleed.  Plan: -Patient had no episode of BRBPR over night.   -on IV protonix BID,   -NPO, fecal occult blood positive  -H/H 7.3 --> 8.6-->8.0 today  Gi dr cox  as per GI likely lower GI bleed 2/2 hemorrhoids vs diverticular  continue to monitor CBC Q24h  will HOLD on heparin, aspirin and plavix due to risk of bleeding.    - No colonoscopy as risks higher then benefits      Problem/Plan - 2:  ·  Problem: Sepsis.  Plan: -no fevers in  4 days, BP stable 104/50 HR in 80s  - new LIJ placed.1/30   -repeat blood cultures, sputum cx -ve  -meropenem day 13.     Problem/Plan - 3:  ·  Problem: ARDS (adult respiratory distress syndrome).  Plan: meets burlin's criteria of ARDS, acute onset <7 days, non cardiogenic, bilateral involvement, Fio2/PO2 245,   CXR shows B/l pachy infiltrates, off mechanical vent,  ECHO normal EF ,  HD couldn't be completed yesterday and patient's blood pressure dropped, new shiley was placed by surgery, patient will get HD in AM, will continue with IV lasix 80mg today  -on meropenem 500 mg Q12hrs ( renal adjusted)   - prednisone taper  - Extubated on 1/31/18    -Poor prognosis, DNR/DNI.     Problem/Plan - 4:  ·  Problem: Acute respiratory failure with hypoxia.  Plan: could be due to flu/PNA   off droplet isolation, Influenza B positive   completed tamiflu ,  - Procalcitonin 80.4  -urine and blood cultures are negative, urine legionella negative   - IV lasix 80 mg daily  - CT physiotherapy  - ID Dr Wilkinson following.      Problem/Plan - 5:  ·  Problem: Renal failure.  Plan: patient had acute renal failure with metabolic acidosis 2/2 sepsis, failed HD due to low BP,will try to get   hemodialysis today, making urine.  -shiley changed by surgery yesterday.     Problem/Plan - 6:  Problem: DM (diabetes mellitus). Plan: Diet controlled   HbA1c. 6.0  lantus 20 units at bed time  - continue with sliding scale Q 6 hrs.     Problem/Plan - 7:  ·  Problem: HTN (hypertension).  Plan: increased metoprolol 25 Q6 hour, patient had x2 episode of SVT.    patient received 2 stat doses of adenosine. EKG showed NSR s/p adenosine, started on amiodarone 200mg TID for 5 days.  Dr Benson- cardiology.     Problem/Plan - 8:  ·  Problem: CAD (coronary artery disease).  Plan: -held ASA, Plavix due to GI bleed  -Patient finished heparin drip, troponin were elevated due to stress. ECHO EF 55 %with normal LV functions   - tropnin likely due to demand ischaemia 2/2 septic shock   - held lipitor due to worsening LFTs  - HD stable, currently off the pressor support   - Elevated d-dimer with normal venous doppler with no RV strain on ECHO less likely PE,   - elevated d-dimer 2/2 renal failure and sepsis    - Dr Benson cardio eval noted   -patient is DNR/ and do not reintubate .  palliative care consult noted.  family at bedside updated about the clinical condition.

## 2018-02-04 NOTE — PROGRESS NOTE ADULT - SUBJECTIVE AND OBJECTIVE BOX
pt seen and examined.pts current chart reviewed and case discussed with resident covering.    SUBJECTIVE:  pt is lethargic , arousable on 100 % NRBM  u/o improving on Lasix    Current meds:    acetaminophen    Suspension 650 milliGRAM(s) Oral every 6 hours PRN  aluminum hydroxide Suspension 30 milliLiter(s) Oral every 6 hours  amiodarone    Tablet 200 milliGRAM(s) Oral three times a day  chlorhexidine 4% Liquid 1 Application(s) Topical daily  finasteride 5 milliGRAM(s) Oral daily  furosemide   Injectable 80 milliGRAM(s) IV Push daily  insulin glargine Injectable (LANTUS) 20 Unit(s) SubCutaneous at bedtime  insulin glargine Injectable (LANTUS) 20 Unit(s) SubCutaneous every morning  insulin lispro (HumaLOG) corrective regimen sliding scale   SubCutaneous every 6 hours  meropenem IVPB 500 milliGRAM(s) IV Intermittent every 12 hours  metoprolol     tartrate 25 milliGRAM(s) Oral every 6 hours  pantoprazole  Injectable 40 milliGRAM(s) IV Push every 12 hours  predniSONE   Tablet 10 milliGRAM(s) Oral daily  sevelamer carbonate Powder 1600 milliGRAM(s) Oral three times a day with meals  tamsulosin 0.4 milliGRAM(s) Oral at bedtime  tiotropium 18 MICROgram(s) Capsule 1 Capsule(s) Inhalation daily      Vital Signs    T(F): 98.8 (02-04-18 @ 15:30), Max: 100 (02-04-18 @ 05:00)  HR: 79 (02-04-18 @ 13:00) (79 - 95)  BP: 109/59 (02-04-18 @ 13:00) (108/52 - 142/59)  ABP: 90/86 (02-04-18 @ 13:00) (90/86 - 169/67)  RR: 24 (02-04-18 @ 13:00) (24 - 31)  SpO2: 99% (02-04-18 @ 13:00) (99% - 100%)  Wt(kg): --  CVP(cm H2O): --  CO: --  PCWP: --    I and O's:    02-02 @ 07:01  -  02-03 @ 07:00  --------------------------------------------------------  IN:    Enteral Tube Flush: 325 mL    Solution: 50 mL  Total IN: 375 mL    OUT:    Indwelling Catheter - Urethral: 1142 mL  Total OUT: 1142 mL    Total NET: -767 mL      02-03 @ 07:01  -  02-04 @ 07:00  --------------------------------------------------------  IN:    Enteral Tube Flush: 160 mL    Nepro with Carb Steady: 120 mL  Total IN: 280 mL    OUT:    Indwelling Catheter - Urethral: 1610 mL  Total OUT: 1610 mL    Total NET: -1330 mL      02-04 @ 07:01  -  02-04 @ 17:04  --------------------------------------------------------  IN:  Total IN: 0 mL    OUT:    Indwelling Catheter - Urethral: 500 mL  Total OUT: 500 mL    Total NET: -500 mL        Daily     Daily     PHYSICAL EXAM:  Constitutional: well developed, obese and in and  HEENT: PERRLA,  no icteric sclera and mild pallor of conjunctiva noted  Neck: No JVD, thyromegaly or adenopathy  Respiratory: reduced air entry lower lungs with no rales, wheezing or rhonchi  Cardiovascular: S1 and S2 normally heard  Gastrointestinal: soft, distended, nontender and normal bowel sounds heard  Extremities: 2 plus peripheral edema noted   Neurological: pt is responsive but not oriented  : No flank or cva tenderness palpated.  Skin: No rashes      LABS:    CBC:                          8.0    16.4  )-----------( 354      ( 04 Feb 2018 06:22 )             25.2           BMP:    02-04    143  |  105  |  204  ----------------------------<  169<H>  4.3   |  18<L>  |  7.70<H>  02-03    141  |  102  |  194<H>  ----------------------------<  140<H>  4.1   |  20<L>  |  7.46<H>  02-02    138  |  99  |  161  ----------------------------<  265<H>  4.2   |  20<L>  |  6.87<H>  02-01    137  |  99  |  167<H>  ----------------------------<  227<H>  4.4   |  22  |  6.45<H>    Ca    6.7<L>      04 Feb 2018 06:22  Ca    6.4<LL>      03 Feb 2018 06:53  Ca    6.5<LL>      02 Feb 2018 06:43  Ca    6.3<LL>      01 Feb 2018 21:28  Phos  12     02-04  Phos  11.8     02-03  Phos  9.9     02-02  Mg     3.3     02-04  Mg     3.0     02-03  Mg     3.0     02-02    RADIOLOGY & ADDITIONAL STUDIES:      < from: Xray Chest 1 View- PORTABLE-Routine (02.04.18 @ 12:01) >    EXAM:  XR CHEST PORTABLE ROUTINE 1V                            PROCEDURE DATE:  02/04/2018          INTERPRETATION:  CLINICAL INFORMATION: Nasogastric tube.    A single portable view of the chest was obtained.     Comparison: 2/1/2018.     The mediastinal cardiac silhouette is unremarkable. Nasogastric tube   below the hemidiaphragms tip not visualized. Left IJ line with tip in   superior vena cava. No pneumothorax.    Small left effusion and left basilar opacity. Clear right lung.    No acute osseous finding.     IMPRESSION:    As above.      < end of copied text >

## 2018-02-04 NOTE — PROGRESS NOTE ADULT - ASSESSMENT
1.	Pneumonia   2.	Leukocytosis  3.	Fevers - low grade today  4.	S/p septic shock  ·	cont meropenem to 500mg IV q12h D13  ·	will continue monitoring temps     Time spent - 35 minutes

## 2018-02-04 NOTE — PROGRESS NOTE ADULT - GENITOURINARY COMMENTS
Keita with clear yellow urine, swollen scrotum
lopez in place
Keita in place with clear yellow urine

## 2018-02-04 NOTE — PROGRESS NOTE ADULT - ATTENDING COMMENTS
-pat had episodes of NSVT, incomplete dilaysis  -schedule for dialysis tomorr  -mental status/ encephalopathy improving  -hemodynamic monitoring

## 2018-02-05 NOTE — PROGRESS NOTE ADULT - SUBJECTIVE AND OBJECTIVE BOX
Patient seen and examined.     MEDICATIONS  (STANDING):  aluminum hydroxide Suspension 30 milliLiter(s) Oral every 6 hours  amiodarone Infusion 1 mG/Min (33.333 mL/Hr) IV Continuous <Continuous>  amiodarone Infusion 0.5 mG/Min (16.667 mL/Hr) IV Continuous <Continuous>  chlorhexidine 4% Liquid 1 Application(s) Topical daily  finasteride 5 milliGRAM(s) Oral daily  furosemide   Injectable 80 milliGRAM(s) IV Push daily  insulin glargine Injectable (LANTUS) 20 Unit(s) SubCutaneous at bedtime  insulin glargine Injectable (LANTUS) 20 Unit(s) SubCutaneous every morning  insulin lispro (HumaLOG) corrective regimen sliding scale   SubCutaneous every 6 hours  metoprolol     tartrate 25 milliGRAM(s) Oral every 6 hours  pantoprazole  Injectable 40 milliGRAM(s) IV Push every 12 hours  sevelamer carbonate Powder 1600 milliGRAM(s) Oral three times a day with meals  tamsulosin 0.4 milliGRAM(s) Oral at bedtime      MEDICATIONS  (PRN):  acetaminophen    Suspension 650 milliGRAM(s) Oral every 6 hours PRN For Temp greater than 38 C (100.4 F)     Medications up to date at time of exam.    PHYSICAL EXAMINATION:  Patient has no new complaints.  GENERAL: The patient is a well-developed, well-nourished, in no apparent distress.     Vital Signs Last 24 Hrs  T(C): 36.7 (05 Feb 2018 16:04), Max: 37.7 (05 Feb 2018 00:00)  T(F): 98 (05 Feb 2018 16:04), Max: 99.9 (05 Feb 2018 00:00)  HR: 60 (05 Feb 2018 19:00) (60 - 99)  BP: 93/46 (05 Feb 2018 19:00) (90/46 - 128/56)  BP(mean): 57 (05 Feb 2018 19:00) (55 - 73)  RR: 19 (05 Feb 2018 19:00) (18 - 28)  SpO2: 97% (05 Feb 2018 19:00) (90% - 100%)   (if applicable)    Chest Tube (if applicable)    HEENT: Head is normocephalic and atraumatic.     NECK: Supple, no palpable adenopathy.    LUNGS: Clear to auscultation, no wheezing, rales, or rhonchi.    HEART: Regular rate and rhythm without murmur.    ABDOMEN: Soft, nontender, and nondistended.      EXTREMITIES: Without any cyanosis, clubbing, rash, lesions or edema.    NEUROLOGIC: Awake, alert.    SKIN: Warm, dry, good turgor.    LABS:                        7.9    18.5  )-----------( 328      ( 05 Feb 2018 05:50 )             24.8     02-05    145  |  107  |  219  ----------------------------<  157<H>  4.1   |  18<L>  |  7.97<H>    Ca    6.7<L>      05 Feb 2018 05:50  Phos  13.1     02-05  Mg     3.3     02-05    TPro  6.2  /  Alb  1.8<L>  /  TBili  0.5  /  DBili  x   /  AST  66<H>  /  ALT  35  /  AlkPhos  85  02-05              MICROBIOLOGY: (if applicable)    RADIOLOGY & ADDITIONAL STUDIES:  EKG:   CXR:  ECHO:    IMPRESSION: 72y Male PAST MEDICAL & SURGICAL HISTORY:  Sleep apnea: hx of spleep  Hyperlipidemia  CAD (coronary artery disease)  DM (diabetes mellitus)  HTN (hypertension)  S/P foot surgery       Imp:  This is a 72 yrs old man NHR with prior mentioned medical condition presented to ER with SOb, intubated for acute hypoxic resp failure due to influenza s/p course of tamiflu  now extubated. He progressed to ARDS, improved. He is requiring less FiO 2, off pressors. Acute renal failure on hemodialysis.    worsening leukocytosis  mild improvement on 2/5 cxr when compared to 2/4 cxr    Sugg:  -asp precaution  -dialysis as per renal  -con't with antibiotics as per ID recommendations.   -hemodynamic support and monitoring Patient seen and examined.     MEDICATIONS  (STANDING):  aluminum hydroxide Suspension 30 milliLiter(s) Oral every 6 hours  amiodarone Infusion 1 mG/Min (33.333 mL/Hr) IV Continuous <Continuous>  amiodarone Infusion 0.5 mG/Min (16.667 mL/Hr) IV Continuous <Continuous>  chlorhexidine 4% Liquid 1 Application(s) Topical daily  finasteride 5 milliGRAM(s) Oral daily  furosemide   Injectable 80 milliGRAM(s) IV Push daily  insulin glargine Injectable (LANTUS) 20 Unit(s) SubCutaneous at bedtime  insulin glargine Injectable (LANTUS) 20 Unit(s) SubCutaneous every morning  insulin lispro (HumaLOG) corrective regimen sliding scale   SubCutaneous every 6 hours  metoprolol     tartrate 25 milliGRAM(s) Oral every 6 hours  pantoprazole  Injectable 40 milliGRAM(s) IV Push every 12 hours  sevelamer carbonate Powder 1600 milliGRAM(s) Oral three times a day with meals  tamsulosin 0.4 milliGRAM(s) Oral at bedtime      MEDICATIONS  (PRN):  acetaminophen    Suspension 650 milliGRAM(s) Oral every 6 hours PRN For Temp greater than 38 C (100.4 F)     Medications up to date at time of exam.    PHYSICAL EXAMINATION:  Patient has no new complaints.  GENERAL: The patient is a well-developed, well-nourished, in no apparent distress.     Vital Signs Last 24 Hrs  T(C): 36.7 (05 Feb 2018 16:04), Max: 37.7 (05 Feb 2018 00:00)  T(F): 98 (05 Feb 2018 16:04), Max: 99.9 (05 Feb 2018 00:00)  HR: 60 (05 Feb 2018 19:00) (60 - 99)  BP: 93/46 (05 Feb 2018 19:00) (90/46 - 128/56)  BP(mean): 57 (05 Feb 2018 19:00) (55 - 73)  RR: 19 (05 Feb 2018 19:00) (18 - 28)  SpO2: 97% (05 Feb 2018 19:00) (90% - 100%)   (if applicable)    Chest Tube (if applicable)    HEENT: Head is normocephalic and atraumatic.     NECK: Supple, no palpable adenopathy.    LUNGS: Clear to auscultation, no wheezing, rales, or rhonchi.    HEART: Regular rate and rhythm without murmur.    ABDOMEN: Soft, nontender, and nondistended.      EXTREMITIES: Without any cyanosis, clubbing, rash, lesions or edema.    NEUROLOGIC: Awake, alert.    SKIN: Warm, dry, good turgor.    LABS:                        7.9    18.5  )-----------( 328      ( 05 Feb 2018 05:50 )             24.8     02-05    145  |  107  |  219  ----------------------------<  157<H>  4.1   |  18<L>  |  7.97<H>    Ca    6.7<L>      05 Feb 2018 05:50  Phos  13.1     02-05  Mg     3.3     02-05    TPro  6.2  /  Alb  1.8<L>  /  TBili  0.5  /  DBili  x   /  AST  66<H>  /  ALT  35  /  AlkPhos  85  02-05              MICROBIOLOGY: (if applicable)    RADIOLOGY & ADDITIONAL STUDIES:  EKG:   CXR:  ECHO:    IMPRESSION: 72y Male PAST MEDICAL & SURGICAL HISTORY:  Sleep apnea: hx of spleep  Hyperlipidemia  CAD (coronary artery disease)  DM (diabetes mellitus)  HTN (hypertension)  S/P foot surgery       Imp:  This is a 72 yrs old man NHR with prior mentioned medical condition presented to ER with SOb, intubated for acute hypoxic resp failure due to influenza s/p course of tamiflu  now extubated. He progressed to ARDS, improved. He is requiring less FiO 2, off pressors. Acute renal failure on hemodialysis.    increase leukocytosis steroid effect  mild improvement on 2/5 cxr when compared to 2/4 cxr    Sugg:  -asp precaution  -dialysis as per renal  -con't with antibiotics as per ID recommendations.   -hemodynamic support and monitoring

## 2018-02-05 NOTE — PROGRESS NOTE ADULT - ASSESSMENT
1. ESRD sec to [facility]: pt s renal function is stable with improving U/O on lasix  - we will plan for hd tomorrow if pt is hemodynamically stable and renal function remains poor   -pts BUN is very high , most likley secondary to prednisone plus gi bleed(occult blood positive)  -pt needs gi f/u for positive occult blood  - hold nephrotoxic medications, adjust meds as per egfr  and avoid contrast studies/phosphate  enema, etc  -renal hd tube feeding     2. Hyperkalemia : now improved  -keep <5 and >4  -low K diet   -f/u k level q 12hrs      2. Metabolic and resp.acidosis   -multifactorial   -monitor CO 2 daily   -keep PH >7.3       3. Hyperphosphatemia   -now worsening   -continue  Amphogel plus Renagel as d/w resident  -keep Phos>3, <5     4.Hyponatremia: now improved, secondary to ning  -avoid iv fluids  -f/u Na level daily  -continue  fluid restriction to 1 liter per day    5. Fluid overload/Edema  sec to ? CHF/NING , now mildly better  -we will continue  Lasix high dose to Rx edema/fluid overlaod as pt is not tolerating dialysis well   -avoid iv fluid     6.Hypocalcemia;mild (corrected ca )  -suggest to keep corrected ca >8.5 and <10  -f/u ca level daily

## 2018-02-05 NOTE — PROGRESS NOTE ADULT - SUBJECTIVE AND OBJECTIVE BOX
Patient is a 72y old  Male who presents with a chief complaint of Respiratory distress (2018 14:30)    PATIENT IS SEEN AND EXAMINED IN ICU.  NGT [  X  ]    BRUNSON [  X ]      NEW SHILEY IN LEFT GROIN    ALLERGIES:  doxycycline (Unknown)  penicillin (Unknown)  tetracycline (Unknown)  Valtrex (Unknown)      Daily     Daily Weight in k.5 (2018 08:00)    VITALS:    Vital Signs Last 24 Hrs  T(C): 36.7 (2018 20:05), Max: 37.7 (2018 00:00)  T(F): 98 (2018 20:05), Max: 99.9 (2018 00:00)  HR: 60 (2018 19:00) (60 - 99)  BP: 93/46 (2018 19:00) (90/46 - 128/56)  BP(mean): 57 (2018 19:00) (55 - 73)  RR: 19 (2018 19:00) (18 - 28)  SpO2: 97% (2018 19:00) (90% - 100%)    LABS:  CBC Full  -  ( 2018 05:50 )  WBC Count : 18.5 K/uL  Hemoglobin : 7.9 g/dL  Hematocrit : 24.8 %  Platelet Count - Automated : 328 K/uL  Mean Cell Volume : 103.9 fl  Mean Cell Hemoglobin : 33.2 pg  Mean Cell Hemoglobin Concentration : 32.0 gm/dL  Auto Neutrophil # : 15.7 K/uL  Auto Lymphocyte # : 1.4 K/uL  Auto Monocyte # : 1.1 K/uL  Auto Eosinophil # : 0.1 K/uL  Auto Basophil # : 0.2 K/uL  Auto Neutrophil % : 84.8 %  Auto Lymphocyte % : 7.3 %  Auto Monocyte % : 5.8 %  Auto Eosinophil % : 0.8 %  Auto Basophil % : 1.2 %      02-05    145  |  107  |  219  ----------------------------<  157<H>  4.1   |  18<L>  |  7.97<H>    Ca    6.7<L>      2018 05:50  Phos  13.1     02-05  Mg     3.3     02-05    TPro  6.2  /  Alb  1.8<L>  /  TBili  0.5  /  DBili  x   /  AST  66<H>  /  ALT  35  /  AlkPhos  85  -    CAPILLARY BLOOD GLUCOSE      POCT Blood Glucose.: 215 mg/dL (2018 16:54)  POCT Blood Glucose.: 176 mg/dL (2018 11:15)  POCT Blood Glucose.: 142 mg/dL (2018 06:36)  POCT Blood Glucose.: 167 mg/dL (2018 04:38)  POCT Blood Glucose.: 157 mg/dL (2018 23:14)        LIVER FUNCTIONS - ( 2018 05:50 )  Alb: 1.8 g/dL / Pro: 6.2 g/dL / ALK PHOS: 85 U/L / ALT: 35 U/L DA / AST: 66 U/L / GGT: x                 .Sputum Sputum trap   @ 13:45   Normal Respiratory Tonie present  --    Few polymorphonuclear leukocytes  Rare Squamous epithelial cells per low power field  Numerous Yeast per oil power field      .Blood Blood-Peripheral   @ 11:43   No growth at 5 days.  --  --      .Sputum Sputumtrap rec'd   @ 10:48   Few Streptococcus pyogenes (Group A)  Normal Respiratory Tonie present  --    Numerous polymorphonuclear leukocytes  Rare Squamous epithelial cells per low power field  Numerous Gram Positive Cocci in Pairs and Chains per oil power field      .Urine Clean Catch (Midstream)   @ 22:48   No growth  --  --      .Blood Blood-Peripheral   @ 17:03   No growth at 5 days.  --  --          MEDICATIONS:    MEDICATIONS  (STANDING):  aluminum hydroxide Suspension 30 milliLiter(s) Oral every 6 hours  amiodarone Infusion 1 mG/Min (33.333 mL/Hr) IV Continuous <Continuous>  amiodarone Infusion 0.5 mG/Min (16.667 mL/Hr) IV Continuous <Continuous>  chlorhexidine 4% Liquid 1 Application(s) Topical daily  finasteride 5 milliGRAM(s) Oral daily  furosemide   Injectable 80 milliGRAM(s) IV Push daily  insulin glargine Injectable (LANTUS) 20 Unit(s) SubCutaneous at bedtime  insulin glargine Injectable (LANTUS) 20 Unit(s) SubCutaneous every morning  insulin lispro (HumaLOG) corrective regimen sliding scale   SubCutaneous every 6 hours  metoprolol     tartrate 25 milliGRAM(s) Oral every 6 hours  pantoprazole  Injectable 40 milliGRAM(s) IV Push every 12 hours  sevelamer carbonate Powder 1600 milliGRAM(s) Oral three times a day with meals  tamsulosin 0.4 milliGRAM(s) Oral at bedtime      MEDICATIONS  (PRN):  acetaminophen    Suspension 650 milliGRAM(s) Oral every 6 hours PRN For Temp greater than 38 C (100.4 F)      REVIEW OF SYSTEMS:                           ALL ROS DONE [ X   ]    CONSTITUTIONAL:  LETHARGIC [   ], FEVER [   ], UNRESPONSIVE [   ]  CVS:  CP  [   ], SOB, [   ], PALPITATIONS [   ], DIZZYNESS [   ]  RS: COUGH [   ], SPUTUM [   ]  GI: ABDOMINAL PAIN [   ], NAUSEA [   ], VOMITINGS [   ], DIARRHEA [   ], CONSTIPATION [   ]  :  DYSURIA [   ], NOCTURIA [   ], INCREASED FREQUENCY [   ], DRIBLING [   ],  SKELETAL: PAINFUL JOINTS [   ], SWOLLEN JOINTS [   ], NECK ACHE [   ], LOW BACK ACHE [   ],  SKIN : ULCERS [   ], RASH [   ], ITCHING [   ]  CNS: HEAD ACHE [   ], DOUBLE VISION [   ], BLURRED VISION [   ], AMS / CONFUSION [   ], SEIZURES [   ], WEAKNESS [   ],TINGLING / NUMBNESS [   ]    ICU Vital Signs Last 24 Hrs  T(C): 38.4 (2018 10:36), Max: 38.4 (2018 10:22)  T(F): 101.2 (2018 10:36), Max: 101.2 (2018 10:22)  HR: 88 (2018 13:01) (78 - 91)  BP: 130/61 (2018 12:44) (82/53 - 130/61)  RR: 25 (2018 12:44) (25 - 25)  SpO2: 99% (2018 13:01) (91% - 99%) (2018 14:30)    PHYSICAL EXAMINATION:  GENERAL APPEARANCE: NO DISTRESS  HEENT:  NO PALLOR, NO  JVD,  NO   NODES, NECK SUPPLE ,                         ORAL ET + , NGT +  CVS: S1 +, S2 +,   RS: AEEB,  B/L RALES +,  B/L RONCHI +  ABD: SOFT, NT, NO, BS +  EXT: PE +  SKIN: WARM,   SKELETAL:  ROM ACCEPTABLE  CNS:  AAO X 0   , NO  DEFICITS    RADIOLOGY :    < from: Xray Chest 1 View AP-PORTABLE IMMEDIATE (18 @ 19:50) >  EXAM:  XR CHEST PORTABLE IMMED 1V                            PROCEDURE DATE:  2018          INTERPRETATION:  Chest one view    HISTORY: Central line placement    COMPARISON STUDY: Earlier the same day    Frontal expiratory view of the chest shows the heart to be similar in   size. Right jugular line as and placed extending to the level of the   superior vena cava. Feeding tube tip remains in the stomach. Endotracheal   tube is unchanged. The lungs show no definite infiltrate and there is no   evidence of pneumothorax nor pleural effusion.    IMPRESSION:  Right jugular line. No pneumothorax.    < end of copied text >      ASSESSMENT :     Sepsis  Sleep apnea  Hyperlipidemia  CAD (coronary artery disease)  DM (diabetes mellitus)  HTN (hypertension)  S/P foot surgery      PLAN:  HPI:  72 M from [facility] h/o CKD, Anxiety disorder, CAD, BPH, Carpel tunnel syndrome, compulsive disorder, DM, OA, HTN, IBS sent for dyspnea for last 3 days. Dyspnea is constant, moderate to severe in intensity, associated with fever, generalized weakness, lethargy, frequent falls, cough, myalgias and URI symptoms. Cough is productive with non bloody yellowish sputum.     Patient denies chest pain, nausea, vomiting, LOC, focal neurological deficit, abdominal pain, hematochezia, current smoking, alcohol abuse and illicit drug use.    ICU Vital Signs Last 24 Hrs  T(C): 38.4 (2018 10:36), Max: 38.4 (2018 10:22)  T(F): 101.2 (2018 10:36), Max: 101.2 (2018 10:22)  HR: 88 (2018 13:01) (78 - 91)  BP: 130/61 (2018 12:44) (82/53 - 130/61)  RR: 25 (2018 12:44) (25 - 25)  SpO2: 99% (2018 13:01) (91% - 99%) (2018 14:30)    -  INFLUENZA B, ACUTE BRONCHITIS,  B/L PNEUMONIA, SEPSIS, HYPOXIC RESPIRATORY FAILURE  ON S/P  TAMIFLU, IV MEROPENEM , NEBS. ICU F/UP IS IN PROGRESS. S/P EXTUBATION     - AMS DUE TO METABOLIC ENCEPHALOPATHY  - ARF / CKD , WORSENING HYPERKALEMIA AND METABOLIC ACIDOSIS - S/P HD CATHETER REINSERTION  AND IS ON  HD   - GI AND DVT PROPHYLAXIS  - POOR PROGNOSIS   - DR. GARCIA-

## 2018-02-05 NOTE — PROGRESS NOTE ADULT - SUBJECTIVE AND OBJECTIVE BOX
CARDIOLOGY ATTENDING    TELEMETRY:  SR, recurrent episodes of SVT    Patient resting, on face mask O2, not answering questions     acetaminophen    Suspension 650 milliGRAM(s) Oral every 6 hours PRN  aluminum hydroxide Suspension 30 milliLiter(s) Oral every 6 hours  amiodarone Infusion 1 mG/Min IV Continuous <Continuous>  amiodarone Infusion 0.5 mG/Min IV Continuous <Continuous>  chlorhexidine 4% Liquid 1 Application(s) Topical daily  finasteride 5 milliGRAM(s) Oral daily  furosemide   Injectable 80 milliGRAM(s) IV Push daily  insulin glargine Injectable (LANTUS) 20 Unit(s) SubCutaneous at bedtime  insulin glargine Injectable (LANTUS) 20 Unit(s) SubCutaneous every morning  insulin lispro (HumaLOG) corrective regimen sliding scale   SubCutaneous every 6 hours  meropenem IVPB 500 milliGRAM(s) IV Intermittent every 12 hours  metoprolol     tartrate 25 milliGRAM(s) Oral every 6 hours  pantoprazole  Injectable 40 milliGRAM(s) IV Push every 12 hours  sevelamer carbonate Powder 1600 milliGRAM(s) Oral three times a day with meals  tamsulosin 0.4 milliGRAM(s) Oral at bedtime                            7.9    18.5  )-----------( 328      ( 05 Feb 2018 05:50 )             24.8       Hemoglobin: 7.9 g/dL (02-05 @ 05:50)  Hemoglobin: 8.0 g/dL (02-04 @ 06:22)  Hemoglobin: 8.2 g/dL (02-03 @ 22:27)  Hemoglobin: 8.0 g/dL (02-03 @ 18:24)  Hemoglobin: 7.7 g/dL (02-03 @ 06:53)      02-05    145  |  107  |  219  ----------------------------<  157<H>  4.1   |  18<L>  |  7.97<H>    Ca    6.7<L>      05 Feb 2018 05:50  Phos  13.1     02-05  Mg     3.3     02-05    TPro  6.2  /  Alb  1.8<L>  /  TBili  0.5  /  DBili  x   /  AST  66<H>  /  ALT  35  /  AlkPhos  85  02-05    Creatinine Trend: 7.97<--, 7.70<--, 7.46<--, 6.87<--, 6.45<--, 7.72<--    COAGS:           T(C): 35.8 (02-05-18 @ 08:00), Max: 37.7 (02-05-18 @ 00:00)  HR: 73 (02-05-18 @ 11:00) (67 - 99)  BP: 107/51 (02-05-18 @ 11:00) (93/44 - 129/61)  RR: 22 (02-05-18 @ 11:00) (20 - 28)  SpO2: 97% (02-05-18 @ 11:00) (90% - 100%)  Wt(kg): --    I&O's Summary    04 Feb 2018 07:01  -  05 Feb 2018 07:00  --------------------------------------------------------  IN: 350 mL / OUT: 1750 mL / NET: -1400 mL    05 Feb 2018 07:01  -  05 Feb 2018 11:34  --------------------------------------------------------  IN: 40 mL / OUT: 270 mL / NET: -230 mL      no JVD  RRR, no murmurs  CTAB  soft nt/nd  no c/c/ (+) mild edema          A/P) 71 y/o male nursing home resident, CKD, CAD, BPH, DM, HTN admitted with influenza resulting in NING now requiring HD. Echo shows normal LV function. Tele showing frequent episodes of asymptomatic SVT    - Cont broad spectrum abx and supportive care per MICU  - close respiratory status monitoring  - Hold antihypertensives until BP stable  - use amio 400 tid for 5 days for recurrent SVT    Jake Benson MD, [facility]  [facility], [facility]  [address] [place] [address]  PHONE:  [phone]  BEEPER : [phone]

## 2018-02-05 NOTE — PROGRESS NOTE ADULT - ATTENDING COMMENTS
72 male nursing home patient with CAD, CKD, DM, HTN, OA, BPH, anxiety, presented with influenza, acute respiratory failure, septic shock, pneumonia. Now with NING requiring dialysis, encephalopathy, acute resp failure.  Extubated 1/31.   Still with encephalopathy.  New HD cath placed in left groin.  Overall prognosis poor.  Plan:  - abx per ID, completed tamiflu  - HD per nephrology, attempt again today.  - amiodarone for paroxysmal SVT (multiple episodes), failed beta blocker  - DNR/ Do not re-intubate per son.

## 2018-02-05 NOTE — PROGRESS NOTE ADULT - SUBJECTIVE AND OBJECTIVE BOX
INTERVAL HPI/OVERNIGHT EVENTS: ***    PRESSORS: [ ] YES [ ] NO  WHICH:    ANTIBIOTICS:                  DATE STARTED:  ANTIBIOTICS:                  DATE STARTED:  ANTIBIOTICS:                  DATE STARTED:    Antimicrobial:  meropenem IVPB 500 milliGRAM(s) IV Intermittent every 12 hours    Cardiovascular:  amiodarone    Tablet 200 milliGRAM(s) Oral three times a day  amiodarone IVPB 150 milliGRAM(s) IV Intermittent once  furosemide   Injectable 80 milliGRAM(s) IV Push daily  metoprolol     tartrate 25 milliGRAM(s) Oral every 6 hours  tamsulosin 0.4 milliGRAM(s) Oral at bedtime    Pulmonary:  tiotropium 18 MICROgram(s) Capsule 1 Capsule(s) Inhalation daily    Hematalogic:    Other:  acetaminophen    Suspension 650 milliGRAM(s) Oral every 6 hours PRN  aluminum hydroxide Suspension 30 milliLiter(s) Oral every 6 hours  chlorhexidine 4% Liquid 1 Application(s) Topical daily  finasteride 5 milliGRAM(s) Oral daily  insulin glargine Injectable (LANTUS) 20 Unit(s) SubCutaneous at bedtime  insulin glargine Injectable (LANTUS) 20 Unit(s) SubCutaneous every morning  insulin lispro (HumaLOG) corrective regimen sliding scale   SubCutaneous every 6 hours  pantoprazole  Injectable 40 milliGRAM(s) IV Push every 12 hours  sevelamer carbonate Powder 1600 milliGRAM(s) Oral three times a day with meals    acetaminophen    Suspension 650 milliGRAM(s) Oral every 6 hours PRN  aluminum hydroxide Suspension 30 milliLiter(s) Oral every 6 hours  amiodarone    Tablet 200 milliGRAM(s) Oral three times a day  amiodarone IVPB 150 milliGRAM(s) IV Intermittent once  chlorhexidine 4% Liquid 1 Application(s) Topical daily  finasteride 5 milliGRAM(s) Oral daily  furosemide   Injectable 80 milliGRAM(s) IV Push daily  insulin glargine Injectable (LANTUS) 20 Unit(s) SubCutaneous at bedtime  insulin glargine Injectable (LANTUS) 20 Unit(s) SubCutaneous every morning  insulin lispro (HumaLOG) corrective regimen sliding scale   SubCutaneous every 6 hours  meropenem IVPB 500 milliGRAM(s) IV Intermittent every 12 hours  metoprolol     tartrate 25 milliGRAM(s) Oral every 6 hours  pantoprazole  Injectable 40 milliGRAM(s) IV Push every 12 hours  sevelamer carbonate Powder 1600 milliGRAM(s) Oral three times a day with meals  tamsulosin 0.4 milliGRAM(s) Oral at bedtime  tiotropium 18 MICROgram(s) Capsule 1 Capsule(s) Inhalation daily    Drug Dosing Weight  Height (cm): 185.42 (20 Jan 2018 10:22)  Weight (kg): 96 (25 Jan 2018 07:30)  BMI (kg/m2): 27.9 (25 Jan 2018 07:30)  BSA (m2): 2.2 (25 Jan 2018 07:30)    CENTRAL LINE: [ ] YES [ ] NO  LOCATION:   DATE INSERTED:  REMOVE: [ ] YES [ ] NO  EXPLAIN:    BRUNSON: [ ] YES [ ] NO    DATE INSERTED:  REMOVE:  [ ] YES [ ] NO  EXPLAIN:    A-LINE:  [ ] YES [ ] NO  LOCATION:   DATE INSERTED:  REMOVE:  [ ] YES [ ] NO  EXPLAIN:    PMH -reviewed admission note, no change since admission  Heart faliure: acute [ ] chronic [ ] acute or chronic [ ] diastolic [ ] systolic [ ] combied systolic and diastolic[ ]  NING: ATN[ ] renal medullary necrosis [ ] CKD I [ ]CKDII [ ]CKD III [ ]CKD IV [ ]CKD V [ ]Other pathological lesions [ ]  Abdominal Nutrition Status: malnutrition [ ] cachexia [ ] morbid obesity/BMI=40 [ ] Supplement ordered [___________]     ICU Vital Signs Last 24 Hrs  T(C): 36.9 (05 Feb 2018 06:00), Max: 37.7 (05 Feb 2018 00:00)  T(F): 98.5 (05 Feb 2018 06:00), Max: 99.9 (05 Feb 2018 00:00)  HR: 78 (05 Feb 2018 07:00) (78 - 99)  BP: 100/46 (05 Feb 2018 07:00) (93/44 - 133/51)  BP(mean): 60 (05 Feb 2018 07:00) (55 - 79)  ABP: 117/50 (05 Feb 2018 07:00) (76/64 - 163/69)  ABP(mean): 68 (05 Feb 2018 07:00) (62 - 111)  RR: 21 (05 Feb 2018 07:00) (21 - 28)  SpO2: 100% (05 Feb 2018 07:00) (90% - 100%)            02-04 @ 07:01  -  02-05 @ 07:00  --------------------------------------------------------  IN: 350 mL / OUT: 1750 mL / NET: -1400 mL            PHYSICAL EXAM:    GENERAL: [ ]NAD, [ ]well-groomed, [ ]well-developed  HEAD:  [ ]Atraumatic, [ ]Normocephalic  EYES: [ ]EOMI, [ ]PERRLA, [ ]conjunctiva and sclera clear  ENMT: [ ]No tonsillar erythema, exudates, or enlargement; [ ]Moist mucous membranes, [ ]Good dentition, [ ]No lesions  NECK: [ ]Supple, normal appearance, [ ]No JVD; [ ]Normal thyroid; [ ]Trachea midline  NERVOUS SYSTEM:  [ ]Alert & Oriented X3, [ ]Good concentration; [ ]Motor Strength 5/5 B/L upper and lower extremities; [ ]DTRs 2+ intact and symmetric  CHEST/LUNG: [ ]No chest deformity; [ ]Normal percussion bilaterally; [ ]No rales, rhonchi, wheezing   HEART: [ ]Regular rate and rhythm; [ ]No murmurs, rubs, or gallops  ABDOMEN: [ ]Soft, Nontender, Nondistended; [ ]Bowel sounds present  EXTREMITIES:  [ ]2+ Peripheral Pulses, [ ]No clubbing, cyanosis, or edema  LYMPH: [ ]No lymphadenopathy noted  SKIN: [ ]No rashes or lesions; [ ]Good capillary refill      LABS:  CBC Full  -  ( 05 Feb 2018 05:50 )  WBC Count : 18.5 K/uL  Hemoglobin : 7.9 g/dL  Hematocrit : 24.8 %  Platelet Count - Automated : 328 K/uL  Mean Cell Volume : 103.9 fl  Mean Cell Hemoglobin : 33.2 pg  Mean Cell Hemoglobin Concentration : 32.0 gm/dL  Auto Neutrophil # : 15.7 K/uL  Auto Lymphocyte # : 1.4 K/uL  Auto Monocyte # : 1.1 K/uL  Auto Eosinophil # : 0.1 K/uL  Auto Basophil # : 0.2 K/uL  Auto Neutrophil % : 84.8 %  Auto Lymphocyte % : 7.3 %  Auto Monocyte % : 5.8 %  Auto Eosinophil % : 0.8 %  Auto Basophil % : 1.2 %    02-05    145  |  107  |  x   ----------------------------<  157<H>  4.1   |  18<L>  |  7.97<H>    Ca    6.7<L>      05 Feb 2018 05:50  Phos  X-NORESULT     02-05  Mg     3.3     02-05    TPro  6.2  /  Alb  1.8<L>  /  TBili  0.5  /  DBili  x   /  AST  66<H>  /  ALT  35  /  AlkPhos  85  02-05            RADIOLOGY & ADDITIONAL STUDIES REVIEWED:  ***    [ ]GOALS OF CARE DISCUSSION WITH PATIENT/FAMILY/PROXY:    CRITICAL CARE TIME SPENT: 35 minutes INTERVAL HPI/OVERNIGHT EVENTS: *** Patient was seen and examined at bed side. patient had 2 episodes of SVT over night.     PRESSORS: [ ] YES [x ] NO  WHICH:    ANTIBIOTICS:       meropenem IVPB 500 milliGRAM(s) IV Intermittent every 12 hours           DATE STARTED: day 14  ANTIBIOTICS:                  DATE STARTED:  ANTIBIOTICS:                  DATE STARTED:    Antimicrobial:  meropenem IVPB 500 milliGRAM(s) IV Intermittent every 12 hours    Cardiovascular:  amiodarone    Tablet 200 milliGRAM(s) Oral three times a day  amiodarone IVPB 150 milliGRAM(s) IV Intermittent once  furosemide   Injectable 80 milliGRAM(s) IV Push daily  metoprolol     tartrate 25 milliGRAM(s) Oral every 6 hours  tamsulosin 0.4 milliGRAM(s) Oral at bedtime    Pulmonary:  tiotropium 18 MICROgram(s) Capsule 1 Capsule(s) Inhalation daily    Hematalogic:    Other:  acetaminophen    Suspension 650 milliGRAM(s) Oral every 6 hours PRN  aluminum hydroxide Suspension 30 milliLiter(s) Oral every 6 hours  chlorhexidine 4% Liquid 1 Application(s) Topical daily  finasteride 5 milliGRAM(s) Oral daily  insulin glargine Injectable (LANTUS) 20 Unit(s) SubCutaneous at bedtime  insulin glargine Injectable (LANTUS) 20 Unit(s) SubCutaneous every morning  insulin lispro (HumaLOG) corrective regimen sliding scale   SubCutaneous every 6 hours  pantoprazole  Injectable 40 milliGRAM(s) IV Push every 12 hours  sevelamer carbonate Powder 1600 milliGRAM(s) Oral three times a day with meals    acetaminophen    Suspension 650 milliGRAM(s) Oral every 6 hours PRN  aluminum hydroxide Suspension 30 milliLiter(s) Oral every 6 hours  amiodarone    Tablet 200 milliGRAM(s) Oral three times a day  amiodarone IVPB 150 milliGRAM(s) IV Intermittent once  chlorhexidine 4% Liquid 1 Application(s) Topical daily  finasteride 5 milliGRAM(s) Oral daily  furosemide   Injectable 80 milliGRAM(s) IV Push daily  insulin glargine Injectable (LANTUS) 20 Unit(s) SubCutaneous at bedtime  insulin glargine Injectable (LANTUS) 20 Unit(s) SubCutaneous every morning  insulin lispro (HumaLOG) corrective regimen sliding scale   SubCutaneous every 6 hours  meropenem IVPB 500 milliGRAM(s) IV Intermittent every 12 hours  metoprolol     tartrate 25 milliGRAM(s) Oral every 6 hours  pantoprazole  Injectable 40 milliGRAM(s) IV Push every 12 hours  sevelamer carbonate Powder 1600 milliGRAM(s) Oral three times a day with meals  tamsulosin 0.4 milliGRAM(s) Oral at bedtime  tiotropium 18 MICROgram(s) Capsule 1 Capsule(s) Inhalation daily    Drug Dosing Weight  Height (cm): 185.42 (20 Jan 2018 10:22)  Weight (kg): 96 (25 Jan 2018 07:30)  BMI (kg/m2): 27.9 (25 Jan 2018 07:30)  BSA (m2): 2.2 (25 Jan 2018 07:30)    CENTRAL LINE: [x ] YES [ ] NO  LOCATION:   [facility] DATE INSERTED: 1/30   REMOVE: [ ] YES [ ] NO  EXPLAIN:    BRUNSON: [x ] YES [ ] NO    DATE INSERTED:  REMOVE:  [ ] YES [ ] NO  EXPLAIN:    A-LINE:  [ ] YES [x ] NO  LOCATION:   DATE INSERTED:  REMOVE:  [ ] YES [ ] NO  EXPLAIN:    PMH -reviewed admission note, no change since admission  Heart faliure: acute [ ] chronic [ ] acute or chronic [ ] diastolic [ ] systolic [ ] combied systolic and diastolic[ ]  NING: ATN[ ] renal medullary necrosis [ ] CKD I [ ]CKDII [ ]CKD III [ ]CKD IV [ ]CKD V [x ]Other pathological lesions [ ]  Abdominal Nutrition Status: malnutrition [ ] cachexia [ ] morbid obesity/BMI=40 [ ] Supplement ordered [___________]     ICU Vital Signs Last 24 Hrs  T(C): 36.9 (05 Feb 2018 06:00), Max: 37.7 (05 Feb 2018 00:00)  T(F): 98.5 (05 Feb 2018 06:00), Max: 99.9 (05 Feb 2018 00:00)  HR: 78 (05 Feb 2018 07:00) (78 - 99)  BP: 100/46 (05 Feb 2018 07:00) (93/44 - 133/51)  BP(mean): 60 (05 Feb 2018 07:00) (55 - 79)  ABP: 117/50 (05 Feb 2018 07:00) (76/64 - 163/69)  ABP(mean): 68 (05 Feb 2018 07:00) (62 - 111)  RR: 21 (05 Feb 2018 07:00) (21 - 28)  SpO2: 100% (05 Feb 2018 07:00) (90% - 100%)            02-04 @ 07:01  -  02-05 @ 07:00  --------------------------------------------------------  IN: 350 mL / OUT: 1750 mL / NET: -1400 mL            PHYSICAL EXAM:    GENERAL: [x ]NAD, [ ]well-groomed, [ ]well-developed  HEAD:  [x ]Atraumatic, [ ]Normocephalic  EYES: [ ]EOMI, [x ]PERRLA, [x ]conjunctiva and sclera clear  ENMT: [ ]No tonsillar erythema, exudates, or enlargement; [ ]Moist mucous membranes, [ ]Good dentition, [ ]No lesions  NECK: [x ]Supple, normal appearance, [x ]No JVD; [ ]Normal thyroid; [ ]Trachea midline  NERVOUS SYSTEM:  awake but not following commands [ ]Good concentration; [ ]Motor Strength 5/5 B/L upper and lower extremities; [ ]DTRs 2+ intact and symmetric  CHEST/LUNG: [ ]No chest deformity; [ ]Normal percussion bilaterally; [x ]No rales, rhonchi, wheezing   HEART: [ ]Regular rate and rhythm; [ ]No murmurs, rubs, or gallops  ABDOMEN: [x ]Soft, Nontender, Nondistended; [ ]Bowel sounds present  EXTREMITIES:  [ x]2+ Peripheral Pulses, [ x]No clubbing, cyanosis, or edema  LYMPH: [x ]No lymphadenopathy noted  SKIN: [x ]No rashes or lesions; [ ]Good capillary refill      LABS:  CBC Full  -  ( 05 Feb 2018 05:50 )  WBC Count : 18.5 K/uL  Hemoglobin : 7.9 g/dL  Hematocrit : 24.8 %  Platelet Count - Automated : 328 K/uL  Mean Cell Volume : 103.9 fl  Mean Cell Hemoglobin : 33.2 pg  Mean Cell Hemoglobin Concentration : 32.0 gm/dL  Auto Neutrophil # : 15.7 K/uL  Auto Lymphocyte # : 1.4 K/uL  Auto Monocyte # : 1.1 K/uL  Auto Eosinophil # : 0.1 K/uL  Auto Basophil # : 0.2 K/uL  Auto Neutrophil % : 84.8 %  Auto Lymphocyte % : 7.3 %  Auto Monocyte % : 5.8 %  Auto Eosinophil % : 0.8 %  Auto Basophil % : 1.2 %    02-05    145  |  107  |  x   ----------------------------<  157<H>  4.1   |  18<L>  |  7.97<H>    Ca    6.7<L>      05 Feb 2018 05:50  Phos  X-NORESULT     02-05  Mg     3.3     02-05    TPro  6.2  /  Alb  1.8<L>  /  TBili  0.5  /  DBili  x   /  AST  66<H>  /  ALT  35  /  AlkPhos  85  02-05            RADIOLOGY & ADDITIONAL STUDIES REVIEWED:  ***    [ ]GOALS OF CARE DISCUSSION WITH PATIENT/FAMILY/PROXY:    CRITICAL CARE TIME SPENT: 35 minutes

## 2018-02-05 NOTE — PROGRESS NOTE ADULT - ASSESSMENT
72 male nursing home patient with CAD, CKD, DM, HTN, OA, BPH, anxiety, presented with influenza, acute respiratory failure, septic shock.  May have superimposed pneumonia as well           Problem/Plan - 1:  ·  Problem: GI bleed.  Plan: -Patient had no episode of BRBPR over night.   -on IV protonix BID,   -NPO, fecal occult blood positive  -H/H 7.3 --> 8.6-->8.0 today  Gi dr cox  as per GI likely lower GI bleed 2/2 hemorrhoids vs diverticular  continue to monitor CBC Q24h  will HOLD on heparin, aspirin and plavix due to risk of bleeding.    - No colonoscopy as risks higher then benefits      Problem/Plan - 2:  ·  Problem: Sepsis.  Plan: -no fevers in  4 days, BP stable 104/50 HR in 80s  - new LIJ placed.1/30   -repeat blood cultures, sputum cx -ve  -meropenem day 13.     Problem/Plan - 3:  ·  Problem: ARDS (adult respiratory distress syndrome).  Plan: meets burlin's criteria of ARDS, acute onset <7 days, non cardiogenic, bilateral involvement, Fio2/PO2 245,   CXR shows B/l pachy infiltrates, off mechanical vent,  ECHO normal EF ,  HD couldn't be completed yesterday and patient's blood pressure dropped, new shiley was placed by surgery, patient will get HD in AM, will continue with IV lasix 80mg today  -on meropenem 500 mg Q12hrs ( renal adjusted)   - prednisone taper  - Extubated on 1/31/18    -Poor prognosis, DNR/DNI.     Problem/Plan - 4:  ·  Problem: Acute respiratory failure with hypoxia.  Plan: could be due to flu/PNA   off droplet isolation, Influenza B positive   completed tamiflu ,  - Procalcitonin 80.4  -urine and blood cultures are negative, urine legionella negative   - IV lasix 80 mg daily  - CT physiotherapy  - ID Dr Wilkinson following.      Problem/Plan - 5:  ·  Problem: Renal failure.  Plan: patient had acute renal failure with metabolic acidosis 2/2 sepsis, failed HD due to low BP,will try to get   hemodialysis today, making urine.  -shiley changed by surgery yesterday.     Problem/Plan - 6:  Problem: DM (diabetes mellitus). Plan: Diet controlled   HbA1c. 6.0  lantus 20 units at bed time  - continue with sliding scale Q 6 hrs.     Problem/Plan - 7:  ·  Problem: HTN (hypertension).  Plan: increased metoprolol 25 Q6 hour, patient had x2 episode of SVT.    patient received 2 stat doses of adenosine. EKG showed NSR s/p adenosine, started on amiodarone 200mg TID for 5 days.  Dr Benson- cardiology.     Problem/Plan - 8:  ·  Problem: CAD (coronary artery disease).  Plan: -held ASA, Plavix due to GI bleed  -Patient finished heparin drip, troponin were elevated due to stress. ECHO EF 55 %with normal LV functions   - tropnin likely due to demand ischaemia 2/2 septic shock   - held lipitor due to worsening LFTs  - HD stable, currently off the pressor support   - Elevated d-dimer with normal venous doppler with no RV strain on ECHO less likely PE,   - elevated d-dimer 2/2 renal failure and sepsis    - Dr Benson cardio eval noted   -patient is DNR/ and do not reintubate .  palliative care consult noted.  family at bedside updated about the clinical condition. 72 male nursing home patient with CAD, CKD, DM, HTN, OA, BPH, anxiety, presented with influenza, acute respiratory failure, septic shock.  May have superimposed pneumonia as well    Problem/Plan - 1:  ·  Problem: GI bleed.  Plan: -Patient had no episode of BRBPR over night.   -on IV protonix BID,   -on trickle feeds,, fecal occult blood positive  -H/H 7.9 stable  Gi dr cox  as per GI likely lower GI bleed 2/2 hemorrhoids vs diverticular  continue to monitor CBC Q24h  will HOLD on heparin, aspirin and plavix due to risk of bleeding.    - No colonoscopy as risks higher then benefits      Problem/Plan - 2:  ·  Problem: Sepsis.  Plan: -no fevers in  5 days, BP stable   - new LIJ placed.1/30   -repeat blood cultures, sputum cx -ve  -meropenem day 14.  Id Dr wilkinson following     Problem/Plan - 3:  ·  Problem: ARDS (adult respiratory distress syndrome).  Plan: meets burlin's criteria of ARDS, acute onset <7 days, non cardiogenic, bilateral involvement, Fio2/PO2 245,   CXR shows B/l pachy infiltrates, off mechanical vent,  ECHO normal EF ,  -HD today, good urine out put, will continue lasix   -on meropenem 500 mg Q12hrs ( renal adjusted)   - Extubated on 1/31/18    -Poor prognosis, DNR/DNI.     Problem/Plan - 4:  ·  Problem: Acute respiratory failure with hypoxia.  Plan: could be due to flu/PNA   off droplet isolation, Influenza B positive   completed tamiflu ,  - Procalcitonin 80.4  -urine and blood cultures are negative, urine legionella negative   - IV lasix 80 mg daily  - CT physiotherapy  - ID Dr Wilkinson following.      Problem/Plan - 5:  ·  Problem: Renal failure.  Plan: patient had acute renal failure with metabolic acidosis 2/2 sepsis,hemodialysis today, making urine.  -LIJ 2/4/18      Problem/Plan - 6:  Problem: DM (diabetes mellitus). Plan: Diet controlled   HbA1c. 6.0  lantus 20 units at bed time and 20 in morning  - continue with sliding scale Q 6 hrs.     Problem/Plan - 7:  ·  Problem: HTN (hypertension).  Plan: increased metoprolol 25 Q6 hour, patient had x2 episode of SVT.    patient received 2 stat doses of adenosine. EKG showed NSR s/p adenosine, started on amiodarone loading dose,   Dr Benson- cardiology.     Problem/Plan - 8:  ·  Problem: CAD (coronary artery disease).  Plan: -held ASA, Plavix due to GI bleed  -Patient finished heparin drip, troponin were elevated due to stress. ECHO EF 55 %with normal LV functions   - tropnin likely due to demand ischaemia 2/2 septic shock   - held lipitor due to worsening LFTs  - HD stable, currently off the pressor support   - Elevated d-dimer with normal venous doppler with no RV strain on ECHO less likely PE,   - elevated d-dimer 2/2 renal failure and sepsis    - Dr Benson cardio eval noted   -patient is DNR/ and do not reintubate .  palliative care consult noted.  son updated on phone regarding;s patient's current state,

## 2018-02-05 NOTE — PROGRESS NOTE ADULT - ASSESSMENT
1.	Pneumonia   2.	Leukocytosis - increased likely from steroids  3.	S/p fevers   ·	completed course of meropenem today  ·	will monitor patient for now  Time spent - 35 minutes

## 2018-02-05 NOTE — PROGRESS NOTE ADULT - SUBJECTIVE AND OBJECTIVE BOX
ICU visit:  72y Male is under our care for pneumonia and leukocytosis.  Patient has not had recent fevers. Wbc count has increased to 18.5, but was on a steroid taper until this am. Repeat CXR shows no new findings.      REVIEW OF SYSTEMS:  [ X ] Not able to illicit    MEDS:  meropenem IVPB 500 milliGRAM(s) IV Intermittent every 12 hours    ALLERGIES: doxycycline (Unknown)  penicillin (Unknown)  tetracycline (Unknown)  Valtrex (Unknown)    VITALS:  ICU Vital Signs Last 24 Hrs  T(C): 35.8 (05 Feb 2018 12:00), Max: 37.7 (05 Feb 2018 00:00)  T(F): 96.5 (05 Feb 2018 12:00), Max: 99.9 (05 Feb 2018 00:00)  HR: 75 (05 Feb 2018 12:00) (67 - 99)  BP: 105/48 (05 Feb 2018 12:00) (93/44 - 129/61)  BP(mean): 60 (05 Feb 2018 12:00) (55 - 79)  ABP: 141/54 (05 Feb 2018 12:00) (76/64 - 149/56)  ABP(mean): 77 (05 Feb 2018 12:00) (62 - 111)  RR: 19 (05 Feb 2018 12:00) (19 - 28)  SpO2: 99% (05 Feb 2018 12:00) (90% - 100%)    PHYSICAL EXAM:  HEENT: +NGT +aerosol mask  Neck: left neck CVP  Respiratory: bilateral rhonchi no rales  Cardiovascular: S1 S2 reg no murmurs  Gastrointestinal: +BS with soft, mildly distended abdomen; nontender  +lopez  Extremities: +anasarca  +left groin shiley  Skin: no rashes  Ortho: n/a  Neuro: obtunded      LABS/DIAGNOSTIC TESTS:                        7.9    18.5  )-----------( 328      ( 05 Feb 2018 05:50 )             24.8   WBC Count: 18.5 K/uL (02-05 @ 05:50)  WBC Count: 16.4 K/uL (02-04 @ 06:22)  WBC Count: 16.6 K/uL (02-03 @ 22:27)  WBC Count: 14.7 K/uL (02-03 @ 18:24)  WBC Count: 14.1 K/uL (02-03 @ 06:53)    02-05    145  |  107  |  219  ----------------------------<  157<H>  4.1   |  18<L>  |  7.97<H>    Ca    6.7<L>      05 Feb 2018 05:50  Phos  13.1     02-05  Mg     3.3     02-05    TPro  6.2  /  Alb  1.8<L>  /  TBili  0.5  /  DBili  x   /  AST  66<H>  /  ALT  35  /  AlkPhos  85  02-05        CULTURES:   .Sputum Sputum trap  01-29 @ 13:45   Normal Respiratory Tonie present  --    Few polymorphonuclear leukocytes  Rare Squamous epithelial cells per low power field  Numerous Yeast per oil power field      .Blood Blood-Peripheral  01-29 @ 11:43   No growth to date.  --  --      .Sputum Sputumtrap rec'd  01-22 @ 10:48   Few Streptococcus pyogenes (Group A)  Normal Respiratory Tonie present  --    Numerous polymorphonuclear leukocytes  Rare Squamous epithelial cells per low power field  Numerous Gram Positive Cocci in Pairs and Chains per oil power field      .Urine Clean Catch (Midstream)  01-20 @ 22:48   No growth  --  --      .Blood Blood-Peripheral  01-20 @ 17:03   No growth at 5 days.  --  --      RADIOLOGY:   EXAM:  XR CHEST PORTABLE ROUTINE 1V                        PROCEDURE DATE:  02/04/2018    INTERPRETATION:  CLINICAL INFORMATION: Nasogastric tube.    A single portable view of the chest was obtained.     Comparison: 2/1/2018.     The mediastinal cardiac silhouette is unremarkable. Nasogastric tube   below the hemidiaphragms tip not visualized. Left IJ line with tip in   superior vena cava. No pneumothorax.    Small left effusion and left basilar opacity. Clear right lung.    No acute osseous finding.     IMPRESSION:    As above.

## 2018-02-05 NOTE — PROGRESS NOTE ADULT - SUBJECTIVE AND OBJECTIVE BOX
pt seen and examined.pts current chart reviewed and case discussed with resident covering.    SUBJECTIVE:  pt is awake , arousable on 35 % AM and in nad  U/O is acceptble  REVIEW OF SYSTEMS:  Unobtainable  Current meds:    acetaminophen    Suspension 650 milliGRAM(s) Oral every 6 hours PRN  aluminum hydroxide Suspension 30 milliLiter(s) Oral every 6 hours  amiodarone Infusion 1 mG/Min IV Continuous <Continuous>  amiodarone Infusion 0.5 mG/Min IV Continuous <Continuous>  chlorhexidine 4% Liquid 1 Application(s) Topical daily  finasteride 5 milliGRAM(s) Oral daily  furosemide   Injectable 80 milliGRAM(s) IV Push daily  insulin glargine Injectable (LANTUS) 20 Unit(s) SubCutaneous at bedtime  insulin glargine Injectable (LANTUS) 20 Unit(s) SubCutaneous every morning  insulin lispro (HumaLOG) corrective regimen sliding scale   SubCutaneous every 6 hours  metoprolol     tartrate 25 milliGRAM(s) Oral every 6 hours  pantoprazole  Injectable 40 milliGRAM(s) IV Push every 12 hours  sevelamer carbonate Powder 1600 milliGRAM(s) Oral three times a day with meals  tamsulosin 0.4 milliGRAM(s) Oral at bedtime      Vital Signs    T(F): 98 (18 @ 16:04), Max: 99.9 (18 @ 00:00)  HR: 60 (18 @ 19:00) (60 - 99)  BP: 93/46 (18 @ 19:00) (90/46 - 128/56)  ABP: 106/48 (18 @ 19:00) (76/64 - 149/56)  RR: 19 (18 @ 19:00) (18 - 28)  SpO2: 97% (18 @ 19:00) (90% - 100%)  Wt(kg): --  CVP(cm H2O): --  CO: --  PCWP: --    I and O's:     @ 07:01  -   @ 07:00  --------------------------------------------------------  IN:    Enteral Tube Flush: 160 mL    Nepro with Carb Steady: 120 mL  Total IN: 280 mL    OUT:    Indwelling Catheter - Urethral: 1610 mL  Total OUT: 1610 mL    Total NET: -1330 mL       @ 07:01  -   @ 07:00  --------------------------------------------------------  IN:    Enteral Tube Flush: 100 mL    Nepro with Carb Steady: 200 mL    Solution: 50 mL  Total IN: 350 mL    OUT:    Indwelling Catheter - Urethral: 1750 mL  Total OUT: 1750 mL    Total NET: -1400 mL       @ 07:01  -   @ 20:33  --------------------------------------------------------  IN:    amiodarone Infusion: 16.7 mL    amiodarone Infusion: 199.8 mL    Enteral Tube Flush: 100 mL    Nepro with Carb Steady: 120 mL    Solution: 100 mL  Total IN: 536.5 mL    OUT:    Indwelling Catheter - Urethral: 1020 mL  Total OUT: 1020 mL    Total NET: -483.5 mL        Daily     Daily Weight in k.5 (2018 08:00)    PHYSICAL EXAM:  Constitutional: well developed, obese and in and  HEENT: PERRLA,  no icteric sclera and mild pallor of conjunctiva noted  Neck: No JVD, thyromegaly or adenopathy  Respiratory: reduced air entry lower lungs with no rales, wheezing or rhonchi  Cardiovascular: S1 and S2 normally heard  Gastrointestinal: soft, distended, nontender and normal bowel sounds heard  Extremities: 2 plus peripheral edema noted   Neurological: pt is responsive but not oriented  : No flank or cva tenderness palpated.  Skin: No rashes      LABS:    CBC:                        7.9    18.5  )-----------( 328      ( 2018 05:50 )             24.8           BMP:        145  |  107  |  219  ----------------------------<  157<H>  4.1   |  18<L>  |  7.97<H>      143  |  105  |  204  ----------------------------<  169<H>  4.3   |  18<L>  |  7.70<H>      141  |  102  |  194<H>  ----------------------------<  140<H>  4.1   |  20<L>  |  7.46<H>    Ca    6.7<L>      2018 05:50  Ca    6.7<L>      2018 06:22  Ca    6.4<LL>      2018 06:53  Phos  13.1     -  Phos  12     -  Phos  11.8     -  Mg     3.3     -  Mg     3.3     -  Mg     3.0     -    TPro  6.2  /  Alb  1.8<L>  /  TBili  0.5  /  DBili  x   /  AST  66<H>  /  ALT  35  /  AlkPhos  85        Blood Gas Profile - Venous (18 @ 11:56)    pH, Venous: 7.37    pCO2, Venous: 28 mmHg    pO2, Venous: 83 mmHg    HCO3, Venous: 16 mmol/L    Base Excess, Venous: -8.3 mmol/L    Oxygen Saturation, Venous: 95 %    FIO2, Venous: 21.0    Blood Gas Comments, Venous: Drawn by RN            RADIOLOGY & ADDITIONAL STUDIES:      < from: Xray Chest 1 View- PORTABLE-Routine (18 @ 11:01) >  EXAM:  XR CHEST PORTABLE ROUTINE 1V                            PROCEDURE DATE:  2018          INTERPRETATION:  AP semierect chest on 2018 at 9:30 AM.   Patient has sepsis and respiratory failure.    Heart is magnified by technique. Poor inspiration crowds chest.    Nasogastric tube and left jugular line remain.    There is an atelectatic process at right base and slight fluid/infiltrate   at left base. Left base findings have improved from . Right   base findings areunchanged.    IMPRESSION: As above.      < end of copied text >

## 2018-02-06 NOTE — PROGRESS NOTE ADULT - SUBJECTIVE AND OBJECTIVE BOX
ICU visit:  72y Male is under our care for leukocytosis, s/p pneumonia tx.  Patient is currently getting HD session; remains afebrile and wbc count has improved.  Completed course of antibiotics yesterday. No further ID followup needed. .      REVIEW OF SYSTEMS:  [ X ] Not able to illicit    MEDS: no antibiotics     ALLERGIES: doxycycline (Unknown)  penicillin (Unknown)  tetracycline (Unknown)  Valtrex (Unknown)    VITALS:  ICU Vital Signs Last 24 Hrs  T(C): 36.2 (06 Feb 2018 13:14), Max: 36.7 (05 Feb 2018 20:05)  T(F): 97.1 (06 Feb 2018 13:14), Max: 98 (05 Feb 2018 20:05)  HR: 86 (06 Feb 2018 17:30) (55 - 88)  BP: 94/51 (06 Feb 2018 16:00) (73/58 - 124/71)  BP(mean): 61 (06 Feb 2018 16:00) (51 - 79)  ABP: 105/46 (06 Feb 2018 17:30) (70/39 - 150/63)  ABP(mean): 61 (06 Feb 2018 17:30) (51 - 88)  RR: 24 (06 Feb 2018 17:30) (14 - 26)  SpO2: 100% (06 Feb 2018 17:30) (97% - 100%)      PHYSICAL EXAM:  HEENT: +NGT +NC @ 2L/min  Neck: left neck CVP  Respiratory: bilateral rhonchi no rales  Cardiovascular: S1 S2 reg no murmurs  Gastrointestinal: +BS with soft, nondistended abdomen; nontender  +lopez  Extremities: +anasarca  +left groin shiley  Skin: no rashes  Ortho: n/a  Neuro: lethargic      LABS/DIAGNOSTIC TESTS:                        7.3    16.3  )-----------( 315      ( 06 Feb 2018 06:12 )             23.2   WBC Count: 16.3 K/uL (02-06 @ 06:12)  WBC Count: 18.5 K/uL (02-05 @ 05:50)  WBC Count: 16.4 K/uL (02-04 @ 06:22)    02-06    144  |  107  |  143<H>  ----------------------------<  137<H>  3.8   |  21<L>  |  5.25<H>    Ca    7.1<L>      06 Feb 2018 14:54  Phos  13.2     02-06  Mg     3.2     02-06    TPro  6.2  /  Alb  1.8<L>  /  TBili  0.5  /  DBili  x   /  AST  66<H>  /  ALT  35  /  AlkPhos  85  02-05        CULTURES:   .Sputum Sputum trap  01-29 @ 13:45   Normal Respiratory Tonie present  --    Few polymorphonuclear leukocytes  Rare Squamous epithelial cells per low power field  Numerous Yeast per oil power field      .Blood Blood-Peripheral  01-29 @ 11:43   No growth to date.  --  --      .Sputum Sputum   01-22 @ 10:48   Few Streptococcus pyogenes (Group A)  Normal Respiratory Tonie present  --    Numerous polymorphonuclear leukocytes  Rare Squamous epithelial cells per low power field  Numerous Gram Positive Cocci in Pairs and Chains per oil power field      RADIOLOGY:  no new studies

## 2018-02-06 NOTE — PROGRESS NOTE ADULT - SUBJECTIVE AND OBJECTIVE BOX
INTERVAL HPI/OVERNIGHT EVENTS: ***      PRESSORS: [ ] YES [x ] NO  WHICH:    ANTIBIOTICS:       none  ANTIBIOTICS:                  DATE STARTED:  ANTIBIOTICS:                  DATE STARTED:    Antimicrobial:    Cardiovascular:  amiodarone Infusion 1 mG/Min IV Continuous <Continuous>  amiodarone Infusion 0.5 mG/Min IV Continuous <Continuous>  furosemide   Injectable 80 milliGRAM(s) IV Push daily  metoprolol     tartrate 25 milliGRAM(s) Oral every 6 hours  tamsulosin 0.4 milliGRAM(s) Oral at bedtime    Pulmonary:    Hematalogic:    Other:  acetaminophen    Suspension 650 milliGRAM(s) Oral every 6 hours PRN  aluminum hydroxide Suspension 30 milliLiter(s) Oral every 6 hours  chlorhexidine 4% Liquid 1 Application(s) Topical daily  finasteride 5 milliGRAM(s) Oral daily  insulin glargine Injectable (LANTUS) 20 Unit(s) SubCutaneous at bedtime  insulin glargine Injectable (LANTUS) 20 Unit(s) SubCutaneous every morning  insulin lispro (HumaLOG) corrective regimen sliding scale   SubCutaneous every 6 hours  pantoprazole  Injectable 40 milliGRAM(s) IV Push every 12 hours  sevelamer carbonate Powder 1600 milliGRAM(s) Oral three times a day with meals    acetaminophen    Suspension 650 milliGRAM(s) Oral every 6 hours PRN  aluminum hydroxide Suspension 30 milliLiter(s) Oral every 6 hours  amiodarone Infusion 1 mG/Min IV Continuous <Continuous>  amiodarone Infusion 0.5 mG/Min IV Continuous <Continuous>  chlorhexidine 4% Liquid 1 Application(s) Topical daily  finasteride 5 milliGRAM(s) Oral daily  furosemide   Injectable 80 milliGRAM(s) IV Push daily  insulin glargine Injectable (LANTUS) 20 Unit(s) SubCutaneous at bedtime  insulin glargine Injectable (LANTUS) 20 Unit(s) SubCutaneous every morning  insulin lispro (HumaLOG) corrective regimen sliding scale   SubCutaneous every 6 hours  metoprolol     tartrate 25 milliGRAM(s) Oral every 6 hours  pantoprazole  Injectable 40 milliGRAM(s) IV Push every 12 hours  sevelamer carbonate Powder 1600 milliGRAM(s) Oral three times a day with meals  tamsulosin 0.4 milliGRAM(s) Oral at bedtime    Drug Dosing Weight  Height (cm): 185.42 (20 Jan 2018 10:22)  Weight (kg): 96 (25 Jan 2018 07:30)  BMI (kg/m2): 27.9 (25 Jan 2018 07:30)  BSA (m2): 2.2 (25 Jan 2018 07:30)    CENTRAL LINE: [x ] YES [ ] NO  LOCATION:   [facility] DATE INSERTED: 1/30   REMOVE: [ ] YES [ ] NO  EXPLAIN:    BRUNSON: [x ] YES [ ] NO    DATE INSERTED:  REMOVE:  [ ] YES [ ] NO  EXPLAIN:    A-LINE:  [ ] YES [x ] NO  LOCATION:   DATE INSERTED:  REMOVE:  [ ] YES [ ] NO  EXPLAIN:        ICU Vital Signs Last 24 Hrs  T(C): 36.2 (06 Feb 2018 06:00), Max: 36.7 (05 Feb 2018 16:04)  T(F): 97.2 (06 Feb 2018 06:00), Max: 98 (05 Feb 2018 16:04)  HR: 63 (06 Feb 2018 07:00) (60 - 75)  BP: 102/47 (06 Feb 2018 06:00) (89/43 - 117/47)  BP(mean): 60 (06 Feb 2018 06:00) (54 - 68)  ABP: 128/51 (06 Feb 2018 07:00) (79/74 - 149/56)  ABP(mean): 73 (06 Feb 2018 07:00) (61 - 83)  RR: 19 (06 Feb 2018 07:00) (16 - 24)  SpO2: 99% (06 Feb 2018 07:00) (90% - 100%)            02-05 @ 07:01  -  02-06 @ 07:00  --------------------------------------------------------  IN: 976.9 mL / OUT: 2395 mL / NET: -1418.1 mL          PHYSICAL EXAM:    GENERAL: [x ]NAD, [ ]well-groomed, [ ]well-developed  HEAD:  [x ]Atraumatic, [ ]Normocephalic  EYES: [ ]EOMI, [x ]PERRLA, [x ]conjunctiva and sclera clear  ENMT: [ ]No tonsillar erythema, exudates, or enlargement; [ ]Moist mucous membranes, [ ]Good dentition, [ ]No lesions  NECK: [x ]Supple, normal appearance, [x ]No JVD; [ ]Normal thyroid; [ ]Trachea midline  NERVOUS SYSTEM:  awake but not following commands [ ]Good concentration; [ ]Motor Strength 5/5 B/L upper and lower extremities; [ ]DTRs 2+ intact and symmetric  CHEST/LUNG: [ ]No chest deformity; [ ]Normal percussion bilaterally; [x ]No rales, rhonchi, wheezing   HEART: [ ]Regular rate and rhythm; [ ]No murmurs, rubs, or gallops  ABDOMEN: [x ]Soft, Nontender, Nondistended; [ ]Bowel sounds present  EXTREMITIES:  [ x]2+ Peripheral Pulses, [ x]No clubbing, cyanosis, or edema  LYMPH: [x ]No lymphadenopathy noted  SKIN: [x ]No rashes or lesions; [ ]Good capillary refill      LABS:  CBC Full  -  ( 06 Feb 2018 06:12 )  WBC Count : 16.3 K/uL  Hemoglobin : 7.3 g/dL  Hematocrit : 23.2 %  Platelet Count - Automated : 315 K/uL  Mean Cell Volume : 104.0 fl  Mean Cell Hemoglobin : 32.8 pg  Mean Cell Hemoglobin Concentration : 31.6 gm/dL  Auto Neutrophil # : x  Auto Lymphocyte # : x  Auto Monocyte # : x  Auto Eosinophil # : x  Auto Basophil # : x  Auto Neutrophil % : x  Auto Lymphocyte % : x  Auto Monocyte % : x  Auto Eosinophil % : x  Auto Basophil % : x    02-06    147<H>  |  109<H>  |  x   ----------------------------<  152<H>  4.1   |  18<L>  |  7.92<H>    Ca    6.6<L>      06 Feb 2018 06:12  Phos  13.1     02-05  Mg     3.2     02-06    TPro  6.2  /  Alb  1.8<L>  /  TBili  0.5  /  DBili  x   /  AST  66<H>  /  ALT  35  /  AlkPhos  85  02-05            RADIOLOGY & ADDITIONAL STUDIES REVIEWED:  ***    [ ]GOALS OF CARE DISCUSSION WITH PATIENT/FAMILY/PROXY:    CRITICAL CARE TIME SPENT: 35 minutes INTERVAL HPI/OVERNIGHT EVENTS: ***      PRESSORS: [ ] YES [x ] NO  WHICH:    ANTIBIOTICS:       none  ANTIBIOTICS:                  DATE STARTED:  ANTIBIOTICS:                  DATE STARTED:    Antimicrobial:    Cardiovascular:  amiodarone Infusion 1 mG/Min IV Continuous <Continuous>  amiodarone Infusion 0.5 mG/Min IV Continuous <Continuous>  furosemide   Injectable 80 milliGRAM(s) IV Push daily  metoprolol     tartrate 25 milliGRAM(s) Oral every 6 hours  tamsulosin 0.4 milliGRAM(s) Oral at bedtime    Pulmonary:    Hematalogic:    Other:  acetaminophen    Suspension 650 milliGRAM(s) Oral every 6 hours PRN  aluminum hydroxide Suspension 30 milliLiter(s) Oral every 6 hours  chlorhexidine 4% Liquid 1 Application(s) Topical daily  finasteride 5 milliGRAM(s) Oral daily  insulin glargine Injectable (LANTUS) 20 Unit(s) SubCutaneous at bedtime  insulin glargine Injectable (LANTUS) 20 Unit(s) SubCutaneous every morning  insulin lispro (HumaLOG) corrective regimen sliding scale   SubCutaneous every 6 hours  pantoprazole  Injectable 40 milliGRAM(s) IV Push every 12 hours  sevelamer carbonate Powder 1600 milliGRAM(s) Oral three times a day with meals    acetaminophen    Suspension 650 milliGRAM(s) Oral every 6 hours PRN  aluminum hydroxide Suspension 30 milliLiter(s) Oral every 6 hours  amiodarone Infusion 1 mG/Min IV Continuous <Continuous>  amiodarone Infusion 0.5 mG/Min IV Continuous <Continuous>  chlorhexidine 4% Liquid 1 Application(s) Topical daily  finasteride 5 milliGRAM(s) Oral daily  furosemide   Injectable 80 milliGRAM(s) IV Push daily  insulin glargine Injectable (LANTUS) 20 Unit(s) SubCutaneous at bedtime  insulin glargine Injectable (LANTUS) 20 Unit(s) SubCutaneous every morning  insulin lispro (HumaLOG) corrective regimen sliding scale   SubCutaneous every 6 hours  metoprolol     tartrate 25 milliGRAM(s) Oral every 6 hours  pantoprazole  Injectable 40 milliGRAM(s) IV Push every 12 hours  sevelamer carbonate Powder 1600 milliGRAM(s) Oral three times a day with meals  tamsulosin 0.4 milliGRAM(s) Oral at bedtime    Drug Dosing Weight  Height (cm): 185.42 (20 Jan 2018 10:22)  Weight (kg): 96 (25 Jan 2018 07:30)  BMI (kg/m2): 27.9 (25 Jan 2018 07:30)  BSA (m2): 2.2 (25 Jan 2018 07:30)    CENTRAL LINE: [x ] YES [ ] NO  LOCATION:   [facility] DATE INSERTED: 1/30   REMOVE: [ ] YES [ ] NO  EXPLAIN:    BRUNSON: [x ] YES [ ] NO    DATE INSERTED:  REMOVE:  [ ] YES [ ] NO  EXPLAIN:    A-LINE:  [ ] YES [x ] NO  LOCATION:   DATE INSERTED:  REMOVE:  [ ] YES [ ] NO  EXPLAIN:        ICU Vital Signs Last 24 Hrs  T(C): 36.2 (06 Feb 2018 06:00), Max: 36.7 (05 Feb 2018 16:04)  T(F): 97.2 (06 Feb 2018 06:00), Max: 98 (05 Feb 2018 16:04)  HR: 63 (06 Feb 2018 07:00) (60 - 75)  BP: 102/47 (06 Feb 2018 06:00) (89/43 - 117/47)  BP(mean): 60 (06 Feb 2018 06:00) (54 - 68)  ABP: 128/51 (06 Feb 2018 07:00) (79/74 - 149/56)  ABP(mean): 73 (06 Feb 2018 07:00) (61 - 83)  RR: 19 (06 Feb 2018 07:00) (16 - 24)  SpO2: 99% (06 Feb 2018 07:00) (90% - 100%)            02-05 @ 07:01  -  02-06 @ 07:00  --------------------------------------------------------  IN: 976.9 mL / OUT: 2395 mL / NET: -1418.1 mL          PHYSICAL EXAM:    GENERAL: [x ]NAD, [ ]well-groomed, [ ]well-developed  HEAD:  [x ]Atraumatic, [ ]Normocephalic  EYES: [ ]EOMI, [x ]PERRLA, [x ]conjunctiva and sclera clear  ENMT: [ ]No tonsillar erythema, exudates, or enlargement; [ ]Moist mucous membranes, [ ]Good dentition, [ ]No lesions  NECK: [x ]Supple, normal appearance, [x ]No JVD; [ ]Normal thyroid; [ ]Trachea midline  NERVOUS SYSTEM:  awake but not following commands [ ]Good concentration; [ ]Motor Strength 5/5 B/L upper and lower extremities; [ ]DTRs 2+ intact and symmetric  CHEST/LUNG: [ ]No chest deformity; [ ]Normal percussion bilaterally; [x ]No rales, rhonchi, wheezing   HEART: [ ]Regular rate and rhythm; [ ]No murmurs, rubs, or gallops  ABDOMEN: [x ]Soft, Nontender, Nondistended; [ ]Bowel sounds present  EXTREMITIES:  [ x]2+ Peripheral Pulses, [ x]No clubbing, cyanosis, or edema  LYMPH: [x ]No lymphadenopathy noted  SKIN: [x ]No rashes or lesions; [ ]Good capillary refill    LABS:  CBC Full  -  ( 06 Feb 2018 06:12 )  WBC Count : 16.3 K/uL  Hemoglobin : 7.3 g/dL  Hematocrit : 23.2 %  Platelet Count - Automated : 315 K/uL  Mean Cell Volume : 104.0 fl  Mean Cell Hemoglobin : 32.8 pg  Mean Cell Hemoglobin Concentration : 31.6 gm/dL  Auto Neutrophil # : x  Auto Lymphocyte # : x  Auto Monocyte # : x  Auto Eosinophil # : x  Auto Basophil # : x  Auto Neutrophil % : x  Auto Lymphocyte % : x  Auto Monocyte % : x  Auto Eosinophil % : x  Auto Basophil % : x    02-06    147<H>  |  109<H>  |  x   ----------------------------<  152<H>  4.1   |  18<L>  |  7.92<H>    Ca    6.6<L>      06 Feb 2018 06:12  Phos  13.1     02-05  Mg     3.2     02-06    TPro  6.2  /  Alb  1.8<L>  /  TBili  0.5  /  DBili  x   /  AST  66<H>  /  ALT  35  /  AlkPhos  85  02-05            RADIOLOGY & ADDITIONAL STUDIES REVIEWED:  ***    [ ]GOALS OF CARE DISCUSSION WITH PATIENT/FAMILY/PROXY:    CRITICAL CARE TIME SPENT: 35 minutes INTERVAL HPI/OVERNIGHT EVENTS: *** no events over night.       PRESSORS: [ ] YES [x ] NO  WHICH:    ANTIBIOTICS:       none  ANTIBIOTICS:                  DATE STARTED:  ANTIBIOTICS:                  DATE STARTED:    Antimicrobial:    Cardiovascular:  amiodarone Infusion 1 mG/Min IV Continuous <Continuous>  amiodarone Infusion 0.5 mG/Min IV Continuous <Continuous>  furosemide   Injectable 80 milliGRAM(s) IV Push daily  metoprolol     tartrate 25 milliGRAM(s) Oral every 6 hours  tamsulosin 0.4 milliGRAM(s) Oral at bedtime    Pulmonary:    Hematalogic:    Other:  acetaminophen    Suspension 650 milliGRAM(s) Oral every 6 hours PRN  aluminum hydroxide Suspension 30 milliLiter(s) Oral every 6 hours  chlorhexidine 4% Liquid 1 Application(s) Topical daily  finasteride 5 milliGRAM(s) Oral daily  insulin glargine Injectable (LANTUS) 20 Unit(s) SubCutaneous at bedtime  insulin glargine Injectable (LANTUS) 20 Unit(s) SubCutaneous every morning  insulin lispro (HumaLOG) corrective regimen sliding scale   SubCutaneous every 6 hours  pantoprazole  Injectable 40 milliGRAM(s) IV Push every 12 hours  sevelamer carbonate Powder 1600 milliGRAM(s) Oral three times a day with meals    acetaminophen    Suspension 650 milliGRAM(s) Oral every 6 hours PRN  aluminum hydroxide Suspension 30 milliLiter(s) Oral every 6 hours  amiodarone Infusion 1 mG/Min IV Continuous <Continuous>  amiodarone Infusion 0.5 mG/Min IV Continuous <Continuous>  chlorhexidine 4% Liquid 1 Application(s) Topical daily  finasteride 5 milliGRAM(s) Oral daily  furosemide   Injectable 80 milliGRAM(s) IV Push daily  insulin glargine Injectable (LANTUS) 20 Unit(s) SubCutaneous at bedtime  insulin glargine Injectable (LANTUS) 20 Unit(s) SubCutaneous every morning  insulin lispro (HumaLOG) corrective regimen sliding scale   SubCutaneous every 6 hours  metoprolol     tartrate 25 milliGRAM(s) Oral every 6 hours  pantoprazole  Injectable 40 milliGRAM(s) IV Push every 12 hours  sevelamer carbonate Powder 1600 milliGRAM(s) Oral three times a day with meals  tamsulosin 0.4 milliGRAM(s) Oral at bedtime    Drug Dosing Weight  Height (cm): 185.42 (20 Jan 2018 10:22)  Weight (kg): 96 (25 Jan 2018 07:30)  BMI (kg/m2): 27.9 (25 Jan 2018 07:30)  BSA (m2): 2.2 (25 Jan 2018 07:30)    CENTRAL LINE: [x ] YES [ ] NO  LOCATION:   [facility] DATE INSERTED: 1/30   REMOVE: [ ] YES [ ] NO  EXPLAIN:    BRUNSON: [x ] YES [ ] NO    DATE INSERTED:  REMOVE:  [ ] YES [ ] NO  EXPLAIN:    A-LINE:  [ ] YES [x ] NO  LOCATION:   DATE INSERTED:  REMOVE:  [ ] YES [ ] NO  EXPLAIN:        ICU Vital Signs Last 24 Hrs  T(C): 36.2 (06 Feb 2018 06:00), Max: 36.7 (05 Feb 2018 16:04)  T(F): 97.2 (06 Feb 2018 06:00), Max: 98 (05 Feb 2018 16:04)  HR: 63 (06 Feb 2018 07:00) (60 - 75)  BP: 102/47 (06 Feb 2018 06:00) (89/43 - 117/47)  BP(mean): 60 (06 Feb 2018 06:00) (54 - 68)  ABP: 128/51 (06 Feb 2018 07:00) (79/74 - 149/56)  ABP(mean): 73 (06 Feb 2018 07:00) (61 - 83)  RR: 19 (06 Feb 2018 07:00) (16 - 24)  SpO2: 99% (06 Feb 2018 07:00) (90% - 100%)            02-05 @ 07:01  -  02-06 @ 07:00  --------------------------------------------------------  IN: 976.9 mL / OUT: 2395 mL / NET: -1418.1 mL          PHYSICAL EXAM:    GENERAL: [x ]NAD, [ ]well-groomed, [ ]well-developed  HEAD:  [x ]Atraumatic, [ ]Normocephalic  EYES: [ ]EOMI, [x ]PERRLA, [x ]conjunctiva and sclera clear  ENMT: [ ]No tonsillar erythema, exudates, or enlargement; [ x]Moist mucous membranes, [ ]Good dentition, [ ]No lesions  NECK: [x ]Supple, normal appearance, [x ]No JVD; [ ]Normal thyroid; [ ]Trachea midline  NERVOUS SYSTEM:  awake but not following commands CHEST/LUNG: [ ]No chest deformity; [ ]Normal percussion bilaterally; [x ]No rales, rhonchi, wheezing   HEART: [x ]Regular rate and rhythm; [ ]No murmurs, rubs, or gallops  ABDOMEN: [x ]Soft, Nontender, Nondistended; [ ]Bowel sounds present  EXTREMITIES:  [ x]2+ Peripheral Pulses, [ x]No clubbing, cyanosis, or edema  LYMPH: [x ]No lymphadenopathy noted  SKIN: [x ]No rashes or lesions; [ ]Good capillary refill    LABS:  CBC Full  -  ( 06 Feb 2018 06:12 )  WBC Count : 16.3 K/uL  Hemoglobin : 7.3 g/dL  Hematocrit : 23.2 %  Platelet Count - Automated : 315 K/uL  Mean Cell Volume : 104.0 fl  Mean Cell Hemoglobin : 32.8 pg  Mean Cell Hemoglobin Concentration : 31.6 gm/dL  Auto Neutrophil # : x  Auto Lymphocyte # : x  Auto Monocyte # : x  Auto Eosinophil # : x  Auto Basophil # : x  Auto Neutrophil % : x  Auto Lymphocyte % : x  Auto Monocyte % : x  Auto Eosinophil % : x  Auto Basophil % : x    02-06    147<H>  |  109<H>  |  x   ----------------------------<  152<H>  4.1   |  18<L>  |  7.92<H>    Ca    6.6<L>      06 Feb 2018 06:12  Phos  13.1     02-05  Mg     3.2     02-06    TPro  6.2  /  Alb  1.8<L>  /  TBili  0.5  /  DBili  x   /  AST  66<H>  /  ALT  35  /  AlkPhos  85  02-05            RADIOLOGY & ADDITIONAL STUDIES REVIEWED:  ***    [ x]GOALS OF CARE DISCUSSION WITH PATIENT/FAMILY/PROXY: DNR/DNI    CRITICAL CARE TIME SPENT: 35 minutes

## 2018-02-06 NOTE — PROGRESS NOTE ADULT - ASSESSMENT
1.	Leukocytosis - improved  2.	S/p pneumonia tx  ·	completed course of meropenem   ·	reconsult prn  Time spent - 30 minutes

## 2018-02-06 NOTE — PROGRESS NOTE ADULT - ASSESSMENT
1. ESRD sec to [facility]: pt s renal function is unchanged(no recovery yet)  - pt was dialysed today.pt tolerated the dialysis ok but bp was low during dialysis  -Hd orders written and discussed with dialysis RN  -pts BUN is very high , most likley secondary to prednisone plus gi bleed(occult blood positive)  -pt needs gi f/u for positive occult blood  - hold nephrotoxic medications, adjust meds as per egfr  and avoid contrast studies/phosphate  enema, etc  -renal hd tube feeding     2. Hyperkalemia : now improved  -keep <5 and >4  -low K diet   -f/u k level q 12hrs      2. Metabolic and resp.acidosis   -multifactorial   -monitor CO 2 daily   -keep PH >7.3       3. Hyperphosphatemia   -now worsening   -continue  Amphogel plus Renagel as d/w resident  -keep Phos>3, <5     4.Hyponatremia: now improved, secondary to ning  -avoid iv fluids  -f/u Na level daily  -continue  fluid restriction to 1 liter per day    5. Fluid overload/Edema  sec to ? CHF/NING , now mildly better  -we will continue  Lasix high dose to Rx edema/fluid overlaod as pt is not tolerating dialysis well   -avoid iv fluid     6.Hypocalcemia;mild (corrected ca )  -suggest to keep corrected ca >8.5 and <10  -f/u ca level daily

## 2018-02-06 NOTE — PROGRESS NOTE ADULT - ATTENDING COMMENTS
72 male nursing home patient with CAD, CKD, DM, HTN, OA, BPH, anxiety, presented with influenza, acute respiratory failure, septic shock, pneumonia. Now with NING requiring dialysis, encephalopathy, acute resp failure.  Extubated 1/31.   Still with encephalopathy.  New HD cath placed in left groin.  Overall prognosis poor.  Plan:  - abx per ID, completed tamiflu  - HD per nephrology  - amiodarone for paroxysmal SVT (multiple episodes), failed beta blocker  - DNR/ Do not re-intubate per son.

## 2018-02-06 NOTE — PROGRESS NOTE ADULT - SUBJECTIVE AND OBJECTIVE BOX
Time of Visit:  Patient seen and examined.     MEDICATIONS  (STANDING):  aluminum hydroxide Suspension 30 milliLiter(s) Oral every 6 hours  amiodarone    Tablet 200 milliGRAM(s) Oral daily  amiodarone Infusion 1 mG/Min (33.333 mL/Hr) IV Continuous <Continuous>  amiodarone Infusion 0.5 mG/Min (16.667 mL/Hr) IV Continuous <Continuous>  chlorhexidine 4% Liquid 1 Application(s) Topical daily  epoetin adrian Injectable 8000 Unit(s) SubCutaneous <User Schedule>  finasteride 5 milliGRAM(s) Oral daily  furosemide   Injectable 80 milliGRAM(s) IV Push daily  insulin glargine Injectable (LANTUS) 20 Unit(s) SubCutaneous at bedtime  insulin glargine Injectable (LANTUS) 20 Unit(s) SubCutaneous every morning  insulin lispro (HumaLOG) corrective regimen sliding scale   SubCutaneous every 6 hours  metoprolol     tartrate 12.5 milliGRAM(s) Oral every 8 hours  pantoprazole  Injectable 40 milliGRAM(s) IV Push every 12 hours  phenylephrine    Infusion 0.5 MICROgram(s)/kG/Min (9 mL/Hr) IV Continuous <Continuous>  sevelamer carbonate Powder 1600 milliGRAM(s) Oral three times a day with meals  tamsulosin 0.4 milliGRAM(s) Oral at bedtime      MEDICATIONS  (PRN):  acetaminophen    Suspension 650 milliGRAM(s) Oral every 6 hours PRN For Temp greater than 38 C (100.4 F)       Medications up to date at time of exam.      PHYSICAL EXAMINATION:  Patient has no new complaints.  GENERAL: The patient is a well-developed, well-nourished, in no apparent distress.     Vital Signs Last 24 Hrs  T(C): 36.2 (06 Feb 2018 13:14), Max: 36.7 (05 Feb 2018 16:04)  T(F): 97.1 (06 Feb 2018 13:14), Max: 98 (05 Feb 2018 16:04)  HR: 76 (06 Feb 2018 13:45) (55 - 83)  BP: 124/71 (06 Feb 2018 13:20) (73/58 - 124/71)  BP(mean): 79 (06 Feb 2018 13:20) (51 - 79)  RR: 21 (06 Feb 2018 13:45) (14 - 26)  SpO2: 98% (06 Feb 2018 13:30) (97% - 100%)   (if applicable)    Chest Tube (if applicable)    HEENT: Head is normocephalic and atraumatic. Extraocular muscles are intact. Mucous membranes are moist.     NECK: Supple, no palpable adenopathy.    LUNGS: Clear to auscultation, no wheezing, rales, or rhonchi.    HEART: Regular rate and rhythm without murmur.    ABDOMEN: Soft, nontender, and nondistended.  No hepatosplenomegaly is noted.    EXTREMITIES: Without any cyanosis, clubbing, rash, lesions or edema.    NEUROLOGIC: Awake, alert, oriented.     SKIN: Warm, dry, good turgor.      LABS:                        7.3    16.3  )-----------( 315      ( 06 Feb 2018 06:12 )             23.2     02-06    147<H>  |  109<H>  |  224  ----------------------------<  152<H>  4.1   |  18<L>  |  7.92<H>    Ca    6.6<L>      06 Feb 2018 06:12  Phos  13.2     02-06  Mg     3.2     02-06    TPro  6.2  /  Alb  1.8<L>  /  TBili  0.5  /  DBili  x   /  AST  66<H>  /  ALT  35  /  AlkPhos  85  02-05                        MICROBIOLOGY: (if applicable)    RADIOLOGY & ADDITIONAL STUDIES:  EKG:   CXR:< from: Xray Chest 1 View- PORTABLE-Routine (02.05.18 @ 11:01) >  INTERPRETATION:  AP semierect chest on February 5, 2018 at 9:30 AM.   Patient has sepsis and respiratory failure.    Heart is magnified by technique. Poor inspiration crowds chest.    Nasogastric tube and left jugular line remain.    There is an atelectatic process at right base and slight fluid/infiltrate   at left base. Left base findings have improved from February 4. Right   base findings areunchanged.    IMPRESSION: As above.        < end of copied text >    ECHO:    IMPRESSION: 72y Male PAST MEDICAL & SURGICAL HISTORY:  Sleep apnea: hx of spleep  Hyperlipidemia  CAD (coronary artery disease)  DM (diabetes mellitus)  HTN (hypertension)  S/P foot surgery   p/w     Imp:  This is a 72 yrs old man [facility] with prior mentioned medical condition presented to ER with SOb, intubated for acute hypoxic resp failure due to influenza s/p course of tamiflu  now extubated. He progressed to ARDS, improved. He is requiring less FiO 2, off pressors. Acute renal failure on hemodialysis. encephaloapathic. CRX still show  b/l effusion    Sugg:  -asp precaution  -dialysis as per renal  -hemodynamic support and monitoring  -serial cxr to monitor pleural effusion

## 2018-02-06 NOTE — PROGRESS NOTE ADULT - ASSESSMENT
72 male nursing home patient with CAD, CKD, DM, HTN, OA, BPH, anxiety, presented with influenza, acute respiratory failure, septic shock.  May have superimposed pneumonia as well    Problem/Plan - 1:  ·  Problem: GI bleed.  Plan: -Patient had no episode of BRBPR over night.   -on IV protonix BID,   -on trickle feeds,, fecal occult blood positive  -H/H 7.9 stable  Gi dr cox  as per GI likely lower GI bleed 2/2 hemorrhoids vs diverticular  continue to monitor CBC Q24h  will HOLD on heparin, aspirin and plavix due to risk of bleeding.    - No colonoscopy as risks higher then benefits      Problem/Plan - 2:  ·  Problem: Sepsis.  Plan: -no fevers in  5 days, BP stable   - new LIJ placed.1/30   -repeat blood cultures, sputum cx -ve  -meropenem day 14.  Id Dr wilkinson following     Problem/Plan - 3:  ·  Problem: ARDS (adult respiratory distress syndrome).  Plan: meets burlin's criteria of ARDS, acute onset <7 days, non cardiogenic, bilateral involvement, Fio2/PO2 245,   CXR shows B/l pachy infiltrates, off mechanical vent,  ECHO normal EF ,  -HD today, good urine out put, will continue lasix   -on meropenem 500 mg Q12hrs ( renal adjusted)   - Extubated on 1/31/18    -Poor prognosis, DNR/DNI.     Problem/Plan - 4:  ·  Problem: Acute respiratory failure with hypoxia.  Plan: could be due to flu/PNA   off droplet isolation, Influenza B positive   completed tamiflu ,  - Procalcitonin 80.4  -urine and blood cultures are negative, urine legionella negative   - IV lasix 80 mg daily  - CT physiotherapy  - ID Dr Wilkinson following.      Problem/Plan - 5:  ·  Problem: Renal failure.  Plan: patient had acute renal failure with metabolic acidosis 2/2 sepsis,hemodialysis today, making urine.  -LIJ 2/4/18      Problem/Plan - 6:  Problem: DM (diabetes mellitus). Plan: Diet controlled   HbA1c. 6.0  lantus 20 units at bed time and 20 in morning  - continue with sliding scale Q 6 hrs.     Problem/Plan - 7:  ·  Problem: HTN (hypertension).  Plan: increased metoprolol 25 Q6 hour, patient had x2 episode of SVT.    patient received 2 stat doses of adenosine. EKG showed NSR s/p adenosine, started on amiodarone loading dose,   Dr Benson- cardiology.     Problem/Plan - 8:  ·  Problem: CAD (coronary artery disease).  Plan: -held ASA, Plavix due to GI bleed  -Patient finished heparin drip, troponin were elevated due to stress. ECHO EF 55 %with normal LV functions   - tropnin likely due to demand ischaemia 2/2 septic shock   - held lipitor due to worsening LFTs  - HD stable, currently off the pressor support   - Elevated d-dimer with normal venous doppler with no RV strain on ECHO less likely PE,   - elevated d-dimer 2/2 renal failure and sepsis    - Dr Benson cardio eval noted   -patient is DNR/ and do not reintubate .  palliative care consult noted.  son updated on phone regarding;s patient's current state, 72 male nursing home patient with CAD, CKD, DM, HTN, OA, BPH, anxiety, presented with influenza, acute respiratory failure, septic shock.  May have superimposed pneumonia as well    Problem/Plan - 1:  ·  Problem: GI bleed.  Plan: -Patient had no episode of BRBPR over night.   -on IV protonix BID,   -regular feeds.  -,H/H 7.3 stable  Gi dr cox  as per GI likely lower GI bleed 2/2 hemorrhoids vs diverticular  continue to monitor CBC Q24h  will HOLD on heparin, aspirin and plavix due to risk of bleeding.    - No colonoscopy as risks higher then benefits  -His FOBT was positive      Problem/Plan - 2:  ·  Problem: Sepsis.  Plan: -no fevers in  6 days, BP stable   - new LIJ placed.1/30   -repeat blood cultures, sputum cx -ve  -meropenem completed  Id Dr freitas following     Problem/Plan - 3:  ·  Problem: ARDS (adult respiratory distress syndrome).   resolving, off MV  ECHO normal EF ,  -HD today, good urine out put, will continue lasix  - Extubated on 1/31/18    -Poor prognosis, DNR/DNI.     Problem/Plan - 4:  ·  Problem: Acute respiratory failure with hypoxia.  Plan: could be due to flu/PNA   off droplet isolation, Influenza B positive   completed tamiflu ,  - Procalcitonin 80.4  -urine and blood cultures are negative, urine legionella negative   - IV lasix 80 mg daily  - CT physiotherapy  - ID Dr Freitas following.      Problem/Plan - 5:  ·  Problem: Renal failure.  Plan: patient had acute renal failure with metabolic acidosis 2/2 sepsis,hemodialysis today, making urine.  -[facility] 2/4/18   added epogen 8000 units for anemia on days of dialysis.      Problem/Plan - 6:  Problem: DM (diabetes mellitus). Plan: Diet controlled   HbA1c. 6.0  lantus 20 units at bed time and 20 in morning  - continue with sliding scale Q 6 hrs.     Problem/Plan - 7:  ·  Problem: HTN (hypertension).  Plan: increased metoprolol 25 Q6 hour, patient had x2 episode of SVT.    patient received 2 stat doses of adenosine. EKG showed NSR s/p adenosine,   - amiodarone loading dose,   -will switch to oral amiodarone when complete amiodarone drip  Dr Benson- cardiology.     Problem/Plan - 8:  ·  Problem: CAD (coronary artery disease).  Plan: - Resolved  held ASA, Plavix due to GI bleed  -Patient finished heparin drip, troponin were elevated due to stress. ECHO EF 55 %with normal LV functions   - tropnin likely due to demand ischaemia 2/2 septic shock   - held lipitor due to worsening LFTs   - Elevated d-dimer with normal venous doppler with no RV strain on ECHO less likely PE,   - elevated d-dimer 2/2 renal failure and sepsis    - Dr Kelley bani noted   -family meeting tomorrow.

## 2018-02-06 NOTE — PROGRESS NOTE ADULT - SUBJECTIVE AND OBJECTIVE BOX
Patient is a 72y old  Male who presents with a chief complaint of Respiratory distress (2018 14:30)    PATIENT IS SEEN AND EXAMINED IN ICU.  NGT [  X  ]    BRUNSON [ X  ]      SHILEY CATHETER IN LEFT GROIN    ALLERGIES:  doxycycline (Unknown)  penicillin (Unknown)  tetracycline (Unknown)  Valtrex (Unknown)      Daily Weight in k (2018 13:14)    VITALS:    Vital Signs Last 24 Hrs  T(C): 36.2 (2018 13:14), Max: 36.7 (2018 20:05)  T(F): 97.1 (2018 13:14), Max: 98 (2018 20:05)  HR: 87 (2018 18:30) (55 - 88)  BP: 101/50 (2018 18:00) (73/58 - 124/71)  BP(mean): 61 (2018 18:00) (51 - 79)  RR: 24 (2018 18:30) (14 - 26)  SpO2: 100% (2018 18:30) (97% - 100%)    LABS:  CBC Full  -  ( 2018 06:12 )  WBC Count : 16.3 K/uL  Hemoglobin : 7.3 g/dL  Hematocrit : 23.2 %  Platelet Count - Automated : 315 K/uL  Mean Cell Volume : 104.0 fl  Mean Cell Hemoglobin : 32.8 pg  Mean Cell Hemoglobin Concentration : 31.6 gm/dL  Auto Neutrophil # : x  Auto Lymphocyte # : x  Auto Monocyte # : x  Auto Eosinophil # : x  Auto Basophil # : x  Auto Neutrophil % : x  Auto Lymphocyte % : x  Auto Monocyte % : x  Auto Eosinophil % : x  Auto Basophil % : x      02-06    144  |  107  |  143<H>  ----------------------------<  137<H>  3.8   |  21<L>  |  5.25<H>    Ca    7.1<L>      2018 14:54  Phos  13.2     02-06  Mg     3.2     02-06    TPro  6.2  /  Alb  1.8<L>  /  TBili  0.5  /  DBili  x   /  AST  66<H>  /  ALT  35  /  AlkPhos  85  02-05    CAPILLARY BLOOD GLUCOSE      POCT Blood Glucose.: 147 mg/dL (2018 17:02)  POCT Blood Glucose.: 101 mg/dL (2018 10:55)  POCT Blood Glucose.: 136 mg/dL (2018 08:03)  POCT Blood Glucose.: 143 mg/dL (2018 05:49)  POCT Blood Glucose.: 211 mg/dL (2018 00:40)        LIVER FUNCTIONS - ( 2018 05:50 )  Alb: 1.8 g/dL / Pro: 6.2 g/dL / ALK PHOS: 85 U/L / ALT: 35 U/L DA / AST: 66 U/L / GGT: x                 .Sputum Sputum trap   @ 13:45   Normal Respiratory Tonie present  --    Few polymorphonuclear leukocytes  Rare Squamous epithelial cells per low power field  Numerous Yeast per oil power field      .Blood Blood-Peripheral   @ 11:43   No growth at 5 days.  --  --      .Sputum Sputumtrap rec'd   @ 10:48   Few Streptococcus pyogenes (Group A)  Normal Respiratory Tonie present  --    Numerous polymorphonuclear leukocytes  Rare Squamous epithelial cells per low power field  Numerous Gram Positive Cocci in Pairs and Chains per oil power field      .Urine Clean Catch (Midstream)   @ 22:48   No growth  --  --      .Blood Blood-Peripheral   @ 17:03   No growth at 5 days.  --  --          MEDICATIONS:    MEDICATIONS  (STANDING):  amiodarone    Tablet 200 milliGRAM(s) Oral daily  amiodarone Infusion 1 mG/Min (33.333 mL/Hr) IV Continuous <Continuous>  amiodarone Infusion 0.5 mG/Min (16.667 mL/Hr) IV Continuous <Continuous>  chlorhexidine 4% Liquid 1 Application(s) Topical daily  epoetin adrian Injectable 8000 Unit(s) SubCutaneous <User Schedule>  finasteride 5 milliGRAM(s) Oral daily  furosemide   Injectable 80 milliGRAM(s) IV Push daily  insulin glargine Injectable (LANTUS) 20 Unit(s) SubCutaneous at bedtime  insulin glargine Injectable (LANTUS) 20 Unit(s) SubCutaneous every morning  insulin lispro (HumaLOG) corrective regimen sliding scale   SubCutaneous every 6 hours  metoprolol     tartrate 12.5 milliGRAM(s) Oral every 8 hours  pantoprazole  Injectable 40 milliGRAM(s) IV Push every 12 hours  phenylephrine    Infusion 0.5 MICROgram(s)/kG/Min (9 mL/Hr) IV Continuous <Continuous>  sevelamer carbonate Powder 1600 milliGRAM(s) Oral three times a day with meals  tamsulosin 0.4 milliGRAM(s) Oral at bedtime      MEDICATIONS  (PRN):  acetaminophen    Suspension 650 milliGRAM(s) Oral every 6 hours PRN For Temp greater than 38 C (100.4 F)      REVIEW OF SYSTEMS:                           ALL ROS DONE [ X   ]    CONSTITUTIONAL:  LETHARGIC [   ], FEVER [   ], UNRESPONSIVE [   ]  CVS:  CP  [   ], SOB, [   ], PALPITATIONS [   ], DIZZYNESS [   ]  RS: COUGH [   ], SPUTUM [   ]  GI: ABDOMINAL PAIN [   ], NAUSEA [   ], VOMITINGS [   ], DIARRHEA [   ], CONSTIPATION [   ]  :  DYSURIA [   ], NOCTURIA [   ], INCREASED FREQUENCY [   ], DRIBLING [   ],  SKELETAL: PAINFUL JOINTS [   ], SWOLLEN JOINTS [   ], NECK ACHE [   ], LOW BACK ACHE [   ],  SKIN : ULCERS [   ], RASH [   ], ITCHING [   ]  CNS: HEAD ACHE [   ], DOUBLE VISION [   ], BLURRED VISION [   ], AMS / CONFUSION [   ], SEIZURES [   ], WEAKNESS [   ],TINGLING / NUMBNESS [   ]    PHYSICAL EXAMINATION:  GENERAL APPEARANCE: NO DISTRESS  HEENT:  NO PALLOR, NO  JVD,  NO   NODES, NECK SUPPLE ,                          NGT +  CVS: S1 +, S2 +,   RS: AEEB,  B/L RALES + MILD   ABD: SOFT, NT, NO, BS +  EXT: PE + , MILD ANASARCA  SKIN: WARM,   SKELETAL:  ROM ACCEPTABLE  CNS:  AAO X 0   , NO  DEFICITS    RADIOLOGY :    < from: Xray Chest 1 View- PORTABLE-Routine (18 @ 11:01) >  EXAM:  XR CHEST PORTABLE ROUTINE 1V                            PROCEDURE DATE:  2018          INTERPRETATION:  AP semierect chest on 2018 at 9:30 AM.   Patient has sepsis and respiratory failure.    Heart is magnified by technique. Poor inspiration crowds chest.    Nasogastric tube and left jugular line remain.    There is an atelectatic process at right base and slight fluid/infiltrate   at left base. Left base findings have improved from . Right   base findings areunchanged.    IMPRESSION: As above.      < end of copied text >      < from: Xray Chest 1 View AP-PORTABLE IMMEDIATE (18 @ 19:50) >  EXAM:  XR CHEST PORTABLE IMMED 1V                            PROCEDURE DATE:  2018          INTERPRETATION:  Chest one view    HISTORY: Central line placement    COMPARISON STUDY: Earlier the same day    Frontal expiratory view of the chest shows the heart to be similar in   size. Right jugular line as and placed extending to the level of the   superior vena cava. Feeding tube tip remains in the stomach. Endotracheal   tube is unchanged. The lungs show no definite infiltrate and there is no   evidence of pneumothorax nor pleural effusion.    IMPRESSION:  Right jugular line. No pneumothorax.    < end of copied text >      ASSESSMENT :     Sepsis  Sleep apnea  Hyperlipidemia  CAD (coronary artery disease)  DM (diabetes mellitus)  HTN (hypertension)  S/P foot surgery      PLAN:  HPI:  72 M from [facility] h/o CKD, Anxiety disorder, CAD, BPH, Carpel tunnel syndrome, compulsive disorder, DM, OA, HTN, IBS sent for dyspnea for last 3 days. Dyspnea is constant, moderate to severe in intensity, associated with fever, generalized weakness, lethargy, frequent falls, cough, myalgias and URI symptoms. Cough is productive with non bloody yellowish sputum.     Patient denies chest pain, nausea, vomiting, LOC, focal neurological deficit, abdominal pain, hematochezia, current smoking, alcohol abuse and illicit drug use.    ICU Vital Signs Last 24 Hrs  T(C): 38.4 (2018 10:36), Max: 38.4 (2018 10:22)  T(F): 101.2 (2018 10:36), Max: 101.2 (2018 10:22)  HR: 88 (2018 13:01) (78 - 91)  BP: 130/61 (2018 12:44) (82/53 - 130/61)  RR: 25 (2018 12:44) (25 - 25)  SpO2: 99% (2018 13:01) (91% - 99%) (2018 14:30)    -  INFLUENZA B, ACUTE BRONCHITIS,  B/L PNEUMONIA, S/P SEPSIS, HYPOXIC RESPIRATORY FAILURE  ON S/P  TAMIFLU, IV MEROPENEM , NEBS. ICU F/UP IS IN PROGRESS. S/P EXTUBATION     - AMS DUE TO METABOLIC ENCEPHALOPATHY  - ANEMIA , SUSPECT GI BLEED   - ARF / CKD , METABOLIC ACIDOSIS - S/P HD CATHETER REINSERTION  AND IS ON  HD   - GI AND DVT PROPHYLAXIS  - POOR PROGNOSIS   - DR. GARCIA-

## 2018-02-06 NOTE — PROGRESS NOTE ADULT - SUBJECTIVE AND OBJECTIVE BOX
CARDIOLOGY ATTENDING    TELEMETRY:  SR,    Patient resting, on face mask O2, not answering questions     acetaminophen    Suspension 650 milliGRAM(s) Oral every 6 hours PRN  aluminum hydroxide Suspension 30 milliLiter(s) Oral every 6 hours  amiodarone Infusion 1 mG/Min IV Continuous <Continuous>  amiodarone Infusion 0.5 mG/Min IV Continuous <Continuous>  chlorhexidine 4% Liquid 1 Application(s) Topical daily  finasteride 5 milliGRAM(s) Oral daily  furosemide   Injectable 80 milliGRAM(s) IV Push daily  insulin glargine Injectable (LANTUS) 20 Unit(s) SubCutaneous at bedtime  insulin glargine Injectable (LANTUS) 20 Unit(s) SubCutaneous every morning  insulin lispro (HumaLOG) corrective regimen sliding scale   SubCutaneous every 6 hours  metoprolol     tartrate 12.5 milliGRAM(s) Oral every 8 hours  pantoprazole  Injectable 40 milliGRAM(s) IV Push every 12 hours  phenylephrine    Infusion 0.5 MICROgram(s)/kG/Min IV Continuous <Continuous>  sevelamer carbonate Powder 1600 milliGRAM(s) Oral three times a day with meals  tamsulosin 0.4 milliGRAM(s) Oral at bedtime                            7.3    16.3  )-----------( 315      ( 06 Feb 2018 06:12 )             23.2       Hemoglobin: 7.3 g/dL (02-06 @ 06:12)  Hemoglobin: 7.9 g/dL (02-05 @ 05:50)  Hemoglobin: 8.0 g/dL (02-04 @ 06:22)  Hemoglobin: 8.2 g/dL (02-03 @ 22:27)  Hemoglobin: 8.0 g/dL (02-03 @ 18:24)      02-06    147<H>  |  109<H>  |  224  ----------------------------<  152<H>  4.1   |  18<L>  |  7.92<H>    Ca    6.6<L>      06 Feb 2018 06:12  Phos  13.2     02-06  Mg     3.2     02-06    TPro  6.2  /  Alb  1.8<L>  /  TBili  0.5  /  DBili  x   /  AST  66<H>  /  ALT  35  /  AlkPhos  85  02-05    Creatinine Trend: 7.92<--, 7.97<--, 7.70<--, 7.46<--, 6.87<--, 6.45<--    COAGS:           T(C): 36.3 (02-06-18 @ 09:00), Max: 36.7 (02-05-18 @ 16:04)  HR: 76 (02-06-18 @ 11:50) (55 - 76)  BP: 79/47 (02-06-18 @ 10:05) (76/42 - 117/47)  RR: 24 (02-06-18 @ 11:50) (14 - 26)  SpO2: 100% (02-06-18 @ 11:50) (90% - 100%)  Wt(kg): --    I&O's Summary    05 Feb 2018 07:01  -  06 Feb 2018 07:00  --------------------------------------------------------  IN: 976.9 mL / OUT: 2395 mL / NET: -1418.1 mL    06 Feb 2018 07:01  -  06 Feb 2018 12:01  --------------------------------------------------------  IN: 121.4 mL / OUT: 270 mL / NET: -148.6 mL        no JVD  RRR, no murmurs  CTAB  soft nt/nd  no c/c/ (+) mild edema          A/P) 73 y/o male nursing home resident, CKD, CAD, BPH, DM, HTN admitted with influenza resulting in NING now requiring HD. Echo shows normal LV function. Tele showing frequent episodes of asymptomatic SVT    - Cont broad spectrum abx and supportive care per MICU  - close respiratory status monitoring  - Hold antihypertensives until BP stable  - On amio for episodes of SVT, transition to PO amio.      Jake Benson MD, FACC  [facility], [facility]  [address] [place] [address]  PHONE:  [phone]  BEEPER : [phone]

## 2018-02-06 NOTE — PROGRESS NOTE ADULT - SUBJECTIVE AND OBJECTIVE BOX
pt seen and examined.    SUBJECTIVE:  pt is lethargic arousable on nasal o2 and in nad  pt was dialysed this pm  U/O ok    REVIEW OF SYSTEMS:  Unobtainable  Current meds:    acetaminophen    Suspension 650 milliGRAM(s) Oral every 6 hours PRN  aluminum hydroxide Suspension 30 milliLiter(s) Oral every 6 hours  amiodarone    Tablet 200 milliGRAM(s) Oral daily  amiodarone Infusion 1 mG/Min IV Continuous <Continuous>  amiodarone Infusion 0.5 mG/Min IV Continuous <Continuous>  chlorhexidine 4% Liquid 1 Application(s) Topical daily  epoetin adrian Injectable 8000 Unit(s) SubCutaneous <User Schedule>  finasteride 5 milliGRAM(s) Oral daily  furosemide   Injectable 80 milliGRAM(s) IV Push daily  insulin glargine Injectable (LANTUS) 20 Unit(s) SubCutaneous at bedtime  insulin glargine Injectable (LANTUS) 20 Unit(s) SubCutaneous every morning  insulin lispro (HumaLOG) corrective regimen sliding scale   SubCutaneous every 6 hours  metoprolol     tartrate 12.5 milliGRAM(s) Oral every 8 hours  pantoprazole  Injectable 40 milliGRAM(s) IV Push every 12 hours  phenylephrine    Infusion 0.5 MICROgram(s)/kG/Min IV Continuous <Continuous>  sevelamer carbonate Powder 1600 milliGRAM(s) Oral three times a day with meals  tamsulosin 0.4 milliGRAM(s) Oral at bedtime      Vital Signs    T(F): 97.1 (18 @ 13:14), Max: 98 (18 @ 16:04)  HR: 78 (18 @ 14:46) (55 - 83)  BP: 89/53 (18 @ 14:00) (73/58 - 124/71)  ABP: 118/50 (18 @ 14:46) (70/39 - 150/63)  RR: 24 (18 @ 14:46) (14 - 26)  SpO2: 100% (18 @ 14:46) (97% - 100%)  Wt(kg): --  CVP(cm H2O): --  CO: --  PCWP: --    I and O's:     @ 07:01  -   @ 07:00  --------------------------------------------------------  IN:    Enteral Tube Flush: 100 mL    Nepro with Carb Steady: 200 mL    Solution: 50 mL  Total IN: 350 mL    OUT:    Indwelling Catheter - Urethral: 1750 mL  Total OUT: 1750 mL    Total NET: -1400 mL       @ 07: @ 07:00  --------------------------------------------------------  IN:    amiodarone Infusion: 217.1 mL    amiodarone Infusion: 199.8 mL    Enteral Tube Flush: 220 mL    Nepro with Carb Steady: 240 mL    Solution: 100 mL  Total IN: 976.9 mL    OUT:    Indwelling Catheter - Urethral: 2395 mL  Total OUT: 2395 mL    Total NET: -1418.1 mL       @ 07: @ 15:13  --------------------------------------------------------  IN:    amiodarone Infusion: 100.2 mL    Nepro with Carb Steady: 10 mL    phenylephrine   Infusion: 157.8 mL  Total IN: 268 mL    OUT:    Indwelling Catheter - Urethral: 285 mL  Total OUT: 285 mL    Total NET: -17 mL        Daily     Daily Weight in k (2018 13:14)    PHYSICAL EXAM:  Constitutional: well developed, obese and in and  HEENT: PERRLA,  no icteric sclera and mild pallor of conjunctiva noted  Neck: No JVD, thyromegaly or adenopathy  Respiratory: reduced air entry lower lungs with no rales, wheezing or rhonchi  Cardiovascular: S1 and S2 normally heard  Gastrointestinal: soft, distended, nontender and normal bowel sounds heard  Extremities: 2 plus peripheral edema noted   Neurological: pt is responsive but not oriented  : No flank or cva tenderness palpated.  Skin: No rashes      LABS:    CBC:                          7.3    16.3  )-----------( 315      ( 2018 06:12 )             23.2           BMP:    -06    147<H>  |  109<H>  |  224  ----------------------------<  152<H>  4.1   |  18<L>  |  7.92<H>      145  |  107  |  219  ----------------------------<  157<H>  4.1   |  18<L>  |  7.97<H>      143  |  105  |  204  ----------------------------<  169<H>  4.3   |  18<L>  |  7.70<H>    Ca    6.6<L>      2018 06:12  Ca    6.7<L>      2018 05:50  Ca    6.7<L>      2018 06:22  Phos  13.2     02-06  Phos  13.1     -  Phos  12     02-04  Mg     3.2     02-06  Mg     3.3     -  Mg     3.3     -    TPro  6.2  /  Alb  1.8<L>  /  TBili  0.5  /  DBili  x   /  AST  66<H>  /  ALT  35  /  AlkPhos  85  -

## 2018-02-07 NOTE — PROGRESS NOTE ADULT - SUBJECTIVE AND OBJECTIVE BOX
Time of Visit:  Patient seen and examined.     MEDICATIONS  (STANDING):  chlorhexidine 4% Liquid 1 Application(s) Topical daily  phenylephrine    Infusion 0.5 MICROgram(s)/kG/Min (9 mL/Hr) IV Continuous <Continuous>      MEDICATIONS  (PRN):  acetaminophen    Suspension 650 milliGRAM(s) Oral every 6 hours PRN For Temp greater than 38 C (100.4 F)  glycopyrrolate Injectable 0.4 milliGRAM(s) IV Push every 4 hours PRN secretions  HYDROmorphone  Injectable 0.5 milliGRAM(s) IV Push every 1 hour PRN tachypnea, labored breathing     Medications up to date at time of exam.    PHYSICAL EXAMINATION:    GENERAL: The patient is a well-developed, well-nourished, in no apparent distress.     Vital Signs Last 24 Hrs  T(C): 36.4 (07 Feb 2018 16:41), Max: 37.3 (07 Feb 2018 00:00)  T(F): 97.6 (07 Feb 2018 16:41), Max: 99.1 (07 Feb 2018 00:00)  HR: 91 (07 Feb 2018 17:30) (78 - 99)  BP: 124/54 (07 Feb 2018 16:00) (98/48 - 124/54)  BP(mean): 70 (07 Feb 2018 16:00) (57 - 70)  RR: 20 (07 Feb 2018 17:30) (15 - 30)  SpO2: 100% (07 Feb 2018 17:30) (85% - 100%)   (if applicable)    Chest Tube (if applicable)    HEENT: Head is normocephalic and atraumatic.     NECK: Supple, no palpable adenopathy.    LUNGS: Clear to auscultation, no wheezing, rales, or rhonchi.    HEART: Regular rate and rhythm without murmur.    ABDOMEN: Soft, nontender, and nondistended.      EXTREMITIES: Without any cyanosis, clubbing, rash, lesions or edema.    NEUROLOGIC: Awake, alert.    SKIN: Warm, dry, good turgor.    LABS:                        7.7    14.3  )-----------( 286      ( 07 Feb 2018 06:34 )             23.9     02-07    146<H>  |  109<H>  |  165  ----------------------------<  165<H>  4.0   |  20<L>  |  6.04<H>    Ca    7.0<L>      07 Feb 2018 06:34  Phos  9.5     02-07  Mg     2.9     02-07      PT/INR - ( 07 Feb 2018 13:16 )   PT: 13.2 sec;   INR: 1.21 ratio           MICROBIOLOGY: (if applicable)    RADIOLOGY & ADDITIONAL STUDIES:  EKG:   CXR:  ECHO:    IMPRESSION: 72y Male PAST MEDICAL & SURGICAL HISTORY:  Sleep apnea: hx of spleep  Hyperlipidemia  CAD (coronary artery disease)  DM (diabetes mellitus)  HTN (hypertension)  S/P foot surgery       Imp:  This is a 72 yrs old man NHR with prior mentioned medical condition presented to ER with SOb, intubated for acute hypoxic resp failure due to influenza s/p course of tamiflu  now extubated. He progressed to ARDS, improved. He is requiring less FiO 2, off pressors. Acute renal failure on hemodialysis.    increase leukocytosis steroid effect  mild improvement on 2/5 cxr when compared to 2/4 cxr    Sugg:  - aspiration precautions  - patient minimally responsive   - son wants in patient hospice  - comfort care, dilaudid prn

## 2018-02-07 NOTE — PROGRESS NOTE ADULT - ATTENDING COMMENTS
72 male nursing home patient with CAD, CKD, DM, HTN, OA, BPH, anxiety, presented with influenza, acute respiratory failure, septic shock, pneumonia. Now with NING requiring dialysis, encephalopathy, acute resp failure.  Extubated 1/31.   Still with encephalopathy.    Overall prognosis poor, son wants inpatient hospice.   Plan:  - will arrange for transfer to inpatient hospice

## 2018-02-07 NOTE — PROGRESS NOTE ADULT - ATTENDING COMMENTS
Patient seen and examined, agree with above assessment and plan as transcribed above.    - Palliative eval noted d/c planning for hospice    Jake Benson MD, FACC  [facility], [facility]  [address] [place] [address]  PHONE:  [phone]  BEEPER : [phone]

## 2018-02-07 NOTE — DISCHARGE NOTE ADULT - PATIENT PORTAL LINK FT
You can access the [facility] Portal, offered by [facility], by registering with the following website: [email or web address]

## 2018-02-07 NOTE — PROGRESS NOTE ADULT - ASSESSMENT
1. ESRD sec to [facility]: pt s renal function is unchanged(no recovery yet)  - pt was dialysed today.pt tolerated the dialysis ok but bp was low during dialysis  -Hd orders written and discussed with dialysis RN  -pts BUN is very high , most likley secondary to prednisone plus gi bleed(occult blood positive)  -pt needs gi f/u for positive occult blood  - hold nephrotoxic medications, adjust meds as per egfr  and avoid contrast studies/phosphate  enema, etc  -renal hd tube feeding     2. Hyperkalemia : now improved  -keep <5 and >4  -low K diet   -f/u k level q 12hrs      2. Metabolic and resp.acidosis   -multifactorial   -monitor CO 2 daily   -keep PH >7.3       3. Hyperphosphatemia   -now worsening   -continue  Amphogel plus Renagel as d/w resident  -keep Phos>3, <5     4.Hyponatremia: now improved, secondary to ning  -avoid iv fluids  -f/u Na level daily  -continue  fluid restriction to 1 liter per day    5. Fluid overload/Edema  sec to ? CHF/NING , now mildly better  -we will continue  Lasix high dose to Rx edema/fluid overlaod as pt is not tolerating dialysis well   -avoid iv fluid     6.Hypocalcemia;mild (corrected ca )  -suggest to keep corrected ca >8.5 and <10  -f/u ca level daily 1. ESRD sec to [facility]: pt s renal function is unchanged(no recovery yet), non oliguric  -pts family (son) does not want dialysis anymore and pt had been seen by pallative care consult  -pt is now accepted to hospice care in the hospital  - hold nephrotoxic medications, adjust meds as per egfr  and avoid contrast studies/phosphate  enema, etc  -renal hd tube feeding     2. Hyperkalemia : now improved  -keep <5 and >4  -low K diet   -f/u k level q 12hrs      2. Metabolic and resp.acidosis   -multifactorial   -monitor CO 2 daily   -keep PH >7.3       3. Hyperphosphatemia   -now better  -continue  Amphogel plus Renagel as d/w resident  -keep Phos>3, <5     4.Hyponatremia: now improved, secondary to ning  -avoid iv fluids  -f/u Na level daily  -continue  fluid restriction to 1 liter per day    5. Fluid overload/Edema  sec to ? CHF/NING , improving   -avoid iv fluid    6.Hypocalcemia;mild (corrected ca )  -suggest to keep corrected ca >8.5 and <10  -f/u ca level daily

## 2018-02-07 NOTE — PROGRESS NOTE ADULT - SUBJECTIVE AND OBJECTIVE BOX
pt seen and examined.pts current chart reviewed and case discussed with resident covering.    SUBJECTIVE:  pt feels fine and denies cp,sob,gi or gu/uremic  symptons    REVIEW OF SYSTEMS:  CONSTITUTIONAL: No weakness, fevers or chills  EYES/ENT: No visual changes;  No vertigo or throat pain   NECK: No pain or stiffness  RESPIRATORY: No cough, wheezing, hemoptysis; No shortness of breath  CARDIOVASCULAR: No chest pain or palpitations  GASTROINTESTINAL: No abdominal or epigastric pain. No nausea, vomiting, or hematemesis; No diarrhea or constipation. No melena or hematochezia.  GENITOURINARY: No dysuria, frequency , hematuria, flank pain or nocturia  NEUROLOGICAL: No numbness or weakness  SKIN: No itching, burning, rashes, or lesions   All other review of systems is negative unless indicated above    Current meds:    acetaminophen    Suspension 650 milliGRAM(s) Oral every 6 hours PRN  chlorhexidine 4% Liquid 1 Application(s) Topical daily  glycopyrrolate Injectable 0.4 milliGRAM(s) IV Push every 4 hours PRN  HYDROmorphone  Injectable 0.5 milliGRAM(s) IV Push every 1 hour PRN  phenylephrine    Infusion 0.5 MICROgram(s)/kG/Min IV Continuous <Continuous>      Vital Signs    T(F): 98.5 (18 @ 04:46), Max: 99.1 (18 @ 00:00)  HR: 87 (18 @ 12:30) (71 - 99)  BP: 120/48 (18 @ 12:00) (73/58 - 124/71)  ABP: 128/52 (18 @ 12:30) (72/49 - 150/63)  RR: 19 (18 @ 12:30) (16 - 30)  SpO2: 100% (18 @ 12:30) (85% - 100%)  Wt(kg): --  CVP(cm H2O): --  CO: --  PCWP: --    I and O's:     @ 07:01  -   @ 07:00  --------------------------------------------------------  IN:    amiodarone Infusion: 217.1 mL    amiodarone Infusion: 199.8 mL    Enteral Tube Flush: 220 mL    Nepro with Carb Steady: 240 mL    Solution: 100 mL  Total IN: 976.9 mL    OUT:    Indwelling Catheter - Urethral: 2395 mL  Total OUT: 2395 mL    Total NET: -1418.1 mL       @ 07:01  -   @ 07:00  --------------------------------------------------------  IN:    amiodarone Infusion: 100.2 mL    Nepro with Carb Steady: 70 mL    phenylephrine   Infusion: 293.1 mL  Total IN: 463.3 mL    OUT:    Indwelling Catheter - Urethral: 980 mL  Total OUT: 980 mL    Total NET: -516.7 mL        Daily     Daily Weight in k.6 (2018 08:00)    PHYSICAL EXAM:  Constitutional: well developed, well nourished  and in nad  HEENT: PERRLA,  no icteric sclera and mild pallor of conjunctiva noted  Neck: No JVD, thyromegaly or adenopathy  Respiratory: reduced air entry lower lungs with no rales, wheezing or rhonchi  Cardiovascular: S1 and S2 normally heard  Gastrointestinal: soft, nondistended, nontender and normal bowel sounds heard  Extremities: No peripheral edema or cyanosis  Neurological: A/O x 3, no focal deficits  : No flank or cva tenderness palpated.  Skin: No rashes      LABS:    CBC:                          7.7    14.3  )-----------( 286      ( 2018 06:34 )             23.9           BMP:        146<H>  |  109<H>  |  165  ----------------------------<  165<H>  4.0   |  20<L>  |  6.04<H>      144  |  107  |  143<H>  ----------------------------<  137<H>  3.8   |  21<L>  |  5.25<H>  -    147<H>  |  109<H>  |  224  ----------------------------<  152<H>  4.1   |  18<L>  |  7.92<H>      145  |  107  |  219  ----------------------------<  157<H>  4.1   |  18<L>  |  7.97<H>    Ca    7.0<L>      2018 06:34  Ca    7.1<L>      2018 14:54  Ca    6.6<L>      2018 06:12  Ca    6.7<L>      2018 05:50  Phos  9.5     -07  Phos  13.2     -06  Phos  13.1     02-05  Mg     2.9     -  Mg     3.2     -06  Mg     3.3     -    TPro  6.2  /  Alb  1.8<L>  /  TBili  0.5  /  DBili  x   /  AST  66<H>  /  ALT  35  /  AlkPhos  85  -          URINE STUDIES:                        RADIOLOGY & ADDITIONAL STUDIES: pt seen ,examined and case discussed with resident covering and icu attending    SUBJECTIVE:  pt is more alert but not oriented and in and  pt s son was here this am and decided to place pt on hospice care and stop dialysis after discussing his father medical status with micu attending    Current meds:    acetaminophen    Suspension 650 milliGRAM(s) Oral every 6 hours PRN  chlorhexidine 4% Liquid 1 Application(s) Topical daily  glycopyrrolate Injectable 0.4 milliGRAM(s) IV Push every 4 hours PRN  HYDROmorphone  Injectable 0.5 milliGRAM(s) IV Push every 1 hour PRN  phenylephrine    Infusion 0.5 MICROgram(s)/kG/Min IV Continuous <Continuous>      Vital Signs    T(F): 98.5 (18 @ 04:46), Max: 99.1 (18 @ 00:00)  HR: 87 (18 @ 12:30) (71 - 99)  BP: 120/48 (18 @ 12:00) (73/58 - 124/71)  ABP: 128/52 (18 @ 12:30) (72/49 - 150/63)  RR: 19 (18 @ 12:30) (16 - 30)  SpO2: 100% (18 @ 12:30) (85% - 100%)  Wt(kg): --  CVP(cm H2O): --  CO: --  PCWP: --    I and O's:     @ 07:01  -   @ 07:00  --------------------------------------------------------  IN:    amiodarone Infusion: 217.1 mL    amiodarone Infusion: 199.8 mL    Enteral Tube Flush: 220 mL    Nepro with Carb Steady: 240 mL    Solution: 100 mL  Total IN: 976.9 mL    OUT:    Indwelling Catheter - Urethral: 2395 mL  Total OUT: 2395 mL    Total NET: -1418.1 mL       @ 07:01  -   @ 07:00  --------------------------------------------------------  IN:    amiodarone Infusion: 100.2 mL    Nepro with Carb Steady: 70 mL    phenylephrine   Infusion: 293.1 mL  Total IN: 463.3 mL    OUT:    Indwelling Catheter - Urethral: 980 mL  Total OUT: 980 mL    Total NET: -516.7 mL        Daily     Daily Weight in k.6 (2018 08:00)    PHYSICAL EXAM:  Constitutional: well developed, obese and in and  HEENT: PERRLA,  no icteric sclera and mild pallor of conjunctiva noted  Neck: No JVD, thyromegaly or adenopathy  Respiratory: reduced air entry lower lungs with no rales, wheezing or rhonchi  Cardiovascular: S1 and S2 normally heard  Gastrointestinal: soft, distended, nontender and normal bowel sounds heard  Extremities: trace peripheral edema noted   Neurological: pt is responsive but not oriented  : No flank or cva tenderness palpated.  Skin: No rashes      LABS:    CBC:                          7.7    14.3  )-----------( 286      ( 2018 06:34 )             23.9       BMP:        146<H>  |  109<H>  |  165  ----------------------------<  165<H>  4.0   |  20<L>  |  6.04<H>  02    144  |  107  |  143<H>  ----------------------------<  137<H>  3.8   |  21<L>  |  5.25<H>  02-06    147<H>  |  109<H>  |  224  ----------------------------<  152<H>  4.1   |  18<L>  |  7.92<H>  02-05    145  |  107  |  219  ----------------------------<  157<H>  4.1   |  18<L>  |  7.97<H>    Ca    7.0<L>      2018 06:34  Ca    7.1<L>      2018 14:54  Ca    6.6<L>      2018 06:12  Ca    6.7<L>      2018 05:50  Phos  9.5     02-07  Phos  13.2     02-06  Phos  13.1     02-05  Mg     2.9     02-  Mg     3.2     02-06  Mg     3.3     02-05    TPro  6.2  /  Alb  1.8<L>  /  TBili  0.5  /  DBili  x   /  AST  66<H>  /  ALT  35  /  AlkPhos  85  02-05          URINE STUDIES:                        RADIOLOGY & ADDITIONAL STUDIES:    < from: Xray Chest 1 View- PORTABLE-Urgent (18 @ 08:59) >  EXAM:  XR CHEST PORTABLE URGENT 1V                            PROCEDURE DATE:  2018          INTERPRETATION:  CLINICAL STATEMENT: Chest Pain.    TECHNIQUE: AP view of the chest.    COMPARISON: 2018    FINDINGS/  IMPRESSION:  Left central line noted tip overlying junction of left brachiocephalic   vein and SVC. ET tube noted tip under the diaphragm overlying the   expected region of stomach.    Mild increased interstitial lung markings. Small left pleural effusion   with mild airspace opacities lung bases. Better aeration right lung base    Heart size cannot be accurately assessed in this projection.    < end of copied text >

## 2018-02-07 NOTE — PROGRESS NOTE ADULT - SUBJECTIVE AND OBJECTIVE BOX
OVERNIGHT EVENTS: None, patient remains lethargic, minimally responsive; son Gene met with Dr. Ross this AM and decided to stop dialysis and wants inpatient hospice.     Review of Systems: Unable to obtain due to poor mentation    MEDICATIONS  (STANDING):  amiodarone    Tablet 200 milliGRAM(s) Oral daily  amiodarone Infusion 1 mG/Min (33.333 mL/Hr) IV Continuous <Continuous>  amiodarone Infusion 0.5 mG/Min (16.667 mL/Hr) IV Continuous <Continuous>  chlorhexidine 4% Liquid 1 Application(s) Topical daily  epoetin adrian Injectable 8000 Unit(s) SubCutaneous <User Schedule>  finasteride 5 milliGRAM(s) Oral daily  furosemide   Injectable 80 milliGRAM(s) IV Push daily  insulin glargine Injectable (LANTUS) 20 Unit(s) SubCutaneous at bedtime  insulin glargine Injectable (LANTUS) 20 Unit(s) SubCutaneous every morning  insulin lispro (HumaLOG) corrective regimen sliding scale   SubCutaneous every 6 hours  metoprolol     tartrate 12.5 milliGRAM(s) Oral every 8 hours  pantoprazole  Injectable 40 milliGRAM(s) IV Push every 12 hours  phenylephrine    Infusion 0.5 MICROgram(s)/kG/Min (9 mL/Hr) IV Continuous <Continuous>  sevelamer carbonate Powder 1600 milliGRAM(s) Oral three times a day with meals  tamsulosin 0.4 milliGRAM(s) Oral at bedtime    MEDICATIONS  (PRN):  acetaminophen    Suspension 650 milliGRAM(s) Oral every 6 hours PRN For Temp greater than 38 C (100.4 F)    PHYSICAL EXAM:  Vital Signs Last 24 Hrs  T(C): 36.9 (07 Feb 2018 04:46), Max: 37.3 (07 Feb 2018 00:00)  T(F): 98.5 (07 Feb 2018 04:46), Max: 99.1 (07 Feb 2018 00:00)  HR: 86 (07 Feb 2018 08:30) (65 - 99)  BP: 102/53 (07 Feb 2018 08:00) (73/58 - 124/71)  BP(mean): 64 (07 Feb 2018 08:00) (55 - 79)  RR: 19 (07 Feb 2018 08:30) (16 - 30)  SpO2: 100% (07 Feb 2018 08:30) (85% - 100%)    General: alert  oriented x __0__ lethargic, nonverbal   Karnofsky Performance Score/Palliative Performance Status Version2: 20%    HEENT: dry mouth   Lungs: comfortable   CV: normal    GI: Incontinent, NG tube  : [facility]  Musculoskeletal: edema, bedbound   Skin: normal   Neuro: Lethargic, minimally responsive, opens eyes, not following commands  Oral intake ability: unable/only mouth care   Diet: NPO with tube feeds    LABS:                          7.7    14.3  )-----------( 286      ( 07 Feb 2018 06:34 )             23.9     02-07    146<H>  |  109<H>  |  165  ----------------------------<  165<H>  4.0   |  20<L>  |  6.04<H>    Ca    7.0<L>      07 Feb 2018 06:34  Phos  9.5     02-07  Mg     2.9     02-07    ADVANCE DIRECTIVES: DNR/DNI  ACP time spent: 30 Minutes OVERNIGHT EVENTS: None, patient remains lethargic, minimally responsive; son Gene Mcknight met with Dr. Ross this AM and decided to stop dialysis and wants inpatient hospice for symptom management.     Review of Systems: Unable to obtain due to poor mentation    MEDICATIONS  (STANDING):  amiodarone    Tablet 200 milliGRAM(s) Oral daily  amiodarone Infusion 1 mG/Min (33.333 mL/Hr) IV Continuous <Continuous>  amiodarone Infusion 0.5 mG/Min (16.667 mL/Hr) IV Continuous <Continuous>  chlorhexidine 4% Liquid 1 Application(s) Topical daily  epoetin adrian Injectable 8000 Unit(s) SubCutaneous <User Schedule>  finasteride 5 milliGRAM(s) Oral daily  furosemide   Injectable 80 milliGRAM(s) IV Push daily  insulin glargine Injectable (LANTUS) 20 Unit(s) SubCutaneous at bedtime  insulin glargine Injectable (LANTUS) 20 Unit(s) SubCutaneous every morning  insulin lispro (HumaLOG) corrective regimen sliding scale   SubCutaneous every 6 hours  metoprolol     tartrate 12.5 milliGRAM(s) Oral every 8 hours  pantoprazole  Injectable 40 milliGRAM(s) IV Push every 12 hours  phenylephrine    Infusion 0.5 MICROgram(s)/kG/Min (9 mL/Hr) IV Continuous <Continuous>  sevelamer carbonate Powder 1600 milliGRAM(s) Oral three times a day with meals  tamsulosin 0.4 milliGRAM(s) Oral at bedtime    MEDICATIONS  (PRN):  acetaminophen    Suspension 650 milliGRAM(s) Oral every 6 hours PRN For Temp greater than 38 C (100.4 F)    PHYSICAL EXAM:  Vital Signs Last 24 Hrs  T(C): 36.9 (07 Feb 2018 04:46), Max: 37.3 (07 Feb 2018 00:00)  T(F): 98.5 (07 Feb 2018 04:46), Max: 99.1 (07 Feb 2018 00:00)  HR: 86 (07 Feb 2018 08:30) (65 - 99)  BP: 102/53 (07 Feb 2018 08:00) (73/58 - 124/71)  BP(mean): 64 (07 Feb 2018 08:00) (55 - 79)  RR: 19 (07 Feb 2018 08:30) (16 - 30)  SpO2: 100% (07 Feb 2018 08:30) (85% - 100%)    General: alert  oriented x __0__ lethargic, nonverbal   Karnofsky Performance Score/Palliative Performance Status Version2: 20%    HEENT: dry mouth   Lungs: comfortable   CV: normal    GI: Incontinent, NG tube  : Keita  Musculoskeletal: edema, bedbound   Skin: normal   Neuro: Lethargic, minimally responsive, opens eyes, not following commands  Oral intake ability: unable/only mouth care   Diet: NPO with tube feeds    LABS:                          7.7    14.3  )-----------( 286      ( 07 Feb 2018 06:34 )             23.9     02-07    146<H>  |  109<H>  |  165  ----------------------------<  165<H>  4.0   |  20<L>  |  6.04<H>    Ca    7.0<L>      07 Feb 2018 06:34  Phos  9.5     02-07  Mg     2.9     02-07    ADVANCE DIRECTIVES: DNR/DNI  ACP time spent: 30 Minutes

## 2018-02-07 NOTE — DISCHARGE NOTE ADULT - PLAN OF CARE
Patient had no episode of BRBPR over night.   -on IV protonix BID,   -regular feeds.  -,H/H 7.3 stable  Gi dr cox  as per GI likely lower GI bleed 2/2 hemorrhoids vs diverticular  continue to monitor CBC Q24h  will HOLD on heparin, aspirin and plavix due to risk of bleeding.    - No colonoscopy as risks higher then benefits  -His FOBT was positive Patient had acute respiratory distress syndrome secondary to septic shock. patient was mechanically ventilated and was eventually extubated on 1/31/18. patient was using nasal cannula and was saturating well. patient's mental status didn't return to base line. He is awake but is not following commands, indicating poor prognosis, patient's family opted for in patient hospice and comfort care for patient. patient had gradual drop in his hemoglobin level. patient was started on IV protonix two times daily. diet was held for 2 days, and then advanced to trickle feeds and then to regular feeds. No further episodes of  melena were observed. patient was evaluated by GI and recommended no acute intervention as patient is a poor candidate for colonoscopy. patient is for in patient hospice for comfort care.   -,H/H 7.3 stable  Gi dr cox  as per GI likely lower GI bleed 2/2 hemorrhoids vs diverticular  continue to monitor CBC Q24h  will HOLD on heparin, aspirin and plavix due to risk of bleeding.    - No colonoscopy as risks higher then benefits  -His FOBT was positive Problem/Plan - 3:  ·  Problem: ARDS (adult respiratory distress syndrome).   resolving, off MV  ECHO normal EF ,  -HD today, good urine out put, will continue lasix  - Extubated on 1/31/18 Plan: -no fevers in  6 days, BP stable   - new LIJ placed.1/30   -repeat blood cultures, sputum cx -ve  -meropenem completed  Id Dr freitas following comfort care patient had gradual drop in his hemoglobin level. patient was started on IV protonix two times daily. diet was held for 2 days, and then advanced to trickle feeds and then to regular feeds. No further episodes of  melena were observed. patient was evaluated by GI and recommended no acute intervention as patient is a poor candidate for colonoscopy. patient is for in patient hospice for comfort care.   -,H/H 7.3 stable. Bleeding could be from hemorrhoid. diverticulosis. Aspirin, plavix and heparin were held.  - No colonoscopy as risks higher then benefit. patient is for comfort care. patient went into end stage renal disease secondary to sepsis. continued dialysis. renal function didn't significantly improved. family decided for in patient hospice. off dialysis. dialysis catheter was removed. Patient came in with sepsis secondary to influenza. patient received tamilu and completed 5 days. patient had a central line placement.   which was later changed by an Left IJ. Patient was treated with meropenem and vancomycin. compeleted 14 days of antibiotics, patient stayed a febrile.   repeat blood cultures, sputum cultures were negative. patient is DNR/DNI. patient's family opted for comfort care. Patient; s diabetes was controlled here with Lantus. family opted for comfort care. Patient is for in patient hospice.

## 2018-02-07 NOTE — DISCHARGE NOTE ADULT - HOSPITAL COURSE
72 male nursing home patient with CAD, CKD, DM, HTN, OA, BPH, anxiety, presented with influenza, acute respiratory failure, septic shock.  May have superimposed pneumonia as well    Problem/Plan - 1:  ·  Problem: GI bleed.  Plan: -Patient had no episode of BRBPR over night.   -on IV protonix BID,   -regular feeds.  -,H/H 7.3 stable  Gi dr cox  as per GI likely lower GI bleed 2/2 hemorrhoids vs diverticular  continue to monitor CBC Q24h  will HOLD on heparin, aspirin and plavix due to risk of bleeding.    - No colonoscopy as risks higher then benefits  -His FOBT was positive      Problem/Plan - 2:  ·  Problem: Sepsis.  Plan: -no fevers in  6 days, BP stable   - new LIJ placed.1/30   -repeat blood cultures, sputum cx -ve  -meropenem completed  Id Dr freitas following     Problem/Plan - 3:  ·  Problem: ARDS (adult respiratory distress syndrome).   resolving, off MV  ECHO normal EF ,  -HD today, good urine out put, will continue lasix  - Extubated on 1/31/18    -Poor prognosis, DNR/DNI.     Problem/Plan - 4:  ·  Problem: Acute respiratory failure with hypoxia.  Plan: could be due to flu/PNA   off droplet isolation, Influenza B positive   completed tamiflu ,  - Procalcitonin 80.4  -urine and blood cultures are negative, urine legionella negative   - IV lasix 80 mg daily  - CT physiotherapy  - ID Dr Freitas following.      Problem/Plan - 5:  ·  Problem: Renal failure.  Plan: patient had acute renal failure with metabolic acidosis 2/2 sepsis,hemodialysis today, making urine.  -[facility] 2/4/18   added epogen 8000 units for anemia on days of dialysis.      Problem/Plan - 6:  Problem: DM (diabetes mellitus). Plan: Diet controlled   HbA1c. 6.0  lantus 20 units at bed time and 20 in morning  - continue with sliding scale Q 6 hrs.     Problem/Plan - 7:  ·  Problem: HTN (hypertension).  Plan: increased metoprolol 25 Q6 hour, patient had x2 episode of SVT.    patient received 2 stat doses of adenosine. EKG showed NSR s/p adenosine,   - amiodarone loading dose,   -will switch to oral amiodarone when complete amiodarone drip  Dr Benson- cardiology.     Problem/Plan - 8:  ·  Problem: CAD (coronary artery disease).  Plan: - Resolved  held ASA, Plavix due to GI bleed  -Patient finished heparin drip, troponin were elevated due to stress. ECHO EF 55 %with normal LV functions   - tropnin likely due to demand ischaemia 2/2 septic shock   - held lipitor due to worsening LFTs   - Elevated d-dimer with normal venous doppler with no RV strain on ECHO less likely PE,   - elevated d-dimer 2/2 renal failure and sepsis    - Dr Kelley bain noted   -family meeting tomorrow. 72 M from [facility] h/o CKD, Anxiety disorder, CAD, BPH, Carpel tunnel syndrome, compulsive disorder, DM, OA, HTN, IBS sent for dyspnea for last 3 days. Dyspnea is constant, moderate to severe in intensity, associated with fever, generalized weakness, lethargy, frequent falls, cough, myalgias and URI symptoms. Cough is productive with non bloody yellowish sputum.     Patient denies chest pain, nausea, vomiting, LOC, focal neurological deficit, abdominal pain, hematochezia, current smoking, alcohol abuse and illicit drug use.  Patient had acute respiratory distress syndrome secondary to septic shock. patient was mechanically ventilated and was eventually extubated on 1/31/18. patient was using nasal cannula and was saturating well. patient's mental status didn't return to base line. He is awake but is not following commands, indicating poor prognosis, patient's family opted for in patient hospice and comfort care for patient.  patient had gradual drop in his hemoglobin level. patient was started on IV protonix two times daily. diet was held for 2 days, and then advanced to trickle feeds and then to regular feeds. No further episodes of  melena were observed. patient was evaluated by GI and recommended no acute intervention as patient is a poor candidate for colonoscopy. patient is for in patient hospice for comfort care.   -,H/H 7.3 stable. Bleeding could be from hemorrhoid. diverticulosis. Aspirin, plavix and heparin were held.  - No colonoscopy as risks higher then benefit. patient is for comfort care.  patient went into end stage renal disease secondary to sepsis. continued dialysis. renal function didn't significantly improved. family decided for in patient hospice. off dialysis. dialysis catheter was removed.  Patient came in with sepsis secondary to influenza. patient received tamilu and completed 5 days. patient had a central line placement.   which was later changed by an Left IJ. Patient was treated with meropenem and vancomycin. compeleted 14 days of antibiotics, patient stayed a febrile.   repeat blood cultures, sputum cultures were negative.  patient is DNR/DNI. patient's family opted for comfort care.  Patient; s diabetes was controlled here with Lantus. family opted for comfort care. Patient is for in patient hospice. Patient will be discharged to in patient hospice in stable condition.

## 2018-02-07 NOTE — H&P ADULT - ASSESSMENT
72 year old male with hospice diagnosis of acute on chronic renal failure with comorbid conditions of ARDS secondary to sepsis admitted to the [facility] at [facility] for IV management of dyspnea post palliative extubation.

## 2018-02-07 NOTE — DISCHARGE NOTE ADULT - MEDICATION SUMMARY - MEDICATIONS TO STOP TAKING
I will STOP taking the medications listed below when I get home from the hospital:    MiraLax oral powder for reconstitution  -- 17 gram(s) by mouth once a day, As Needed - for constipation in 4-8 oz of water  -- Dilute this medication with liquid before administration.  It is very important that you take or use this exactly as directed.  Do not skip doses or discontinue unless directed by your doctor.    Colace 100 mg oral capsule  -- 1 cap(s) by mouth 2 times a day  -- Medication should be taken with plenty of water.    Percocet 5/325  -- 1 cap(s) by mouth 4 times a day, As Needed    Robitussin 100 mg/5 mL oral liquid  -- 5 milliliter(s) by mouth every 4 hours, As Needed    Aspir 81 oral delayed release tablet  -- 1 tab(s) by mouth once a day    acyclovir  -- 200 milligram(s) by mouth 2 times a day    Avodart 0.5 mg oral capsule  -- 1 cap(s) by mouth once a day    Cardura 4 mg oral tablet  -- 1 tab(s) by mouth once a day    benzonatate 200 mg oral capsule  -- 1 cap(s) by mouth 3 times a day, As Needed    clomiPRAMINE 75 mg oral capsule  -- 1 cap(s) by mouth 3 times a day    Crestor 10 mg oral tablet  -- 1 tab(s) by mouth once a day (at bedtime)    DuoNeb 0.5 mg-2.5 mg/3 mL inhalation solution  -- 3 milliliter(s) inhaled 4 times a day    Eucerin topical lotion  -- Apply on skin to affected area 2 times a day    Flomax 0.4 mg oral capsule  -- 1 cap(s) by mouth once a day    Hydrocortisone 1% In Absorbase topical ointment  -- Apply on skin to affected area once a day    Toprol-XL 25 mg oral tablet, extended release  -- 1 tab(s) by mouth once a day    Plavix 75 mg oral tablet  -- 1 tab(s) by mouth once a day    Ranexa 500 mg oral tablet, extended release  -- 1 tab(s) by mouth 2 times a day, As Needed    nitroglycerin 0.4 mg sublingual spray  -- 1 spray(s) under tongue every 5 minutes x 3 dose, As Needed    Lyrica 50 mg oral capsule  -- 1 cap(s) by mouth 2 times a day    MetroGel 1% topical gel  -- Apply on skin to affected area once a day    multivitamin    Naprosyn 500 mg oral tablet  -- 1 tab(s) by mouth 2 times a day, As Needed    Tylenol 500 mg oral tablet  -- 1 tab(s) by mouth every 6 hours, As Needed    PriLOSEC 40 mg oral delayed release capsule  -- 1 cap(s) by mouth once a day    Metamucil

## 2018-02-07 NOTE — PROGRESS NOTE ADULT - SUBJECTIVE AND OBJECTIVE BOX
pt seen and examined, no complaints on exam.   Afebrile overnight , requiring low dose phenylephrine for BP support.   U/O with 30 cc /hr  CVP - single digits    acetaminophen    Suspension 650 milliGRAM(s) Oral every 6 hours PRN  amiodarone    Tablet 200 milliGRAM(s) Oral daily  amiodarone Infusion 1 mG/Min IV Continuous <Continuous>  amiodarone Infusion 0.5 mG/Min IV Continuous <Continuous>  chlorhexidine 4% Liquid 1 Application(s) Topical daily  epoetin adrian Injectable 8000 Unit(s) SubCutaneous <User Schedule>  finasteride 5 milliGRAM(s) Oral daily  furosemide   Injectable 80 milliGRAM(s) IV Push daily  insulin glargine Injectable (LANTUS) 20 Unit(s) SubCutaneous at bedtime  insulin glargine Injectable (LANTUS) 20 Unit(s) SubCutaneous every morning  insulin lispro (HumaLOG) corrective regimen sliding scale   SubCutaneous every 6 hours  metoprolol     tartrate 12.5 milliGRAM(s) Oral every 8 hours  pantoprazole  Injectable 40 milliGRAM(s) IV Push every 12 hours  phenylephrine    Infusion 0.5 MICROgram(s)/kG/Min IV Continuous <Continuous>  sevelamer carbonate Powder 1600 milliGRAM(s) Oral three times a day with meals  tamsulosin 0.4 milliGRAM(s) Oral at bedtime                            7.3    16.3  )-----------( 315      ( 06 Feb 2018 06:12 )             23.2       Hemoglobin: 7.3 g/dL (02-06 @ 06:12)  Hemoglobin: 7.9 g/dL (02-05 @ 05:50)  Hemoglobin: 8.0 g/dL (02-04 @ 06:22)  Hemoglobin: 8.2 g/dL (02-03 @ 22:27)  Hemoglobin: 8.0 g/dL (02-03 @ 18:24)      02-06    144  |  107  |  143<H>  ----------------------------<  137<H>  3.8   |  21<L>  |  5.25<H>    Ca    7.1<L>      06 Feb 2018 14:54  Phos  13.2     02-06  Mg     3.2     02-06    TPro  6.2  /  Alb  1.8<L>  /  TBili  0.5  /  DBili  x   /  AST  66<H>  /  ALT  35  /  AlkPhos  85  02-05    Creatinine Trend: 5.25<--, 7.92<--, 7.97<--, 7.70<--, 7.46<--, 6.87<--    COAGS:           T(C): 36.9 (02-07-18 @ 04:46), Max: 37.3 (02-07-18 @ 00:00)  HR: 87 (02-07-18 @ 05:00) (55 - 99)  BP: 98/48 (02-07-18 @ 04:00) (73/58 - 124/71)  RR: 25 (02-07-18 @ 05:00) (14 - 29)  SpO2: 100% (02-07-18 @ 05:00) (96% - 100%)  Wt(kg): --    I&O's Summary    05 Feb 2018 07:01  -  06 Feb 2018 07:00  --------------------------------------------------------  IN: 976.9 mL / OUT: 2395 mL / NET: -1418.1 mL    06 Feb 2018 07:01  -  07 Feb 2018 05:21  --------------------------------------------------------  IN: 451.3 mL / OUT: 750 mL / NET: -298.7 mL        no JVD  RRR, no murmurs  CTAB  soft nt/nd  no c/c/e    TELE,   NSR         A/P) 71 y/o male nursing home resident, CKD, CAD, BPH, DM, HTN admitted with influenza resulting in NING now requiring HD. Echo shows normal LV function. Tele showing frequent episodes of asymptomatic SVT    cont pressor support    amio 400 tid for 5 days for recurrent SVT   GI / DVT prophylaxis.   keep K>4, mag >2.0 -   HD per renal   hold Antihypertensive ( BB ) due to pressor needs  D/W Dr Benson

## 2018-02-07 NOTE — DISCHARGE NOTE ADULT - CARE PLAN
Principal Discharge DX:	ARDS (adult respiratory distress syndrome)  Secondary Diagnosis:	GI bleed  Assessment and plan of treatment:	Patient had no episode of BRBPR over night.   -on IV protonix BID,   -regular feeds.  -,H/H 7.3 stable  Gi dr cox  as per GI likely lower GI bleed 2/2 hemorrhoids vs diverticular  continue to monitor CBC Q24h  will HOLD on heparin, aspirin and plavix due to risk of bleeding.    - No colonoscopy as risks higher then benefits  -His FOBT was positive  Secondary Diagnosis:	ESRD (end stage renal disease)  Secondary Diagnosis:	Sepsis  Secondary Diagnosis:	Palliative care encounter  Secondary Diagnosis:	DM (diabetes mellitus) Principal Discharge DX:	ARDS (adult respiratory distress syndrome)  Assessment and plan of treatment:	Patient had acute respiratory distress syndrome secondary to septic shock. patient was mechanically ventilated and was eventually extubated on 1/31/18. patient was using nasal cannula and was saturating well. patient's mental status didn't return to base line. He is awake but is not following commands, indicating poor prognosis, patient's family opted for in patient hospice and comfort care for patient.  Secondary Diagnosis:	GI bleed  Assessment and plan of treatment:	patient had gradual drop in his hemoglobin level. patient was started on IV protonix two times daily. diet was held for 2 days, and then advanced to trickle feeds and then to regular feeds. No further episodes of  melena were observed. patient was evaluated by GI and recommended no acute intervention as patient is a poor candidate for colonoscopy. patient is for in patient hospice for comfort care.   -,H/H 7.3 stable  Gi dr cox  as per GI likely lower GI bleed 2/2 hemorrhoids vs diverticular  continue to monitor CBC Q24h  will HOLD on heparin, aspirin and plavix due to risk of bleeding.    - No colonoscopy as risks higher then benefits  -His FOBT was positive  Secondary Diagnosis:	ESRD (end stage renal disease)  Assessment and plan of treatment:	Problem/Plan - 3:  ·  Problem: ARDS (adult respiratory distress syndrome).   resolving, off MV  ECHO normal EF ,  -HD today, good urine out put, will continue lasix  - Extubated on 1/31/18  Secondary Diagnosis:	Sepsis  Assessment and plan of treatment:	Plan: -no fevers in  6 days, BP stable   - new LIJ placed.1/30   -repeat blood cultures, sputum cx -ve  -meropenem completed  Id Dr freitas following  Secondary Diagnosis:	Palliative care encounter  Secondary Diagnosis:	DM (diabetes mellitus) Principal Discharge DX:	ARDS (adult respiratory distress syndrome)  Goal:	comfort care  Assessment and plan of treatment:	Patient had acute respiratory distress syndrome secondary to septic shock. patient was mechanically ventilated and was eventually extubated on 1/31/18. patient was using nasal cannula and was saturating well. patient's mental status didn't return to base line. He is awake but is not following commands, indicating poor prognosis, patient's family opted for in patient hospice and comfort care for patient.  Secondary Diagnosis:	GI bleed  Assessment and plan of treatment:	patient had gradual drop in his hemoglobin level. patient was started on IV protonix two times daily. diet was held for 2 days, and then advanced to trickle feeds and then to regular feeds. No further episodes of  melena were observed. patient was evaluated by GI and recommended no acute intervention as patient is a poor candidate for colonoscopy. patient is for in patient hospice for comfort care.   -,H/H 7.3 stable. Bleeding could be from hemorrhoid. diverticulosis. Aspirin, plavix and heparin were held.  - No colonoscopy as risks higher then benefit. patient is for comfort care.  Secondary Diagnosis:	ESRD (end stage renal disease)  Assessment and plan of treatment:	patient went into end stage renal disease secondary to sepsis. continued dialysis. renal function didn't significantly improved. family decided for in patient hospice. off dialysis. dialysis catheter was removed.  Secondary Diagnosis:	Sepsis  Assessment and plan of treatment:	Patient came in with sepsis secondary to influenza. patient received tamilu and completed 5 days. patient had a central line placement.   which was later changed by an Left IJ. Patient was treated with meropenem and vancomycin. compeleted 14 days of antibiotics, patient stayed a febrile.   repeat blood cultures, sputum cultures were negative.  Secondary Diagnosis:	Palliative care encounter  Assessment and plan of treatment:	patient is DNR/DNI. patient's family opted for comfort care.  Secondary Diagnosis:	DM (diabetes mellitus)  Assessment and plan of treatment:	Patient; s diabetes was controlled here with Lantus. family opted for comfort care. Patient is for in patient hospice.

## 2018-02-07 NOTE — PROGRESS NOTE ADULT - PROBLEM SELECTOR PLAN 2
Andre Mcknight has decided to stop HD; patient likely to become dyspneic once medications are stopped- Andre Mcknight has decided to stop HD; patient likely to become dyspneic once medications are stopped; d/c all unnecessary medications; start PRN Diluadid

## 2018-02-07 NOTE — PROGRESS NOTE ADULT - SUBJECTIVE AND OBJECTIVE BOX
INTERVAL HPI/OVERNIGHT EVENTS: *** No events over night. patient had hemodialysis yesterday, Significant improvement in BUN. Patient's breathing improved.     PRESSORS: [x ] YES [ ] NO  WHICH: phenylephrine     ANTIBIOTICS:            none      DATE STARTED:  ANTIBIOTICS:                  DATE STARTED:  ANTIBIOTICS:                  DATE STARTED:    Antimicrobial:    Cardiovascular:  amiodarone    Tablet 200 milliGRAM(s) Oral daily  amiodarone Infusion 1 mG/Min IV Continuous <Continuous>  amiodarone Infusion 0.5 mG/Min IV Continuous <Continuous>  furosemide   Injectable 80 milliGRAM(s) IV Push daily  metoprolol     tartrate 12.5 milliGRAM(s) Oral every 8 hours  phenylephrine    Infusion 0.5 MICROgram(s)/kG/Min IV Continuous <Continuous>  tamsulosin 0.4 milliGRAM(s) Oral at bedtime    Pulmonary:    Hematalogic:    Other:  acetaminophen    Suspension 650 milliGRAM(s) Oral every 6 hours PRN  chlorhexidine 4% Liquid 1 Application(s) Topical daily  epoetin adrian Injectable 8000 Unit(s) SubCutaneous <User Schedule>  finasteride 5 milliGRAM(s) Oral daily  insulin glargine Injectable (LANTUS) 20 Unit(s) SubCutaneous at bedtime  insulin glargine Injectable (LANTUS) 20 Unit(s) SubCutaneous every morning  insulin lispro (HumaLOG) corrective regimen sliding scale   SubCutaneous every 6 hours  pantoprazole  Injectable 40 milliGRAM(s) IV Push every 12 hours  sevelamer carbonate Powder 1600 milliGRAM(s) Oral three times a day with meals    acetaminophen    Suspension 650 milliGRAM(s) Oral every 6 hours PRN  amiodarone    Tablet 200 milliGRAM(s) Oral daily  amiodarone Infusion 1 mG/Min IV Continuous <Continuous>  amiodarone Infusion 0.5 mG/Min IV Continuous <Continuous>  chlorhexidine 4% Liquid 1 Application(s) Topical daily  epoetin adrian Injectable 8000 Unit(s) SubCutaneous <User Schedule>  finasteride 5 milliGRAM(s) Oral daily  furosemide   Injectable 80 milliGRAM(s) IV Push daily  insulin glargine Injectable (LANTUS) 20 Unit(s) SubCutaneous at bedtime  insulin glargine Injectable (LANTUS) 20 Unit(s) SubCutaneous every morning  insulin lispro (HumaLOG) corrective regimen sliding scale   SubCutaneous every 6 hours  metoprolol     tartrate 12.5 milliGRAM(s) Oral every 8 hours  pantoprazole  Injectable 40 milliGRAM(s) IV Push every 12 hours  phenylephrine    Infusion 0.5 MICROgram(s)/kG/Min IV Continuous <Continuous>  sevelamer carbonate Powder 1600 milliGRAM(s) Oral three times a day with meals  tamsulosin 0.4 milliGRAM(s) Oral at bedtime    Drug Dosing Weight  Height (cm): 185.42 (20 Jan 2018 10:22)  Weight (kg): 96 (25 Jan 2018 07:30)  BMI (kg/m2): 27.9 (25 Jan 2018 07:30)  BSA (m2): 2.2 (25 Jan 2018 07:30)            ICU Vital Signs Last 24 Hrs  T(C): 36.9 (07 Feb 2018 04:46), Max: 37.3 (07 Feb 2018 00:00)  T(F): 98.5 (07 Feb 2018 04:46), Max: 99.1 (07 Feb 2018 00:00)  HR: 83 (07 Feb 2018 07:30) (55 - 99)  BP: 101/46 (07 Feb 2018 06:00) (73/58 - 124/71)  BP(mean): 58 (07 Feb 2018 06:00) (51 - 79)  ABP: 111/46 (07 Feb 2018 07:30) (70/39 - 150/63)  ABP(mean): 62 (07 Feb 2018 07:30) (51 - 88)  RR: 16 (07 Feb 2018 07:30) (14 - 30)  SpO2: 100% (07 Feb 2018 07:30) (85% - 100%)            02-06 @ 07:01  -  02-07 @ 07:00  --------------------------------------------------------  IN: 463.3 mL / OUT: 980 mL / NET: -516.7 mL      PHYSICAL EXAM:    GENERAL: [x ]NAD, [ ]well-groomed, [ ]well-developed  HEAD:  [x ]Atraumatic, [ ]Normocephalic  EYES: [ ]EOMI, [x ]PERRLA, [x ]conjunctiva and sclera clear  ENMT: [ ]No tonsillar erythema, exudates, or enlargement; [ x]Moist mucous membranes, [ ]Good dentition, [ ]No lesions  NECK: [x ]Supple, normal appearance, [x ]No JVD; [ ]Normal thyroid; [ ]Trachea midline  NERVOUS SYSTEM:  awake but not following commands CHEST/LUNG: [ ]No chest deformity; [ ]Normal percussion bilaterally; [x ]No rales, rhonchi, wheezing   HEART: [x ]Regular rate and rhythm; [ ]No murmurs, rubs, or gallops  ABDOMEN: [x ]Soft, Nontender, Nondistended; [ ]Bowel sounds present  EXTREMITIES:  [ x]2+ Peripheral Pulses, [ x]No clubbing, cyanosis, or edema  LYMPH: [x ]No lymphadenopathy noted  SKIN: [x ]No rashes or lesions; [ ]Good capillary refill            LABS:  CBC Full  -  ( 07 Feb 2018 06:34 )  WBC Count : 14.3 K/uL  Hemoglobin : 7.7 g/dL  Hematocrit : 23.9 %  Platelet Count - Automated : 286 K/uL  Mean Cell Volume : 103.6 fl  Mean Cell Hemoglobin : 33.3 pg  Mean Cell Hemoglobin Concentration : 32.1 gm/dL  Auto Neutrophil # : x  Auto Lymphocyte # : x  Auto Monocyte # : x  Auto Eosinophil # : x  Auto Basophil # : x  Auto Neutrophil % : x  Auto Lymphocyte % : x  Auto Monocyte % : x  Auto Eosinophil % : x  Auto Basophil % : x    02-07    146<H>  |  109<H>  |  165  ----------------------------<  165<H>  4.0   |  20<L>  |  6.04<H>    Ca    7.0<L>      07 Feb 2018 06:34  Phos  9.5     02-07  Mg     2.9     02-07              RADIOLOGY & ADDITIONAL STUDIES REVIEWED:  ***        CRITICAL CARE TIME SPENT: 35 minutes INTERVAL HPI/OVERNIGHT EVENTS: *** No events over night. patient had hemodialysis yesterday, Significant improvement in BUN. Patient's breathing improved.     PRESSORS: [x ] YES [ ] NO  WHICH: phenylephrine 0.1mcg/kg    ANTIBIOTICS:            none      DATE STARTED:  ANTIBIOTICS:                  DATE STARTED:  ANTIBIOTICS:                  DATE STARTED:    Antimicrobial:    Cardiovascular:  amiodarone    Tablet 200 milliGRAM(s) Oral daily  amiodarone Infusion 1 mG/Min IV Continuous <Continuous>  amiodarone Infusion 0.5 mG/Min IV Continuous <Continuous>  furosemide   Injectable 80 milliGRAM(s) IV Push daily  metoprolol     tartrate 12.5 milliGRAM(s) Oral every 8 hours  phenylephrine    Infusion 0.5 MICROgram(s)/kG/Min IV Continuous <Continuous>  tamsulosin 0.4 milliGRAM(s) Oral at bedtime    Pulmonary:    Hematalogic:    Other:  acetaminophen    Suspension 650 milliGRAM(s) Oral every 6 hours PRN  chlorhexidine 4% Liquid 1 Application(s) Topical daily  epoetin adrian Injectable 8000 Unit(s) SubCutaneous <User Schedule>  finasteride 5 milliGRAM(s) Oral daily  insulin glargine Injectable (LANTUS) 20 Unit(s) SubCutaneous at bedtime  insulin glargine Injectable (LANTUS) 20 Unit(s) SubCutaneous every morning  insulin lispro (HumaLOG) corrective regimen sliding scale   SubCutaneous every 6 hours  pantoprazole  Injectable 40 milliGRAM(s) IV Push every 12 hours  sevelamer carbonate Powder 1600 milliGRAM(s) Oral three times a day with meals    acetaminophen    Suspension 650 milliGRAM(s) Oral every 6 hours PRN  amiodarone    Tablet 200 milliGRAM(s) Oral daily  amiodarone Infusion 1 mG/Min IV Continuous <Continuous>  amiodarone Infusion 0.5 mG/Min IV Continuous <Continuous>  chlorhexidine 4% Liquid 1 Application(s) Topical daily  epoetin adrian Injectable 8000 Unit(s) SubCutaneous <User Schedule>  finasteride 5 milliGRAM(s) Oral daily  furosemide   Injectable 80 milliGRAM(s) IV Push daily  insulin glargine Injectable (LANTUS) 20 Unit(s) SubCutaneous at bedtime  insulin glargine Injectable (LANTUS) 20 Unit(s) SubCutaneous every morning  insulin lispro (HumaLOG) corrective regimen sliding scale   SubCutaneous every 6 hours  metoprolol     tartrate 12.5 milliGRAM(s) Oral every 8 hours  pantoprazole  Injectable 40 milliGRAM(s) IV Push every 12 hours  phenylephrine    Infusion 0.5 MICROgram(s)/kG/Min IV Continuous <Continuous>  sevelamer carbonate Powder 1600 milliGRAM(s) Oral three times a day with meals  tamsulosin 0.4 milliGRAM(s) Oral at bedtime    Drug Dosing Weight  Height (cm): 185.42 (20 Jan 2018 10:22)  Weight (kg): 96 (25 Jan 2018 07:30)  BMI (kg/m2): 27.9 (25 Jan 2018 07:30)  BSA (m2): 2.2 (25 Jan 2018 07:30)            ICU Vital Signs Last 24 Hrs  T(C): 36.9 (07 Feb 2018 04:46), Max: 37.3 (07 Feb 2018 00:00)  T(F): 98.5 (07 Feb 2018 04:46), Max: 99.1 (07 Feb 2018 00:00)  HR: 83 (07 Feb 2018 07:30) (55 - 99)  BP: 101/46 (07 Feb 2018 06:00) (73/58 - 124/71)  BP(mean): 58 (07 Feb 2018 06:00) (51 - 79)  ABP: 111/46 (07 Feb 2018 07:30) (70/39 - 150/63)  ABP(mean): 62 (07 Feb 2018 07:30) (51 - 88)  RR: 16 (07 Feb 2018 07:30) (14 - 30)  SpO2: 100% (07 Feb 2018 07:30) (85% - 100%)            02-06 @ 07:01  -  02-07 @ 07:00  --------------------------------------------------------  IN: 463.3 mL / OUT: 980 mL / NET: -516.7 mL      PHYSICAL EXAM:    GENERAL: [x ]NAD, [ ]well-groomed, [ ]well-developed  HEAD:  [x ]Atraumatic, [ ]Normocephalic  EYES: [ ]EOMI, [x ]PERRLA, [x ]conjunctiva and sclera clear  ENMT: [ ]No tonsillar erythema, exudates, or enlargement; [ x]Moist mucous membranes, [ ]Good dentition, [ ]No lesions  NECK: [x ]Supple, normal appearance, [x ]No JVD; [ ]Normal thyroid; [ ]Trachea midline  NERVOUS SYSTEM:  awake but not following commands CHEST/LUNG: [ ]No chest deformity; [ ]Normal percussion bilaterally; [x ]No rales, rhonchi, wheezing   HEART: [x ]Regular rate and rhythm; [ ]No murmurs, rubs, or gallops  ABDOMEN: [x ]Soft, Nontender, Nondistended; [ ]Bowel sounds present  EXTREMITIES:  [ x]2+ Peripheral Pulses, [ x]No clubbing, cyanosis, or edema  LYMPH: [x ]No lymphadenopathy noted  SKIN: [x ]No rashes or lesions; [ ]Good capillary refill    CENTRAL LINE: [x ] YES [ ] NO  LOCATION:   [facility] DATE INSERTED: 1/30   REMOVE: [ ] YES [ ] NO  EXPLAIN:    BRUNSON: [x ] YES [ ] NO    DATE INSERTED:  REMOVE:  [ ] YES [ ] NO  EXPLAIN:    A-LINE:  [ x] YES [ ] NO  LOCATION: right   DATE INSERTED:  REMOVE:  [ ] YES [ ] NO  EXPLAIN:          LABS:  CBC Full  -  ( 07 Feb 2018 06:34 )  WBC Count : 14.3 K/uL  Hemoglobin : 7.7 g/dL  Hematocrit : 23.9 %  Platelet Count - Automated : 286 K/uL  Mean Cell Volume : 103.6 fl  Mean Cell Hemoglobin : 33.3 pg  Mean Cell Hemoglobin Concentration : 32.1 gm/dL  Auto Neutrophil # : x  Auto Lymphocyte # : x  Auto Monocyte # : x  Auto Eosinophil # : x  Auto Basophil # : x  Auto Neutrophil % : x  Auto Lymphocyte % : x  Auto Monocyte % : x  Auto Eosinophil % : x  Auto Basophil % : x    02-07    146<H>  |  109<H>  |  165  ----------------------------<  165<H>  4.0   |  20<L>  |  6.04<H>    Ca    7.0<L>      07 Feb 2018 06:34  Phos  9.5     02-07  Mg     2.9     02-07              RADIOLOGY & ADDITIONAL STUDIES REVIEWED:  ***        CRITICAL CARE TIME SPENT: 35 minutes

## 2018-02-07 NOTE — PROGRESS NOTE ADULT - PROBLEM SELECTOR PLAN 4
Met with son Gene Mcknight and he met with Dr. Ross; he decided he wants inpatient hospice care.  He wants to stop dialysis and stop all aggressive interventions.  He is agreeable with d/c NG tube feedings and blood pressure support. Support given to the son who struggles with Anxiety. Gave him information for bereavement. support bala.

## 2018-02-07 NOTE — CHART NOTE - NSCHARTNOTEFT_GEN_A_CORE
Per ICU team, pt is being transferred to inpatient hospice    Pt no longer needs hemodialysis.  ICU requests removal of L femoral shiley    labs reviewed: INR 1.2, platelets 286    L femoral shiley removed. Pressure held for 20 minutes. No bleeding noted    will perform groin check in AM

## 2018-02-07 NOTE — H&P ADULT - NSHPPHYSICALEXAM_GEN_ALL_CORE
thin female in mild respiratory distress  124/54 97.6 20 100%  HEENT: moderate bitemporal wasting  Neck: no JVD no nodes no thyromegaly  Lungs: decreased breath sounds and rales at bases  Heart: s1s2 no audible murmurs  Abd: soft ND NT no masses no orgnoamegaly  Ext: 1+ pitting edema bilaterally

## 2018-02-07 NOTE — DISCHARGE NOTE ADULT - MEDICATION SUMMARY - MEDICATIONS TO TAKE
I will START or STAY ON the medications listed below when I get home from the hospital:    HYDROmorphone  -- Indication: For Comfort care    Robinul 0.2 mg/mL injectable solution  --  injectable   -- Indication: For Comfort care    phenylephrine  -- Indication: For Pressors

## 2018-02-07 NOTE — PROGRESS NOTE ADULT - PROBLEM SELECTOR PLAN 1
Started on HD during this admission, with little improvement in overall condition.  Patient's lethargic, now nonverbal; son Gene agreed to stop diaylsis

## 2018-02-07 NOTE — PROGRESS NOTE ADULT - ASSESSMENT
72 male nursing home patient with CAD, CKD, DM, HTN, OA, BPH, anxiety, presented with influenza, acute respiratory failure, septic shock.  May have superimposed pneumonia as well    Problem/Plan - 1:  ·  Problem: GI bleed.  Plan: -Patient had no episode of BRBPR over night.   -on IV protonix BID,   -regular feeds.  -,H/H 7.3 stable  Gi dr cox  as per GI likely lower GI bleed 2/2 hemorrhoids vs diverticular  continue to monitor CBC Q24h  will HOLD on heparin, aspirin and plavix due to risk of bleeding.    - No colonoscopy as risks higher then benefits  -His FOBT was positive      Problem/Plan - 2:  ·  Problem: Sepsis.  Plan: -no fevers in  6 days, BP stable   - new LIJ placed.1/30   -repeat blood cultures, sputum cx -ve  -meropenem completed  Id Dr freitas following     Problem/Plan - 3:  ·  Problem: ARDS (adult respiratory distress syndrome).   resolving, off MV  ECHO normal EF ,  -HD today, good urine out put, will continue lasix  - Extubated on 1/31/18    -Poor prognosis, DNR/DNI.     Problem/Plan - 4:  ·  Problem: Acute respiratory failure with hypoxia.  Plan: could be due to flu/PNA   off droplet isolation, Influenza B positive   completed tamiflu ,  - Procalcitonin 80.4  -urine and blood cultures are negative, urine legionella negative   - IV lasix 80 mg daily  - CT physiotherapy  - ID Dr Freitas following.      Problem/Plan - 5:  ·  Problem: Renal failure.  Plan: patient had acute renal failure with metabolic acidosis 2/2 sepsis,hemodialysis today, making urine.  -[facility] 2/4/18   added epogen 8000 units for anemia on days of dialysis.      Problem/Plan - 6:  Problem: DM (diabetes mellitus). Plan: Diet controlled   HbA1c. 6.0  lantus 20 units at bed time and 20 in morning  - continue with sliding scale Q 6 hrs.     Problem/Plan - 7:  ·  Problem: HTN (hypertension).  Plan: increased metoprolol 25 Q6 hour, patient had x2 episode of SVT.    patient received 2 stat doses of adenosine. EKG showed NSR s/p adenosine,   - amiodarone loading dose,   -will switch to oral amiodarone when complete amiodarone drip  Dr Benson- cardiology.     Problem/Plan - 8:  ·  Problem: CAD (coronary artery disease).  Plan: - Resolved  held ASA, Plavix due to GI bleed  -Patient finished heparin drip, troponin were elevated due to stress. ECHO EF 55 %with normal LV functions   - tropnin likely due to demand ischaemia 2/2 septic shock   - held lipitor due to worsening LFTs   - Elevated d-dimer with normal venous doppler with no RV strain on ECHO less likely PE,   - elevated d-dimer 2/2 renal failure and sepsis    - Dr Kelley bain noted   -family meeting tomorrow.

## 2018-02-07 NOTE — CHART NOTE - NSCHARTNOTEFT_GEN_A_CORE
Upon Nutritional Assessment by the Registered Dietitian your patient was determined to meet criteria / has evidence of the following diagnosis/diagnoses:          [ ]  Mild Protein Calorie Malnutrition        [ ]  Moderate Protein Calorie Malnutrition        [x ] Severe Protein Calorie Malnutrition        [ ] Unspecified Protein Calorie Malnutrition        [ ] Underweight / BMI <19        [ ] Morbid Obesity / BMI > 40      Findings as based on:  •  Comprehensive nutrition assessment and consultation  •  Calorie counts (nutrient intake analysis)  •  Food acceptance and intake status from observations by staff  •  Follow up  •  Patient education  •  Intervention secondary to interdisciplinary rounds  •   concerns      Treatment:    The following diet has been recommended:    Pt has had TF order (low level). Plan now is for hospice  PROVIDER Section:     By signing this assessment you are acknowledging and agree with the diagnosis/diagnoses assigned by the Registered Dietitian    Comments:

## 2018-02-08 NOTE — PROGRESS NOTE ADULT - ASSESSMENT
72 year old male with hospice diagnosis of acute on chronic renal failure with comorbid conditions of ARDS secondary to sepsis admitted to the [facility] at [facility] for IV management of dyspnea post palliative extubation.

## 2018-02-09 NOTE — PROGRESS NOTE ADULT - SUBJECTIVE AND OBJECTIVE BOX
Patient is a 72y old  Male who presents with a chief complaint of dyspnea and/or pain (07 Feb 2018 18:30)    PATIENT IS SEEN AND EXAMINED IN MEDICAL FLOOR.    NANNETTE [ X  ]        ALLERGIES:  doxycycline (Unknown)  penicillin (Unknown)  tetracycline (Unknown)  Valtrex (Unknown)      VITALS:    Vital Signs Last 24 Hrs  T(C): 37.8 (09 Feb 2018 16:04), Max: 37.8 (09 Feb 2018 16:04)  T(F): 100 (09 Feb 2018 16:04), Max: 100 (09 Feb 2018 16:04)  HR: 106 (09 Feb 2018 16:04) (102 - 106)  BP: 124/39 (09 Feb 2018 16:04) (107/47 - 124/39)  BP(mean): --  RR: 22 (09 Feb 2018 16:04) (22 - 24)  SpO2: 92% (09 Feb 2018 16:04) (92% - 95%)      .Sputum Sputum trap  01-29 @ 13:45   Normal Respiratory Tonie present  --    Few polymorphonuclear leukocytes  Rare Squamous epithelial cells per low power field  Numerous Yeast per oil power field      .Blood Blood-Peripheral  01-29 @ 11:43   No growth at 5 days.  --  --      .Sputum Sputumtrap rec'd  01-22 @ 10:48   Few Streptococcus pyogenes (Group A)  Normal Respiratory Tonie present  --    Numerous polymorphonuclear leukocytes  Rare Squamous epithelial cells per low power field  Numerous Gram Positive Cocci in Pairs and Chains per oil power field      .Urine Clean Catch (Midstream)  01-20 @ 22:48   No growth  --  --      .Blood Blood-Peripheral  01-20 @ 17:03   No growth at 5 days.  --  --          MEDICATIONS:    MEDICATIONS  (STANDING):  chlorhexidine 4% Liquid 1 Application(s) Topical daily      MEDICATIONS  (PRN):  acetaminophen  Suppository 650 milliGRAM(s) Rectal four times a day PRN For Temp greater than 38 C (100.4 F)  glycopyrrolate Injectable 0.4 milliGRAM(s) IV Push every 4 hours PRN secretions  HYDROmorphone  Injectable 0.5 milliGRAM(s) IV Push every 1 hour PRN pain and/or dyspnea  LORazepam   Injectable 0.5 milliGRAM(s) IV Push every 4 hours PRN Agitation  sodium biphosphate Rectal Enema 1 Enema Rectal every 72 hours PRN constipation      REVIEW OF SYSTEMS:                           ALL ROS DONE [ X   ]    CONSTITUTIONAL:  LETHARGIC [   ], FEVER [   ], UNRESPONSIVE [   ]  CVS:  CP  [   ], SOB, [   ], PALPITATIONS [   ], DIZZYNESS [   ]  RS: COUGH [   ], SPUTUM [   ]  GI: ABDOMINAL PAIN [   ], NAUSEA [   ], VOMITINGS [   ], DIARRHEA [   ], CONSTIPATION [   ]  :  DYSURIA [   ], NOCTURIA [   ], INCREASED FREQUENCY [   ], DRIBLING [   ],  SKELETAL: PAINFUL JOINTS [   ], SWOLLEN JOINTS [   ], NECK ACHE [   ], LOW BACK ACHE [   ],  SKIN : ULCERS [   ], RASH [   ], ITCHING [   ]  CNS: HEAD ACHE [   ], DOUBLE VISION [   ], BLURRED VISION [   ], AMS / CONFUSION [   ], SEIZURES [   ], WEAKNESS [   ],TINGLING / NUMBNESS [   ]    PHYSICAL EXAMINATION:  GENERAL APPEARANCE: NO DISTRESS  HEENT:  NO PALLOR, NO  JVD,  NO   NODES, NECK SUPPLE ,                            CVS: S1 +, S2 +,   RS: AEEB,  B/L RALES + MILD   ABD: SOFT, NT, NO, BS +  EXT: PE + , MILD ANASARCA  SKIN: WARM,   SKELETAL:  ROM ACCEPTABLE  CNS:  AAO X 0   , NO  DEFICITS    RADIOLOGY :    < from: Xray Chest 1 View- PORTABLE-Routine (02.05.18 @ 11:01) >  EXAM:  XR CHEST PORTABLE ROUTINE 1V                            PROCEDURE DATE:  02/05/2018          INTERPRETATION:  AP semierect chest on February 5, 2018 at 9:30 AM.   Patient has sepsis and respiratory failure.    Heart is magnified by technique. Poor inspiration crowds chest.    Nasogastric tube and left jugular line remain.    There is an atelectatic process at right base and slight fluid/infiltrate   at left base. Left base findings have improved from February 4. Right   base findings areunchanged.    IMPRESSION: As above.      < end of copied text >      < from: Xray Chest 1 View AP-PORTABLE IMMEDIATE (01.20.18 @ 19:50) >  EXAM:  XR CHEST PORTABLE IMMED 1V                            PROCEDURE DATE:  01/20/2018          INTERPRETATION:  Chest one view    HISTORY: Central line placement    COMPARISON STUDY: Earlier the same day    Frontal expiratory view of the chest shows the heart to be similar in   size. Right jugular line as and placed extending to the level of the   superior vena cava. Feeding tube tip remains in the stomach. Endotracheal   tube is unchanged. The lungs show no definite infiltrate and there is no   evidence of pneumothorax nor pleural effusion.    IMPRESSION:  Right jugular line. No pneumothorax.    < end of copied text >      ASSESSMENT :     Sepsis  Sleep apnea  Hyperlipidemia  CAD (coronary artery disease)  DM (diabetes mellitus)  HTN (hypertension)  S/P foot surgery      PLAN:  HPI:  72 M from [facility] h/o CKD, Anxiety disorder, CAD, BPH, Carpel tunnel syndrome, compulsive disorder, DM, OA, HTN, IBS sent for dyspnea for last 3 days. Dyspnea is constant, moderate to severe in intensity, associated with fever, generalized weakness, lethargy, frequent falls, cough, myalgias and URI symptoms. Cough is productive with non bloody yellowish sputum.     Patient denies chest pain, nausea, vomiting, LOC, focal neurological deficit, abdominal pain, hematochezia, current smoking, alcohol abuse and illicit drug use.    ICU Vital Signs Last 24 Hrs  T(C): 38.4 (20 Jan 2018 10:36), Max: 38.4 (20 Jan 2018 10:22)  T(F): 101.2 (20 Jan 2018 10:36), Max: 101.2 (20 Jan 2018 10:22)  HR: 88 (20 Jan 2018 13:01) (78 - 91)  BP: 130/61 (20 Jan 2018 12:44) (82/53 - 130/61)  RR: 25 (20 Jan 2018 12:44) (25 - 25)  SpO2: 99% (20 Jan 2018 13:01) (91% - 99%) (20 Jan 2018 14:30)      - PATIENT IS UNDER HOSPICE CARE NOW. D/W STAFF - COMFORT CARE IS IN PROGRESS.  -  INFLUENZA B, ACUTE BRONCHITIS,  B/L PNEUMONIA, S/P SEPSIS, HYPOXIC RESPIRATORY FAILURE  ON S/P  TAMIFLU, IV MEROPENEM , NEBS. ICU F/UP IS IN PROGRESS. S/P EXTUBATION     - AMS DUE TO METABOLIC ENCEPHALOPATHY  - ANEMIA , SUSPECT GI BLEED   - ARF / CKD , METABOLIC ACIDOSIS - HD IS DISCONTINUED AT FAMILY'S REQUEST  - GI AND DVT PROPHYLAXIS  - POOR PROGNOSIS ON HOSPICE CARE   - DR. GARCIA
MELISSA PINEDO                    72y  Male    Allergies    doxycycline (Unknown)  penicillin (Unknown)  tetracycline (Unknown)  Valtrex (Unknown)    Intolerances        Symptoms:  Pain (1-10): 0  Dyspnea: mild  Nausea/Vomitin  Secretions:  0  Agitation: 0  Symptom Requiring Inpatient Hospice Admission: dyspnea    Overnight events/interim history: dilaudid given times 1 for dyspnea with improvement    HPI:  72 year old female with hospice diagnosis of acute on chronic renal disease, with comorbid conditions of ARDS secondary to sepsis. with past medical history of CAD, anxiety disorder, carpel tunnel syndrome, DM, OA, HTN, IBS admitted to [facility] with sepsis and dyspnea.  The patient required intubation for respiratory failure.  The patient did not improve despite IV antibiotics.  The surrogate elected for hospice and the patient was admitted to the IPU at [facility] for IV management of dyspnea and pain. (2018 18:30)          PPSV2: 10%    Code Status: DNR          MEDICATIONS  (STANDING):  chlorhexidine 4% Liquid 1 Application(s) Topical daily    MEDICATIONS  (PRN):  acetaminophen  Suppository 650 milliGRAM(s) Rectal four times a day PRN For Temp greater than 38 C (100.4 F)  glycopyrrolate Injectable 0.4 milliGRAM(s) IV Push every 4 hours PRN secretions  HYDROmorphone  Injectable 0.5 milliGRAM(s) IV Push every 1 hour PRN pain and/or dyspnea  LORazepam   Injectable 0.5 milliGRAM(s) IV Push every 4 hours PRN Agitation  sodium biphosphate Rectal Enema 1 Enema Rectal every 72 hours PRN constipation      CBC Full  -  ( 2018 06:34 )  WBC Count : 14.3 K/uL  Hemoglobin : 7.7 g/dL  Hematocrit : 23.9 %  Platelet Count - Automated : 286 K/uL  Mean Cell Volume : 103.6 fl  Mean Cell Hemoglobin : 33.3 pg  Mean Cell Hemoglobin Concentration : 32.1 gm/dL  Auto Neutrophil # : x  Auto Lymphocyte # : x  Auto Monocyte # : x  Auto Eosinophil # : x  Auto Basophil # : x  Auto Neutrophil % : x  Auto Lymphocyte % : x  Auto Monocyte % : x  Auto Eosinophil % : x  Auto Basophil % : x                         Vital Signs Last 24 Hrs  T(C): 37.4 (2018 00:03), Max: 37.4 (2018 16:02)  T(F): 99.3 (2018 00:03), Max: 99.4 (2018 16:02)  HR: 102 (2018 00:03) (98 - 110)  BP: 115/43 (2018 00:03) (102/27 - 115/43)  BP(mean): --  RR: 24 (2018 00:03) (18 - 30)  SpO2: 95% (2018 00:03) (85% - 95%)    PHYSICAL EXAM:  107/47 24 94% 104 98.9  HEENT: moderate bitemporal wasting  Neck: no JVD no nodes no thyromegaly  Lungs: decreased breath sounds and rales at bases  Heart: s1s2 no audible murmurs  Abd: soft ND NT no masses no organomegaly  Ext: 1+ pitting edema bilaterally
MELISSA PINEDO                    72y  Male    Allergies    doxycycline (Unknown)  penicillin (Unknown)  tetracycline (Unknown)  Valtrex (Unknown)    Intolerances        Symptoms:  Pain (1-10): 0  Dyspnea: mild  Nausea/Vomitin  Secretions:  0  Agitation: 0  Symptom Requiring Inpatient Hospice Admission: dyspnea    Overnight events/interim history: dilaudid given times 2 for dyspnea with improvement, ativan given times one for agitation with improvement    HPI:  72 year old female with hospice diagnosis of acute on chronic renal disease, with comorbid conditions of ARDS secondary to sepsis. with past medical history of CAD, anxiety disorder, carpel tunnel syndrome, DM, OA, HTN, IBS admitted to [facility] with sepsis and dyspnea.  The patient required intubation for respiratory failure.  The patient did not improve despite IV antibiotics.  The surrogate elected for hospice and the patient was admitted to the IPU at [facility] for IV management of dyspnea and pain. (2018 18:30)          PPSV2: 10%    Code Status: DNR          MEDICATIONS  (STANDING):  chlorhexidine 4% Liquid 1 Application(s) Topical daily    MEDICATIONS  (PRN):  acetaminophen  Suppository 650 milliGRAM(s) Rectal four times a day PRN For Temp greater than 38 C (100.4 F)  glycopyrrolate Injectable 0.4 milliGRAM(s) IV Push every 4 hours PRN secretions  HYDROmorphone  Injectable 0.5 milliGRAM(s) IV Push every 1 hour PRN pain and/or dyspnea  LORazepam   Injectable 0.5 milliGRAM(s) IV Push every 4 hours PRN Agitation  sodium biphosphate Rectal Enema 1 Enema Rectal every 72 hours PRN constipation      CBC Full  -  ( 2018 06:34 )  WBC Count : 14.3 K/uL  Hemoglobin : 7.7 g/dL  Hematocrit : 23.9 %  Platelet Count - Automated : 286 K/uL  Mean Cell Volume : 103.6 fl  Mean Cell Hemoglobin : 33.3 pg  Mean Cell Hemoglobin Concentration : 32.1 gm/dL  Auto Neutrophil # : x  Auto Lymphocyte # : x  Auto Monocyte # : x  Auto Eosinophil # : x  Auto Basophil # : x  Auto Neutrophil % : x  Auto Lymphocyte % : x  Auto Monocyte % : x  Auto Eosinophil % : x  Auto Basophil % : x                         Vital Signs Last 24 Hrs  T(C): 37.4 (2018 00:03), Max: 37.4 (2018 16:02)  T(F): 99.3 (2018 00:03), Max: 99.4 (2018 16:02)  HR: 102 (2018 00:03) (98 - 110)  BP: 115/43 (2018 00:03) (102/27 - 115/43)  BP(mean): --  RR: 24 (2018 00:03) (18 - 30)  SpO2: 95% (2018 00:03) (85% - 95%)    PHYSICAL EXAM:  thin female in mild respiratory distress  124/54 97.6 20 100%  HEENT: moderate bitemporal wasting  Neck: no JVD no nodes no thyromegaly  Lungs: decreased breath sounds and rales at bases  Heart: s1s2 no audible murmurs  Abd: soft ND NT no masses no orgnoamegaly  Ext: 1+ pitting edema bilaterally
Patient is a 72y old  Male who presents with a chief complaint of     PATIENT IS SEEN AND EXAMINED IN  ICU     NANNETTE [ X  ]      CHAVO  [ X  ]    ALLERGIES:  doxycycline (Unknown)  penicillin (Unknown)  tetracycline (Unknown)  Valtrex (Unknown)        VITALS:    Vital Signs Last 24 Hrs  T(C): 36.4 (07 Feb 2018 16:41), Max: 37.3 (07 Feb 2018 00:00)  T(F): 97.6 (07 Feb 2018 16:41), Max: 99.1 (07 Feb 2018 00:00)  HR: 91 (07 Feb 2018 19:00) (78 - 99)  BP: 125/56 (07 Feb 2018 18:00) (98/48 - 125/56)  BP(mean): 73 (07 Feb 2018 18:00) (58 - 73)  RR: 20 (07 Feb 2018 19:00) (15 - 30)  SpO2: 100% (07 Feb 2018 19:00) (85% - 100%)    LABS:  CBC Full  -  ( 07 Feb 2018 06:34 )  WBC Count : 14.3 K/uL  Hemoglobin : 7.7 g/dL  Hematocrit : 23.9 %  Platelet Count - Automated : 286 K/uL  Mean Cell Volume : 103.6 fl  Mean Cell Hemoglobin : 33.3 pg  Mean Cell Hemoglobin Concentration : 32.1 gm/dL  Auto Neutrophil # : x  Auto Lymphocyte # : x  Auto Monocyte # : x  Auto Eosinophil # : x  Auto Basophil # : x  Auto Neutrophil % : x  Auto Lymphocyte % : x  Auto Monocyte % : x  Auto Eosinophil % : x  Auto Basophil % : x    PT/INR - ( 07 Feb 2018 13:16 )   PT: 13.2 sec;   INR: 1.21 ratio           02-07    146<H>  |  109<H>  |  165  ----------------------------<  165<H>  4.0   |  20<L>  |  6.04<H>    Ca    7.0<L>      07 Feb 2018 06:34  Phos  9.5     02-07  Mg     2.9     02-07      CAPILLARY BLOOD GLUCOSE      POCT Blood Glucose.: 168 mg/dL (07 Feb 2018 01:58)  POCT Blood Glucose.: 155 mg/dL (06 Feb 2018 22:16)                .Sputum Sputum trap  01-29 @ 13:45   Normal Respiratory Tonie present  --    Few polymorphonuclear leukocytes  Rare Squamous epithelial cells per low power field  Numerous Yeast per oil power field      .Blood Blood-Peripheral  01-29 @ 11:43   No growth at 5 days.  --  --      .Sputum Sputumtrap rec'd  01-22 @ 10:48   Few Streptococcus pyogenes (Group A)  Normal Respiratory Tonie present  --    Numerous polymorphonuclear leukocytes  Rare Squamous epithelial cells per low power field  Numerous Gram Positive Cocci in Pairs and Chains per oil power field      .Urine Clean Catch (Midstream)  01-20 @ 22:48   No growth  --  --      .Blood Blood-Peripheral  01-20 @ 17:03   No growth at 5 days.  --  --          MEDICATIONS:    MEDICATIONS  (STANDING):  chlorhexidine 4% Liquid 1 Application(s) Topical daily      MEDICATIONS  (PRN):  acetaminophen  Suppository 650 milliGRAM(s) Rectal four times a day PRN For Temp greater than 38 C (100.4 F)  glycopyrrolate Injectable 0.4 milliGRAM(s) IV Push every 4 hours PRN secretions  HYDROmorphone  Injectable 0.5 milliGRAM(s) IV Push every 1 hour PRN pain and/or dyspnea  LORazepam   Injectable 0.5 milliGRAM(s) IV Push every 4 hours PRN Agitation  sodium biphosphate Rectal Enema 1 Enema Rectal every 72 hours PRN constipation      REVIEW OF SYSTEMS:                           ALL ROS DONE [ X   ]    CONSTITUTIONAL:  LETHARGIC [   ], FEVER [   ], UNRESPONSIVE [   ]  CVS:  CP  [   ], SOB, [   ], PALPITATIONS [   ], DIZZYNESS [   ]  RS: COUGH [   ], SPUTUM [   ]  GI: ABDOMINAL PAIN [   ], NAUSEA [   ], VOMITINGS [   ], DIARRHEA [   ], CONSTIPATION [   ]  :  DYSURIA [   ], NOCTURIA [   ], INCREASED FREQUENCY [   ], DRIBLING [   ],  SKELETAL: PAINFUL JOINTS [   ], SWOLLEN JOINTS [   ], NECK ACHE [   ], LOW BACK ACHE [   ],  SKIN : ULCERS [   ], RASH [   ], ITCHING [   ]  CNS: HEAD ACHE [   ], DOUBLE VISION [   ], BLURRED VISION [   ], AMS / CONFUSION [   ], SEIZURES [   ], WEAKNESS [   ],TINGLING / NUMBNESS [   ]    PHYSICAL EXAMINATION:  GENERAL APPEARANCE: NO DISTRESS  HEENT:  NO PALLOR, NO  JVD,  NO   NODES, NECK SUPPLE ,                            CVS: S1 +, S2 +,   RS: AEEB,  B/L RALES + MILD   ABD: SOFT, NT, NO, BS +  EXT: PE + , MILD ANASARCA  SKIN: WARM,   SKELETAL:  ROM ACCEPTABLE  CNS:  AAO X 0   , NO  DEFICITS    RADIOLOGY :    < from: Xray Chest 1 View- PORTABLE-Routine (02.05.18 @ 11:01) >  EXAM:  XR CHEST PORTABLE ROUTINE 1V                            PROCEDURE DATE:  02/05/2018          INTERPRETATION:  AP semierect chest on February 5, 2018 at 9:30 AM.   Patient has sepsis and respiratory failure.    Heart is magnified by technique. Poor inspiration crowds chest.    Nasogastric tube and left jugular line remain.    There is an atelectatic process at right base and slight fluid/infiltrate   at left base. Left base findings have improved from February 4. Right   base findings areunchanged.    IMPRESSION: As above.      < end of copied text >      < from: Xray Chest 1 View AP-PORTABLE IMMEDIATE (01.20.18 @ 19:50) >  EXAM:  XR CHEST PORTABLE IMMED 1V                            PROCEDURE DATE:  01/20/2018          INTERPRETATION:  Chest one view    HISTORY: Central line placement    COMPARISON STUDY: Earlier the same day    Frontal expiratory view of the chest shows the heart to be similar in   size. Right jugular line as and placed extending to the level of the   superior vena cava. Feeding tube tip remains in the stomach. Endotracheal   tube is unchanged. The lungs show no definite infiltrate and there is no   evidence of pneumothorax nor pleural effusion.    IMPRESSION:  Right jugular line. No pneumothorax.    < end of copied text >      ASSESSMENT :     Sepsis  Sleep apnea  Hyperlipidemia  CAD (coronary artery disease)  DM (diabetes mellitus)  HTN (hypertension)  S/P foot surgery      PLAN:  HPI:  72 M from [facility] h/o CKD, Anxiety disorder, CAD, BPH, Carpel tunnel syndrome, compulsive disorder, DM, OA, HTN, IBS sent for dyspnea for last 3 days. Dyspnea is constant, moderate to severe in intensity, associated with fever, generalized weakness, lethargy, frequent falls, cough, myalgias and URI symptoms. Cough is productive with non bloody yellowish sputum.     Patient denies chest pain, nausea, vomiting, LOC, focal neurological deficit, abdominal pain, hematochezia, current smoking, alcohol abuse and illicit drug use.    ICU Vital Signs Last 24 Hrs  T(C): 38.4 (20 Jan 2018 10:36), Max: 38.4 (20 Jan 2018 10:22)  T(F): 101.2 (20 Jan 2018 10:36), Max: 101.2 (20 Jan 2018 10:22)  HR: 88 (20 Jan 2018 13:01) (78 - 91)  BP: 130/61 (20 Jan 2018 12:44) (82/53 - 130/61)  RR: 25 (20 Jan 2018 12:44) (25 - 25)  SpO2: 99% (20 Jan 2018 13:01) (91% - 99%) (20 Jan 2018 14:30)    -  INFLUENZA B, ACUTE BRONCHITIS,  B/L PNEUMONIA, S/P SEPSIS, HYPOXIC RESPIRATORY FAILURE  ON S/P  TAMIFLU, IV MEROPENEM , NEBS. ICU F/UP IS IN PROGRESS. S/P EXTUBATION     - AMS DUE TO METABOLIC ENCEPHALOPATHY  - ANEMIA , SUSPECT GI BLEED   - ARF / CKD , METABOLIC ACIDOSIS - S/P HD CATHETER REINSERTION  AND IS ON  HD   - GI AND DVT PROPHYLAXIS  - POOR PROGNOSIS   - patient is accepted to hospice care as requested by family. d/w palliative care team.  - DR. GARCIA-
Patient is a 72y old  Male who presents with a chief complaint of dyspnea and/or pain (07 Feb 2018 18:30)    PATIENT IS SEEN AND EXAMINED IN MEDICAL FLOOR.  NGT [   x ]    BRUNSON [ x  ]          ALLERGIES:  doxycycline (Unknown)  penicillin (Unknown)  tetracycline (Unknown)  Valtrex (Unknown)    VITALS:    Vital Signs Last 24 Hrs  T(C): 37.4 (08 Feb 2018 16:02), Max: 37.4 (08 Feb 2018 16:02)  T(F): 99.4 (08 Feb 2018 16:02), Max: 99.4 (08 Feb 2018 16:02)  HR: 110 (08 Feb 2018 16:02) (88 - 110)  BP: 102/27 (08 Feb 2018 16:02) (102/27 - 125/56)  BP(mean): 73 (07 Feb 2018 18:00) (73 - 73)  RR: 30 (08 Feb 2018 16:02) (15 - 30)  SpO2: 85% (08 Feb 2018 16:02) (85% - 100%)    LABS:  CBC Full  -  ( 07 Feb 2018 06:34 )  WBC Count : 14.3 K/uL  Hemoglobin : 7.7 g/dL  Hematocrit : 23.9 %  Platelet Count - Automated : 286 K/uL  Mean Cell Volume : 103.6 fl  Mean Cell Hemoglobin : 33.3 pg  Mean Cell Hemoglobin Concentration : 32.1 gm/dL  Auto Neutrophil # : x  Auto Lymphocyte # : x  Auto Monocyte # : x  Auto Eosinophil # : x  Auto Basophil # : x  Auto Neutrophil % : x  Auto Lymphocyte % : x  Auto Monocyte % : x  Auto Eosinophil % : x  Auto Basophil % : x    PT/INR - ( 07 Feb 2018 13:16 )   PT: 13.2 sec;   INR: 1.21 ratio           02-07    146<H>  |  109<H>  |  165  ----------------------------<  165<H>  4.0   |  20<L>  |  6.04<H>    Ca    7.0<L>      07 Feb 2018 06:34  Phos  9.5     02-07  Mg     2.9     02-07      CAPILLARY BLOOD GLUCOSE                    .Sputum Sputum trap  01-29 @ 13:45   Normal Respiratory Tonie present  --    Few polymorphonuclear leukocytes  Rare Squamous epithelial cells per low power field  Numerous Yeast per oil power field      .Blood Blood-Peripheral  01-29 @ 11:43   No growth at 5 days.  --  --      .Sputum Sputumtrap rec'd  01-22 @ 10:48   Few Streptococcus pyogenes (Group A)  Normal Respiratory Tonie present  --    Numerous polymorphonuclear leukocytes  Rare Squamous epithelial cells per low power field  Numerous Gram Positive Cocci in Pairs and Chains per oil power field      .Urine Clean Catch (Midstream)  01-20 @ 22:48   No growth  --  --      .Blood Blood-Peripheral  01-20 @ 17:03   No growth at 5 days.  --  --          MEDICATIONS:    MEDICATIONS  (STANDING):  chlorhexidine 4% Liquid 1 Application(s) Topical daily      MEDICATIONS  (PRN):  acetaminophen  Suppository 650 milliGRAM(s) Rectal four times a day PRN For Temp greater than 38 C (100.4 F)  glycopyrrolate Injectable 0.4 milliGRAM(s) IV Push every 4 hours PRN secretions  HYDROmorphone  Injectable 0.5 milliGRAM(s) IV Push every 1 hour PRN pain and/or dyspnea  LORazepam   Injectable 0.5 milliGRAM(s) IV Push every 4 hours PRN Agitation  sodium biphosphate Rectal Enema 1 Enema Rectal every 72 hours PRN constipation      REVIEW OF SYSTEMS:                           ALL ROS DONE [ X   ]    CONSTITUTIONAL:  LETHARGIC [   ], FEVER [   ], UNRESPONSIVE [   ]  CVS:  CP  [   ], SOB, [   ], PALPITATIONS [   ], DIZZYNESS [   ]  RS: COUGH [   ], SPUTUM [   ]  GI: ABDOMINAL PAIN [   ], NAUSEA [   ], VOMITINGS [   ], DIARRHEA [   ], CONSTIPATION [   ]  :  DYSURIA [   ], NOCTURIA [   ], INCREASED FREQUENCY [   ], DRIBLING [   ],  SKELETAL: PAINFUL JOINTS [   ], SWOLLEN JOINTS [   ], NECK ACHE [   ], LOW BACK ACHE [   ],  SKIN : ULCERS [   ], RASH [   ], ITCHING [   ]  CNS: HEAD ACHE [   ], DOUBLE VISION [   ], BLURRED VISION [   ], AMS / CONFUSION [   ], SEIZURES [   ], WEAKNESS [   ],TINGLING / NUMBNESS [   ]      PHYSICAL EXAMINATION:  GENERAL APPEARANCE: NO DISTRESS  HEENT:  NO PALLOR, NO  JVD,  NO   NODES, NECK SUPPLE ,                            CVS: S1 +, S2 +,   RS: AEEB,  B/L RALES + MILD   ABD: SOFT, NT, NO, BS +  EXT: PE + , MILD ANASARCA  SKIN: WARM,   SKELETAL:  ROM ACCEPTABLE  CNS:  AAO X 0   , NO  DEFICITS    RADIOLOGY :    < from: Xray Chest 1 View- PORTABLE-Routine (02.05.18 @ 11:01) >  EXAM:  XR CHEST PORTABLE ROUTINE 1V                            PROCEDURE DATE:  02/05/2018          INTERPRETATION:  AP semierect chest on February 5, 2018 at 9:30 AM.   Patient has sepsis and respiratory failure.    Heart is magnified by technique. Poor inspiration crowds chest.    Nasogastric tube and left jugular line remain.    There is an atelectatic process at right base and slight fluid/infiltrate   at left base. Left base findings have improved from February 4. Right   base findings areunchanged.    IMPRESSION: As above.      < end of copied text >      < from: Xray Chest 1 View AP-PORTABLE IMMEDIATE (01.20.18 @ 19:50) >  EXAM:  XR CHEST PORTABLE IMMED 1V                            PROCEDURE DATE:  01/20/2018          INTERPRETATION:  Chest one view    HISTORY: Central line placement    COMPARISON STUDY: Earlier the same day    Frontal expiratory view of the chest shows the heart to be similar in   size. Right jugular line as and placed extending to the level of the   superior vena cava. Feeding tube tip remains in the stomach. Endotracheal   tube is unchanged. The lungs show no definite infiltrate and there is no   evidence of pneumothorax nor pleural effusion.    IMPRESSION:  Right jugular line. No pneumothorax.    < end of copied text >      ASSESSMENT :     Sepsis  Sleep apnea  Hyperlipidemia  CAD (coronary artery disease)  DM (diabetes mellitus)  HTN (hypertension)  S/P foot surgery      PLAN:  HPI:  72 M from [facility] h/o CKD, Anxiety disorder, CAD, BPH, Carpel tunnel syndrome, compulsive disorder, DM, OA, HTN, IBS sent for dyspnea for last 3 days. Dyspnea is constant, moderate to severe in intensity, associated with fever, generalized weakness, lethargy, frequent falls, cough, myalgias and URI symptoms. Cough is productive with non bloody yellowish sputum.     Patient denies chest pain, nausea, vomiting, LOC, focal neurological deficit, abdominal pain, hematochezia, current smoking, alcohol abuse and illicit drug use.    ICU Vital Signs Last 24 Hrs  T(C): 38.4 (20 Jan 2018 10:36), Max: 38.4 (20 Jan 2018 10:22)  T(F): 101.2 (20 Jan 2018 10:36), Max: 101.2 (20 Jan 2018 10:22)  HR: 88 (20 Jan 2018 13:01) (78 - 91)  BP: 130/61 (20 Jan 2018 12:44) (82/53 - 130/61)  RR: 25 (20 Jan 2018 12:44) (25 - 25)  SpO2: 99% (20 Jan 2018 13:01) (91% - 99%) (20 Jan 2018 14:30)      - PATIENT IS UNDER HOSPICE CARE NOW. D/W STAFF - COMFORT CARE IS IN PROGRESS.  -  INFLUENZA B, ACUTE BRONCHITIS,  B/L PNEUMONIA, S/P SEPSIS, HYPOXIC RESPIRATORY FAILURE  ON S/P  TAMIFLU, IV MEROPENEM , NEBS. ICU F/UP IS IN PROGRESS. S/P EXTUBATION     - AMS DUE TO METABOLIC ENCEPHALOPATHY  - ANEMIA , SUSPECT GI BLEED   - ARF / CKD , METABOLIC ACIDOSIS - HD IS DISCONTINUED AT FAMILY'S REQUEST  - GI AND DVT PROPHYLAXIS  - POOR PROGNOSIS   - DR. GARCIA-

## 2018-02-09 NOTE — PROGRESS NOTE ADULT - ASSESSMENT
72 year old male with hospice diagnosis of acute on chronic renal failure with comorbid conditions of ARDS secondary to sepsis admitted to the IPU at [facility] for IV management of dyspnea post palliative extubation.

## 2018-02-10 NOTE — DISCHARGE NOTE FOR THE EXPIRED PATIENT - HOSPITAL COURSE
71 y/o male from [facility] with pmhx. sepsis, respiratory failure, CAD, ESRD, DM, HTN, HLD, BPH, sleep apnea, skin abnormalities, delirium, OA, constipation, s/p foot surgery, was brought to ED for worsening dyspnea and pain. Prognosis was poor, and pt was admitted for inpatient hospice on 2/7/18. He was medicated with robinul, hibiclens, ativan, sodium biphosphate rectal enema, tylenol suppository for temp., dilaudid injectable for pain.  Called by nurse for pt. being unresponsive with no pulse and no spontaneous breathing, and examined at bedside. Patient was unresponsive to both verbal and tactile stimuli, had no audible sounds, no spontaneous respiration. Pupills were fixed and dilated. No resuscitative measures were initiated because patient was on DNR / DNI and MEWS suspension order for comfort care. Pt. was pronounced death at 6:55 am on 2/10/18. Family was notified. No autopsy.   Dr. Chacon - made aware. Hospice care network - called.

## 2018-02-10 NOTE — DISCHARGE NOTE FOR THE EXPIRED PATIENT - NS PATIENT DEATH CRITERIA
including absent breath sounds, pulselessness and/or asystole./Patient is dead based on Cardiopulmonary criteria including absent breath sounds, pulselessness and/or asystole

## 2018-08-29 NOTE — PROGRESS NOTE ADULT - PROBLEM SELECTOR PROBLEM 3
Acute respiratory failure with hypoxia Anesthesia Volume In Cc (Will Not Render If 0): 0.5 Additional Anesthesia Volume In Cc (Will Not Render If 0): 0 Billing Type: [employer] Biopsy Method: Personna blade Biopsy Type: H and E Electrodesiccation Text: The wound bed was treated with electrodesiccation after the biopsy was performed. Curettage Text: The wound bed was treated with curettage after the biopsy was performed. Silver Nitrate Text: The wound bed was treated with silver nitrate after the biopsy was performed. Hemostasis: Electrocautery Wound Care: Vaseline Type Of Destruction Used: Curettage Cryotherapy Text: The wound bed was treated with cryotherapy after the biopsy was performed. Detail Level: Detailed Consent: Written consent was obtained and risks were reviewed including but not limited to scarring, infection, bleeding, scabbing, incomplete removal, nerve damage and allergy to anesthesia. Dressing: bandage Electrodesiccation And Curettage Text: The wound bed was treated with electrodesiccation and curettage after the biopsy was performed. Destruction After The Procedure: No Lab: [facility] Was A Bandage Applied: Yes Post-Care Instructions: I reviewed with the patient in detail post-care instructions. Patient is to keep the biopsy site dry overnight, and then apply bacitracin twice daily until healed. Patient may apply hydrogen peroxide soaks to remove any crusting. Notification Instructions: Patient will be notified of biopsy results. However, patient instructed to call the office if not contacted within 2 weeks. Anesthesia Type: 1% lidocaine without epinephrine Depth Of Biopsy: dermis

## 2019-02-05 NOTE — PROGRESS NOTE ADULT - SUBJECTIVE AND OBJECTIVE BOX
SUBJECTIVE:   Leland Soto is a 3 year old female who presents to clinic today with father because of:    Chief Complaint   Patient presents with     Fever     Abdominal Pain        HPI  ENT/Cough Symptoms    Problem started: 2 days ago  Fever: YES  Runny nose: YES  Congestion: no  Sore Throat: YES  Cough: YES  Eye discharge/redness:  no  Ear Pain: no  Wheeze: no   Sick contacts: None;  Strep exposure: None;  Therapies Tried: trace Ha had stomach pain last night before bed, but no vomiting or diarrhea.  She has a history of constipation and seems more recently her stools have been regular.  Although Dad does admit they have not been following her stools as routinely as they were in the past.  She also has developed a fever in the past 24 hours.  This morning her temperature was 102.  She has had a cough and nasal congestion and runny nose.  She does not have any rash present.       ROS  Constitutional, eye, ENT, skin, respiratory, cardiac, and GI are normal except as otherwise noted.    PROBLEM LIST  Patient Active Problem List    Diagnosis Date Noted     Dental caries 05/31/2017     Priority: Medium     Enlarged tonsils 03/16/2017     Priority: Medium     Sleep-disordered breathing 03/16/2017     Priority: Medium     Thickened frenulum of upper lip 2014     Priority: Medium     Torticollis 2014     Priority: Medium     Plagiocephaly, acquired 2014     Priority: Medium     Congenital nasolacrimal duct obstruction, bilateral 2014     Priority: Medium     Acid reflux 2014     Priority: Medium     Twin birth 2014     Priority: Medium     Hemangioma 2014     Priority: Medium      MEDICATIONS  Current Outpatient Medications   Medication Sig Dispense Refill     multivitamin, therapeutic with minerals (MULTI-VITAMIN) TABS tablet Take 1 tablet by mouth daily       triamcinolone (KENALOG) 0.1 % cream Apply sparingly to affected area three times daily for 7-10 days. 30 g 0     "  ALLERGIES  Allergies   Allergen Reactions     No Known Allergies        Reviewed and updated as needed this visit by clinical staff  Tobacco  Allergies  Meds         Reviewed and updated as needed this visit by Provider       OBJECTIVE:     /70   Pulse 142   Temp 99.9  F (37.7  C) (Tympanic)   Resp 20   Ht 3' 5.73\" (1.06 m)   Wt 43 lb (19.5 kg)   SpO2 100%   BMI 17.36 kg/m    37 %ile based on CDC (Girls, 2-20 Years) Stature-for-age data based on Stature recorded on 2/5/2019.  72 %ile based on CDC (Girls, 2-20 Years) weight-for-age data based on Weight recorded on 2/5/2019.  90 %ile based on CDC (Girls, 2-20 Years) BMI-for-age based on body measurements available as of 2/5/2019.  Blood pressure percentiles are 93 % systolic and 96 % diastolic based on the August 2017 AAP Clinical Practice Guideline. This reading is in the Stage 1 hypertension range (BP >= 95th percentile).    GENERAL: Active, alert, in no acute distress.  SKIN: Clear. No significant rash, abnormal pigmentation or lesions  HEAD: Normocephalic.  EYES:  No discharge or erythema. Normal pupils and EOM.  RIGHT EAR: normal: no effusions, no erythema, normal landmarks  LEFT EAR: normal: no effusions, no erythema, normal landmarks  NOSE: Normal without discharge.  MOUTH/THROAT: Clear. No oral lesions. Teeth intact without obvious abnormalities.  LYMPH NODES: No adenopathy  LUNGS: Clear. No rales, rhonchi, wheezing or retractions  HEART: Regular rhythm. Normal S1/S2. No murmurs.  ABDOMEN: positive bowel sounds, soft, nontender, firm stool palpated in lower left quadrant. No rebound or guarding.  Negative obturator and psoas signs.  Jumping in the room creates no pain or discomfort.    DIAGNOSTICS:   Results for orders placed or performed in visit on 02/05/19   Rapid strep screen   Result Value Ref Range    Specimen Description Throat     Rapid Strep A Screen       NEGATIVE: No Group A streptococcal antigen detected by immunoassay, await " INTERVAL HPI/OVERNIGHT EVENTS: patient had low grade overnight,     PRESSORS: [ ] YES [ ] NO  WHICH:    ANTIBIOTICS:                  DATE STARTED:  ANTIBIOTICS:                  DATE STARTED:  ANTIBIOTICS:                  DATE STARTED:    Antimicrobial:  meropenem IVPB 500 milliGRAM(s) IV Intermittent every 12 hours    Cardiovascular:  tamsulosin 0.4 milliGRAM(s) Oral at bedtime    Pulmonary:  ALBUTerol    90 MICROgram(s) HFA Inhaler 1 Puff(s) Inhalation every 6 hours    Hematalogic:  aspirin  chewable 81 milliGRAM(s) Oral daily  clopidogrel Tablet 75 milliGRAM(s) Oral daily  heparin  Injectable 5000 Unit(s) SubCutaneous every 12 hours    Other:  acetaminophen    Suspension 650 milliGRAM(s) Oral every 6 hours PRN  chlorhexidine 4% Liquid 1 Application(s) Topical daily  dexmedetomidine Infusion 0.5 MICROgram(s)/kG/Hr IV Continuous <Continuous>  fentaNYL   Infusion 0.5 MICROgram(s)/kG/Hr IV Continuous <Continuous>  finasteride 5 milliGRAM(s) Oral daily  insulin glargine Injectable (LANTUS) 10 Unit(s) SubCutaneous at bedtime  insulin lispro (HumaLOG) corrective regimen sliding scale   SubCutaneous every 6 hours  methylPREDNISolone sodium succinate Injectable 60 milliGRAM(s) IV Push every 6 hours  pantoprazole  Injectable 40 milliGRAM(s) IV Push daily  pregabalin 50 milliGRAM(s) Oral two times a day  sevelamer carbonate Powder 1600 milliGRAM(s) Oral three times a day with meals    acetaminophen    Suspension 650 milliGRAM(s) Oral every 6 hours PRN  ALBUTerol    90 MICROgram(s) HFA Inhaler 1 Puff(s) Inhalation every 6 hours  aspirin  chewable 81 milliGRAM(s) Oral daily  chlorhexidine 4% Liquid 1 Application(s) Topical daily  clopidogrel Tablet 75 milliGRAM(s) Oral daily  dexmedetomidine Infusion 0.5 MICROgram(s)/kG/Hr IV Continuous <Continuous>  fentaNYL   Infusion 0.5 MICROgram(s)/kG/Hr IV Continuous <Continuous>  finasteride 5 milliGRAM(s) Oral daily  heparin  Injectable 5000 Unit(s) SubCutaneous every 12 hours  insulin glargine Injectable (LANTUS) 10 Unit(s) SubCutaneous at bedtime  insulin lispro (HumaLOG) corrective regimen sliding scale   SubCutaneous every 6 hours  meropenem IVPB 500 milliGRAM(s) IV Intermittent every 12 hours  methylPREDNISolone sodium succinate Injectable 60 milliGRAM(s) IV Push every 6 hours  pantoprazole  Injectable 40 milliGRAM(s) IV Push daily  pregabalin 50 milliGRAM(s) Oral two times a day  sevelamer carbonate Powder 1600 milliGRAM(s) Oral three times a day with meals  tamsulosin 0.4 milliGRAM(s) Oral at bedtime    Drug Dosing Weight  Height (cm): 185.42 (20 Jan 2018 10:22)  Weight (kg): 96 (25 Jan 2018 07:30)  BMI (kg/m2): 27.9 (25 Jan 2018 07:30)  BSA (m2): 2.2 (25 Jan 2018 07:30)    CENTRAL LINE: [ ] YES [ ] NO  LOCATION:   DATE INSERTED:  REMOVE: [ ] YES [ ] NO  EXPLAIN:    BRUNSON: [ ] YES [ ] NO    DATE INSERTED:  REMOVE:  [ ] YES [ ] NO  EXPLAIN:    A-LINE:  [ ] YES [ ] NO  LOCATION:   DATE INSERTED:  REMOVE:  [ ] YES [ ] NO  EXPLAIN:    PMH -reviewed admission note, no change since admission  Heart faliure: acute [ ] chronic [ ] acute or chronic [ ] diastolic [ ] systolic [ ] combied systolic and diastolic[ ]  NING: ATN[ ] renal medullary necrosis [ ] CKD I [ ]CKDII [ ]CKD III [ ]CKD IV [ ]CKD V [ ]Other pathological lesions [ ]  Abdominal Nutrition Status: malnutrition [ ] cachexia [ ] morbid obesity/BMI=40 [ ] Supplement ordered [___________]     ICU Vital Signs Last 24 Hrs  T(C): 37.1 (25 Jan 2018 06:00), Max: 38.1 (24 Jan 2018 20:11)  T(F): 98.7 (25 Jan 2018 06:00), Max: 100.5 (24 Jan 2018 20:11)  HR: 95 (25 Jan 2018 08:46) (82 - 104)  BP: 152/72 (25 Jan 2018 08:00) (111/69 - 152/72)  BP(mean): 88 (25 Jan 2018 08:00) (74 - 90)  ABP: 195/77 (25 Jan 2018 07:30) (86/50 - 198/81)  ABP(mean): 114 (25 Jan 2018 07:30) (62 - 118)  RR: 14 (25 Jan 2018 08:00) (7 - 16)  SpO2: 98% (25 Jan 2018 08:46) (96% - 100%)      ABG - ( 25 Jan 2018 04:50 )  pH: 7.28  /  pCO2: 41    /  pO2: 122   / HCO3: 18    / Base Excess: -7.4  /  SaO2: 98                    01-24 @ 07:01  -  01-25 @ 07:00  --------------------------------------------------------  IN: 1563.6 mL / OUT: 55 mL / NET: 1508.6 mL        Mode: AC/ CMV (Assist Control/ Continuous Mandatory Ventilation)  RR (machine): 14  TV (machine): 400  FiO2: 60  PEEP: 10  ITime: 0.9  MAP: 13  PIP: 20      PHYSICAL EXAM:    GENERAL: [ ]NAD, [ ]well-groomed, [ ]well-developed  HEAD:  [ ]Atraumatic, [ ]Normocephalic  EYES: [ ]EOMI, [ ]PERRLA, [ ]conjunctiva and sclera clear  ENMT: [ ]No tonsillar erythema, exudates, or enlargement; [ ]Moist mucous membranes, [ ]Good dentition, [ ]No lesions  NECK: [ ]Supple, normal appearance, [ ]No JVD; [ ]Normal thyroid; [ ]Trachea midline  NERVOUS SYSTEM:  [ ]Alert & Oriented X3, [ ]Good concentration; [ ]Motor Strength 5/5 B/L upper and lower extremities; [ ]DTRs 2+ intact and symmetric  CHEST/LUNG: [ ]No chest deformity; [ ]Normal percussion bilaterally; [ ]No rales, rhonchi, wheezing   HEART: [ ]Regular rate and rhythm; [ ]No murmurs, rubs, or gallops  ABDOMEN: [ ]Soft, Nontender, Nondistended; [ ]Bowel sounds present  EXTREMITIES:  [ ]2+ Peripheral Pulses, [ ]No clubbing, cyanosis, or edema  LYMPH: [ ]No lymphadenopathy noted  SKIN: [ ]No rashes or lesions; [ ]Good capillary refill      LABS:  CBC Full  -  ( 25 Jan 2018 06:12 )  WBC Count : 8.0 K/uL  Hemoglobin : 11.0 g/dL  Hematocrit : 32.1 %  Platelet Count - Automated : 85 K/uL  Mean Cell Volume : 103.9 fl  Mean Cell Hemoglobin : 35.4 pg  Mean Cell Hemoglobin Concentration : 34.1 gm/dL  Auto Neutrophil # : 7.2 K/uL  Auto Lymphocyte # : 0.2 K/uL  Auto Monocyte # : 0.4 K/uL  Auto Eosinophil # : 0.0 K/uL  Auto Basophil # : 0.0 K/uL  Auto Neutrophil % : 90.9 %  Auto Lymphocyte % : 3.1 %  Auto Monocyte % : 5.6 %  Auto Eosinophil % : 0.0 %  Auto Basophil % : 0.5 %    01-25    134<L>  |  98  |  88<H>  ----------------------------<  322<H>  4.6   |  21<L>  |  6.04<H>    Ca    7.4<L>      25 Jan 2018 06:12  Phos  7.5     01-25  Mg     2.2     01-25    TPro  6.1  /  Alb  2.1<L>  /  TBili  1.1  /  DBili  x   /  AST  129<H>  /  ALT  86<H>  /  AlkPhos  165<H>  01-25    PT/INR - ( 23 Jan 2018 12:27 )   PT: 10.0 sec;   INR: 0.92 ratio         PTT - ( 24 Jan 2018 06:26 )  PTT:27.6 sec    Culture Results:   Few Streptococcus pyogenes (Group A)  Normal Respiratory Tonie present (01-22 @ 10:48)      RADIOLOGY & ADDITIONAL STUDIES REVIEWED:  ***    [ ]GOALS OF CARE DISCUSSION WITH PATIENT/FAMILY/PROXY:    CRITICAL CARE TIME SPENT: 35 minutes INTERVAL HPI/OVERNIGHT EVENTS: patient had low grade overnight, HD stable, had hemodialysis yesterday, right femoral catheter for Dialysis     PRESSORS: [ ] YES [ x] NO  WHICH:    ANTIBIOTICS:    Meropenem   DATE STARTED: 1/23  ANTIBIOTICS:                  DATE STARTED:  ANTIBIOTICS:                  DATE STARTED:    Antimicrobial:  meropenem IVPB 500 milliGRAM(s) IV Intermittent every 12 hours    Cardiovascular:  tamsulosin 0.4 milliGRAM(s) Oral at bedtime    Pulmonary:  ALBUTerol    90 MICROgram(s) HFA Inhaler 1 Puff(s) Inhalation every 6 hours    Hematalogic:  aspirin  chewable 81 milliGRAM(s) Oral daily  clopidogrel Tablet 75 milliGRAM(s) Oral daily  heparin  Injectable 5000 Unit(s) SubCutaneous every 12 hours    Other:  acetaminophen    Suspension 650 milliGRAM(s) Oral every 6 hours PRN  chlorhexidine 4% Liquid 1 Application(s) Topical daily  dexmedetomidine Infusion 0.5 MICROgram(s)/kG/Hr IV Continuous <Continuous>  fentaNYL   Infusion 0.5 MICROgram(s)/kG/Hr IV Continuous <Continuous>  finasteride 5 milliGRAM(s) Oral daily  insulin glargine Injectable (LANTUS) 10 Unit(s) SubCutaneous at bedtime  insulin lispro (HumaLOG) corrective regimen sliding scale   SubCutaneous every 6 hours  methylPREDNISolone sodium succinate Injectable 60 milliGRAM(s) IV Push every 6 hours  pantoprazole  Injectable 40 milliGRAM(s) IV Push daily  pregabalin 50 milliGRAM(s) Oral two times a day  sevelamer carbonate Powder 1600 milliGRAM(s) Oral three times a day with meals    acetaminophen    Suspension 650 milliGRAM(s) Oral every 6 hours PRN  ALBUTerol    90 MICROgram(s) HFA Inhaler 1 Puff(s) Inhalation every 6 hours  aspirin  chewable 81 milliGRAM(s) Oral daily  chlorhexidine 4% Liquid 1 Application(s) Topical daily  clopidogrel Tablet 75 milliGRAM(s) Oral daily  dexmedetomidine Infusion 0.5 MICROgram(s)/kG/Hr IV Continuous <Continuous>  fentaNYL   Infusion 0.5 MICROgram(s)/kG/Hr IV Continuous <Continuous>  finasteride 5 milliGRAM(s) Oral daily  heparin  Injectable 5000 Unit(s) SubCutaneous every 12 hours  insulin glargine Injectable (LANTUS) 10 Unit(s) SubCutaneous at bedtime  insulin lispro (HumaLOG) corrective regimen sliding scale   SubCutaneous every 6 hours  meropenem IVPB 500 milliGRAM(s) IV Intermittent every 12 hours  methylPREDNISolone sodium succinate Injectable 60 milliGRAM(s) IV Push every 6 hours  pantoprazole  Injectable 40 milliGRAM(s) IV Push daily  pregabalin 50 milliGRAM(s) Oral two times a day  sevelamer carbonate Powder 1600 milliGRAM(s) Oral three times a day with meals  tamsulosin 0.4 milliGRAM(s) Oral at bedtime    Drug Dosing Weight  Height (cm): 185.42 (20 Jan 2018 10:22)  Weight (kg): 96 (25 Jan 2018 07:30)  BMI (kg/m2): 27.9 (25 Jan 2018 07:30)  BSA (m2): 2.2 (25 Jan 2018 07:30)    CENTRAL LINE: [ x] YES [ ] NO  LOCATION:   DATE INSERTED:  REMOVE: [ ] YES [ ] NO  EXPLAIN:    BRUNSON: [x ] YES [ ] NO    DATE INSERTED:  REMOVE:  [ ] YES [ ] NO  EXPLAIN:    A-LINE:  [x ] YES [ ] NO  LOCATION:   DATE INSERTED:  REMOVE:  [ ] YES [ ] NO  EXPLAIN:    PMH -reviewed admission note, no change since admission    ICU Vital Signs Last 24 Hrs  T(C): 37.1 (25 Jan 2018 06:00), Max: 38.1 (24 Jan 2018 20:11)  T(F): 98.7 (25 Jan 2018 06:00), Max: 100.5 (24 Jan 2018 20:11)  HR: 95 (25 Jan 2018 08:46) (82 - 104)  BP: 152/72 (25 Jan 2018 08:00) (111/69 - 152/72)  BP(mean): 88 (25 Jan 2018 08:00) (74 - 90)  ABP: 195/77 (25 Jan 2018 07:30) (86/50 - 198/81)  ABP(mean): 114 (25 Jan 2018 07:30) (62 - 118)  RR: 14 (25 Jan 2018 08:00) (7 - 16)  SpO2: 98% (25 Jan 2018 08:46) (96% - 100%)      ABG - ( 25 Jan 2018 04:50 )  pH: 7.28  /  pCO2: 41    /  pO2: 122   / HCO3: 18    / Base Excess: -7.4  /  SaO2: 98                    01-24 @ 07:01  -  01-25 @ 07:00  --------------------------------------------------------  IN: 1563.6 mL / OUT: 55 mL / NET: 1508.6 mL        Mode: AC/ CMV (Assist Control/ Continuous Mandatory Ventilation)  RR (machine): 14  TV (machine): 400  FiO2: 60  PEEP: 10  ITime: 0.9  MAP: 13  PIP: 20      PHYSICAL EXAM:  GENERAL: sedated   HEAD:  [x ]Atraumatic, [ x]Normocephalic  EYES:  clear conjunctiva   ENMT: ETT in place   NECK: [c]Supple, normal appearance, [ x]No JVD; RIJ in place   NERVOUS SYSTEM:  patient is sedated and intubated, On mechanical ventilation   CHEST/LUNG: B/l rales and rhonchi   HEART: [ x]Regular rate and rhythm; [x ]No murmurs, rubs, or gallops  ABDOMEN: Abdomen distension, BS diminished, soft   EXTREMITIES:  [x ]2+ Peripheral Pulses, [ ]No clubbing, cyanosis, or edema  LYMPH: [ x]No lymphadenopathy noted  SKIN: [x ]No rashes or lesions;       LABS:  CBC Full  -  ( 25 Jan 2018 06:12 )  WBC Count : 8.0 K/uL  Hemoglobin : 11.0 g/dL  Hematocrit : 32.1 %  Platelet Count - Automated : 85 K/uL  Mean Cell Volume : 103.9 fl  Mean Cell Hemoglobin : 35.4 pg  Mean Cell Hemoglobin Concentration : 34.1 gm/dL  Auto Neutrophil # : 7.2 K/uL  Auto Lymphocyte # : 0.2 K/uL  Auto Monocyte # : 0.4 K/uL  Auto Eosinophil # : 0.0 K/uL  Auto Basophil # : 0.0 K/uL  Auto Neutrophil % : 90.9 %  Auto Lymphocyte % : 3.1 %  Auto Monocyte % : 5.6 %  Auto Eosinophil % : 0.0 %  Auto Basophil % : 0.5 %    01-25    134<L>  |  98  |  88<H>  ----------------------------<  322<H>  4.6   |  21<L>  |  6.04<H>    Ca    7.4<L>      25 Jan 2018 06:12  Phos  7.5     01-25  Mg     2.2     01-25    TPro  6.1  /  Alb  2.1<L>  /  TBili  1.1  /  DBili  x   /  AST  129<H>  /  ALT  86<H>  /  AlkPhos  165<H>  01-25    PT/INR - ( 23 Jan 2018 12:27 )   PT: 10.0 sec;   INR: 0.92 ratio         PTT - ( 24 Jan 2018 06:26 )  PTT:27.6 sec    Culture Results:   Few Streptococcus pyogenes (Group A)  Normal Respiratory Tonie present (01-22 @ 10:48)      RADIOLOGY & ADDITIONAL STUDIES REVIEWED:    CXR Advanced diffuse right lung field infiltrate and mild left mid to lower   lung field infiltrate again noted.    Chest is similar to January 24.    [ ]GOALS OF CARE DISCUSSION WITH PATIENT/FAMILY/PROXY:    CRITICAL CARE TIME SPENT: 35 minutes culture report.   Beta strep group A culture   Result Value Ref Range    Specimen Description Throat     Culture Micro No beta hemolytic Streptococcus Group A isolated          ASSESSMENT/PLAN:   1. Pharyngitis, unspecified etiology    - Rapid strep screen  - Beta strep group A culture    2. Viral upper respiratory tract infection  Discussed possible influenza; Dad declines testing today.  Will continue to monitor the fever and illness symptoms.  If she has ongoing fever or worsening cold symptoms parents will return to clinic for evaluation.     3. Functional constipation  Encouraged monitoring the stool patterns again and restarting miralax regimen.  Push fluids and fresh fruits and vegetables.  Follow up if ongoing or worsening symptoms.      FOLLOW UP: If not improving or if worsening    Dayanna Tracy PA-C

## 2020-09-13 NOTE — ED PROVIDER NOTE - CONSTITUTIONAL, MLM
EXAM: CT Abdomen and Pelvis WITH contrast  

INDICATION:      Right lower quadrant pain, hernia 

COMPARISON: None.

TECHNIQUE: Abdomen and pelvis were scanned utilizing a multidetector helical

scanner from the lung base to the pubic symphysis after administration of IV

contrast. Coronal and sagittal reformations were obtained. Routine protocol was

performed. Scan was performed when during portal venous phase.    

     IV CONTRAST: 100 mL of Isovue 370

     ORAL CONTRAST: None

            

COMPLICATIONS: None



RADIATION DOSE:

     Total DLP: 458 mGy*cm

     Estimated effective dose: (DLP x 0.015 x size factor) mSv

     CTDIvol has been reviewed. It is below the limits set by the Radiation

Protocol Committee (RPC).

     Dose modulation, iterative reconstruction, and/or weight based adjustment

of the mA/kV was utilized to reduce the radiation dose to as low as reasonably

achievable. 



FINDINGS:



LINES and TUBES: None.



LOWER THORAX: The distal esophagus is filled with fluid and has circumferential

wall thickening. Triple vessel coronary artery calcific atherosclerosis. 



HEPATOBILIARY: Lobular/nodular hepatic surface contour.  Caudate hypertrophy.

Widened intrahepatic fissures.No focal hepatic lesions. No biliary ductal

dilation. 



GALLBLADDER: Tiny calcified gallstone.  No wall thickening.



SPLEEN: No splenomegaly. 



PANCREAS: No focal masses or ductal dilatation.  Mild peripancreatic edema.



ADRENALS: No adrenal nodules    



KIDNEYS/URETERS: Kidneys enhance symmetrically.  No hydronephrosis. There are

scattered too small to characterize hypodensites, likely benign.  No stones.



GI TRACT: Mild fluid distention of small bowel loops proximal to the right

inguinal hernia, The hernia contains distal small bowel and cecum and appendix.

Edema within the mesentery of the herniated loops of bowel in the right

inguinal hernia. No wall thickening.  Mild prominence of rectal mucosal

vessels.  Appendix is normal.



PELVIC ORGANS/BLADDER: Unremarkable.



LYMPH NODES: No lymphadenopathy.



VESSELS: Gastroesophageal varices.  Large collateral ectatic splenic vessels..

Arterial calcifications.



PERITONEUM / RETROPERITONEUM: Trace fluid in the right inguinal hernia.



BONES: Chronic nonhealed nondisplaced right lower lumbar transverse process

fractures.



SOFT TISSUES: Large fat and bowel containing right inguinal hernia. Mild amount

of fluid edema and congestion in the hernia sac.  The hernia contains distal

small bowel and cecum and appendix.    



IMPRESSION: 



1.  Large fat and bowel containing right inguinal hernia, mild edema in the

hernia sac is suggestive of strangulation. Slightly dilated loops of small

bowel proximal to the hernia is suspicious for low-grade obstruction. The

hernia contains distal small bowel and cecum and appendix.



2.  Hepatic cirrhosis and portal hypertension with gastroesophageal and

probably hemorrhoidal varices. Trace perihepatic ascites.



3.  Esophagitis. 



4.  Mild peripancreatic edema can be due to acute interstitial edematous

pancreatitis.



5.  Prostatomegaly.



6.  Cholelithiasis without cystic duct obstruction.



Signed by: Guicho Luis DO on 9/13/2020 10:48 PM - - -

## 2020-11-24 NOTE — PATIENT PROFILE ADULT. - FUNCTIONAL SCREEN CURRENT LEVEL: COMMUNICATION, MLM
(3) unable to speak (not related to language barrier) Vital Signs Last 24 Hrs  T(C): 36.3 (24 Nov 2020 16:57), Max: 37.2 (24 Nov 2020 04:39)  T(F): 97.4 (24 Nov 2020 16:57), Max: 99 (24 Nov 2020 04:39)  HR: 74 (24 Nov 2020 16:57) (68 - 78)  BP: 103/68 (24 Nov 2020 16:57) (103/68 - 113/74)  BP(mean): --  RR: 18 (24 Nov 2020 16:57) (17 - 18)  SpO2: 96% (24 Nov 2020 16:57) (92% - 98%)

## 2021-01-13 NOTE — PHYSICAL THERAPY INITIAL EVALUATION ADULT - SHORT TERM MEMORY, REHAB EVAL
impaired

## 2021-09-11 NOTE — CONSULT NOTE ADULT - SUBJECTIVE AND OBJECTIVE BOX
HPI:  72 M from [facility] h/o CKD, Anxiety disorder, CAD, BPH, Carpel tunnel syndrome, compulsive disorder, DM, OA, HTN, IBS sent for dyspnea for last 3 days. Dyspnea is constant, moderate to severe in intensity, associated with fever, generalized weakness, lethargy, frequent falls, cough, myalgias and URI symptoms. Cough is productive with non bloody yellowish sputum.     Patient denies chest pain, nausea, vomiting, LOC, focal neurological deficit, abdominal pain, hematochezia, current smoking, alcohol abuse and illicit drug use.    ICU Vital Signs Last 24 Hrs  T(C): 38.4 (2018 10:36), Max: 38.4 (2018 10:22)  T(F): 101.2 (2018 10:36), Max: 101.2 (2018 10:22)  HR: 88 (2018 13:01) (78 - 91)  BP: 130/61 (2018 12:44) (82/53 - 130/61)  RR: 25 (2018 12:44) (25 - 25)  SpO2: 99% (2018 13:01) (91% - 99%) (2018 14:30)      PAST MEDICAL & SURGICAL HISTORY:  Sleep apnea: hx of spleep  Hyperlipidemia  CAD (coronary artery disease)  DM (diabetes mellitus)  HTN (hypertension)  S/P foot surgery      doxycycline (Unknown)  penicillin (Unknown)  tetracycline (Unknown)  Valtrex (Unknown)      aspirin enteric coated 81 milliGRAM(s) Oral daily  atorvastatin 40 milliGRAM(s) Oral at bedtime  azithromycin  IVPB      cefTRIAXone   IVPB 1 Gram(s) IV Intermittent every 24 hours  clopidogrel Tablet 75 milliGRAM(s) Oral daily  finasteride 5 milliGRAM(s) Oral daily  heparin  Injectable 5000 Unit(s) SubCutaneous every 12 hours  oseltamivir 30 milliGRAM(s) Oral every 12 hours  pregabalin 50 milliGRAM(s) Oral two times a day  tamsulosin 0.4 milliGRAM(s) Oral at bedtime      MEDICATIONS  (PRN):      Social Hx:    FAMILY HISTORY:        ROS  [   ] UNABLE TO ELICIT     [   ] ALL ROS DONE    General:  [   ] Fever,    [   ] Malaise, [   ] LETHARGIC, [   ]  ANOREXIA    Skin: [   ]  RASH ,     HEENT:  [   ] Sore Throat  [   ] Photophobia	    Chest: [   ] SOB  [   ] Cough     Cardiovascular: [   ] CP	    Gastrointestinal: [   ] Abd pain   [   ] N    [   ] V   [   ] Diarrhea	    Genitourinary: [   ] Polyuria   [   ] Urgency   [   ] Dysuria    [   ]  Hematuria	    Musculoskeletal:  [  ] Back Pain	    Neurological: [  ]Dizziness  [  ]Visual Disturbance  [  ]Headaches        LABS/DIAGNOSTIC TESTS                          14.7   4.2   )-----------( 92       ( 2018 11:15 )             44.5     WBC Count: 4.2 K/uL ( @ 11:15)            134<L>  |  101  |  42<H>  ----------------------------<  261<H>  5.0   |  22  |  2.66<H>    Ca    8.5      2018 11:15    TPro  6.6  /  Alb  3.3<L>  /  TBili  1.0  /  DBili  x   /  AST  77<H>  /  ALT  100<H>  /  AlkPhos  52        CAPILLARY BLOOD GLUCOSE      POCT Blood Glucose.: 246 mg/dL (2018 10:24)      CARDIAC MARKERS ( 2018 14:26 )  0.020 ng/mL / x     / 568 U/L / x     / 1.1 ng/mL          LIVER FUNCTIONS - ( 2018 11:15 )  Alb: 3.3 g/dL / Pro: 6.6 g/dL / ALK PHOS: 52 U/L / ALT: 100 U/L DA / AST: 77 U/L / GGT: x             ABG - ( 2018 12:57 )  pH: 7.32  /  pCO2: 34    /  pO2: 85    / HCO3: 17    / Base Excess: -7.7  /  SaO2: 96                  Urinalysis Basic - ( 2018 12:47 )    Color: Yellow / Appearance: very cloudy / S.020 / pH: x  Gluc: x / Ketone: Trace  / Bili: Moderate / Urobili: 4   Blood: x / Protein: 30 mg/dL / Nitrite: Negative   Leuk Esterase: Trace / RBC: 2-5 /HPF / WBC 3-5 /HPF   Sq Epi: x / Non Sq Epi: Few /HPF / Bacteria: Few /HPF        LACTATE:Lactate, Blood: 7.6 mmol/L ( @ 11:15)        CULTURES:       RADIOLOGY    CXR:    PHYSICAL EXAMINATION:  GENERAL APPEARANCE: NO DISTRESS  HEENT:   PALLOR,   JVD,     NODES, NECK SUPPLE  CVS: S1 +, S2 +,   RS: AEEB,   RALES,   RONCHI  ABD: SOFT, NT, NO, BS  EXT:  PE  SKIN: WARM,   SKELETAL:  ROM   CNS:  AAOX    ,   DEFICITS        ASSESSMENT AND PLAN: HPI:  72 M from [facility] h/o CKD, Anxiety disorder, CAD, BPH, Carpel tunnel syndrome, compulsive disorder, DM, OA, HTN, IBS sent for dyspnea for last 3 days. Dyspnea is constant, moderate to severe in intensity, associated with fever, generalized weakness, lethargy, frequent falls, cough, myalgias and URI symptoms. Cough is productive with non bloody yellowish sputum.   PATIENT WAS NOTED HYPOXIC AT [facility], O2 SAT OF 70'S    Patient denies chest pain, nausea, vomiting, LOC, focal neurological deficit, abdominal pain, hematochezia, current smoking, alcohol abuse and illicit drug use.    ICU Vital Signs Last 24 Hrs  T(C): 38.4 (2018 10:36), Max: 38.4 (2018 10:22)  T(F): 101.2 (2018 10:36), Max: 101.2 (2018 10:22)  HR: 88 (2018 13:01) (78 - 91)  BP: 130/61 (2018 12:44) (82/53 - 130/61)  RR: 25 (2018 12:44) (25 - 25)  SpO2: 99% (2018 13:01) (91% - 99%) (2018 14:30)      PAST MEDICAL & SURGICAL HISTORY:  Sleep apnea: hx of spleep  Hyperlipidemia  CAD (coronary artery disease)  DM (diabetes mellitus)  HTN (hypertension)  S/P foot surgery      doxycycline (Unknown)  penicillin (Unknown)  tetracycline (Unknown)  Valtrex (Unknown)      aspirin enteric coated 81 milliGRAM(s) Oral daily  atorvastatin 40 milliGRAM(s) Oral at bedtime  azithromycin  IVPB      cefTRIAXone   IVPB 1 Gram(s) IV Intermittent every 24 hours  clopidogrel Tablet 75 milliGRAM(s) Oral daily  finasteride 5 milliGRAM(s) Oral daily  heparin  Injectable 5000 Unit(s) SubCutaneous every 12 hours  oseltamivir 30 milliGRAM(s) Oral every 12 hours  pregabalin 50 milliGRAM(s) Oral two times a day  tamsulosin 0.4 milliGRAM(s) Oral at bedtime      MEDICATIONS  (PRN):      Social Hx: LIVES AT [facility]    FAMILY HISTORY: UN AVAILABLE        ROS  [   ] UNABLE TO ELICIT     [  X ] ALL ROS DONE    General:  [   ] Fever,    [   ] Malaise, [   ] LETHARGIC, [   ]  ANOREXIA    Skin: [   ]  RASH ,     HEENT:  [   ] Sore Throat  [   ] Photophobia	    Chest: [   ] SOB  [   ] Cough     Cardiovascular: [   ] CP	    Gastrointestinal: [   ] Abd pain   [   ] N    [   ] V   [   ] Diarrhea	    Genitourinary: [   ] Polyuria   [   ] Urgency   [   ] Dysuria    [   ]  Hematuria	    Musculoskeletal:  [  ] Back Pain	    Neurological: [  ]Dizziness  [  ]Visual Disturbance  [  ]Headaches        LABS/DIAGNOSTIC TESTS                          14.7   4.2   )-----------( 92       ( 2018 11:15 )             44.5     WBC Count: 4.2 K/uL ( @ 11:15)            134<L>  |  101  |  42<H>  ----------------------------<  261<H>  5.0   |  22  |  2.66<H>    Ca    8.5      2018 11:15    TPro  6.6  /  Alb  3.3<L>  /  TBili  1.0  /  DBili  x   /  AST  77<H>  /  ALT  100<H>  /  AlkPhos  52        CAPILLARY BLOOD GLUCOSE      POCT Blood Glucose.: 246 mg/dL (2018 10:24)      CARDIAC MARKERS ( 2018 14:26 )  0.020 ng/mL / x     / 568 U/L / x     / 1.1 ng/mL          LIVER FUNCTIONS - ( 2018 11:15 )  Alb: 3.3 g/dL / Pro: 6.6 g/dL / ALK PHOS: 52 U/L / ALT: 100 U/L DA / AST: 77 U/L / GGT: x             ABG - ( 2018 12:57 )  pH: 7.32  /  pCO2: 34    /  pO2: 85    / HCO3: 17    / Base Excess: -7.7  /  SaO2: 96                  Urinalysis Basic - ( 2018 12:47 )    Color: Yellow / Appearance: very cloudy / S.020 / pH: x  Gluc: x / Ketone: Trace  / Bili: Moderate / Urobili: 4   Blood: x / Protein: 30 mg/dL / Nitrite: Negative   Leuk Esterase: Trace / RBC: 2-5 /HPF / WBC 3-5 /HPF   Sq Epi: x / Non Sq Epi: Few /HPF / Bacteria: Few /HPF        LACTATE:Lactate, Blood: 7.6 mmol/L ( @ 11:15)        CULTURES:       RADIOLOGY    CXR:  < from: Xray Chest 1 View AP- PORTABLE-Urgent (18 @ 11:44) >  EXAM:  XR CHEST PORTABLE URGENT 1V                            PROCEDURE DATE:  2018          INTERPRETATION:  Chest one view    HISTORY: Fever and fatigue    COMPARISON STUDY: None available    Frontal expiratory view of the chest shows the heart to be enlarged in   size. Small pleural effusions are present. The lungs show questionable   retrocardiac infiltrate and there is no evidence of pneumothorax.    IMPRESSION:  Questionable retrocardiac infiltrate. Follow-up study is recommended as  clinically warranted.    < end of copied text >      PHYSICAL EXAMINATION:  GENERAL APPEARANCE: NO DISTRESS  HEENT: NO  PALLOR, NO  JVD,  NO   NODES, NECK SUPPLE  CVS: S1 +, S2 +,   RS: AEEB,  B/L  RALES +,   MILD B/L RONCHI +  ABD: SOFT, NT, NO, BS  EXT: NO  PE  SKIN: WARM,   SKELETAL:  ROM  ACCEPTABLE   CNS:  AAOX 0-1   , NO  DEFICITS        ASSESSMENT AND PLAN:    - INFLUENZA B, ACUTE BRONCHITIS, SUSPECT LEFT LOWER LOBE PNEUMONIA, SEPSIS, HYPOXIC RESPIRATORY FAILURE  ON  TAMIFLU, IV ROCEPHIN, AZITHROMYCIN, ADD NEBS. ICU F/UP IS IN PROGRESS. BIPAP IS STARTED.  - GI AND DVT PROPHYLAXIS  - DR. GARCIA HPI:  72 M from [facility] h/o CKD, Anxiety disorder, CAD, BPH, Carpel tunnel syndrome, compulsive disorder, DM, OA, HTN, IBS sent for dyspnea for last 3 days. Dyspnea is constant, moderate to severe in intensity, associated with fever, generalized weakness, lethargy, frequent falls, cough, myalgias and URI symptoms. Cough is productive with non bloody yellowish sputum.   PATIENT WAS NOTED HYPOXIC AT [facility], O2 SAT OF 70'S    Patient denies chest pain, nausea, vomiting, LOC, focal neurological deficit, abdominal pain, hematochezia, current smoking, alcohol abuse and illicit drug use.    ICU Vital Signs Last 24 Hrs  T(C): 38.4 (2018 10:36), Max: 38.4 (2018 10:22)  T(F): 101.2 (2018 10:36), Max: 101.2 (2018 10:22)  HR: 88 (2018 13:01) (78 - 91)  BP: 130/61 (2018 12:44) (82/53 - 130/61)  RR: 25 (2018 12:44) (25 - 25)  SpO2: 99% (2018 13:01) (91% - 99%) (2018 14:30)      PAST MEDICAL & SURGICAL HISTORY:  Sleep apnea: hx of spleep  Hyperlipidemia  CAD (coronary artery disease)  DM (diabetes mellitus)  HTN (hypertension)  S/P foot surgery      doxycycline (Unknown)  penicillin (Unknown)  tetracycline (Unknown)  Valtrex (Unknown)      aspirin enteric coated 81 milliGRAM(s) Oral daily  atorvastatin 40 milliGRAM(s) Oral at bedtime  azithromycin  IVPB      cefTRIAXone   IVPB 1 Gram(s) IV Intermittent every 24 hours  clopidogrel Tablet 75 milliGRAM(s) Oral daily  finasteride 5 milliGRAM(s) Oral daily  heparin  Injectable 5000 Unit(s) SubCutaneous every 12 hours  oseltamivir 30 milliGRAM(s) Oral every 12 hours  pregabalin 50 milliGRAM(s) Oral two times a day  tamsulosin 0.4 milliGRAM(s) Oral at bedtime      MEDICATIONS  (PRN):      Social Hx: LIVES AT [facility]    FAMILY HISTORY: UN AVAILABLE        ROS  [   ] UNABLE TO ELICIT     [  X ] ALL ROS DONE    General:  [   ] Fever,    [   ] Malaise, [   ] LETHARGIC, [   ]  ANOREXIA    Skin: [   ]  RASH ,     HEENT:  [   ] Sore Throat  [   ] Photophobia	    Chest: [   ] SOB  [   ] Cough     Cardiovascular: [   ] CP	    Gastrointestinal: [   ] Abd pain   [   ] N    [   ] V   [   ] Diarrhea	    Genitourinary: [   ] Polyuria   [   ] Urgency   [   ] Dysuria    [   ]  Hematuria	    Musculoskeletal:  [  ] Back Pain	    Neurological: [  ]Dizziness  [  ]Visual Disturbance  [  ]Headaches        LABS/DIAGNOSTIC TESTS                          14.7   4.2   )-----------( 92       ( 2018 11:15 )             44.5     WBC Count: 4.2 K/uL ( @ 11:15)            134<L>  |  101  |  42<H>  ----------------------------<  261<H>  5.0   |  22  |  2.66<H>    Ca    8.5      2018 11:15    TPro  6.6  /  Alb  3.3<L>  /  TBili  1.0  /  DBili  x   /  AST  77<H>  /  ALT  100<H>  /  AlkPhos  52        CAPILLARY BLOOD GLUCOSE      POCT Blood Glucose.: 246 mg/dL (2018 10:24)      CARDIAC MARKERS ( 2018 14:26 )  0.020 ng/mL / x     / 568 U/L / x     / 1.1 ng/mL          LIVER FUNCTIONS - ( 2018 11:15 )  Alb: 3.3 g/dL / Pro: 6.6 g/dL / ALK PHOS: 52 U/L / ALT: 100 U/L DA / AST: 77 U/L / GGT: x             ABG - ( 2018 12:57 )  pH: 7.32  /  pCO2: 34    /  pO2: 85    / HCO3: 17    / Base Excess: -7.7  /  SaO2: 96                  Urinalysis Basic - ( 2018 12:47 )    Color: Yellow / Appearance: very cloudy / S.020 / pH: x  Gluc: x / Ketone: Trace  / Bili: Moderate / Urobili: 4   Blood: x / Protein: 30 mg/dL / Nitrite: Negative   Leuk Esterase: Trace / RBC: 2-5 /HPF / WBC 3-5 /HPF   Sq Epi: x / Non Sq Epi: Few /HPF / Bacteria: Few /HPF        LACTATE:Lactate, Blood: 7.6 mmol/L ( @ 11:15)        CULTURES:       RADIOLOGY    CXR:  < from: Xray Chest 1 View AP- PORTABLE-Urgent (18 @ 11:44) >  EXAM:  XR CHEST PORTABLE URGENT 1V                            PROCEDURE DATE:  2018          INTERPRETATION:  Chest one view    HISTORY: Fever and fatigue    COMPARISON STUDY: None available    Frontal expiratory view of the chest shows the heart to be enlarged in   size. Small pleural effusions are present. The lungs show questionable   retrocardiac infiltrate and there is no evidence of pneumothorax.    IMPRESSION:  Questionable retrocardiac infiltrate. Follow-up study is recommended as  clinically warranted.    < end of copied text >      PHYSICAL EXAMINATION:  GENERAL APPEARANCE: NO DISTRESS  HEENT: NO  PALLOR, NO  JVD,  NO   NODES, NECK SUPPLE  CVS: S1 +, S2 +,   RS: AEEB,  B/L  RALES +,   MILD B/L RONCHI +  ABD: SOFT, NT, NO, BS  EXT: NO  PE  SKIN: WARM,   SKELETAL:  ROM  ACCEPTABLE   CNS:  AAOX 0-1   , NO  DEFICITS        ASSESSMENT AND PLAN:    - INFLUENZA B, ACUTE BRONCHITIS, SUSPECT LEFT LOWER LOBE PNEUMONIA, SEPSIS, HYPOXIC RESPIRATORY FAILURE  ON  TAMIFLU, IV ROCEPHIN, AZITHROMYCIN, ADD NEBS. ICU F/UP IS IN PROGRESS. BIPAP IS STARTED.  - AMS DUE TO METABOLIC ENCEPHALOPATHY  - ARF / CKD   - GI AND DVT PROPHYLAXIS  - DR. GARCIA Rectal exam deferred

## 2023-01-25 NOTE — H&P ADULT - PROBLEM/PLAN-3
DISPLAY PLAN FREE TEXT Female Pregnancy Counseling Text: Female patients should also be on two forms of birth control while taking this medication and for one month after their last dose. Xerosis Aggressive Treatment: I recommended application of Cetaphil or CeraVe numerous times a day going to bed to all dry areas. I also prescribed a topical steroid for twice daily use. Calculate Months Of Therapy Based On Documented Dosages (Will Hide Months Of Therapy Question)?: No Months Of Therapy Completed: 3 Cheilitis Normal Treatment: I recommended application of Vaseline or Aquaphor numerous times a day (as often as every hour) and before going to bed. Headache Monitoring: I recommended monitoring the headaches for now. There is no evidence of increased intracranial pressure. They were instructed to call if the headaches are worsening. Retinoid Dermatitis Aggressive Treatment: I recommended more frequent application of Cetaphil or CeraVe to the areas of dermatitis. I also prescribed a topical steroid for twice daily use until the dermatitis resolves. Hypertriglyceridemia Monitoring: I explained this is common when taking isotretinoin. If this worsens they will contact us. Cheilitis Aggressive Treatment: I recommended application of Vaseline or Aquaphor numerous times a day (as often as every hour) and before going to bed. I also prescribed a topical steroid for twice daily use. Is Xerosis Present?: Yes - Normal Treatment Use Therapeutic Ranged Or Therapeutic Target: please select Range or Target Hypertriglyceridemia Treatment: I explained this is common when taking isotretinoin. If this worsens they will contact us. They may try OTC ibuprofen. Dosing Month 1 (Required For Cumulative Dosing): 40mg Daily Nosebleeds Normal Treatment: I explained this is common when taking isotretinoin. I recommended saline mist in each nostril multiple times a day. If this worsens they will contact us. Dosing Month 2 (Required For Cumulative Dosing): 60mg Daily Pounds Preamble Statement (Weight Entered In Details Tab): Reported Weight in pounds: Kilograms Preamble Statement (Weight Entered In Details Tab): Reported Weight in kilograms: Lower Range (In Mg/Kg): 120 Detail Level: Zone Female Completion Statement: After discussing her treatment course we decided to discontinue isotretinoin therapy at this time. I explained that she would need to continue her birth control methods for at least one month after the last dosage. She should also get a pregnancy test one month after the last dose. She shouldn't donate blood for one month after the last dose. She should call with any new symptoms of depression. Hypercholesterolemia Monitoring: I explained this is common when taking isotretinoin. We will monitor closely. Next Month's Dosage: Continue Current Dosage Male Completion Statement: After discussing his treatment course we decided to discontinue isotretinoin therapy at this time. He shouldn't donate blood for one month after the last dose. He should call with any new symptoms of depression. Upper Range (In Mg/Kg): 150 What Is The Patient's Gender: Male Xerosis Normal Treatment: I recommended application of Cetaphil or CeraVe numerous times a day and before going to bed to all dry areas. Retinoid Dermatitis Normal Treatment: I recommended more frequent application of Cetaphil or CeraVe to the areas of dermatitis. Xerosis Aggressive Treatment: I recommended application of Cetaphil or CeraVe numerous times a day and before going to bed to all dry areas. I also prescribed a topical steroid for twice daily use. Target Cumulative Dosage (In Mg/Kg): 135 Add Associated Diagnosis When Managing Medication Side Effects: Yes Patient Weight (Optional But Required For Cumulative Dose-Numbers And Decimals Only): 140 Counseling Text: I reviewed the side effect in detail. Patient should get monthly blood tests, not donate blood, not drive at night if vision affected, and not share medication. Xerosis Normal Treatment: I recommended application of Cetaphil or CeraVe numerous times a day going to bed to all dry areas. Weight Units: pounds

## 2023-05-04 NOTE — PATIENT PROFILE ADULT. - STAGE
Anesthesia Volume In Cc: 0.5 Price (Use Numbers Only, No Special Characters Or $): 130 Post-Care Instructions: I reviewed with the patient in detail post-care instructions. Patient is to wear sunprotection, and avoid picking at any of the treated lesions. Pt may apply Vaseline to crusted or scabbing areas. Topical Anesthesia?: 2.5% lidocaine, 2.5% prilocaine Consent: The patient's consent was obtained including but not limited to risks of crusting, scabbing, blistering, scarring, darker or lighter pigmentary change, recurrence, incomplete removal and infection. Detail Level: Detailed Peng Advancement Flap Text: The defect edges were debeveled with a #15 scalpel blade.  Given the location of the defect, shape of the defect and the proximity to free margins a Peng advancement flap was deemed most appropriate.  Using a sterile surgical marker, an appropriate advancement flap was drawn incorporating the defect and placing the expected incisions within the relaxed skin tension lines where possible. The area thus outlined was incised deep to adipose tissue with a #15 scalpel blade.  The skin margins were undermined to an appropriate distance in all directions utilizing iris scissors. suspected deep tissue injury
